# Patient Record
Sex: MALE | Race: WHITE | Employment: OTHER | URBAN - METROPOLITAN AREA
[De-identification: names, ages, dates, MRNs, and addresses within clinical notes are randomized per-mention and may not be internally consistent; named-entity substitution may affect disease eponyms.]

---

## 2017-01-10 RX ORDER — FUROSEMIDE 40 MG/1
TABLET ORAL
Qty: 30 TAB | Refills: 0 | Status: SHIPPED | OUTPATIENT
Start: 2017-01-10 | End: 2017-02-09 | Stop reason: SDUPTHER

## 2017-01-11 ENCOUNTER — HOSPITAL ENCOUNTER (OUTPATIENT)
Dept: LAB | Age: 82
Discharge: HOME | End: 2017-01-11
Payer: MEDICARE

## 2017-01-11 ENCOUNTER — APPOINTMENT (OUTPATIENT)
Dept: INTERNAL MEDICINE CLINIC | Age: 82
End: 2017-01-11

## 2017-01-11 ENCOUNTER — OFFICE VISIT (OUTPATIENT)
Dept: CARDIOLOGY CLINIC | Age: 82
End: 2017-01-11

## 2017-01-11 DIAGNOSIS — R00.0 SINUS TACHYCARDIA: ICD-10-CM

## 2017-01-11 DIAGNOSIS — I49.5 SSS (SICK SINUS SYNDROME) (HCC): ICD-10-CM

## 2017-01-11 DIAGNOSIS — I48.92 ATRIAL FLUTTER, UNSPECIFIED TYPE (HCC): ICD-10-CM

## 2017-01-11 DIAGNOSIS — Z95.0 PACEMAKER: Primary | ICD-10-CM

## 2017-01-11 PROCEDURE — 36415 COLL VENOUS BLD VENIPUNCTURE: CPT

## 2017-01-11 PROCEDURE — 85025 COMPLETE CBC W/AUTO DIFF WBC: CPT

## 2017-01-11 PROCEDURE — 83036 HEMOGLOBIN GLYCOSYLATED A1C: CPT

## 2017-01-11 PROCEDURE — 84550 ASSAY OF BLOOD/URIC ACID: CPT

## 2017-01-11 PROCEDURE — 84153 ASSAY OF PSA TOTAL: CPT

## 2017-01-11 PROCEDURE — 80053 COMPREHEN METABOLIC PANEL: CPT

## 2017-01-11 PROCEDURE — 80061 LIPID PANEL: CPT

## 2017-01-17 ENCOUNTER — OFFICE VISIT (OUTPATIENT)
Dept: INTERNAL MEDICINE CLINIC | Age: 82
End: 2017-01-17

## 2017-01-17 VITALS
HEIGHT: 73 IN | HEART RATE: 72 BPM | TEMPERATURE: 98.2 F | SYSTOLIC BLOOD PRESSURE: 100 MMHG | DIASTOLIC BLOOD PRESSURE: 60 MMHG | BODY MASS INDEX: 32.15 KG/M2 | RESPIRATION RATE: 16 BRPM | OXYGEN SATURATION: 96 % | WEIGHT: 242.6 LBS

## 2017-01-17 DIAGNOSIS — I25.10 CORONARY ARTERY DISEASE INVOLVING NATIVE CORONARY ARTERY OF NATIVE HEART WITHOUT ANGINA PECTORIS: ICD-10-CM

## 2017-01-17 DIAGNOSIS — M10.9 GOUT, UNSPECIFIED CAUSE, UNSPECIFIED CHRONICITY, UNSPECIFIED SITE: ICD-10-CM

## 2017-01-17 DIAGNOSIS — E78.00 HYPERCHOLESTEROLEMIA: ICD-10-CM

## 2017-01-17 DIAGNOSIS — M19.90 SENILE ARTHRITIS: Primary | ICD-10-CM

## 2017-01-17 DIAGNOSIS — I63.9 CEREBROVASCULAR ACCIDENT (CVA), UNSPECIFIED MECHANISM (HCC): ICD-10-CM

## 2017-01-17 DIAGNOSIS — C61 PROSTATE CANCER (HCC): ICD-10-CM

## 2017-01-17 DIAGNOSIS — M17.10 ARTHRITIS OF KNEE: Primary | ICD-10-CM

## 2017-01-17 DIAGNOSIS — J42 CHRONIC BRONCHITIS, UNSPECIFIED CHRONIC BRONCHITIS TYPE (HCC): ICD-10-CM

## 2017-01-17 DIAGNOSIS — R73.9 HYPERGLYCEMIA: ICD-10-CM

## 2017-01-17 RX ORDER — DICLOFENAC SODIUM 10 MG/G
GEL TOPICAL 4 TIMES DAILY
Qty: 1 EACH | Refills: 5 | Status: SHIPPED | OUTPATIENT
Start: 2017-01-17 | End: 2017-03-14 | Stop reason: CLARIF

## 2017-01-17 NOTE — MR AVS SNAPSHOT
Visit Information Date & Time Provider Department Dept. Phone Encounter #  
 1/17/2017  9:00 AM Lora Carvajal, [address] [place] [address],4[address] [phone] [id number] Your Appointments 3/7/2017  9:15 AM  
6 MONTH with Chang Albrecht MD  
[facility] Appt Note: 6 month per Dr. Chino Macedo, no cp  
 [address] [facility]  
[phone] [address] [facility]  
  
    
 4/20/2017  9:15 AM  
PACEMAKER with PACEMAKER, [facility] Appt Note: 6mo bsc dcpm  
 [address] [facility]  
[phone] [address] [facility]  
  
    
 4/20/2017  9:15 AM  
ANNUAL with Karen Craig MD  
[facility] Appt Note: AM APT REQUEST, ANNUAL WITH DEVICE CHECK  
 [address] [facility]  
[phone] [address] [facility] Upcoming Health Maintenance Date Due  
 GLAUCOMA SCREENING Q2Y 9/9/2016 MEDICARE YEARLY EXAM 7/15/2017 DTaP/Tdap/Td series (2 - Td) 7/19/2026 Allergies as of 1/17/2017  Review Complete On: 1/17/2017 By: Rose Marie Winchester Severity Noted Reaction Type Reactions Gadolinium-containing Contrast Media Medium 06/11/2012   Intolerance Other (comments) 1) Moderate to Severe. 2) Post Gadolinium issues as noted: - Diaphoretic, Near Syncope, Nausea and vomiting. Contrast Dye [Iodine]  11/20/2009    Other (comments) Decrease in BP Levaquin [Levofloxacin]  11/20/2009    Other (comments) Joint pain Medrol [Methylprednisolone]  06/18/2015    Vertigo Norvasc [Amlodipine]  03/04/2015    Other (comments) Numbness and tingling Nsaids (Non-steroidal Anti-inflammatory Drug)  03/10/2016    Other (comments) Cough up blood Percocet [Oxycodone-acetaminophen]  12/02/2014    Itching Ranexa [Ranolazine]  04/02/2015    Other (comments) Simvastatin  11/20/2009    Other (comments) Joint pain Zetia [Ezetimibe]  12/11/2009    Myalgia Current Immunizations  Reviewed on 4/14/2016 Name Date Influenza High Dose Vaccine PF 9/29/2016 Influenza Vaccine 10/1/2015, 10/17/2014, 10/21/2013 Influenza Vaccine Split 10/3/2012 Pneumococcal Conjugate (PCV-13) 9/29/2016 Pneumococcal Vaccine (Unspecified Type) 4/14/2015 Td 1/1/2008 Tdap 7/19/2016 Zoster Vaccine, Live 1/1/2012 Not reviewed this visit You Were Diagnosed With   
  
 Codes Comments Arthritis of knee    -  Primary ICD-10-CM: M19.90 ICD-9-CM: 716.96 Chronic bronchitis, unspecified chronic bronchitis type ([facility]     ICD-10-CM: V89 ICD-9-CM: 491.9 Hypercholesterolemia     ICD-10-CM: E78.00 ICD-9-CM: 272.0 Gout, unspecified cause, unspecified chronicity, unspecified site     ICD-10-CM: M10.9 ICD-9-CM: 274.9 Coronary artery disease involving native coronary artery of native heart without angina pectoris     ICD-10-CM: I25.10 ICD-9-CM: 414.01 Prostate cancer [facility])     ICD-10-CM: O35 ICD-9-CM: 769 Cerebrovascular accident (CVA), unspecified mechanism ([facility]     ICD-10-CM: I63.9 ICD-9-CM: 434.91 Vitals BP Pulse Temp Resp Height(growth percentile) Weight(growth percentile) 100/60 (BP 1 Location: Left arm, BP Patient Position: Sitting) 72 98.2 °F (36.8 °C) (Oral) 16 6' 1\" (1.854 m) 242 lb 9.6 oz (110 kg) SpO2 BMI Smoking Status 96% 32.01 kg/m2 Never Smoker Vitals History BMI and BSA Data Body Mass Index Body Surface Area 32.01 kg/m 2 2.38 m 2 Preferred Pharmacy Pharmacy Name Phone [facility] [address], [address] [place] [address] [phone] Your Updated Medication List  
  
   
 This list is accurate as of: 1/17/17  9:48 AM.  Always use your most recent med list.  
  
  
  
  
 albuterol 90 mcg/actuation inhaler Commonly known as:  VENTOLIN HFA Take 2 Puffs by inhalation every four (4) hours as needed for Wheezing. aspirin, buffered 81 mg Tab Take 81 mg by mouth daily. coenzyme q10 10 mg Cap Take 100 mg by mouth every evening. diclofenac 1 % Gel Commonly known as:  VOLTAREN Apply  to affected area four (4) times daily. furosemide 40 mg tablet Commonly known as:  LASIX TAKE ONE TABLET BY MOUTH ONCE DAILY HYDROcodone-acetaminophen 5-325 mg per tablet Commonly known as:  Edin Dolphin Take 1 Tab by mouth every six (6) hours as needed. Max Daily Amount: 4 Tabs. isosorbide mononitrate ER 60 mg CR tablet Commonly known as:  IMDUR  
TAKE ONE TABLET BY MOUTH ONCE DAILY  
  
 metoprolol tartrate 25 mg tablet Commonly known as:  LOPRESSOR Take 0.5 Tabs by mouth two (2) times a day. omeprazole 20 mg capsule Commonly known as:  PRILOSEC  
TAKE ONE CAPSULE BY MOUTH EVERY DAY  
  
 potassium chloride 20 mEq tablet Commonly known as:  K-DUR, KLOR-CON Take 1 Tab by mouth daily. rosuvastatin 10 mg tablet Commonly known as:  CRESTOR Take 1 Tab by mouth every Monday and Thursday. Prescriptions Sent to Pharmacy Refills  
 diclofenac (VOLTAREN) 1 % gel 5 Sig: Apply  to affected area four (4) times daily. Class: Normal  
 Pharmacy: [address] [place] [address] #: [phone] Route: Topical  
  
We Performed the Following PSA DIAGNOSTIC (PROSTATIC SPECIFIC AG) [id number] CPT(R)] To-Do List   
 01/17/2017 Imaging:  XR KNEE RT MAX 2 VWS Please provide this summary of care documentation to your next provider. Your primary care clinician is listed as [place]. If you have any questions after today's visit, please call [phone].

## 2017-01-17 NOTE — PROGRESS NOTES
1. Have you been to the ER, urgent care clinic since your last visit? Hospitalized since your last visit?no    2. Have you seen or consulted any other health care providers outside of the [facility] since your last visit? Include any pap smears or colon screening.  no

## 2017-01-17 NOTE — PROGRESS NOTES
Subjective:    Mr. Cathy Fulton is here for f/u. See A/P. Chief Complaint   Patient presents with    Hypertension    Follow-up     6 month f.u    Knee Pain     pt c.o pains in both knee's; pain today is 7/10    Results     pt want to discuss lab results       He has undergone valve replacement in March 2016 with Dr. Shobha Landon. It is recalled from early 2016 that he saw Dr. Trevor Connelly, for exertional dyspnea, and had cardiac cath. \"My valve is real bad. \"   He has a lot of pain in his R knee, medial aspect, that seems to be overshadowing the pain in L knee. Sees Dr. Bae, and has had corticosteroid injection. Surgery on hold due to heart issues. He continues to have wheezing at times. Uses albuterol prn. Doing better. He has prostate cancer, but doesn't want to treat this. He has had biopsy proven adenocarcinoma of prostate, by Dr. Yael Bishop with [facility]. As we noted before: At this time \"I aint doin' nothin'. If the PSA gets high, then I'll take the shots. \"  From our records, it appears he was referred to [facility] for consideration of radiation therapy. He refused this. Past Medical History: Hyperlipidemia, chronic bronchitis, allergic rhinitis, urolithiasis, hiatal hernia with GERD, ED, Asthma, prostate cancer 2012 (Dr. Yael Bishop). Normal stress echo in 2006; Echo 2010 showed LVH, EF 55-60%. Hemoptysis with negative workup in 2008saw Dr. Silvio Hernandez. EGD in 2009 Dr. Anjana Greenberg showing Hiatal hernia. R lid chronic ptosis noted in 2009 by Dr. Benny Soto, optometrist.  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 Had exertional chest pain in 2012, recurred in 2014--cardiac work up with Dr. Trevor Connelly. His ophthalmologist is Dr. Tristen Ann. Past Surgical History: Hernia in 86. Kidney stone 85. Appy (ruptured) in 79. Resection of melanoma. Colon polypectomy 2011 Dr. Anjana Greenberg.    A flutter ablation 2014  Fernando Menendez 2014 Dr. Contreras Booze repair 2014 Dr. Elkin Maldonado. Aortic valve replacement 2016. Allergies: IVP dye (BP drop). Medications: Prilosec, Albuterol prn, bacterial ointment. Fish oil. Viagra prn  Social History:  . Retired [employer]. Drinks a pint to 1 1/2 pints hard liquor daily, Tobacco, or drugs. Family History: F CVAs. Brother valve disease. Review of systems: Unremarkable except as noted above. Objective:    Visit Vitals    /60 (BP 1 Location: Left arm, BP Patient Position: Sitting)    Pulse 72    Temp 98.2 °F (36.8 °C) (Oral)    Resp 16    Ht 6' 1\" (1.854 m)    Wt 242 lb 9.6 oz (110 kg)    SpO2 96%    BMI 32.01 kg/m2   . General appearance: Pleasant, obese elderly male in NAD. HEENT: PERRLA. EOMI. He has a mild right facial droop. OP moist, pink. Neck: Supple with No LAD. Lungs: CTAB. No wheezes. No rales. Heart: RRR. Pacemaker incision healing well. Abdomen: S, NT, ND, BS +. Extremities: Warm. No C/C/E. Neuro: Sensation intact. Lab Results   Component Value Date/Time    Cholesterol, total 143 01/11/2017 08:11 AM    HDL Cholesterol 47 01/11/2017 08:11 AM    LDL, calculated 70 01/11/2017 08:11 AM    VLDL, calculated 26 01/11/2017 08:11 AM    Triglyceride 132 01/11/2017 08:11 AM    CHOL/HDL Ratio 3.6 09/17/2010 08:27 AM     Lab Results   Component Value Date/Time    Hemoglobin A1c 6.4 01/11/2017 08:11 AM         Assessment / Plan:   1. R knee pain: New complaint. Check XR. Continue tylenol. Try diclofenac gel. 2. Aortic stenosis: s/p AVR. Doing well. 3. CAD: As per Dr. Michele Salmeron  4. A flutter, SSS: now with pacemaker. As per Cardiology. 5. Asthma: Stable. Uses albuterol prn; Less than daily currently. 6. Prostate Cancer: Surveillance for now. Pt willing to do hormonal treatments if PSA rises to higher levels. 7. Problem drinking: He has been cutting back. 8. Hyperlipidemia:  Not at goal, but options limited.   Did not tolerate simvastatin or zetia. Now on fish oil. Will recheck lipids and CMP. Continue pulse dosing of crestor. 9. GERD:  Controlled on prn prilosec; Pt. may continue this. 10. Chronic bronchitis:  Stable. Was seeing Dr. Herbert Malik at one time. 11. ED: Not discussed. 12. All rhinitis: Stable. 13. Possible TIA/Diplopia/Amaurosis fugax: No recent episode. Work up as above. 14. Gout: no recent flares. 15. Borderline DM. Recheck next visit. 16. Hernia: s/p repair by Dr. Tiffany Figueroa. 17. Obesity: Watch diet. More exercise as able (limited now by postsurgical recovery). 18. Prediabetes: Watch labs. 19. Noncompliance. Follow up in 6 months.

## 2017-01-18 ENCOUNTER — TELEPHONE (OUTPATIENT)
Dept: INTERNAL MEDICINE CLINIC | Age: 82
End: 2017-01-18

## 2017-01-18 RX ORDER — ISOSORBIDE MONONITRATE 60 MG/1
TABLET, EXTENDED RELEASE ORAL
Qty: 30 TAB | Refills: 0 | Status: SHIPPED | OUTPATIENT
Start: 2017-01-18 | End: 2017-02-10 | Stop reason: SDUPTHER

## 2017-01-18 NOTE — TELEPHONE ENCOUNTER
Spoke with patient's spouse, Bhumi Keith, which was verified on HIPPA form. Discussed right knee xrays in which was negative for fracture, dislocation, and joint effusion. Only arthritis noted.

## 2017-01-20 ENCOUNTER — TELEPHONE (OUTPATIENT)
Dept: INTERNAL MEDICINE CLINIC | Age: 82
End: 2017-01-20

## 2017-01-28 DIAGNOSIS — E78.00 HYPERCHOLESTEROLEMIA: ICD-10-CM

## 2017-01-30 RX ORDER — ROSUVASTATIN CALCIUM 10 MG/1
TABLET, FILM COATED ORAL
Qty: 24 TAB | Refills: 0 | Status: SHIPPED | OUTPATIENT
Start: 2017-01-30 | End: 2017-02-07 | Stop reason: SDUPTHER

## 2017-02-01 ENCOUNTER — TELEPHONE (OUTPATIENT)
Dept: INTERNAL MEDICINE CLINIC | Age: 82
End: 2017-02-01

## 2017-02-01 RX ORDER — HYDROCODONE BITARTRATE AND ACETAMINOPHEN 5; 325 MG/1; MG/1
1 TABLET ORAL
Qty: 30 TAB | Refills: 0 | Status: SHIPPED | OUTPATIENT
Start: 2017-02-01 | End: 2017-03-14 | Stop reason: CLARIF

## 2017-02-01 NOTE — TELEPHONE ENCOUNTER
Requested Prescriptions     Pending Prescriptions Disp Refills    HYDROcodone-acetaminophen (NORCO) 5-325 mg per tablet 30 Tab 0     Sig: Take 1 Tab by mouth every six (6) hours as needed. Max Daily Amount: 4 Tabs.      Last OV-1/17/17  Next OV-n/s  Med refilled-11/14/16

## 2017-02-01 NOTE — TELEPHONE ENCOUNTER
Spoke to patient and let him know that his prescription is ready for . Let pt know that it is at the .

## 2017-02-01 NOTE — TELEPHONE ENCOUNTER
Pt called and states that he is needing a refill on this medication. Please call pt when hard copy is ready for p/u.

## 2017-02-07 DIAGNOSIS — E78.00 HYPERCHOLESTEROLEMIA: ICD-10-CM

## 2017-02-07 RX ORDER — ROSUVASTATIN CALCIUM 10 MG/1
TABLET, COATED ORAL
Qty: 24 TAB | Refills: 3 | Status: SHIPPED | OUTPATIENT
Start: 2017-02-07 | End: 2017-03-14

## 2017-02-07 RX ORDER — OMEPRAZOLE 20 MG/1
CAPSULE, DELAYED RELEASE ORAL
Qty: 90 CAP | Refills: 3 | Status: SHIPPED | OUTPATIENT
Start: 2017-02-07 | End: 2017-10-24 | Stop reason: SDUPTHER

## 2017-02-09 DIAGNOSIS — I25.10 CORONARY ARTERY DISEASE INVOLVING NATIVE CORONARY ARTERY OF NATIVE HEART WITHOUT ANGINA PECTORIS: Primary | ICD-10-CM

## 2017-02-09 RX ORDER — POTASSIUM CHLORIDE 20 MEQ/1
20 TABLET, EXTENDED RELEASE ORAL DAILY
Qty: 90 TAB | Refills: 3 | Status: SHIPPED | OUTPATIENT
Start: 2017-02-09 | End: 2017-02-10 | Stop reason: SDUPTHER

## 2017-02-09 RX ORDER — FUROSEMIDE 40 MG/1
TABLET ORAL
Qty: 90 TAB | Refills: 3 | Status: SHIPPED | OUTPATIENT
Start: 2017-02-09 | End: 2017-02-10 | Stop reason: SDUPTHER

## 2017-02-10 ENCOUNTER — TELEPHONE (OUTPATIENT)
Dept: CARDIOLOGY CLINIC | Age: 82
End: 2017-02-10

## 2017-02-10 DIAGNOSIS — I25.10 CORONARY ARTERY DISEASE INVOLVING NATIVE CORONARY ARTERY OF NATIVE HEART WITHOUT ANGINA PECTORIS: ICD-10-CM

## 2017-02-10 RX ORDER — METOPROLOL TARTRATE 25 MG/1
12.5 TABLET, FILM COATED ORAL 2 TIMES DAILY
Qty: 180 TAB | Refills: 1 | Status: SHIPPED | COMMUNITY
Start: 2017-02-10 | End: 2017-02-16 | Stop reason: SDUPTHER

## 2017-02-10 RX ORDER — ISOSORBIDE MONONITRATE 60 MG/1
TABLET, EXTENDED RELEASE ORAL
Qty: 90 TAB | Refills: 3 | Status: SHIPPED | COMMUNITY
Start: 2017-02-10 | End: 2017-08-21 | Stop reason: SDUPTHER

## 2017-02-10 RX ORDER — FUROSEMIDE 40 MG/1
TABLET ORAL
Qty: 90 TAB | Refills: 3 | Status: SHIPPED | COMMUNITY
Start: 2017-02-10 | End: 2017-08-21 | Stop reason: SDUPTHER

## 2017-02-10 RX ORDER — POTASSIUM CHLORIDE 20 MEQ/1
20 TABLET, EXTENDED RELEASE ORAL DAILY
Qty: 90 TAB | Refills: 3 | Status: SHIPPED | COMMUNITY
Start: 2017-02-10 | End: 2017-08-21 | Stop reason: SDUPTHER

## 2017-02-10 NOTE — TELEPHONE ENCOUNTER
Called pt,verified pt with two pt identifiers,asked pt if he wants his medications refilled thru [employer]. He said he did, I told him I would get those sent in for him. He verbalized that he understood everything.

## 2017-02-13 RX ORDER — ISOSORBIDE MONONITRATE 60 MG/1
TABLET, EXTENDED RELEASE ORAL
Qty: 30 TAB | Refills: 0 | Status: SHIPPED | OUTPATIENT
Start: 2017-02-13 | End: 2017-03-07 | Stop reason: SDUPTHER

## 2017-02-16 ENCOUNTER — TELEPHONE (OUTPATIENT)
Dept: CARDIOLOGY CLINIC | Age: 82
End: 2017-02-16

## 2017-02-16 RX ORDER — METOPROLOL TARTRATE 25 MG/1
TABLET, FILM COATED ORAL
Qty: 60 TAB | Refills: 0 | Status: SHIPPED | OUTPATIENT
Start: 2017-02-16 | End: 2017-08-21 | Stop reason: SDUPTHER

## 2017-02-16 RX ORDER — METOPROLOL TARTRATE 25 MG/1
12.5 TABLET, FILM COATED ORAL 2 TIMES DAILY
Qty: 30 TAB | Refills: 0 | Status: SHIPPED | OUTPATIENT
Start: 2017-02-16 | End: 2017-02-16 | Stop reason: SDUPTHER

## 2017-02-16 NOTE — TELEPHONE ENCOUNTER
Joana with [facility] [phone] needs refill on metoprolol 25 mg Please call in .thanks Lori Darby

## 2017-03-07 ENCOUNTER — OFFICE VISIT (OUTPATIENT)
Dept: CARDIOLOGY CLINIC | Age: 82
End: 2017-03-07

## 2017-03-07 ENCOUNTER — TELEPHONE (OUTPATIENT)
Dept: CARDIOLOGY CLINIC | Age: 82
End: 2017-03-07

## 2017-03-07 VITALS
HEIGHT: 73 IN | OXYGEN SATURATION: 94 % | DIASTOLIC BLOOD PRESSURE: 70 MMHG | WEIGHT: 241.56 LBS | SYSTOLIC BLOOD PRESSURE: 110 MMHG | HEART RATE: 70 BPM | BODY MASS INDEX: 32.01 KG/M2 | RESPIRATION RATE: 16 BRPM

## 2017-03-07 DIAGNOSIS — I25.10 CORONARY ARTERY DISEASE INVOLVING NATIVE CORONARY ARTERY OF NATIVE HEART WITHOUT ANGINA PECTORIS: ICD-10-CM

## 2017-03-07 DIAGNOSIS — I49.5 SSS (SICK SINUS SYNDROME) (HCC): ICD-10-CM

## 2017-03-07 DIAGNOSIS — I25.10 ASHD (ARTERIOSCLEROTIC HEART DISEASE): ICD-10-CM

## 2017-03-07 DIAGNOSIS — I35.0 AORTIC VALVE STENOSIS, UNSPECIFIED ETIOLOGY: ICD-10-CM

## 2017-03-07 DIAGNOSIS — Z01.810 PRE-OPERATIVE CARDIOVASCULAR EXAMINATION: ICD-10-CM

## 2017-03-07 DIAGNOSIS — E78.00 HYPERCHOLESTEROLEMIA: Primary | ICD-10-CM

## 2017-03-07 RX ORDER — TRAMADOL HYDROCHLORIDE 50 MG/1
TABLET ORAL
COMMUNITY
Start: 2017-02-13 | End: 2017-03-17

## 2017-03-07 NOTE — PROGRESS NOTES
NAME:  Selena Sanchez    :   1935   MRN:   [id number]   PCP:  Thomas Chaudhry MD           Subjective: The patient is a 81y.o. year old male  who presents for cardiac clearance for surgery. Surgical procedures include : an orthopedic procedure (right knee arthroscopy). Since the last visit, patient reports no change in exercise tolerance, chest pain, edema, medication intolerance, palpitations, shortness of breath, PND/orthopnea wheezing, sputum, syncope, dizziness or light headedness. Doing well. Past Medical History:   Diagnosis Date    Adverse effect of anesthesia     difficult with waking up     Allergic rhinitis     Arthritis     Asthma     Atrial flutter ([address] 2014    CAD (coronary artery disease)     Dr. Miguel Cherry    Calculus of kidney     Chest pain 2006    Normal stress echo    Chronic bronchitis     Chronic bronchitis ([facility] 10/3/2012    Chronic pain     lt knee    Erectile dysfunction     GERD (gastroesophageal reflux disease)     Gout 2013    Hemoptysis     Work up by Dr. Dacia Zamora; Negative    Hiatal hernia     Hypercholesterolemia     Hypertension     Melanoma ([facility]     back    Pacemaker     Dr. Megan Jiménez [facility]) 2012    Prostate cancer ([facility]     S/P ablation of atrial flutter 2014    Stroke [facility]) 2009     tia no residual problems    Unspecified sleep apnea     does not use cpap        ICD-10-CM ICD-9-CM    1. Hypercholesterolemia E78.00 272.0 AMB POC EKG ROUTINE W/ 12 LEADS, INTER & REP      Social History   Substance Use Topics    Smoking status: Never Smoker    Smokeless tobacco: Never Used    Alcohol use 1.2 oz/week     2 Standard drinks or equivalent per week      Comment: rare      Family History   Problem Relation Age of Onset    Stroke Father     Heart Disease Mother         Review of Systems  General: Pt denies excessive weight gain or loss.  Pt is able to conduct ADL's  HEENT: Denies blurred vision, headaches, epistaxis and difficulty swallowing. Respiratory: Denies shortness of breath, POLANCO, wheezing or stridor. Cardiovascular: Denies precordial pain, palpitations, edema or PND  Gastrointestinal: Denies poor appetite, indigestion, abdominal pain or blood in stool  . Objective:       Vitals:    03/07/17 0915 03/07/17 0927   BP: 112/70 110/70   Pulse: 70    Resp: 16    SpO2: 94%    Weight: 241 lb 9 oz (109.6 kg)    Height: 6' 1\" (1.854 m)     Body mass index is 31.87 kg/(m^2). General PE  Mental Status - Alert. General Appearance - Not in acute distress. Chest and Lung Exam   Inspection: Accessory muscles - No use of accessory muscles in breathing. Auscultation:   Breath sounds: - Normal.    Cardiovascular   Inspection: Jugular vein - Bilateral - Inspection Normal.  Palpation/Percussion:   Apical Impulse: - Normal.  Auscultation: Rhythm - Regular. Heart Sounds - S1 WNL and S2 WNL. No S3 or S4. Murmurs & Other Heart Sounds: Auscultation of the heart reveals - No Murmurs. Peripheral Vascular   Upper Extremity: Inspection - Bilateral - No Cyanotic nailbeds or Digital clubbing. Lower Extremity:   Palpation: Edema - Bilateral - tr R>L edema        Data Review:     EKG -  Electronic atrial pacemaker   -Left axis -anterior fascicular block.    -Old anteroseptal infarct.    -  Nonspecific T-abnormality.        Results for orders placed or performed during the hospital encounter of 03/16/16   EKG, 12 LEAD, INITIAL   Result Value Ref Range    Ventricular Rate 60 BPM    Atrial Rate 60 BPM    P-R Interval 208 ms    QRS Duration 172 ms    Q-T Interval 528 ms    QTC Calculation (Bezet) 528 ms    Calculated P Axis 71 degrees    Calculated R Axis -72 degrees    Calculated T Axis 37 degrees    Diagnosis       Normal sinus rhythm  Right bundle branch block  Left anterior fascicular block  ** Bifascicular block **  Left ventricular hypertrophy with QRS widening  When compared with ECG of 10-MAR-2016 14:56,  Sinus rhythm has replaced Electronic atrial pacemaker  Right bundle branch block is now present  Minimal criteria for Septal infarct are no longer present  Confirmed by Jose Sesay ([id number]) on 3/16/2016 1:57:21 PM     Results for orders placed or performed in visit on 12/04/14   CARDIAC HOLTER MONITOR, 24 HOURS    Narrative    ECG Monitor/24 hours, Complete    Reason for Holter Monitor   A-FLUTTER    Heartbeat    Slowest 48  Average 63  Fastest  97      Results:   Underlying Rhythm: Normal sinus rhythm      Atrial Arrhythmias: premature atrial contractions; frequent             AV Conduction: normal    Ventricular Arrhythmias: premature ventricular contractions; rare    ST Segment Analysis:normal     Symptom Correlation:  None reported    Comment:   Sinus rhythm with occasional atrial ectopy - morning bradycardia  of 48 bpm and highest heart rate of 97 bpm. Clinical correlation  advised. Nic Bill MD, [facility], [facility]                   Allergies reviewed  Allergies   Allergen Reactions    Gadolinium-Containing Contrast Media Other (comments)     1) Moderate to Severe. 2) Post Gadolinium issues as noted:   - Diaphoretic, Near Syncope, Nausea and vomiting.     Contrast Dye [Iodine] Other (comments)     Decrease in BP    Iodinated Contrast Media - Oral And Iv Dye Other (comments)    Levaquin [Levofloxacin] Other (comments)     Joint pain    Medrol [Methylprednisolone] Vertigo    Norvasc [Amlodipine] Other (comments)     Numbness and tingling    Nsaids (Non-Steroidal Anti-Inflammatory Drug) Other (comments)     Cough up blood      Percocet [Oxycodone-Acetaminophen] Itching    Ranexa [Ranolazine] Other (comments)    Simvastatin Other (comments)     Joint pain    Zetia [Ezetimibe] Myalgia       Medications reviewed  Current Outpatient Prescriptions   Medication Sig    traMADol (ULTRAM) 50 mg tablet     metoprolol tartrate (LOPRESSOR) 25 mg tablet TAKE ONE-HALF TABLET BY MOUTH TWICE DAILY    furosemide (LASIX) 40 mg tablet TAKE ONE TABLET BY MOUTH ONCE DAILY    potassium chloride (K-DUR, KLOR-CON) 20 mEq tablet Take 1 Tab by mouth daily.  isosorbide mononitrate ER (IMDUR) 60 mg CR tablet TAKE ONE TABLET BY MOUTH ONCE DAILY    omeprazole (PRILOSEC) 20 mg capsule TAKE ONE CAPSULE BY MOUTH EVERY DAY    rosuvastatin (CRESTOR) 10 mg tablet TAKE 1 TABLET BY MOUTH EVERY MONDAY AND THURSDAY    coenzyme q10 10 mg cap Take 100 mg by mouth every evening.  albuterol (VENTOLIN HFA) 90 mcg/actuation inhaler Take 2 Puffs by inhalation every four (4) hours as needed for Wheezing.  Aspirin, Buffered 81 mg tab Take 81 mg by mouth daily.  HYDROcodone-acetaminophen (NORCO) 5-325 mg per tablet Take 1 Tab by mouth every six (6) hours as needed. Max Daily Amount: 4 Tabs.  diclofenac (VOLTAREN) 1 % gel Apply  to affected area four (4) times daily. No current facility-administered medications for this visit. Assessment:       ICD-10-CM ICD-9-CM    1.  Hypercholesterolemia E78.00 272.0 AMB POC EKG ROUTINE W/ 12 LEADS, INTER & REP        Orders Placed This Encounter    AMB POC EKG ROUTINE W/ 12 LEADS, INTER & REP     Order Specific Question:   Reason for Exam:     Answer:   routine    traMADol (ULTRAM) 50 mg tablet       Patient Active Problem List   Diagnosis Code    Hypercholesterolemia E78.00    Stroke ([facility] I63.9    Asthma J45.909    Calculus of kidney N20.0    Erectile dysfunction N52.9    GERD (gastroesophageal reflux disease) K21.9    Pro[facility] cancer ([facility] C61    Chronic bronchitis ([facility]) J42    Allergic rhinitis J30.9    Gout M10.9    H/O TIA (transient ischemic attack) and stroke Z86.73    Chest pain R07.9    Sinus tachycardia R00.0    Atrial flutter ([facility]) I48.92    S/P ablation of atrial flutter Z98.890, Z86.79    Aortic stenosis I35.0    SOB (shortness of breath) R06.02    SSS (sick sinus syndrome) ([facility]) I49.5    MARYLOU (obstructive sleep apnea) G47.33    Pacemaker Z95.0    CAD (coronary artery disease) I25.10    ASHD (arteriosclerotic heart disease) I25.10    S/P AVR (aortic valve replacement) Z95.2    Irregular heartbeat I49.9           Plan:     Patient presents for cardiac clearance for surgery. Follow up in 6 mo.       1. S/p bioprosthetic aortic valve replacement. Doing very well. No significant CAD at time pre-op cardiac cath. Medical treatment.     2. Hyperlipidemia: on statin. Last FLP noted.     3. S/p a. Flutter ablation, sss, s/p PPM: f/u with EP.  12% V paced, no events per remote check 2.17.   4. MARYLOU: not using CPAP.    5. Clearance: stable cardiac wise - he is CLEARED for surgery. BRYAN Mancia       Pt seen and examined in details. See NP A&P for details. He will be at low CV risk during non cardiac surgery. No further cardiac work up is needed for further risk stratification or modification.      Stanislav Sharma MD

## 2017-03-07 NOTE — TELEPHONE ENCOUNTER
Faxed note stating that pt is cleared for low risk for non cardiac surgery per .  Faxed to [facility] at [phone]

## 2017-03-07 NOTE — MR AVS SNAPSHOT
Visit Information Date & Time Provider Department Dept. Phone Encounter #  
 3/7/2017  9:15 AM Elizabet Goldberg, 1024 [facility] [phone] Follow-up Instructions Return in about 6 months (around 9/7/2017). Your Appointments 4/20/2017  9:15 AM  
PACEMAKER with PACEMAKER, [facility] [address] [facility]) Appt Note: 6mo bsc dcpm  
 [address]  
[phone] [address]  
  
    
 4/20/2017  9:15 AM  
ANNUAL with Marisol Morse MD  
[facility] [address] [facility]) Appt Note: AM APT REQUEST, ANNUAL WITH DEVICE CHECK  
 [address]  
[phone] [address]  
  
    
  
 4/12/2017  8:00 AM  
REMOTE OFFICE VISIT with [facility]) Appt Note: NOT AN OFFICE VISIT - REMOTE BSC PM  
 [address]  
[phone] [address] Upcoming Health Maintenance Date Due  
 GLAUCOMA SCREENING Q2Y 9/9/2016 MEDICARE YEARLY EXAM 7/15/2017 DTaP/Tdap/Td series (2 - Td) 7/19/2026 Allergies as of 3/7/2017  Review Complete On: 3/7/2017 By: Elizabet Goldberg MD  
  
 Severity Noted Reaction Type Reactions Gadolinium-containing Contrast Media Medium 06/11/2012   Intolerance Other (comments) 1) Moderate to Severe. 2) Post Gadolinium issues as noted: - Diaphoretic, Near Syncope, Nausea and vomiting. Contrast Dye [Iodine]  11/20/2009    Other (comments) Decrease in BP Iodinated Contrast Media - Oral And Iv Dye  03/07/2017    Other (comments) Levaquin [Levofloxacin]  11/20/2009    Other (comments) Joint pain Medrol [Methylprednisolone]  06/18/2015    Vertigo Norvasc [Amlodipine]  03/04/2015    Other (comments) Numbness and tingling Nsaids (Non-steroidal Anti-inflammatory Drug)  03/10/2016    Other (comments) Cough up blood Percocet [Oxycodone-acetaminophen]  12/02/2014    Itching Ranexa [Ranolazine]  04/02/2015    Other (comments) Simvastatin  11/20/2009    Other (comments) Joint pain Zetia [Ezetimibe]  12/11/2009    Myalgia Current Immunizations  Reviewed on 4/14/2016 Name Date Influenza High Dose Vaccine PF 9/29/2016 Influenza Vaccine 10/1/2015, 10/17/2014, 10/21/2013 Influenza Vaccine Split 10/3/2012 Pneumococcal Conjugate (PCV-13) 9/29/2016 Pneumococcal Vaccine (Unspecified Type) 4/14/2015 Td 1/1/2008 Tdap 7/19/2016 Zoster Vaccine, Live 1/1/2012 Not reviewed this visit You Were Diagnosed With   
  
 Codes Comments Hypercholesterolemia    -  Primary ICD-10-CM: E78.00 ICD-9-CM: 272.0 Aortic valve stenosis, unspecified etiology     ICD-10-CM: I35.0 ICD-9-CM: 424.1 ASHD (arteriosclerotic heart disease)     ICD-10-CM: I25.10 ICD-9-CM: 414.00 Coronary artery disease involving native coronary artery of native heart without angina pectoris     ICD-10-CM: I25.10 ICD-9-CM: 414.01   
 SSS (sick sinus syndrome) (HCC)     ICD-10-CM: I49.5 ICD-9-CM: 427.81 Pre-operative cardiovascular examination     ICD-10-CM: Z01.810 ICD-9-CM: V72.81 Vitals BP Pulse Resp Height(growth percentile) Weight(growth percentile) SpO2  
 110/70 (BP 1 Location: Left arm, BP Patient Position: Sitting) 70 16 6' 1\" (1.854 m) 241 lb 9 oz (109.6 kg) 94% BMI Smoking Status 31.87 kg/m2 Never Smoker Vitals History BMI and BSA Data Body Mass Index Body Surface Area  
 31.87 kg/m 2 2.38 m 2 Preferred Pharmacy Pharmacy Name Phone [facility] [address], [address] [place] [address] [phone] Your Updated Medication List  
  
   
 This list is accurate as of: 3/7/17  9:48 AM.  Always use your most recent med list.  
  
  
  
  
 albuterol 90 mcg/actuation inhaler Commonly known as:  VENTOLIN HFA Take 2 Puffs by inhalation every four (4) hours as needed for Wheezing. aspirin, buffered 81 mg Tab Take 81 mg by mouth daily. coenzyme q10 10 mg Cap Take 100 mg by mouth every evening. diclofenac 1 % Gel Commonly known as:  VOLTAREN Apply  to affected area four (4) times daily. furosemide 40 mg tablet Commonly known as:  LASIX TAKE ONE TABLET BY MOUTH ONCE DAILY HYDROcodone-acetaminophen 5-325 mg per tablet Commonly known as:  Sangita Rodney Take 1 Tab by mouth every six (6) hours as needed. Max Daily Amount: 4 Tabs. isosorbide mononitrate ER 60 mg CR tablet Commonly known as:  IMDUR  
TAKE ONE TABLET BY MOUTH ONCE DAILY  
  
 metoprolol tartrate 25 mg tablet Commonly known as:  LOPRESSOR  
TAKE ONE-HALF TABLET BY MOUTH TWICE DAILY  
  
 omeprazole 20 mg capsule Commonly known as:  PRILOSEC  
TAKE ONE CAPSULE BY MOUTH EVERY DAY  
  
 potassium chloride 20 mEq tablet Commonly known as:  K-DUR, KLOR-CON Take 1 Tab by mouth daily. rosuvastatin 10 mg tablet Commonly known as:  CRESTOR  
TAKE 1 TABLET BY MOUTH EVERY MONDAY AND THURSDAY  
  
 traMADol 50 mg tablet Commonly known as:  ULTRAM  
  
  
  
  
We Performed the Following AMB POC EKG ROUTINE W/ 12 LEADS, INTER & REP [26365 CPT(R)] Follow-up Instructions Return in about 6 months (around 9/7/2017). Please provide this summary of care documentation to your next provider. Your primary care clinician is listed as [place]. If you have any questions after today's visit, please call [phone].

## 2017-03-14 ENCOUNTER — HOSPITAL ENCOUNTER (OUTPATIENT)
Dept: PREADMISSION TESTING | Age: 82
Discharge: HOME | End: 2017-03-14
Attending: ORTHOPAEDIC SURGERY
Payer: MEDICARE

## 2017-03-14 VITALS
BODY MASS INDEX: 31.68 KG/M2 | DIASTOLIC BLOOD PRESSURE: 71 MMHG | RESPIRATION RATE: 20 BRPM | SYSTOLIC BLOOD PRESSURE: 119 MMHG | OXYGEN SATURATION: 94 % | WEIGHT: 239 LBS | HEIGHT: 73 IN | TEMPERATURE: 97.6 F | HEART RATE: 69 BPM

## 2017-03-14 LAB
ABO + RH BLD: NORMAL
ALBUMIN SERPL BCP-MCNC: 3.6 G/DL (ref 3.5–5)
ALBUMIN/GLOB SERPL: 1 {RATIO} (ref 1.1–2.2)
ALP SERPL-CCNC: 77 U/L (ref 45–117)
ALT SERPL-CCNC: 23 U/L (ref 12–78)
ANION GAP BLD CALC-SCNC: 8 MMOL/L (ref 5–15)
APPEARANCE UR: CLEAR
AST SERPL W P-5'-P-CCNC: 19 U/L (ref 15–37)
BACTERIA URNS QL MICRO: NEGATIVE /HPF
BILIRUB SERPL-MCNC: 0.5 MG/DL (ref 0.2–1)
BILIRUB UR QL: NEGATIVE
BLOOD GROUP ANTIBODIES SERPL: NORMAL
BUN SERPL-MCNC: 22 MG/DL (ref 6–20)
BUN/CREAT SERPL: 18 (ref 12–20)
CALCIUM SERPL-MCNC: 8.6 MG/DL (ref 8.5–10.1)
CHLORIDE SERPL-SCNC: 104 MMOL/L (ref 97–108)
CO2 SERPL-SCNC: 27 MMOL/L (ref 21–32)
COLOR UR: ABNORMAL
CREAT SERPL-MCNC: 1.21 MG/DL (ref 0.7–1.3)
EPITH CASTS URNS QL MICRO: ABNORMAL /LPF
ERYTHROCYTE [DISTWIDTH] IN BLOOD BY AUTOMATED COUNT: 12.7 % (ref 11.5–14.5)
EST. AVERAGE GLUCOSE BLD GHB EST-MCNC: 140 MG/DL
GLOBULIN SER CALC-MCNC: 3.7 G/DL (ref 2–4)
GLUCOSE SERPL-MCNC: 100 MG/DL (ref 65–100)
GLUCOSE UR STRIP.AUTO-MCNC: NEGATIVE MG/DL
HBA1C MFR BLD: 6.5 % (ref 4.2–6.3)
HCT VFR BLD AUTO: 43.5 % (ref 36.6–50.3)
HGB BLD-MCNC: 14.5 G/DL (ref 12.1–17)
HGB UR QL STRIP: NEGATIVE
INR PPP: 1.1 (ref 0.9–1.1)
KETONES UR QL STRIP.AUTO: NEGATIVE MG/DL
LEUKOCYTE ESTERASE UR QL STRIP.AUTO: ABNORMAL
MCH RBC QN AUTO: 31.7 PG (ref 26–34)
MCHC RBC AUTO-ENTMCNC: 33.3 G/DL (ref 30–36.5)
MCV RBC AUTO: 95 FL (ref 80–99)
NITRITE UR QL STRIP.AUTO: NEGATIVE
PH UR STRIP: 5 [PH] (ref 5–8)
PLATELET # BLD AUTO: 235 K/UL (ref 150–400)
POTASSIUM SERPL-SCNC: 4 MMOL/L (ref 3.5–5.1)
PROT SERPL-MCNC: 7.3 G/DL (ref 6.4–8.2)
PROT UR STRIP-MCNC: NEGATIVE MG/DL
PROTHROMBIN TIME: 10.9 SEC (ref 9–11.1)
RBC # BLD AUTO: 4.58 M/UL (ref 4.1–5.7)
RBC #/AREA URNS HPF: ABNORMAL /HPF (ref 0–5)
SODIUM SERPL-SCNC: 139 MMOL/L (ref 136–145)
SP GR UR REFRACTOMETRY: 1.01 (ref 1–1.03)
SPECIMEN EXP DATE BLD: NORMAL
UA: UC IF INDICATED,UAUC: ABNORMAL
UROBILINOGEN UR QL STRIP.AUTO: 0.2 EU/DL (ref 0.2–1)
WBC # BLD AUTO: 8.1 K/UL (ref 4.1–11.1)
WBC URNS QL MICRO: ABNORMAL /HPF (ref 0–4)

## 2017-03-14 PROCEDURE — 97162 PT EVAL MOD COMPLEX 30 MIN: CPT

## 2017-03-14 PROCEDURE — 97161 PT EVAL LOW COMPLEX 20 MIN: CPT

## 2017-03-14 PROCEDURE — 80053 COMPREHEN METABOLIC PANEL: CPT | Performed by: ORTHOPAEDIC SURGERY

## 2017-03-14 PROCEDURE — 81001 URINALYSIS AUTO W/SCOPE: CPT | Performed by: ORTHOPAEDIC SURGERY

## 2017-03-14 PROCEDURE — 85610 PROTHROMBIN TIME: CPT | Performed by: ORTHOPAEDIC SURGERY

## 2017-03-14 PROCEDURE — G8980 MOBILITY D/C STATUS: HCPCS

## 2017-03-14 PROCEDURE — 36415 COLL VENOUS BLD VENIPUNCTURE: CPT | Performed by: ORTHOPAEDIC SURGERY

## 2017-03-14 PROCEDURE — 86900 BLOOD TYPING SEROLOGIC ABO: CPT | Performed by: ORTHOPAEDIC SURGERY

## 2017-03-14 PROCEDURE — 85027 COMPLETE CBC AUTOMATED: CPT | Performed by: ORTHOPAEDIC SURGERY

## 2017-03-14 PROCEDURE — G8978 MOBILITY CURRENT STATUS: HCPCS

## 2017-03-14 PROCEDURE — 97530 THERAPEUTIC ACTIVITIES: CPT

## 2017-03-14 PROCEDURE — G8979 MOBILITY GOAL STATUS: HCPCS

## 2017-03-14 PROCEDURE — 83036 HEMOGLOBIN GLYCOSYLATED A1C: CPT | Performed by: ORTHOPAEDIC SURGERY

## 2017-03-14 RX ORDER — PREGABALIN 75 MG/1
75 CAPSULE ORAL ONCE
Status: CANCELLED | OUTPATIENT
Start: 2017-03-28 | End: 2017-03-28

## 2017-03-14 RX ORDER — CEFAZOLIN SODIUM IN 0.9 % NACL 2 G/100 ML
2 PLASTIC BAG, INJECTION (ML) INTRAVENOUS ONCE
Status: CANCELLED | OUTPATIENT
Start: 2017-03-28 | End: 2017-03-28

## 2017-03-14 RX ORDER — SODIUM CHLORIDE, SODIUM LACTATE, POTASSIUM CHLORIDE, CALCIUM CHLORIDE 600; 310; 30; 20 MG/100ML; MG/100ML; MG/100ML; MG/100ML
25 INJECTION, SOLUTION INTRAVENOUS CONTINUOUS
Status: CANCELLED | OUTPATIENT
Start: 2017-03-28

## 2017-03-14 RX ORDER — ROSUVASTATIN CALCIUM 10 MG/1
10 TABLET, COATED ORAL
COMMUNITY
End: 2018-02-14 | Stop reason: SDUPTHER

## 2017-03-14 NOTE — PERIOP NOTES
Left message with Dr. Toney Barton office/Lucía concerning pre-op medications (acetaminophen,celebrex) flag with multiple allergy history.

## 2017-03-14 NOTE — PERIOP NOTES
[facility]  Preoperative Instructions        Surgery Date 03/28/17*          Time of Arrival 0600  Contact # [phone] home    1. On the day of your surgery, please report to the Surgical Services Registration Desk and sign in at your designated time. The Surgery Center is located to the right of the Emergency Room. 2. You must have someone with you to drive you home. You should not drive a car for 24 hours following surgery. Please make arrangements for a friend or family member to stay with you for the first 24 hours after your surgery. 3. Do not have anything to eat or drink (including water, gum, mints, coffee, juice) after midnight         . This may not apply to medications prescribed by your physician. Please note special instructions, if applicable. If you are currently taking Plavix, Coumadin, or other blood-thinning agents, contact your surgeon for instructions. 4. We recommend you do not drink any alcoholic beverages for 24 hours before and after your surgery. 5. Have a list of all current medications, including vitamins, herbal supplements and any other over the counter medications. Stop all Aspirin and non-steroidal anti-inflammatory drugs (I.e. Advil, Aleve), as directed by your surgeon's office. Stop all vitamins and herbal supplements seven days prior to your surgery. 6. Wear comfortable clothes. Wear glasses instead of contacts. Do not bring any money or jewelry. Please bring picture ID, insurance card, and any prearranged co-payment or hospital payment. Do not wear make-up, particularly mascara the morning of your surgery. Do not wear nail polish, particularly if you are having foot /hand surgery. Wear your hair loose or down, no ponytails, buns, danisha pins or clips. All body piercings must be removed.   Please shower with antibacterial soap for three consecutive days before and on the morning of surgery, but do not apply any lotions, powders or deodorants after the shower on the day of surgery. Please use a fresh towels after each shower. Please sleep in clean clothes and change bed linens the night before surgery. Please do not shave for 48 hours prior to surgery. Shaving of the face is acceptable. 7. You should understand that if you do not follow these instructions your surgery may be cancelled. If your physical condition changes (I.e. fever, cold or flu) please contact your surgeon as soon as possible. 8. It is important that you be on time. If a situation occurs where you may be late, please call [phone] (OR Holding Area). 9. If you have any questions and or problems, please call [phone] (Pre-admission Testing). 10. Your surgery time may be subject to change. You will receive a phone call the evening prior if your time changes. 11.  If having outpatient surgery, you must have someone to drive you here, stay with you during the duration of your stay, and to drive you home at time of discharge. Special Instructions:    MEDICATIONS TO TAKE THE MORNING OF SURGERY WITH A SIP OF WATER:tramadol as needed,omeprazole,  metoprolol, please bring inhaler to the hospital----use if needed    I understand a pre-operative phone call will be made to verify my surgery time. In the event that I am not available, I give permission for a message to be left on my answering service and/or with another person?   yes         ___________________      __________   _________    (Signature of Patient)             (Witness)                (Date and Time)

## 2017-03-14 NOTE — PROGRESS NOTES
[facility]  Physical Therapy Pre-surgery evaluation  [address] [facility], [address] [facility]    physical Therapy pre TKR surgery EVALUATION  Patient: Dayday Fisher Sr. (81 y.o. 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 Discussed anticipated disposition to home with possible discharge within a 1 to 2 day time frame post-surgery. Patient and  in agreement. GOALS: (Goals have been discussed and agreed upon with patient.)  DISCHARGE GOALS: Time Frame: 1 DAY  1. Patient will demonstrate increased strength, range of motion, and pain control via a home exercise program in order to minimize functional deficits in preparation for their upcoming surgery. This will be achieved by using education, demonstration and through the use of an informational handout including a home exercise program.  REHABILITATION POTENTIAL FOR STATED GOALS: excellent     RECOMMENDATIONS AND PLANNED INTERVENTIONS: (Benefits and precautions of physical therapy have been discussed with the patient.)  1. Home Exercise Program  TREATMENT PLAN EFFECTIVE DATES: 3/14/2017 TO 3/14/2017  FREQUENCY/DURATION: Patient to continue to perform home exercise program at least twice daily until his surgery. SUBJECTIVE:   Patient stated I climb ladders and everything.   Patient [x]   does  []   does not state signs/symptoms of shortness of breath/dyspnea on exertion/respiratory distress.     OBJECTIVE DATA SUMMARY:   HISTORY:    Past Medical History:   Diagnosis Date    Adverse effect of anesthesia     difficult with waking up     Allergic rhinitis     Arthritis     Asthma     Atrial flutter ([facility] [address] 9/12/2014    CAD (coronary artery disease)      Wilber    Calculus of kidney     Chest pain 2006    Normal stress echo    Chronic bronchitis     Chronic bronchitis ([facility] [address] 10/3/2012    Chronic pain     lt knee    Erectile dysfunction     GERD (gastroesophageal reflux disease)     Gout 1/24/2013    Hemoptysis 2008    Work up by Dr. Lucio Cassidy; Negative    Hiatal hernia     Hypercholesterolemia     Hypertension     Melanoma ([facility] [address]     back    Pacemaker     Dr. Nancy Saha    Prostate cancer [facility]) 6/4/2012    Prostate cancer ([facility]     S/P ablation of atrial flutter 9/12/2014    Stroke [facility]) 2009     tia no residual problems    Unspecified sleep apnea     does not use cpap     Past Surgical History:   Procedure Laterality Date    HX AFIB ABLATION  2014    Dr. Loida Alanis    ruptured appendix    HX HEART CATHETERIZATION      X2    [address]    HX HERNIA REPAIR  12/17/14    Recurrent left inguinal hernia; Dr. Mayiln Barrera      5 hernia repairs total    HX PACEMAKER      HX PACEMAKER PLACEMENT  2014    Dr. China Delvalle    Kidney stone extraction    ID COLSC FLX W/RMVL OF TUMOR POLYP LESION SNARE TQ  4/21/2011          Prior Level of Function/Home Situation: patient lives with his wife who ambulates with a SPC due to spinal stenosis. Patient occasionally uses a SPC for ambulation. He reports being fairly active, doing most of the household and yard work. He is retired although still drives and is active. He denies history of fals. He reports heart valve replacement and pacemaker placement one year ago, with some SOB then.    Personal factors and/or comorbidities impacting plan of care:       Home Situation  Home Environment: Private residence  # Steps to Enter: 2  Rails to Enter: Yes  Hand Rails : Right  One/Two Story Residence: One story  Living Alone: No  Support Systems: Spouse/Significant Other/Partner  Patient Expects to be Discharged to[de-identified] Private residence  Current DME Used/Available at Home: Cane, straight, Crutches, Grab bars, [facility]ower [facility]  Tub or Shower Type: Tub/Shower combination    EXAMINATION/PRESENTATION/DECISION MAKING:     ADLs (Current Functional Status): Bathing/Showering:   [x] Independent  [] Requires Assistance from Someone  [] Sponge Bath Only   Ambulation:  [x] Independent  [] Walk Indoors Only  [] Walk Outdoors  [x] Use Assistive [address] occasionally  [] Use Wheelchair Only     Dressing:  [x] [address] from Someone for:  [] Sock/Shoes  [] Pants  [] Everything   Household Activities:  [x] Routine house and yard work  [] Light Housework Only  [] None       Critical Behavior:                Strength:    Strength: Within functional limits                    Tone & Sensation:   Tone: Normal              Sensation: Intact               Range Of Motion:  AROM: Generally decreased, functional           PROM: Within functional limits           Coordination:  Coordination: Within functional limits    Functional Mobility:  Transfers:  Sit to Stand: Modified independent  Stand to Sit: Modified independent                       Balance:   Sitting: Intact, Without support  Standing: Intact, With support  Ambulation/Gait Training:  Distance (ft): 60 Feet (ft)  Assistive Device: Gait belt, Cane, straight  Ambulation - Level of Assistance: Modified independent     Gait Description (WDL): Exceptions to WDL  Gait Abnormalities: Antalgic        Base of Support: Widened     Speed/Chantell: Pace decreased (<100 feet/min)  Step Length: Right shortened, Left shortened                    Therapeutic Exercises:   The patient was educated in, has demonstrated, and has received written instructions to complete for their home exercise program per total knee replacement protocol.       Functional Measure:  Lower Extremity Functional Scale (LEFS):      Score 39/80     Percentage of impairment CH  0% CI  1-19% CJ  20-39% CK  40-59% CL  60-79% CM  80-99% CN  100%   LEFS score:  0-80 80 64-79 47-63 31-46 16-30 1-15 0     Cutt-offs: None established  TKA and GEORGE:   (Devang et al, 2000)  MDC = 9 points   MCID = 9 points     In compliance with CMSs Claims Based Outcome Reporting, the following G-code set was chosen for this patient based on their primary functional limitation being treated: The outcome measure chosen to determine the severity of the functional limitation was the LEFS with a score of 39/80 which was correlated with the impairment scale. ? Mobility - Walking and Moving Around:     - CURRENT STATUS: CK - 40%-59% impaired, limited or restricted    - GOAL STATUS: CK - 40%-59% impaired, limited or restricted    - D/C STATUS:  CK - 40%-59% impaired, limited or restricted       Pain:                      Activity Tolerance:   Good, O2 sats 93% on RA,  bpm after activity. COMMUNICATION/EDUCATION:   The patient was educated on:  [x]         Importance of post-operative mobility to achieve their desired outcomes and restore biological function  [x]         The key post-operative time frame to address ROM to prevent additional complications    The patients plan of care was discussed with:   [x]         The patient verbalized understanding of his plan in preparation for their upcoming surgery  [x]         The patient's  was present for this session  []         The patient reports that he/she does not have a  identified at this time  [x]         The  verbalized understanding of the education regarding the patient's upcoming surgery  [x]         Patient/family agree to work toward stated goals and plan of care. []         Patient understands intent and goals of therapy, but is neutral about his/her participation. []         Patient is unable to participate in goal setting and plan of care.     Thank you for this referral.  Agustín Rico, PT, DPT   Time Calculation: 23 mins

## 2017-03-15 LAB
BACTERIA SPEC CULT: NORMAL
BACTERIA SPEC CULT: NORMAL
SERVICE CMNT-IMP: NORMAL

## 2017-03-22 NOTE — PERIOP NOTES
Follow up call to confirm ordered medications for day of surgery and allergy hx. Any change in orders?

## 2017-04-12 ENCOUNTER — OFFICE VISIT (OUTPATIENT)
Dept: CARDIOLOGY CLINIC | Age: 82
End: 2017-04-12

## 2017-04-12 DIAGNOSIS — Z95.0 PACEMAKER: Primary | ICD-10-CM

## 2017-04-12 DIAGNOSIS — R00.0 SINUS TACHYCARDIA: ICD-10-CM

## 2017-04-12 DIAGNOSIS — I49.5 SSS (SICK SINUS SYNDROME) (HCC): ICD-10-CM

## 2017-04-20 ENCOUNTER — OFFICE VISIT (OUTPATIENT)
Dept: CARDIOLOGY CLINIC | Age: 82
End: 2017-04-20

## 2017-04-20 ENCOUNTER — CLINICAL SUPPORT (OUTPATIENT)
Dept: CARDIOLOGY CLINIC | Age: 82
End: 2017-04-20

## 2017-04-20 VITALS
WEIGHT: 240.9 LBS | HEIGHT: 73 IN | SYSTOLIC BLOOD PRESSURE: 116 MMHG | DIASTOLIC BLOOD PRESSURE: 70 MMHG | BODY MASS INDEX: 31.93 KG/M2 | RESPIRATION RATE: 18 BRPM | OXYGEN SATURATION: 95 % | HEART RATE: 72 BPM

## 2017-04-20 DIAGNOSIS — R06.02 SOB (SHORTNESS OF BREATH): ICD-10-CM

## 2017-04-20 DIAGNOSIS — Z95.0 PACEMAKER: Primary | ICD-10-CM

## 2017-04-20 DIAGNOSIS — I25.10 ASHD (ARTERIOSCLEROTIC HEART DISEASE): Primary | ICD-10-CM

## 2017-04-20 DIAGNOSIS — I48.92 ATRIAL FLUTTER, UNSPECIFIED TYPE (HCC): ICD-10-CM

## 2017-04-20 DIAGNOSIS — I49.5 SSS (SICK SINUS SYNDROME) (HCC): ICD-10-CM

## 2017-04-20 DIAGNOSIS — E78.00 HYPERCHOLESTEROLEMIA: ICD-10-CM

## 2017-04-20 RX ORDER — TRAMADOL HYDROCHLORIDE 50 MG/1
50 TABLET ORAL
COMMUNITY
Start: 2017-03-13 | End: 2021-02-01 | Stop reason: SDUPTHER

## 2017-04-20 NOTE — PROGRESS NOTES
Subjective:      Mayelin Manzano is a 81 y.o. male is here for annual visit and device check. He reports shortness of breath and chest pain after lying down at nighttime, he is seeing surgery regarding hernia repair. He notes LE edema toward the end of the day but has been cutting his lasix dose in half lately. The patient denies orthopnea, PND, palpitations, syncope, presyncope or fatigue. Patient Active Problem List    Diagnosis Date Noted    Irregular heartbeat 05/03/2016    S/P AVR (aortic valve replacement) 03/16/2016    ASHD (arteriosclerotic heart disease) 01/19/2016    CAD (coronary artery disease) 06/05/2015    Pacemaker 05/19/2015    MARYLOU (obstructive sleep apnea) 12/23/2014    SSS (sick sinus syndrome) ([address] 12/11/2014    Aortic stenosis 11/04/2014    SOB (shortness of breath) 11/04/2014    Atrial flutter ([address] 09/12/2014    S/P ablation of atrial flutter 09/12/2014    H/O TIA (transient ischemic attack) and stroke 09/11/2014    Chest pain 09/11/2014    Sinus tachycardia 09/11/2014    Gout 01/24/2013    Chronic bronchitis (HCC) 10/03/2012    Allergic rhinitis 10/03/2012    Prostate cancer ([address] 06/04/2012    Hypercholesterolemia     Stroke ([address]     Asthma     Calculus of kidney     Erectile dysfunction     GERD (gastroesophageal reflux disease)       Sanjiv Samaniego MD  Past Medical History:   Diagnosis Date    Adverse effect of anesthesia     difficult with waking up     Allergic rhinitis     Arthritis     Asthma     Atrial flutter ([address] 9/12/2014    CAD (coronary artery disease)     Dr. Dillan Steven Calculus of kidney     Chest pain 2006    Normal stress echo    Chronic bronchitis     Chronic bronchitis ([address] 10/3/2012    Chronic pain     knees    Erectile dysfunction     GERD (gastroesophageal reflux disease)     hiatal hernia    Gout 1/24/2013    Hemoptysis 2008    Work up by Dr. Rahul Schmitt;  Negative    Hiatal hernia     Hypercholesterolemia     Hypertension     Ill-defined condition     bronchitis    Melanoma ([facility]     back    Nausea & vomiting     Pacemaker     Dr. Sakina Wolff [facility]) 6/4/2012    S/P ablation of atrial flutter 9/12/2014    Sleep apnea     states never had test for apnea    Stroke [facility]) 2009     tia no residual problems      Past Surgical History:   Procedure Laterality Date    CARDIAC SURG PROCEDURE UNLIST      ablation    HX AFIB ABLATION  2014    Dr. Rebecca Ray    ruptured appendix    HX HEART CATHETERIZATION      [address]    HX HERNIA REPAIR  12/17/14    Recurrent left inguinal hernia; Dr. Cuellar Patella      5 hernia repairs total    HX PACEMAKER  12/2014    HX PACEMAKER PLACEMENT  2014    Dr. Ok Godoy    Kidney stone extraction    AR COLSC FLX W/RMVL OF TUMOR POLYP LESION SNARE TQ  4/21/2011          Allergies   Allergen Reactions    Gadolinium-Containing Contrast Media Other (comments)     1) Moderate to Severe. 2) Post Gadolinium issues as noted:   - Diaphoretic, Near Syncope, Nausea and vomiting.     Acetaminophen Other (comments)     Indigestion,\"burning sensation\"    Contrast Dye [Iodine] Other (comments)     Decrease in BP    Levaquin [Levofloxacin] Other (comments)     Joint pain    Medrol [Methylprednisolone] Vertigo    Norvasc [Amlodipine] Other (comments)     Numbness and tingling    Nsaids (Non-Steroidal Anti-Inflammatory Drug) Other (comments)     Cough up blood      Percocet [Oxycodone-Acetaminophen] Itching    Ranexa [Ranolazine] Other (comments)     Cannot remember    Simvastatin Other (comments)     Joint pain    Voltaren [Diclofenac Sodium] Other (comments)     unknown    Zetia [Ezetimibe] Myalgia      Family History   Problem Relation Age of Onset    Stroke Father     Heart Disease Mother     negative for cardiac disease  Social History     Social History    Marital status:      Spouse name: N/A    Number of children: N/A    Years of education: N/A     Social History Main Topics    Smoking status: Never Smoker    Smokeless tobacco: Never Used    Alcohol use 1.8 oz/week     1 Shots of liquor, 2 Standard drinks or equivalent per week      Comment: rare    Drug use: No    Sexual activity: Yes     Partners: Female     Other Topics Concern    None     Social History Narrative     Current Outpatient Prescriptions   Medication Sig    TRAMADOL HCL (TRAMADOL PO) Take 100 mg by mouth as needed.  rosuvastatin (CRESTOR) 10 mg tablet Take 10 mg by mouth every Monday and Thursday.  menthol 6 % gel by Apply Externally route as needed. \"Abdirashid\"--cool therapy pain gel    metoprolol tartrate (LOPRESSOR) 25 mg tablet TAKE ONE-HALF TABLET BY MOUTH TWICE DAILY    furosemide (LASIX) 40 mg tablet TAKE ONE TABLET BY MOUTH ONCE DAILY    potassium chloride (K-DUR, KLOR-CON) 20 mEq tablet Take 1 Tab by mouth daily.  isosorbide mononitrate ER (IMDUR) 60 mg CR tablet TAKE ONE TABLET BY MOUTH ONCE DAILY    omeprazole (PRILOSEC) 20 mg capsule TAKE ONE CAPSULE BY MOUTH EVERY DAY    coenzyme q10 10 mg cap Take 100 mg by mouth every evening.  albuterol (VENTOLIN HFA) 90 mcg/actuation inhaler Take 2 Puffs by inhalation every four (4) hours as needed for Wheezing.  Aspirin, Buffered 81 mg tab Take 81 mg by mouth daily.  traMADol (ULTRAM) 50 mg tablet      No current facility-administered medications for this visit. Vitals:    04/20/17 0917   BP: 116/70   Pulse: 72   Resp: 18   SpO2: 95%   Weight: 240 lb 14.4 oz (109.3 kg)   Height: 6' 1\" (1.854 m)       I have reviewed the nurses notes, vitals, problem list, allergy list, medical history, family, social history and medications. Review of Symptoms:    General: Pt denies excessive weight gain or loss.  Pt is able to conduct ADL's  HEENT: Denies blurred vision, headaches, epistaxis and difficulty swallowing. Respiratory: +shortness of breath, Denies wheezing or stridor. Cardiovascular: +LE edema, chest pain, Denies  palpitations or PND  Gastrointestinal: Denies poor appetite, indigestion, abdominal pain or blood in stool  Urinary: Denies dysuria, pyuria  Musculoskeletal: Denies pain or swelling from muscles or joints  Neurologic: Denies tremor, paresthesias, or sensory motor disturbance  Skin: Denies rash, itching or texture change. Psych: Denies depression      Physical Exam:      General: Well developed, in no acute distress. HEENT: Eyes - PERRL, no jvd  Heart:  Normal S1/S2 negative S3 or S4. Regular, no murmur, gallop or rub.   Respiratory: Clear bilaterally x 4, no wheezing or rales  Abdomen:   Soft, non-tender, bowel sounds are active.   Extremities:  No edema, normal cap refill, no cyanosis. Musculoskeletal: No clubbing  Neuro: A&Ox3, speech clear, gait stable. Skin: Skin color is normal. No rashes or lesions.  Non diaphoretic  Vascular: 2+ pulses symmetric in all extremities    Cardiographics    Ekg: sinus rhythm   [facility] PM: 89%AP, 12%RVP    Results for orders placed or performed during the hospital encounter of 03/16/16   EKG, 12 LEAD, INITIAL   Result Value Ref Range    Ventricular Rate 60 BPM    Atrial Rate 60 BPM    P-R Interval 208 ms    QRS Duration 172 ms    Q-T Interval 528 ms    QTC Calculation (Bezet) 528 ms    Calculated P Axis 71 degrees    Calculated R Axis -72 degrees    Calculated T Axis 37 degrees    Diagnosis       Normal sinus rhythm  Right bundle branch block  Left anterior fascicular block  ** Bifascicular block **  Left ventricular hypertrophy with QRS widening  When compared with ECG of 10-MAR-2016 14:56,  Sinus rhythm has replaced Electronic atrial pacemaker  Right bundle branch block is now present  Minimal criteria for Septal infarct are no longer present  Confirmed by Faye Holguin ([id number]) on 3/16/2016 1:57:21 PM     Results for orders placed or performed in visit on 12/04/14   CARDIAC HOLTER MONITOR, 24 HOURS    Narrative    ECG Monitor/24 hours, Complete    Reason for Holter Monitor   A-FLUTTER    Heartbeat    Slowest 48  Average 63  Fastest  97      Results:   Underlying Rhythm: Normal sinus rhythm      Atrial Arrhythmias: premature atrial contractions; frequent             AV Conduction: normal    Ventricular Arrhythmias: premature ventricular contractions; rare    ST Segment Analysis:normal     Symptom Correlation:  None reported    Comment:   Sinus rhythm with occasional atrial ectopy - morning bradycardia  of 48 bpm and highest heart rate of 97 bpm. Clinical correlation  advised. Erma Jaramillo MD, [facility]                    Lab Results   Component Value Date/Time    WBC 8.1 03/14/2017 11:02 AM    Hemoglobin (POC) 13.9 12/17/2014 12:06 PM    HGB 14.5 03/14/2017 11:02 AM    Hematocrit (POC) 41 12/17/2014 12:06 PM    HCT 43.5 03/14/2017 11:02 AM    PLATELET 589 14/91/4606 11:02 AM    MCV 95.0 03/14/2017 11:02 AM      Lab Results   Component Value Date/Time    Sodium 139 03/14/2017 11:02 AM    Potassium 4.0 03/14/2017 11:02 AM    Chloride 104 03/14/2017 11:02 AM    CO2 27 03/14/2017 11:02 AM    Anion gap 8 03/14/2017 11:02 AM    Glucose 100 03/14/2017 11:02 AM    BUN 22 03/14/2017 11:02 AM    Creatinine 1.21 03/14/2017 11:02 AM    BUN/Creatinine ratio 18 03/14/2017 11:02 AM    GFR est AA >60 03/14/2017 11:02 AM    GFR est non-AA 58 03/14/2017 11:02 AM    Calcium 8.6 03/14/2017 11:02 AM    Bilirubin, total 0.5 03/14/2017 11:02 AM    AST (SGOT) 19 03/14/2017 11:02 AM    Alk. phosphatase 77 03/14/2017 11:02 AM    Protein, total 7.3 03/14/2017 11:02 AM    Albumin 3.6 03/14/2017 11:02 AM    Globulin 3.7 03/14/2017 11:02 AM    A-G Ratio 1.0 03/14/2017 11:02 AM    ALT (SGPT) 23 03/14/2017 11:02 AM         Assessment:     Assessment:        ICD-10-CM ICD-9-CM    1.  ASHD (arteriosclerotic heart disease) I25.10 414.00 AMB POC EKG ROUTINE W/ 12 LEADS, INTER & REP     Orders Placed This Encounter    AMB POC EKG ROUTINE W/ 12 LEADS, INTER & REP     Order Specific Question:   Reason for Exam:     Answer:   routine    traMADol (ULTRAM) 50 mg tablet        Plan:   Mr. Marianna Dailey is here for follow up and annual device check. His device interrogation demonstrates normal functioning. EKG demonstrates nsr. He should return to taking correct dose of lasix. Suspect his symptoms are related to hernia, he will follow up with surgery. Continue medical therapy and follow up with Dr. Lali Ortega in one year. Continue medical management for hypercholesteremia. Thank you for allowing me to participate in [address] [place] [address].     Katie Bowden MD, [facility]

## 2017-04-21 NOTE — PROGRESS NOTES
See device report - BSC DPCM in office device check, on remote home monitoring, next check due in 6 months.

## 2017-06-30 ENCOUNTER — TELEPHONE (OUTPATIENT)
Dept: INTERNAL MEDICINE CLINIC | Age: 82
End: 2017-06-30

## 2017-06-30 DIAGNOSIS — J42 CHRONIC BRONCHITIS, UNSPECIFIED CHRONIC BRONCHITIS TYPE (HCC): Primary | ICD-10-CM

## 2017-07-03 NOTE — TELEPHONE ENCOUNTER
Pt states he needs see a pulmonologist and needs a referral as to who to see.  Copied and forwarded to Dr. Kashif Carrasquillo

## 2017-07-06 ENCOUNTER — OFFICE VISIT (OUTPATIENT)
Dept: INTERNAL MEDICINE CLINIC | Age: 82
End: 2017-07-06

## 2017-07-06 VITALS
SYSTOLIC BLOOD PRESSURE: 102 MMHG | HEART RATE: 74 BPM | RESPIRATION RATE: 16 BRPM | HEIGHT: 73 IN | BODY MASS INDEX: 32.18 KG/M2 | WEIGHT: 242.8 LBS | OXYGEN SATURATION: 95 % | DIASTOLIC BLOOD PRESSURE: 65 MMHG | TEMPERATURE: 97.5 F

## 2017-07-06 DIAGNOSIS — Z13.31 SCREENING FOR DEPRESSION: ICD-10-CM

## 2017-07-06 DIAGNOSIS — Z00.00 ROUTINE GENERAL MEDICAL EXAMINATION AT A HEALTH CARE FACILITY: Primary | ICD-10-CM

## 2017-07-06 DIAGNOSIS — J30.9 ALLERGIC RHINITIS, UNSPECIFIED ALLERGIC RHINITIS TRIGGER, UNSPECIFIED RHINITIS SEASONALITY: ICD-10-CM

## 2017-07-06 DIAGNOSIS — J20.9 ACUTE BRONCHITIS, UNSPECIFIED ORGANISM: ICD-10-CM

## 2017-07-06 DIAGNOSIS — K21.9 GASTROESOPHAGEAL REFLUX DISEASE WITHOUT ESOPHAGITIS: ICD-10-CM

## 2017-07-06 DIAGNOSIS — I63.9 CEREBROVASCULAR ACCIDENT (CVA), UNSPECIFIED MECHANISM (HCC): ICD-10-CM

## 2017-07-06 DIAGNOSIS — J42 CHRONIC BRONCHITIS, UNSPECIFIED CHRONIC BRONCHITIS TYPE (HCC): ICD-10-CM

## 2017-07-06 DIAGNOSIS — I25.10 CORONARY ARTERY DISEASE INVOLVING NATIVE CORONARY ARTERY OF NATIVE HEART WITHOUT ANGINA PECTORIS: ICD-10-CM

## 2017-07-06 DIAGNOSIS — E78.00 HYPERCHOLESTEROLEMIA: ICD-10-CM

## 2017-07-06 RX ORDER — METHYLPREDNISOLONE 4 MG/1
TABLET ORAL
Qty: 1 DOSE PACK | Refills: 0 | Status: SHIPPED | OUTPATIENT
Start: 2017-07-06 | End: 2017-08-08 | Stop reason: ALTCHOICE

## 2017-07-06 RX ORDER — ALBUTEROL SULFATE 90 UG/1
2 AEROSOL, METERED RESPIRATORY (INHALATION)
Qty: 1 INHALER | Refills: 11 | Status: SHIPPED | OUTPATIENT
Start: 2017-07-06 | End: 2017-10-11 | Stop reason: SDUPTHER

## 2017-07-06 NOTE — MR AVS SNAPSHOT
Visit Information Date & Time Provider Department Dept. Phone Encounter #  
 7/6/2017 10:00 AM Paolo Cazares, [address] [facility] [phone] [id number] Follow-up Instructions Return in about 6 months (around 1/6/2018) for HTN, etc.  
  
Your Appointments 9/19/2017  9:00 AM  
BLOOD PRESSURE with Mina Boles MD  
[facility] [address] [place] [address]) Appt Note: 6 month per Dr. Philomena Morrison, no cp  
 [address]  
[phone] [address]  
  
    
 10/19/2017  8:15 AM  
PACEMAKER with PACEMAKER, [facility] [address]) Appt Note: BSC DCPM 6mo check [address]  
[phone] [address]  
  
    
  
 7/12/2017  8:00 AM  
REMOTE OFFICE VISIT with [facility] [address] [facility]) Appt Note: NOT AN OFFICE VISIT - REMOTE BSC PM  
 [address]  
[phone] [address] Upcoming Health Maintenance Date Due INFLUENZA AGE 9 TO ADULT 8/1/2017 MEDICARE YEARLY EXAM 7/7/2018 GLAUCOMA SCREENING Q2Y 12/1/2018 DTaP/Tdap/Td series (2 - Td) 7/19/2026 Allergies as of 7/6/2017  Review Complete On: 7/6/2017 By: Paolo Cazares MD  
  
 Severity Noted Reaction Type Reactions Gadolinium-containing Contrast Media Medium 06/11/2012   Intolerance Other (comments) 1) Moderate to Severe. 2) Post Gadolinium issues as noted: - Diaphoretic, Near Syncope, Nausea and vomiting. Acetaminophen  03/14/2017    Other (comments) Indigestion,\"burning sensation\" Contrast Dye [Iodine]  11/20/2009    Other (comments) Decrease in BP Levaquin [Levofloxacin]  11/20/2009    Other (comments) Joint pain Medrol [Methylprednisolone]  06/18/2015    Vertigo Norvasc [Amlodipine]  03/04/2015    Other (comments) Numbness and tingling Nsaids (Non-steroidal Anti-inflammatory Drug)  03/10/2016    Other (comments) Cough up blood Percocet [Oxycodone-acetaminophen]  12/02/2014    Itching Ranexa [Ranolazine]  04/02/2015    Other (comments) Cannot remember Simvastatin  11/20/2009    Other (comments) Joint pain  
 Voltaren [Diclofenac Sodium]  03/14/2017    Other (comments)  
 unknown Zetia [Ezetimibe]  12/11/2009    Myalgia Current Immunizations  Reviewed on 4/14/2016 Name Date Influenza High Dose Vaccine PF 9/29/2016 Influenza Vaccine 10/1/2015, 10/17/2014, 10/21/2013 Influenza Vaccine Split 10/3/2012 Pneumococcal Conjugate (PCV-13) 9/29/2016 Pneumococcal Vaccine (Unspecified Type) 4/14/2015 Td 1/1/2008 Tdap 7/19/2016 Zoster Vaccine, Live 1/1/2012 Not reviewed this visit You Were Diagnosed With   
  
 Codes Comments Acute bronchitis, unspecified organism    -  Primary ICD-10-CM: J20.9 ICD-9-CM: 466.0 Routine general medical examination at a health care facility     ICD-10-CM: Z00.00 ICD-9-CM: V70.0 Screening for depression     ICD-10-CM: Z13.89 ICD-9-CM: V79.0 Chronic bronchitis, unspecified chronic bronchitis type ([facility]     ICD-10-CM: U23 ICD-9-CM: 491.9 Cerebrovascular accident (CVA), unspecified mechanism ([facility]     ICD-10-CM: I63.9 ICD-9-CM: 434.91 Hypercholesterolemia     ICD-10-CM: E78.00 ICD-9-CM: 272.0 Gastroesophageal reflux disease without esophagitis     ICD-10-CM: K21.9 ICD-9-CM: 530.81 Allergic rhinitis, unspecified allergic rhinitis trigger, unspecified rhinitis seasonality     ICD-10-CM: J30.9 ICD-9-CM: 477.9 Coronary artery disease involving native coronary artery of native heart without angina pectoris     ICD-10-CM: I25.10 ICD-9-CM: 414.01 Vitals BP Pulse Temp Resp Height(growth percentile) Weight(growth percentile) 102/65 (BP 1 Location: Left arm, BP Patient Position: Sitting) 74 97.5 °F (36.4 °C) (Oral) 16 6' 1\" (1.854 m) 242 lb 12.8 oz (110.1 kg) SpO2 BMI Smoking Status 95% 32.03 kg/m2 Never Smoker Vitals History BMI and BSA Data Body Mass Index Body Surface Area 32.03 kg/m 2 2.38 m 2 Preferred Pharmacy Pharmacy Name Phone [facility] [address], [address] [place] [address] Avenue [phone] Your Updated Medication List  
  
   
This list is accurate as of: 7/6/17 10:59 AM.  Always use your most recent med list.  
  
  
  
  
 albuterol 90 mcg/actuation inhaler Commonly known as:  VENTOLIN HFA Take 2 Puffs by inhalation every four (4) hours as needed for Wheezing. aspirin, buffered 81 mg Tab Take 81 mg by mouth daily. coenzyme q10 10 mg Cap Take 100 mg by mouth every evening. CRESTOR 10 mg tablet Generic drug:  rosuvastatin Take 10 mg by mouth every Monday and Thursday. furosemide 40 mg tablet Commonly known as:  LASIX TAKE ONE TABLET BY MOUTH ONCE DAILY  
  
 isosorbide mononitrate ER 60 mg CR tablet Commonly known as:  IMDUR  
TAKE ONE TABLET BY MOUTH ONCE DAILY  
  
 menthol 6 % Gel  
by Apply Externally route as needed. \"Abdirashid\"--cool therapy pain gel  
  
 methylPREDNISolone 4 mg tablet Commonly known as:  Aleks Blanchard Use as directed  
  
 metoprolol tartrate 25 mg tablet Commonly known as:  LOPRESSOR  
TAKE ONE-HALF TABLET BY MOUTH TWICE DAILY  
  
 omeprazole 20 mg capsule Commonly known as:  PRILOSEC  
TAKE ONE CAPSULE BY MOUTH EVERY DAY  
  
 potassium chloride 20 mEq tablet Commonly known as:  K-DUR, KLOR-CON Take 1 Tab by mouth daily. * TRAMADOL PO Take 100 mg by mouth as needed. * traMADol 50 mg tablet Commonly known as:  ULTRAM  
  
 * Notice:   This list has 2 medication(s) that are the same as other medications prescribed for you. Read the directions carefully, and ask your doctor or other care provider to review them with you. Prescriptions Sent to Pharmacy Refills  
 albuterol (VENTOLIN HFA) 90 mcg/actuation inhaler 11 Sig: Take 2 Puffs by inhalation every four (4) hours as needed for Wheezing. Class: Normal  
 Pharmacy: [address] [place], [address] [place] [address] Ph #: [phone] Route: Inhalation  
 methylPREDNISolone (MEDROL DOSEPACK) 4 mg tablet 0 Sig: Use as directed Class: Normal  
 Pharmacy: [address] [place], [address] [place] [address] Ph #: [phone] We Performed the Following Mavis  [[id number] [facility]] Follow-up Instructions Return in about 6 months (around 1/6/2018) for HTN, etc.  
  
  
Patient Instructions Medicare Part B Preventive Services Limitations Recommendation Scheduled Glaucoma Screening 
(Eye Exam)  Covered for patients with diagnosis of diabetes or family history of glaucoma; -Americans age 48 and older; -Americans age 72 and older Completed 12/1/2016 Recommended every 1-2 years Due [id number] Colorectal Cancer Screening 
 
-Fecal occult blood test every year OR 
-Flexible sigmoidoscopy every 5 yrs OR 
-Colonoscopy every 10 years OR 
-Barium Enema Age 54-65; After age 76 if history of abnormal results Completed Unknown date but states it was normal 
 
 
Recommended every 10 years  Complete Bone Mass Measurement (Dexascan) Age 61 and older Requires diagnosis related to osteoporosis or estrogen deficiency OR patient has history of long-term glucocorticoid treatment Every 5 years for normal scan, every 2 years for abnormal scan Completed Unknown Recommended every 5 years Due Per Dr Shiv Juarez Cardiovascular Screening Blood Tests  
(Cholesterol panel) Lipid panel every 5 years;  Annually if diagnosed with high cholesterol and/or diabetes  Completed 1/12/2017 Annually Due 2018 Diabetes Screening Tests -Basic Metabolic Panel (BMP) or [id number] (HgbA1C) BMP every 3 years for non-diabetic patients Hgb A1C every 6 months for  
pre-diabetic patients or HgbA1C <7 HgbA1C every 3 months if 7 or greater Completed 3/14/2017 Hgb A1C Recommended every 6 months Due 9/2017 Resources for Smoking Cessation    American Lung Association 
www.lung. org 
9-521-LIFTHZM 
 
www.smokefree. gov 
 
1-800-QUIT NOW Seasonal Influenza Vaccination Age 7 months and older Completed 2016 Recommended Annually Due Fall 2017 Pneumococcal Vaccination 
(PPSV 23) Age 72 and older; 
<65 if at high risk for getting Pneumonia Completed 4/14/2014 Recommended once Complete Prevnar Vaccination (PCV 13) Age 72 and older Completed 9/29/2016 Recommended once Complete Tetanus Vaccine -Only covered by Medicare Part D through the pharmacy -Requires a prescription from your primary care provider Completed 7/9/2016 Recommended every 10 years  Due 2026 Zoster Vaccine (Shingles) Age 61 and older, one time dose 
 
-Only covered by Medicare Part D through the pharmacy Completed 1/1/2012 Recommended once  Complete Learning About Cutting Calories How do calories affect your weight? Food gives your body energy. Energy from the food you eat is measured in calories. This energy keeps your heart beating, your brain active, and your muscles working. Your body needs a certain number of calories each day. After your body uses the calories it needs, it stores extra calories as fat. To lose weight safely, you have to eat fewer calories while eating in a healthy way. How many calories do you need each day? The more active you are, the more calories you need. When you are less active, you need fewer calories.  How many calories you need each day also depends on several things, including your age and whether you are male or female. Here are some general guidelines for adults: 
· Less active women and older adults need 1,600 to 2,000 calories each day. · Active women and less active men need 2,000 to 2,400 calories each day. · Active men need 2,400 to 3,000 calories each day. How can you cut calories and eat healthy meals? Whole grains, vegetables and fruits, and dried beans are good lower-calorie foods. They give you lots of nutrients and fiber. And they fill you up. Sweets, energy drinks, and soda pop are high in calories. They give you few nutrients and no fiber. Try to limit soda pop, fruit juice, and energy drinks. Drink water instead. Some fats can be part of a healthy diet. But cutting back on fats from highly processed foods like fast foods and many snack foods is a good way to lower the calories in your diet. Also, use smaller amounts of fats like butter, margarine, salad dressing, and mayonnaise. Add fresh garlic, lemon, or herbs to your meals to add flavor without adding fat. Meats and dairy products can be a big source of hidden fats. Try to choose lean or low-fat versions of these products. Fat-free cookies, candies, chips, and frozen treats can still be high in sugar and calories. Some fat-free foods have more calories than regular ones. Eat fat-free treats in moderation, as you would other foods. If your favorite foods are high in fat, salt, sugar, or calories, limit how often you eat them. Eat smaller servings, or look for healthy substitutes. Fill up on fruits, vegetables, and whole grains. Eating at home · Use meat as a side dish instead of as the main part of your meal. 
· Try main dishes that use whole wheat pasta, brown rice, dried beans, or vegetables. · Find ways to cook with little or no fat, such as broiling, steaming, or grilling. · Use cooking spray instead of oil.  If you use oil, use a monounsaturated oil, such as canola or olive oil. · Trim fat from meats before you cook them. · Drain off fat after you brown the meat or while you roast it. · Chill soups and stews after you cook them. Then skim the fat off the top after it hardens. Eating out · Order foods that are broiled or poached rather than fried or breaded. · Cut back on the amount of butter or margarine that you use on bread. · Order sauces, gravies, and salad dressings on the side, and use only a little. · When you order pasta, choose tomato sauce rather than cream sauce. · Ask for salsa with your baked potato instead of sour cream, butter, cheese, or chong. · Order meals in a small size instead of upgrading to a large. · Share an entree, or take part of your food home to eat as another meal. 
· Share appetizers and desserts. Where can you learn more? Go to http://francisco-nikki.info/. Enter 99 [id number] in the search box to learn more about \"Learning About Cutting Calories. \" Current as of: November 9, 2016 Content Version: 11.3 © 4597-0368 [employer], [employer]. Care instructions adapted under license by [employer] (which disclaims liability or warranty for this information). If you have questions about a medical condition or this instruction, always ask your healthcare professional. [address] any warranty or liability for your use of this information. Please provide this summary of care documentation to your next provider. Your primary care clinician is listed as [place]. If you have any questions after today's visit, please call [phone].

## 2017-07-06 NOTE — PROGRESS NOTES
1. Have you been to the ER, urgent care clinic since your last visit? Hospitalized since your last visit?no  2. Have you seen or consulted any other health care providers outside of the [address] [place] [address] since your last visit? Include any pap smears or colon screening.  no

## 2017-07-06 NOTE — PROGRESS NOTES
SUBJECTIVE  Mr. Chacho Shepherd. presents today acutely for \"I got the damn bronchitis. \"     Chief Complaint   Patient presents with    Annual Wellness Visit    Follow-up     pt here today for routine f.u    Cough     pt concerned of bronchitis; pt c.o coughing up white mucous, tighness in chest     Other     pt has koko w/pulmonary on 8/16/17; pt need a referral and discuss getting an xray       \"I can hear myself gurgling\". \"I like to damn die on Saturday; I had something in there and couldn't get it up in what seemed like forever. \"  +Tightness in chest. Has had some wheezing at times. Present for a week. No F/C. No N/V. Has a lot of coughing and mucus production--mostly clear. He has appointments coming up with Pulmonary. He has taken OTC syrup with acetaminophen, DM, and guaifenesin. Chronic bronchitis: Has seen Dr. Mario Alberto Rodriguez. He continues to have wheezing at times. Uses albuterol prn. Doing better. He had cataracts removed in December 2016. He has undergone valve replacement in March 2016 with Dr. Angela Youngblood. It is recalled from early 2016 that he saw Dr. Jorge Vu, for exertional dyspnea, and had cardiac cath. \"My valve is real bad. \"   Knee pain: \"Doing pretty good right now. \"   Sees Dr. Estrella Morgan, and has had corticosteroid injections. Surgery on hold due to heart issues. He has prostate cancer, but doesn't want to treat this. He has had biopsy proven adenocarcinoma of prostate, by Dr. Gin Cummins with [facility]. As we noted before: At this time \"I aint doin' nothin'. If the PSA gets high, then I'll take the shots. \"  From our records, it appears he was referred to [facility] for consideration of radiation therapy. He refused this. Past Medical History: Hyperlipidemia, chronic bronchitis, allergic rhinitis, urolithiasis, hiatal hernia with GERD, ED, Asthma, prostate cancer 2012 (Dr. Gin Cummins). Normal stress echo in 2006; Echo 2010 showed LVH, EF 55-60%.   Hemoptysis with negative workup in 2008saw  [facility] Beatriz. EGD in 2009 Dr. Ines Roldan showing Hiatal hernia. R lid chronic ptosis noted in 2009 by Dr. Ramos, optometrist.  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 Had exertional chest pain in 2012, recurred in 2014--cardiac work up with Dr. Iman Shultz. His ophthalmologist is Dr. Cameron Echevarria. Past Surgical History: Hernia in 86. Kidney stone 85. Appy (ruptured) in 79. Resection of melanoma. Colon polypectomy 2011 Dr. Ines Roldan. A flutter ablation 2014 Dr. Shelly Lowry 2014 Dr. Rosmery Riddle repair 2014 Dr. Eliu Mcknight. Aortic valve replacement 2016. Cataracts 2016. Allergies: IVP dye (BP drop). Medications:   Current Outpatient Prescriptions on File Prior to Visit   Medication Sig Dispense Refill    traMADol (ULTRAM) 50 mg tablet       TRAMADOL HCL (TRAMADOL PO) Take 100 mg by mouth as needed.  rosuvastatin (CRESTOR) 10 mg tablet Take 10 mg by mouth every Monday and Thursday.  menthol 6 % gel by Apply Externally route as needed. \"Abdirashid\"--cool therapy pain gel      metoprolol tartrate (LOPRESSOR) 25 mg tablet TAKE ONE-HALF TABLET BY MOUTH TWICE DAILY 60 Tab 0    furosemide (LASIX) 40 mg tablet TAKE ONE TABLET BY MOUTH ONCE DAILY 90 Tab 3    potassium chloride (K-DUR, KLOR-CON) 20 mEq tablet Take 1 Tab by mouth daily. 90 Tab 3    isosorbide mononitrate ER (IMDUR) 60 mg CR tablet TAKE ONE TABLET BY MOUTH ONCE DAILY 90 Tab 3    omeprazole (PRILOSEC) 20 mg capsule TAKE ONE CAPSULE BY MOUTH EVERY DAY 90 Cap 3    coenzyme q10 10 mg cap Take 100 mg by mouth every evening.  Aspirin, Buffered 81 mg tab Take 81 mg by mouth daily. No current facility-administered medications on file prior to visit. Social History:  . Retired holt. Drinks a pint to 1 1/2 pints hard liquor daily. No tobacco, or drugs. Family History: F CVAs. Brother valve disease.   Review of systems: Unremarkable except as noted above. Objective:    Visit Vitals    /65 (BP 1 Location: Left arm, BP Patient Position: Sitting)    Pulse 74    Temp 97.5 °F (36.4 °C) (Oral)    Resp 16    Ht 6' 1\" (1.854 m)    Wt 242 lb 12.8 oz (110.1 kg)    SpO2 95%    BMI 32.03 kg/m2   . General appearance: Pleasant, obese elderly male in NAD. HEENT: PERRLA. EOMI. He has a mild right facial droop. OP moist, pink. Neck: Supple with No LAD. Lungs: CTAB. No wheezes. No rales. Heart: RRR. Pacemaker incision healing well. Abdomen: S, NT, ND, BS +. Extremities: Warm. No C/C/E. Neuro: Sensation intact. Lab Results   Component Value Date/Time    Cholesterol, total 143 01/11/2017 08:11 AM    HDL Cholesterol 47 01/11/2017 08:11 AM    LDL, calculated 70 01/11/2017 08:11 AM    VLDL, calculated 26 01/11/2017 08:11 AM    Triglyceride 132 01/11/2017 08:11 AM    CHOL/HDL Ratio 3.6 09/17/2010 08:27 AM     Lab Results   Component Value Date/Time    Hemoglobin A1c 6.5 03/14/2017 11:02 AM         Assessment / Plan:   1. Cough / acute exacerbation of chronic bronchitis: Check XRay chest per pt/specialist request.  He has appt coming up with Dr. Harshil Wang, pulmonologist.  We have refilled his albuterol. Rx for medrol dosepak. If he develops fever or purulent sputum, or other signs of infectious etiology, then consider adding antibiotic. 2. R knee pain: New complaint. Check XR. Continue tylenol. Try diclofenac gel. 3. Aortic stenosis: s/p AVR. Doing well. 4. CAD: As per his cardiologist, Dr. Trinidad [place]  5. A flutter, SSS: now with pacemaker. As per cardiology. 6. Asthma: Stable. Uses albuterol prn; Less than daily currently. 7. Prostate Cancer: Surveillance for now. Pt willing to do hormonal treatments if PSA rises to higher levels. 8. Problem drinking: He has been cutting back. 9. Hyperlipidemia:  Not at goal, but options limited. Did not tolerate simvastatin or zetia. Now on fish oil. Will recheck lipids and CMP. Continue pulse dosing of crestor. 10. GERD:  Controlled on prn prilosec; Pt. may continue this. 11. ED: Not discussed. 12. All rhinitis: Stable. 13. Possible TIA/Diplopia/Amaurosis fugax: No recent episode. Work up as above. 14. Gout: no recent flares. 15. Borderline DM. Recheck labs. 16. Obesity: Watch diet. More exercise as able (limited now by postsurgical recovery). 17. Noncompliance. Follow up in 6 months.

## 2017-07-06 NOTE — PATIENT INSTRUCTIONS
Medicare Part B Preventive Services Limitations Recommendation Scheduled   Glaucoma Screening  (Eye Exam)  Covered for patients with diagnosis of diabetes or family history of glaucoma; -Americans age 48 and older; -Americans age 72 and older Completed 12/1/2016    Recommended every 1-2 years Due [id number]     Colorectal Cancer Screening    -Fecal occult blood test every year OR  -Flexible sigmoidoscopy every 5 yrs OR  -Colonoscopy every 10 years OR  -Barium Enema Age 54-65; After age 76 if history of abnormal results Completed Unknown date but states it was normal      Recommended every 10 years  Complete   Bone Mass Measurement (Dexascan)     Age 61 and older    Requires diagnosis related to osteoporosis or estrogen deficiency OR patient has history of long-term glucocorticoid treatment     Every 5 years for normal scan, every 2 years for abnormal scan Completed Unknown      Recommended every 5 years Due Per Dr Ck Allen Blood Tests   (Cholesterol panel)               Lipid panel every 5 years; Annually if diagnosed with high cholesterol and/or diabetes  Completed 1/12/2017      Annually Due 2018   Diabetes Screening Tests    -Basic Metabolic Panel (BMP) or [id number] (HgbA1C)   BMP every 3 years for non-diabetic patients    Hgb A1C every 6 months for   pre-diabetic patients or HgbA1C <7    HgbA1C every 3 months if 7 or greater Completed 3/14/2017    Hgb A1C  Recommended every 6 months Due 9/2017   Resources for Smoking Cessation    American Lung Association  www.lung. org  6-677-BUMGDTM    www.smokefree. gov    1-800-QUIT NOW   Seasonal Influenza Vaccination Age 7 months and older Completed 2016    Recommended Annually Due Fall 2017   Pneumococcal Vaccination  (PPSV 21) Age 72 and older;  <65 if at high risk for getting Pneumonia Completed 4/14/2014    Recommended once Complete   Prevnar Vaccination  (PCV 13) Age 72 and older Completed 9/29/2016    Recommended once Complete   Tetanus Vaccine -Only covered by Medicare Part D through the pharmacy    -Requires a prescription from your primary care provider   Completed 7/9/2016    Recommended every 10 years  Due 2026       Zoster Vaccine (Shingles) Age 61 and older, one time dose    -Only covered by Medicare Part D through the pharmacy     Completed 1/1/2012        Recommended once  Complete            Learning About Cutting Calories  How do calories affect your weight? Food gives your body energy. Energy from the food you eat is measured in calories. This energy keeps your heart beating, your brain active, and your muscles working. Your body needs a certain number of calories each day. After your body uses the calories it needs, it stores extra calories as fat. To lose weight safely, you have to eat fewer calories while eating in a healthy way. How many calories do you need each day? The more active you are, the more calories you need. When you are less active, you need fewer calories. How many calories you need each day also depends on several things, including your age and whether you are male or female. Here are some general guidelines for adults:  · Less active women and older adults need 1,600 to 2,000 calories each day. · Active women and less active men need 2,000 to 2,400 calories each day. · Active men need 2,400 to 3,000 calories each day. How can you cut calories and eat healthy meals? Whole grains, vegetables and fruits, and dried beans are good lower-calorie foods. They give you lots of nutrients and fiber. And they fill you up. Sweets, energy drinks, and soda pop are high in calories. They give you few nutrients and no fiber. Try to limit soda pop, fruit juice, and energy drinks. Drink water instead. Some fats can be part of a healthy diet. But cutting back on fats from highly processed foods like fast foods and many snack foods is a good way to lower the calories in your diet.  Also, use smaller amounts of fats like butter, margarine, salad dressing, and mayonnaise. Add fresh garlic, lemon, or herbs to your meals to add flavor without adding fat. Meats and dairy products can be a big source of hidden fats. Try to choose lean or low-fat versions of these products. Fat-free cookies, candies, chips, and frozen treats can still be high in sugar and calories. Some fat-free foods have more calories than regular ones. Eat fat-free treats in moderation, as you would other foods. If your favorite foods are high in fat, salt, sugar, or calories, limit how often you eat them. Eat smaller servings, or look for healthy substitutes. Fill up on fruits, vegetables, and whole grains. Eating at home  · Use meat as a side dish instead of as the main part of your meal.  · Try main dishes that use whole wheat pasta, brown rice, dried beans, or vegetables. · Find ways to cook with little or no fat, such as broiling, steaming, or grilling. · Use cooking spray instead of oil. If you use oil, use a monounsaturated oil, such as canola or olive oil. · Trim fat from meats before you cook them. · Drain off fat after you brown the meat or while you roast it. · Chill soups and stews after you cook them. Then skim the fat off the top after it hardens. Eating out  · Order foods that are broiled or poached rather than fried or breaded. · Cut back on the amount of butter or margarine that you use on bread. · Order sauces, gravies, and salad dressings on the side, and use only a little. · When you order pasta, choose tomato sauce rather than cream sauce. · Ask for salsa with your baked potato instead of sour cream, butter, cheese, or chong. · Order meals in a small size instead of upgrading to a large. · Share an entree, or take part of your food home to eat as another meal.  · Share appetizers and desserts. Where can you learn more? Go to http://aaron.info/.   Enter T154 in the search box to learn more about \"Learning About Cutting Calories. \"  Current as of: November 9, 2016  Content Version: 11.3  © 1639-6322 [employer]. Care instructions adapted under license by [employer] (which disclaims liability or warranty for this information). If you have questions about a medical condition or this instruction, always ask your healthcare professional. [place] [address] any warranty or liability for your use of this information.

## 2017-07-06 NOTE — PROGRESS NOTES
This is a Subsequent Medicare Annual Wellness Visit providing Personalized Prevention Plan Services (PPPS) (Performed 12 months after initial AWV and PPPS )    I have reviewed the patient's medical history in detail and updated the computerized patient record. History     Past Medical History:   Diagnosis Date    Adverse effect of anesthesia     difficult with waking up     Allergic rhinitis     Arthritis     Asthma     Atrial flutter ([facility] 2014    CAD (coronary artery disease)     Dr. Bianca Garcia    Calculus of kidney     Chest pain 2006    Normal stress echo    Chronic bronchitis     Chronic bronchitis ([address] 10/3/2012    Chronic pain     knees    Erectile dysfunction     GERD (gastroesophageal reflux disease)     hiatal hernia    Gout 2013    Hemoptysis 2008    Work up by Dr. William Lacy;  Negative    Hiatal hernia     Hypercholesterolemia     Hypertension     Ill-defined condition     bronchitis    Melanoma ([facility]     back    Nausea & vomiting     Pacemaker     Dr. Ge[facility]) 2012    S/P ablation of atrial flutter 2014    Sleep apnea     states never had test for apnea    Stroke [facility]) 2009     tia no residual problems      Past Surgical History:   Procedure Laterality Date    CARDIAC SURG PROCEDURE UNLIST      ablation    HX AFIB ABLATION      Dr. Rashawn Serrato    ruptured appendix    HX HEART CATHETERIZATION      [address]    HX HERNIA REPAIR  14    Recurrent left inguinal hernia; Dr. Lopez Parrot      5 hernia repairs total    HX PACEMAKER  2014    HX PACEMAKER PLACEMENT      Dr. Rodrigez     Kidney stone extraction    GA COLSC FLX W/RMVL OF TUMOR POLYP LESION SNARE TQ  2011          Current Outpatient Prescriptions   Medication Sig Dispense Refill    traMADol (ULTRAM) 50 mg tablet       TRAMADOL HCL (TRAMADOL PO) Take 100 mg by mouth as needed.  rosuvastatin (CRESTOR) 10 mg tablet Take 10 mg by mouth every Monday and Thursday.  menthol 6 % gel by Apply Externally route as needed. \"Abdirashid\"--cool therapy pain gel      metoprolol tartrate (LOPRESSOR) 25 mg tablet TAKE ONE-HALF TABLET BY MOUTH TWICE DAILY 60 Tab 0    furosemide (LASIX) 40 mg tablet TAKE ONE TABLET BY MOUTH ONCE DAILY 90 Tab 3    potassium chloride (K-DUR, KLOR-CON) 20 mEq tablet Take 1 Tab by mouth daily. 90 Tab 3    isosorbide mononitrate ER (IMDUR) 60 mg CR tablet TAKE ONE TABLET BY MOUTH ONCE DAILY 90 Tab 3    omeprazole (PRILOSEC) 20 mg capsule TAKE ONE CAPSULE BY MOUTH EVERY DAY 90 Cap 3    coenzyme q10 10 mg cap Take 100 mg by mouth every evening.  albuterol (VENTOLIN HFA) 90 mcg/actuation inhaler Take 2 Puffs by inhalation every four (4) hours as needed for Wheezing. 1 Inhaler 11    Aspirin, Buffered 81 mg tab Take 81 mg by mouth daily. Allergies   Allergen Reactions    Gadolinium-Containing Contrast Media Other (comments)     1) Moderate to Severe. 2) Post Gadolinium issues as noted:   - Diaphoretic, Near Syncope, Nausea and vomiting.     Acetaminophen Other (comments)     Indigestion,\"burning sensation\"    Contrast Dye [Iodine] Other (comments)     Decrease in BP    Levaquin [Levofloxacin] Other (comments)     Joint pain    Medrol [Methylprednisolone] Vertigo    Norvasc [Amlodipine] Other (comments)     Numbness and tingling    Nsaids (Non-Steroidal Anti-Inflammatory Drug) Other (comments)     Cough up blood      Percocet [Oxycodone-Acetaminophen] Itching    Ranexa [Ranolazine] Other (comments)     Cannot remember    Simvastatin Other (comments)     Joint pain    Voltaren [Diclofenac Sodium] Other (comments)     unknown    Zetia [Ezetimibe] Myalgia     Family History   Problem Relation Age of Onset    Stroke Father     Heart Disease Mother      Social History   Substance Use Topics    Smoking status: Never Smoker    Smokeless tobacco: Never Used    Alcohol use 1.8 oz/week     1 Shots of liquor, 2 Standard drinks or equivalent per week      Comment: rare     Patient Active Problem List   Diagnosis Code    Hypercholesterolemia E78.00    Stroke ([facility] I63.9    Asthma J45.909    Calculus of kidney N20.0    Erectile dysfunction N52.9    GERD (gastroesophageal reflux disease) K21.9    Prostate cancer ([facility] C61    Chronic bronchitis ([facility] J42    Allergic rhinitis J30.9    Gout M10.9    H/O TIA (transient ischemic attack) and stroke Z86.73    Chest pain R07.9    Sinus tachycardia R00.0    Atrial flutter ([facility]) I48.92    S/P ablation of atrial flutter Z98.890, Z86.79    Aortic stenosis I35.0    SOB (shortness of breath) R06.02    SSS (sick sinus syndrome) ([facility]) I49.5    MARYLOU (obstructive sleep apnea) G47.33    Pacemaker Z95.0    CAD (coronary artery disease) I25.10    ASHD (arteriosclerotic heart disease) I25.10    S/P AVR (aortic valve replacement) Z95.2    Irregular heartbeat I49.9       Depression Risk Factor Screening:     PHQ over the last two weeks 7/6/2017   Little interest or pleasure in doing things Not at all   Feeling down, depressed or hopeless Not at all   Total Score PHQ 2 0     Alcohol Risk Factor Screening:   Deferred      Functional Ability and Level of Safety:     Hearing Loss   none    Activities of Daily Living   Self-care. Requires assistance with: no ADLs    Fall Risk     Fall Risk Assessment, last 12 mths 7/6/2017   Able to walk? Yes   Fall in past 12 months? No     Abuse Screen   Patient is not abused    Review of Systems   Not required  Annual Medicare Wellness Visit    Physical Examination     Evaluation of Cognitive Function:  Mood/affect:  happy, Not feeling well, here for sick visit  Appearance: age appropriate  Family member/caregiver input: None present    No exam performed today, Annual Medicare Wellness Visit.     Patient Care Team:  Lul Harris MD as PCP - Yohan Hogan MD (Orthopedic Surgery)  Shantell Quintanilla MD (Cardiology)  Chel Martins MD as Physician (Cardiology)  Lul Kendrick RN as Nurse Navigator  Dory Kwok MD as Surgeon (General Surgery)  Marcos Marino NP as Nurse Practitioner (Nurse Practitioner)  Dr. Vidhi Cárdenas (Dental General Practice)    Advice/Referrals/Counseling   Education and counseling provided:  Are appropriate based on today's review and evaluation  Colorectal cancer screening tests  Bone mass measurement (DEXA)  Screening for glaucoma      Assessment/Plan       ICD-10-CM ICD-9-CM    1. Acute bronchitis, unspecified organism J20.9 466.0    2. Routine general medical examination at a health care facility Z00.00 V70.0    3. Screening for depression Z13.89 V79.0 DEPRESSION SCREEN ANNUAL   4. Chronic bronchitis, unspecified chronic bronchitis type ([facility] J42 491.9    .          1. AMD on file. 2. Discussed preventative screenings and vaccines. Patient states he sees his opthalmologist often and has frequent exams for cataracts. States he had a colonoscopy but does not remember when, states it was normal.  No Dexascan to his knowledge, order per Dr Jose Gonzales discretion. Immunization up to date and patient plans to get a flu shot this fall. AVS and preventative screenings table printed, reviewed, and handed to patient. Patient verbalized understanding to all information.

## 2017-07-12 ENCOUNTER — OFFICE VISIT (OUTPATIENT)
Dept: CARDIOLOGY CLINIC | Age: 82
End: 2017-07-12

## 2017-07-12 DIAGNOSIS — Z95.0 PACEMAKER: Primary | ICD-10-CM

## 2017-07-12 DIAGNOSIS — I49.5 SSS (SICK SINUS SYNDROME) (HCC): ICD-10-CM

## 2017-07-12 DIAGNOSIS — I48.92 ATRIAL FLUTTER, UNSPECIFIED TYPE (HCC): ICD-10-CM

## 2017-07-13 ENCOUNTER — TELEPHONE (OUTPATIENT)
Dept: INTERNAL MEDICINE CLINIC | Age: 82
End: 2017-07-13

## 2017-07-13 NOTE — TELEPHONE ENCOUNTER
Patient states he needs a call back in reference to getting an x-ray for his pulmonary doctor he was referred to see. Patient states order received is not what is needed. Please call to discuss.  Thank you

## 2017-07-13 NOTE — TELEPHONE ENCOUNTER
Pt informed his order was in the system he just needed to go to main building [facility] and register. Pt verbalized understanding.

## 2017-07-17 ENCOUNTER — HOSPITAL ENCOUNTER (OUTPATIENT)
Dept: LAB | Age: 82
Discharge: HOME | End: 2017-07-17
Payer: MEDICARE

## 2017-07-17 ENCOUNTER — LAB ONLY (OUTPATIENT)
Dept: INTERNAL MEDICINE CLINIC | Age: 82
End: 2017-07-17

## 2017-07-17 DIAGNOSIS — I25.10 CORONARY ARTERY DISEASE INVOLVING NATIVE CORONARY ARTERY OF NATIVE HEART WITHOUT ANGINA PECTORIS: ICD-10-CM

## 2017-07-17 DIAGNOSIS — M10.9 GOUT, UNSPECIFIED CAUSE, UNSPECIFIED CHRONICITY, UNSPECIFIED SITE: ICD-10-CM

## 2017-07-17 DIAGNOSIS — C61 PROSTATE CANCER (HCC): ICD-10-CM

## 2017-07-17 DIAGNOSIS — J42 UNSPECIFIED CHRONIC BRONCHITIS (HCC): Primary | ICD-10-CM

## 2017-07-17 DIAGNOSIS — R73.9 HYPERGLYCEMIA: ICD-10-CM

## 2017-07-17 PROCEDURE — 36415 COLL VENOUS BLD VENIPUNCTURE: CPT

## 2017-07-17 PROCEDURE — 80053 COMPREHEN METABOLIC PANEL: CPT

## 2017-07-17 PROCEDURE — 84153 ASSAY OF PSA TOTAL: CPT

## 2017-07-17 PROCEDURE — 84550 ASSAY OF BLOOD/URIC ACID: CPT

## 2017-07-17 PROCEDURE — 83036 HEMOGLOBIN GLYCOSYLATED A1C: CPT

## 2017-07-17 PROCEDURE — 85025 COMPLETE CBC W/AUTO DIFF WBC: CPT

## 2017-07-17 PROCEDURE — 80061 LIPID PANEL: CPT

## 2017-07-18 ENCOUNTER — TELEPHONE (OUTPATIENT)
Dept: INTERNAL MEDICINE CLINIC | Age: 82
End: 2017-07-18

## 2017-07-18 LAB
ALBUMIN SERPL-MCNC: 4.2 G/DL (ref 3.5–4.7)
ALBUMIN/GLOB SERPL: 1.8 {RATIO} (ref 1.2–2.2)
ALP SERPL-CCNC: 72 IU/L (ref 39–117)
ALT SERPL-CCNC: 18 IU/L (ref 0–44)
AST SERPL-CCNC: 17 IU/L (ref 0–40)
BASOPHILS # BLD AUTO: 0.1 X10E3/UL (ref 0–0.2)
BASOPHILS NFR BLD AUTO: 1 %
BILIRUB SERPL-MCNC: 0.6 MG/DL (ref 0–1.2)
BUN SERPL-MCNC: 17 MG/DL (ref 8–27)
BUN/CREAT SERPL: 18 (ref 10–24)
CALCIUM SERPL-MCNC: 9.3 MG/DL (ref 8.6–10.2)
CHLORIDE SERPL-SCNC: 98 MMOL/L (ref 96–106)
CHOLEST SERPL-MCNC: 170 MG/DL (ref 100–199)
CO2 SERPL-SCNC: 27 MMOL/L (ref 18–29)
CREAT SERPL-MCNC: 0.96 MG/DL (ref 0.76–1.27)
EOSINOPHIL # BLD AUTO: 0.8 X10E3/UL (ref 0–0.4)
EOSINOPHIL NFR BLD AUTO: 11 %
ERYTHROCYTE [DISTWIDTH] IN BLOOD BY AUTOMATED COUNT: 14.1 % (ref 12.3–15.4)
EST. AVERAGE GLUCOSE BLD GHB EST-MCNC: 131 MG/DL
GLOBULIN SER CALC-MCNC: 2.4 G/DL (ref 1.5–4.5)
GLUCOSE SERPL-MCNC: 113 MG/DL (ref 65–99)
HBA1C MFR BLD: 6.2 % (ref 4.8–5.6)
HCT VFR BLD AUTO: 43.9 % (ref 37.5–51)
HDLC SERPL-MCNC: 54 MG/DL
HGB BLD-MCNC: 14.7 G/DL (ref 12.6–17.7)
IMM GRANULOCYTES # BLD: 0 X10E3/UL (ref 0–0.1)
IMM GRANULOCYTES NFR BLD: 0 %
LDLC SERPL CALC-MCNC: 96 MG/DL (ref 0–99)
LYMPHOCYTES # BLD AUTO: 1.9 X10E3/UL (ref 0.7–3.1)
LYMPHOCYTES NFR BLD AUTO: 28 %
MCH RBC QN AUTO: 31.4 PG (ref 26.6–33)
MCHC RBC AUTO-ENTMCNC: 33.5 G/DL (ref 31.5–35.7)
MCV RBC AUTO: 94 FL (ref 79–97)
MONOCYTES # BLD AUTO: 0.8 X10E3/UL (ref 0.1–0.9)
MONOCYTES NFR BLD AUTO: 11 %
NEUTROPHILS # BLD AUTO: 3.4 X10E3/UL (ref 1.4–7)
NEUTROPHILS NFR BLD AUTO: 49 %
PLATELET # BLD AUTO: 227 X10E3/UL (ref 150–379)
POTASSIUM SERPL-SCNC: 4.6 MMOL/L (ref 3.5–5.2)
PROT SERPL-MCNC: 6.6 G/DL (ref 6–8.5)
PSA SERPL-MCNC: 4.8 NG/ML (ref 0–4)
RBC # BLD AUTO: 4.68 X10E6/UL (ref 4.14–5.8)
SODIUM SERPL-SCNC: 141 MMOL/L (ref 134–144)
TRIGL SERPL-MCNC: 100 MG/DL (ref 0–149)
URATE SERPL-MCNC: 7.5 MG/DL (ref 3.7–8.6)
VLDLC SERPL CALC-MCNC: 20 MG/DL (ref 5–40)
WBC # BLD AUTO: 6.8 X10E3/UL (ref 3.4–10.8)

## 2017-07-18 NOTE — TELEPHONE ENCOUNTER
----- Message from Jozef Linares MD sent at 7/17/2017  4:07 PM EDT -----  Please call the patient and let the patient know that his test result(s) is/are normal.  Thanks. Wild Walker.

## 2017-07-19 ENCOUNTER — TELEPHONE (OUTPATIENT)
Dept: INTERNAL MEDICINE CLINIC | Age: 82
End: 2017-07-19

## 2017-07-20 ENCOUNTER — TELEPHONE (OUTPATIENT)
Dept: INTERNAL MEDICINE CLINIC | Age: 82
End: 2017-07-20

## 2017-07-20 NOTE — TELEPHONE ENCOUNTER
----- Message from Richard Ruiz MD sent at 7/17/2017  4:07 PM EDT -----  Please call the patient and let the patient know that his test result(s) is/are normal.  Thanks. Earle Garcia.

## 2017-07-21 NOTE — TELEPHONE ENCOUNTER
A letter is on its way to him. However, you may inform him of results. Mildly hyperglycemic / pre-diabetic. PSA is slightly high at 4.8.    BJF

## 2017-07-26 NOTE — TELEPHONE ENCOUNTER
Identified patient 2 identifiers verified. Patient made aware of lab results and informed that a lab letter has been mailed. Patient encouraged to call back if any further questions about labs after he receives them in the mail.

## 2017-08-01 ENCOUNTER — TELEPHONE (OUTPATIENT)
Dept: CARDIOLOGY CLINIC | Age: 82
End: 2017-08-01

## 2017-08-01 NOTE — TELEPHONE ENCOUNTER
Pt have swelling in both legs. Please call. Pt has been sched a vascular appt for 8-8-17 @ 4pm.      Please call.    [    Thanks

## 2017-08-01 NOTE — TELEPHONE ENCOUNTER
Left message to call me back. Pt returned my call,verified pt with two pt identifiers, pt stated that he has been swelling in legs and stomach for 2-3 days. He says his chest has funny feeling, like tightness. No sob, heart fluttering. He is not doing daily wts. His BP today is 126/63 HR 74. He takes his Lasix 40 mg tab 1 tab in am and 1/2 tab in pm. His next appt with you is on 8/8/17. Please advise,thank you. Message  Received: [phone]MD Mara LPN       Caller: Unspecified (Yesterday,  9:38 AM)                     Have him increase lasix dose in the evening to 1 tab. Daily wt. F/u with me in cardiac clinic. Don't schedule him in vascular clinic for leg swelling. Called pt,verified pt with two pt identifiers, told pt that  wants him to increase his lasix dose to 1 tab in the am and 1 tab in the pm. Advised to take daily wts as discussed with pt yesterday. Advised that we will need to reschedule his appt on the 8th as it was scheduled in the wrong clinic time. Advised that our  will call and set that appt up with him. He verbalized that he understood everything and had no further questions.        Message  Received: Today       Armani Arredondo LPN       Caller: Unspecified Dom Gray,  9:38 AM)                     Done, Tuesday, August 08, 2017 10:15 AM       Noted.

## 2017-08-08 ENCOUNTER — OFFICE VISIT (OUTPATIENT)
Dept: CARDIOLOGY CLINIC | Age: 82
End: 2017-08-08

## 2017-08-08 VITALS
HEART RATE: 76 BPM | HEIGHT: 73 IN | BODY MASS INDEX: 31.5 KG/M2 | RESPIRATION RATE: 16 BRPM | OXYGEN SATURATION: 98 % | WEIGHT: 237.7 LBS | SYSTOLIC BLOOD PRESSURE: 110 MMHG | DIASTOLIC BLOOD PRESSURE: 68 MMHG

## 2017-08-08 DIAGNOSIS — E78.00 HYPERCHOLESTEROLEMIA: ICD-10-CM

## 2017-08-08 DIAGNOSIS — I48.92 ATRIAL FLUTTER, UNSPECIFIED TYPE (HCC): ICD-10-CM

## 2017-08-08 DIAGNOSIS — I49.5 SSS (SICK SINUS SYNDROME) (HCC): ICD-10-CM

## 2017-08-08 DIAGNOSIS — Z95.2 S/P AVR (AORTIC VALVE REPLACEMENT): Primary | ICD-10-CM

## 2017-08-08 DIAGNOSIS — Z98.890 S/P ABLATION OF ATRIAL FLUTTER: ICD-10-CM

## 2017-08-08 DIAGNOSIS — R06.02 SOB (SHORTNESS OF BREATH): ICD-10-CM

## 2017-08-08 DIAGNOSIS — I25.10 CORONARY ARTERY DISEASE INVOLVING NATIVE CORONARY ARTERY OF NATIVE HEART WITHOUT ANGINA PECTORIS: ICD-10-CM

## 2017-08-08 DIAGNOSIS — Z86.79 S/P ABLATION OF ATRIAL FLUTTER: ICD-10-CM

## 2017-08-08 NOTE — PROGRESS NOTES
Chief Complaint   Patient presents with    Leg Swelling     bilaterial leg/ankle swelling and SOB at times while resting

## 2017-08-08 NOTE — MR AVS SNAPSHOT
Visit Information Date & Time Provider Department Dept. Phone Encounter #  
 8/8/2017 10:15 AM Bam Knott, [address] [facility] [phone] [id number] Follow-up Instructions Return in about 6 months (around 2/8/2018). Routing History Follow-up and Disposition History Your Appointments 9/19/2017  9:00 AM  
BLOOD PRESSURE with Bam Knott MD  
[facility] [address] [place] [address]) Appt Note: 6 month per Dr. Yuri Jules, no cp  
 [address] [facility]  
[phone] [id number] [facility]  
  
    
 10/19/2017  8:15 AM  
PACEMAKER with PACEMAKER, [facility] [address]) Appt Note: BSC DCPM 6mo check [id number] [facility]  
[phone] [address] [facility]  
  
    
 1/4/2018  8:45 AM  
ROUTINE CARE with Norm Black MD  
[facility] [address] [place] [address]) Appt Note: 6 month follow up  
 [facility] [address] [facility]  
[phone]  
  
   
 [facility] [address] [facility] [address] [facility] [address] [address]  
  
    
  
 10/11/2017  8:00 AM  
REMOTE OFFICE VISIT with [facility] [address] [facility]) Appt Note: NOT AN OFFICE VISIT - REMOTE BSC PM  
 [id number] [facility]  
[phone] [address] [facility] Upcoming Health Maintenance Date Due INFLUENZA AGE 9 TO ADULT 8/1/2017 MEDICARE YEARLY EXAM 7/7/2018 GLAUCOMA SCREENING Q2Y 12/1/2018 DTaP/Tdap/Td series (2 - Td) 7/19/2026 Allergies as of 8/8/2017  Review Complete On: 8/8/2017 By: Bam Knott MD  
  
 Severity Noted Reaction Type Reactions Gadolinium-containing Contrast Media Medium 06/11/2012   Intolerance Other (comments) 1) Moderate to Severe. 2) Post Gadolinium issues as noted: - Diaphoretic, Near Syncope, Nausea and vomiting. Acetaminophen  03/14/2017    Other (comments) Indigestion,\"burning sensation\" Contrast Dye [Iodine]  11/20/2009    Other (comments) Decrease in BP Levaquin [Levofloxacin]  11/20/2009    Other (comments) Joint pain Medrol [Methylprednisolone]  06/18/2015    Vertigo Norvasc [Amlodipine]  03/04/2015    Other (comments) Numbness and tingling Nsaids (Non-steroidal Anti-inflammatory Drug)  03/10/2016    Other (comments) Cough up blood Percocet [Oxycodone-acetaminophen]  12/02/2014    Itching Ranexa [Ranolazine]  04/02/2015    Other (comments) Cannot remember Simvastatin  11/20/2009    Other (comments) Joint pain  
 Voltaren [Diclofenac Sodium]  03/14/2017    Other (comments)  
 unknown Zetia [Ezetimibe]  12/11/2009    Myalgia Current Immunizations  Reviewed on 4/14/2016 Name Date Influenza High Dose Vaccine PF 9/29/2016 Influenza Vaccine 10/1/2015, 10/17/2014, 10/21/2013 Influenza Vaccine Split 10/3/2012 Pneumococcal Conjugate (PCV-13) 9/29/2016 Pneumococcal Vaccine (Unspecified Type) 4/14/2015 Td 1/1/2008 Tdap 7/19/2016 Zoster Vaccine, Live 1/1/2012 Not reviewed this visit You Were Diagnosed With   
  
 Codes Comments S/P AVR (aortic valve replacement)    -  Primary ICD-10-CM: P31.6 ICD-9-CM: V43.3 Atrial flutter, unspecified type ([facility] [address]     ICD-10-CM: I48.92 
ICD-9-CM: 427.32 S/P ablation of atrial flutter     ICD-10-CM: Z98.890, Z86.79 
ICD-9-CM: V45.89 Coronary artery disease involving native coronary artery of native heart without angina pectoris     ICD-10-CM: I25.10 ICD-9-CM: 414.01   
 SSS (sick sinus syndrome) (HCC)     ICD-10-CM: I49.5 ICD-9-CM: 427.81 Hypercholesterolemia     ICD-10-CM: E78.00 ICD-9-CM: 272.0   
 SOB (shortness of breath)     ICD-10-CM: R06.02 
ICD-9-CM: 786.05 Vitals BP Pulse Resp Height(growth percentile) Weight(growth percentile) SpO2  
 110/68 (BP 1 Location: Right arm, BP Patient Position: Sitting) 76 16 6' 1\" (1.854 m) 237 lb 11.2 oz (107.8 kg) 98% BMI Smoking Status 31.36 kg/m2 Never Smoker Vitals History BMI and BSA Data Body Mass Index Body Surface Area  
 31.36 kg/m 2 2.36 m 2 Preferred Pharmacy Pharmacy Name Phone [facility] [address], [address] [place] [address] Avenue [phone] Your Updated Medication List  
  
   
This list is accurate as of: 8/8/17 11:06 AM.  Always use your most recent med list.  
  
  
  
  
 albuterol 90 mcg/actuation inhaler Commonly known as:  VENTOLIN HFA Take 2 Puffs by inhalation every four (4) hours as needed for Wheezing. aspirin, buffered 81 mg Tab Take 81 mg by mouth daily. coenzyme q10 10 mg Cap Take 100 mg by mouth every evening. CRESTOR 10 mg tablet Generic drug:  rosuvastatin Take 10 mg by mouth every Monday and Thursday. furosemide 40 mg tablet Commonly known as:  LASIX TAKE ONE TABLET BY MOUTH ONCE DAILY  
  
 isosorbide mononitrate ER 60 mg CR tablet Commonly known as:  IMDUR  
TAKE ONE TABLET BY MOUTH ONCE DAILY  
  
 menthol 6 % Gel  
by Apply Externally route as needed. \"Abdirashid\"--cool therapy pain gel  
  
 metoprolol tartrate 25 mg tablet Commonly known as:  LOPRESSOR  
TAKE ONE-HALF TABLET BY MOUTH TWICE DAILY  
  
 omeprazole 20 mg capsule Commonly known as:  PRILOSEC  
TAKE ONE CAPSULE BY MOUTH EVERY DAY  
  
 potassium chloride 20 mEq tablet Commonly known as:  K-DUR, KLOR-CON Take 1 Tab by mouth daily. * TRAMADOL PO Take 100 mg by mouth as needed. * traMADol 50 mg tablet Commonly known as:  ULTRAM  
  
 * Notice: This list has 2 medication(s) that are the same as other medications prescribed for you.  Read the directions carefully, and ask your doctor or other care provider to review them with you. We Performed the Following AMB POC EKG ROUTINE W/ 12 LEADS, INTER & REP [91227 CPT(R)] METABOLIC PANEL, BASIC [30667 CPT(R)] Follow-up Instructions Return in about 6 months (around 2/8/2018). To-Do List   
 08/08/2017 ECHO:  2D ECHO COMPLETE ADULT (TTE) W OR WO CONTR Please provide this summary of care documentation to your next provider. Your primary care clinician is listed as [place]. If you have any questions after today's visit, please call [phone].

## 2017-08-08 NOTE — PROGRESS NOTES
8/8/2017 10:54 AM      Subjective:     Samira Veras Sr. is here for f/u visit. He call fes weeks ago c/o POLANCO and leg swelling. Dose of lasix was increased and since then feeling better. Wt. Is down. He denies chest pain, chest pressure/discomfort, dyspnea, palpitations, irregular heart beats, near-syncope, syncope, fatigue, orthopnea, paroxysmal nocturnal dyspnea, exertional chest pressure/discomfort, claudication, lower extremity edema. Visit Vitals    /68 (BP 1 Location: Right arm, BP Patient Position: Sitting)    Pulse 76    Resp 16    Ht 6' 1\" (1.854 m)    Wt 237 lb 11.2 oz (107.8 kg)    SpO2 98%    BMI 31.36 kg/m2     Current Outpatient Prescriptions   Medication Sig    albuterol (VENTOLIN HFA) 90 mcg/actuation inhaler Take 2 Puffs by inhalation every four (4) hours as needed for Wheezing.  traMADol (ULTRAM) 50 mg tablet     rosuvastatin (CRESTOR) 10 mg tablet Take 10 mg by mouth every Monday and Thursday.  menthol 6 % gel by Apply Externally route as needed. \"Abdirashid\"--cool therapy pain gel    metoprolol tartrate (LOPRESSOR) 25 mg tablet TAKE ONE-HALF TABLET BY MOUTH TWICE DAILY    furosemide (LASIX) 40 mg tablet TAKE ONE TABLET BY MOUTH ONCE DAILY    potassium chloride (K-DUR, KLOR-CON) 20 mEq tablet Take 1 Tab by mouth daily.  isosorbide mononitrate ER (IMDUR) 60 mg CR tablet TAKE ONE TABLET BY MOUTH ONCE DAILY    omeprazole (PRILOSEC) 20 mg capsule TAKE ONE CAPSULE BY MOUTH EVERY DAY    coenzyme q10 10 mg cap Take 100 mg by mouth every evening.  Aspirin, Buffered 81 mg tab Take 81 mg by mouth daily.  TRAMADOL HCL (TRAMADOL PO) Take 100 mg by mouth as needed. No current facility-administered medications for this visit.           Objective:      Visit Vitals    /68 (BP 1 Location: Right arm, BP Patient Position: Sitting)    Pulse 76    Resp 16    Ht 6' 1\" (1.854 m)    Wt 237 lb 11.2 oz (107.8 kg)    SpO2 98%    BMI 31.36 kg/m2 Data Review:     EKG: Normal sinus rhythm, 1st degree AV block, LAFB, LVH with repolarization changes    Reviewed and/or ordered active problem list, medication list tests    Past Medical History:   Diagnosis Date    Adverse effect of anesthesia     difficult with waking up     Allergic rhinitis     Arthritis     Asthma     Atrial flutter ([facility] [address] 9/12/2014    CAD (coronary artery disease)     Dr. Iman Shultz    Calculus of kidney     Chest pain 2006    Normal stress echo    Chronic bronchitis     Chronic bronchitis ([facility] [address] 10/3/2012    Chronic pain     knees    Erectile dysfunction     GERD (gastroesophageal reflux disease)     hiatal hernia    Gout 1/24/2013    Hemoptysis 2008    Work up by Dr. Shawnee Henderson; Negative    Hiatal hernia     Hypercholesterolemia     Hypertension     Ill-defined condition     bronchitis    Melanoma ([facility] [address]     back    Nausea & vomiting     Pacemaker     Dr. Carmen Dumont [facility]) 6/4/2012    S/P ablation of atrial flutter 9/12/2014    Sleep apnea     states never had test for apnea    Stroke [facility]) 2009     tia no residual problems      Past Surgical History:   Procedure Laterality Date    CARDIAC SURG PROCEDURE UNLIST      ablation    HX AFIB ABLATION  2014    Dr. Jose Desir    ruptured appendix    HX HEART CATHETERIZATION      [address]    HX HERNIA REPAIR  12/17/14    Recurrent left inguinal hernia; Dr. Yanez Courts      5 hernia repairs total    HX PACEMAKER  12/2014    HX PACEMAKER PLACEMENT  2014    Dr. Oro American    Kidney stone extraction    [place] COLSC FLX W/RMVL OF TUMOR POLYP LESION SNARE TQ  4/21/2011          Allergies   Allergen Reactions    Gadolinium-Containing Contrast Media Other (comments)     1) Moderate to Severe. 2) Post Gadolinium issues as noted:   - Diaphoretic, Near Syncope, Nausea and vomiting.     Acetaminophen Other (comments)     Indigestion,\"burning sensation\"    Contrast Dye [Iodine] Other (comments)     Decrease in BP    Levaquin [Levofloxacin] Other (comments)     Joint pain    Medrol [Methylprednisolone] Vertigo    Norvasc [Amlodipine] Other (comments)     Numbness and tingling    Nsaids (Non-Steroidal Anti-Inflammatory Drug) Other (comments)     Cough up blood      Percocet [Oxycodone-Acetaminophen] Itching    Ranexa [Ranolazine] Other (comments)     Cannot remember    Simvastatin Other (comments)     Joint pain    Voltaren [Diclofenac Sodium] Other (comments)     unknown    Zetia [Ezetimibe] Myalgia      Family History   Problem Relation Age of Onset    Stroke Father     Heart Disease Mother       Social History     Social History    Marital status:      Spouse name: N/A    Number of children: N/A    Years of education: N/A     Occupational History    Not on file. Social History Main Topics    Smoking status: Never Smoker    Smokeless tobacco: Never Used    Alcohol use 1.8 oz/week     1 Shots of liquor, 2 Standard drinks or equivalent per week      Comment: rare    Drug use: No    Sexual activity: Yes     Partners: Female     Other Topics Concern    Not on file     Social History Narrative         Review of Systems     General: Not Present- Anorexia, Chills, Dietary Changes, Fatigue, Fever, Medication Changes, Night Sweats, Weight Gain > 10lbs. and Weight Loss > 10lbs. .  Skin: Not Present- Bruising and Excessive Sweating. HEENT: Not Present- Headache, Visual Loss and Vertigo. Respiratory: Not Present- Cough, Decreased Exercise Tolerance, Difficulty Breathing, Snoring and Wheezing.   Cardiovascular: Not Present- Abnormal Blood Pressure, Chest Pain, Claudications, Difficulty Breathing On Exertion, Edema, Fainting / Blacking Out, Irregular Heart Beat, Night Cramps, Orthopnea, Palpitations, Paroxysmal Nocturnal Dyspnea, Rapid Heart Rate, Shortness of Breath and Swelling of Extremities. Gastrointestinal: Not Present- Black, Tarry Stool, Bloody Stool, Diarrhea, Hematemesis, Rectal Bleeding and Vomiting. Musculoskeletal: Not Present- Muscle Pain and Muscle Weakness. Neurological: Not Present- Dizziness. Psychiatric: Not Present- Depression. Endocrine: Not Present- Cold Intolerance, Heat Intolerance and Thyroid Problems. Hematology: Not Present- Abnormal Bleeding, Anemia, Blood Clots and Easy Bruising.       Physical Exam   The physical exam findings are as follows:       General   Mental Status - Alert. General Appearance - Not in acute distress. Chest and Lung Exam   Inspection: Accessory muscles - No use of accessory muscles in breathing. Auscultation:   Breath sounds: - Normal.      Cardiovascular   Inspection: Jugular vein - Bilateral - Inspection Normal.  Palpation/Percussion:   Apical Impulse: - Normal.  Auscultation: Rhythm - Regular. Heart Sounds - S1 WNL and S2 WNL. No S3 or S4. Murmurs & Other Heart Sounds: Auscultation of the heart reveals - No Murmurs. Carotid arteries - No Carotid bruit. Peripheral Vascular   Upper Extremity: Inspection - Bilateral - No Cyanotic nailbeds or Digital clubbing. Lower Extremity:   Palpation: Edema - Bilateral - No edema. Assessment:       ICD-10-CM ICD-9-CM    1. S/P AVR (aortic valve replacement) Z45.2 K18.8 METABOLIC PANEL, BASIC      2D ECHO COMPLETE ADULT (TTE) W OR WO CONTR   2. Atrial flutter, unspecified type (HCC) I48.92 427.32 AMB POC EKG ROUTINE W/ 12 LEADS, INTER & REP      METABOLIC PANEL, BASIC      2D ECHO COMPLETE ADULT (TTE) W OR WO CONTR   3. S/P ablation of atrial flutter B65.527 M59.83 METABOLIC PANEL, BASIC    K84.73  2D ECHO COMPLETE ADULT (TTE) W OR WO CONTR   4. Coronary artery disease involving native coronary artery of native heart without angina pectoris M87.74 491.37 METABOLIC PANEL, BASIC      2D ECHO COMPLETE ADULT (TTE) W OR WO CONTR   5.  SSS (sick sinus syndrome) ([facility] I49.5 427.81 METABOLIC PANEL, BASIC      2D ECHO COMPLETE ADULT (TTE) W OR WO CONTR   6. Hypercholesterolemia Q93.19 871.1 METABOLIC PANEL, BASIC      2D ECHO COMPLETE ADULT (TTE) W OR WO CONTR   7. SOB (shortness of breath) O08.66 895.74 METABOLIC PANEL, BASIC      2D ECHO COMPLETE ADULT (TTE) W OR WO CONTR       Plan:     1. S/p bioprosthetic aortic valve replacement. Doing very well. No significant CAD at time pre-op cardiac cath. Check Echo.     2. Hyperlipidemia: on statin. Last FLP noted.     3. S/p a. Flutter ablation, sss, s/p PPM: f/u with EP.    4. MARYLOU: not using CPAP.    5. Leg swelling resolved with lasix. Continue bid dose. Check f/u BMP.

## 2017-08-14 ENCOUNTER — HOSPITAL ENCOUNTER (OUTPATIENT)
Dept: LAB | Age: 82
Discharge: HOME | End: 2017-08-14
Payer: MEDICARE

## 2017-08-14 PROCEDURE — 80048 BASIC METABOLIC PNL TOTAL CA: CPT

## 2017-08-14 PROCEDURE — 36415 COLL VENOUS BLD VENIPUNCTURE: CPT

## 2017-08-15 LAB
BUN SERPL-MCNC: 19 MG/DL (ref 8–27)
BUN/CREAT SERPL: 17 (ref 10–24)
CALCIUM SERPL-MCNC: 9.6 MG/DL (ref 8.6–10.2)
CHLORIDE SERPL-SCNC: 97 MMOL/L (ref 96–106)
CO2 SERPL-SCNC: 25 MMOL/L (ref 18–29)
CREAT SERPL-MCNC: 1.15 MG/DL (ref 0.76–1.27)
GLUCOSE SERPL-MCNC: 120 MG/DL (ref 65–99)
INTERPRETATION: NORMAL
POTASSIUM SERPL-SCNC: 4.2 MMOL/L (ref 3.5–5.2)
SODIUM SERPL-SCNC: 141 MMOL/L (ref 134–144)

## 2017-08-16 ENCOUNTER — TELEPHONE (OUTPATIENT)
Dept: CARDIOLOGY CLINIC | Age: 82
End: 2017-08-16

## 2017-08-16 NOTE — TELEPHONE ENCOUNTER
----- Message from Luis Neal MD sent at 8/15/2017 10:02 AM EDT -----  Inform him labs are ok      Called pt,verified pt with two pt identifiers, told pt that his labs are okay. He verbalized that he understood everything.

## 2017-08-21 ENCOUNTER — TELEPHONE (OUTPATIENT)
Dept: CARDIOLOGY CLINIC | Age: 82
End: 2017-08-21

## 2017-08-21 DIAGNOSIS — I25.10 CORONARY ARTERY DISEASE INVOLVING NATIVE CORONARY ARTERY OF NATIVE HEART WITHOUT ANGINA PECTORIS: ICD-10-CM

## 2017-08-21 RX ORDER — FUROSEMIDE 40 MG/1
40 TABLET ORAL 2 TIMES DAILY
Qty: 180 TAB | Refills: 3 | Status: SHIPPED | COMMUNITY
Start: 2017-08-21 | End: 2018-07-02 | Stop reason: SDUPTHER

## 2017-08-21 RX ORDER — ISOSORBIDE MONONITRATE 60 MG/1
TABLET, EXTENDED RELEASE ORAL
Qty: 90 TAB | Refills: 3 | Status: SHIPPED | COMMUNITY
Start: 2017-08-21 | End: 2018-10-27 | Stop reason: SDUPTHER

## 2017-08-21 RX ORDER — METOPROLOL TARTRATE 25 MG/1
TABLET, FILM COATED ORAL
Qty: 90 TAB | Refills: 3 | Status: SHIPPED | COMMUNITY
Start: 2017-08-21 | End: 2018-10-27 | Stop reason: SDUPTHER

## 2017-08-21 RX ORDER — POTASSIUM CHLORIDE 20 MEQ/1
20 TABLET, EXTENDED RELEASE ORAL 2 TIMES DAILY
Qty: 180 TAB | Refills: 3 | Status: SHIPPED | OUTPATIENT
Start: 2017-08-21 | End: 2018-10-27 | Stop reason: SDUPTHER

## 2017-08-21 NOTE — TELEPHONE ENCOUNTER
Pt need rx refill for metoprolol, furosemide, potassium chloride, isosorbide mononitrate:  please send all rx to [facility] mail order.       Thanks

## 2017-08-21 NOTE — TELEPHONE ENCOUNTER
Pt called in and wanted to verify his medications and needs a refill on his lasix and potassium. Advised he is to take the lasix 40 mg tab twice daily and the potassium 20 meq was verbal with  to take twice daily. Advised I would send in new Rx scripts to his mail order pharmacy. He verbalized that he understood everything. Sent new Rx scripts to NP for approval and to be sent to pharmacy.

## 2017-08-29 ENCOUNTER — CLINICAL SUPPORT (OUTPATIENT)
Dept: CARDIOLOGY CLINIC | Age: 82
End: 2017-08-29

## 2017-08-29 DIAGNOSIS — I48.92 ATRIAL FLUTTER, UNSPECIFIED TYPE (HCC): ICD-10-CM

## 2017-08-29 DIAGNOSIS — E78.00 HYPERCHOLESTEROLEMIA: ICD-10-CM

## 2017-08-29 DIAGNOSIS — Z86.79 S/P ABLATION OF ATRIAL FLUTTER: ICD-10-CM

## 2017-08-29 DIAGNOSIS — Z95.2 S/P AVR (AORTIC VALVE REPLACEMENT): ICD-10-CM

## 2017-08-29 DIAGNOSIS — I25.10 CORONARY ARTERY DISEASE INVOLVING NATIVE CORONARY ARTERY OF NATIVE HEART WITHOUT ANGINA PECTORIS: ICD-10-CM

## 2017-08-29 DIAGNOSIS — Z98.890 S/P ABLATION OF ATRIAL FLUTTER: ICD-10-CM

## 2017-08-29 DIAGNOSIS — R06.02 SOB (SHORTNESS OF BREATH): ICD-10-CM

## 2017-08-29 DIAGNOSIS — I49.5 SSS (SICK SINUS SYNDROME) (HCC): ICD-10-CM

## 2017-08-30 ENCOUNTER — TELEPHONE (OUTPATIENT)
Dept: CARDIOLOGY CLINIC | Age: 82
End: 2017-08-30

## 2017-08-30 NOTE — TELEPHONE ENCOUNTER
----- Message from Helga Walsh MD sent at 8/30/2017 10:04 AM EDT -----  Inform him Echo is ok      Called pt and left message to call me back. Returned pt's call,verified pt with two pt identifiers, told pt that his echo is okay. He verbalized that he understood everything.

## 2017-10-11 ENCOUNTER — OFFICE VISIT (OUTPATIENT)
Dept: CARDIOLOGY CLINIC | Age: 82
End: 2017-10-11

## 2017-10-11 DIAGNOSIS — I49.5 SSS (SICK SINUS SYNDROME) (HCC): ICD-10-CM

## 2017-10-11 DIAGNOSIS — Z95.0 PACEMAKER: Primary | ICD-10-CM

## 2017-10-11 DIAGNOSIS — I49.9 IRREGULAR HEARTBEAT: ICD-10-CM

## 2017-10-11 RX ORDER — ALBUTEROL SULFATE 90 UG/1
2 AEROSOL, METERED RESPIRATORY (INHALATION)
Qty: 3 INHALER | Refills: 3 | Status: SHIPPED | OUTPATIENT
Start: 2017-10-11 | End: 2019-05-15 | Stop reason: SDUPTHER

## 2017-10-19 ENCOUNTER — CLINICAL SUPPORT (OUTPATIENT)
Dept: CARDIOLOGY CLINIC | Age: 82
End: 2017-10-19

## 2017-10-19 DIAGNOSIS — I48.92 ATRIAL FLUTTER, UNSPECIFIED TYPE (HCC): ICD-10-CM

## 2017-10-19 DIAGNOSIS — I49.5 SSS (SICK SINUS SYNDROME) (HCC): ICD-10-CM

## 2017-10-19 DIAGNOSIS — Z95.0 PACEMAKER: Primary | ICD-10-CM

## 2017-10-24 RX ORDER — OMEPRAZOLE 20 MG/1
CAPSULE, DELAYED RELEASE ORAL
Qty: 90 CAP | Refills: 3 | Status: SHIPPED | OUTPATIENT
Start: 2017-10-24 | End: 2018-10-27 | Stop reason: SDUPTHER

## 2018-01-04 ENCOUNTER — OFFICE VISIT (OUTPATIENT)
Dept: INTERNAL MEDICINE CLINIC | Age: 83
End: 2018-01-04

## 2018-01-04 VITALS
DIASTOLIC BLOOD PRESSURE: 60 MMHG | BODY MASS INDEX: 31.94 KG/M2 | TEMPERATURE: 97.7 F | HEART RATE: 73 BPM | SYSTOLIC BLOOD PRESSURE: 99 MMHG | OXYGEN SATURATION: 94 % | RESPIRATION RATE: 20 BRPM | WEIGHT: 241 LBS | HEIGHT: 73 IN

## 2018-01-04 DIAGNOSIS — J01.10 ACUTE NON-RECURRENT FRONTAL SINUSITIS: Primary | ICD-10-CM

## 2018-01-04 RX ORDER — AMOXICILLIN AND CLAVULANATE POTASSIUM 875; 125 MG/1; MG/1
1 TABLET, FILM COATED ORAL EVERY 12 HOURS
Qty: 20 TAB | Refills: 0 | Status: SHIPPED | OUTPATIENT
Start: 2018-01-04 | End: 2018-01-14

## 2018-01-04 RX ORDER — CODEINE PHOSPHATE AND GUAIFENESIN 10; 100 MG/5ML; MG/5ML
5 SOLUTION ORAL
Qty: 180 ML | Refills: 0 | Status: SHIPPED | OUTPATIENT
Start: 2018-01-04 | End: 2018-04-19

## 2018-01-04 NOTE — PROGRESS NOTES
Neelima Ty is a 81 y.o. male who presents for evaluation of few days worth of sinus congestion cough, poor sleep. No f/c. No night sweats. ROS:  Constitutional: negative for fevers, chills, anorexia and weight loss  Eyes:   negative for visual disturbance and irritation  ENT:   negative for tinnitus,sore throat,ear pain,hoarseness. ++nasal congestion  Respiratory:  negative for hemoptysis, dyspnea,wheezing.  ++cough  CV:   negative for chest pain, palpitations, lower extremity edema  GI:   negative for nausea, vomiting, diarrhea, abdominal pain,melena  Genitourinary: negative for frequency, dysuria and hematuria  Musculoskel: negative for myalgias, arthralgias, back pain, muscle weakness, joint pain  Neurological:  negative for headaches, dizziness, focal weakness, numbness  Psychiatric:     Negative for depression or anxiety      Past Medical History:   Diagnosis Date    Adverse effect of anesthesia     difficult with waking up     Allergic rhinitis     Arthritis     Asthma     Atrial flutter ([facility] [address] 9/12/2014    CAD (coronary artery disease)     Dr. Sarai Lopez    Calculus of kidney     Chest pain 2006    Normal stress echo    Chronic bronchitis     Chronic bronchitis ([address] 10/3/2012    Chronic pain     knees    Erectile dysfunction     GERD (gastroesophageal reflux disease)     hiatal hernia    Gout 1/24/2013    Hemoptysis 2008    Work up by Dr. Julianna Justice;  Negative    Hiatal hernia     Hypercholesterolemia     Hypertension     Ill-defined condition     bronchitis    Melanoma ([address]     back    Nausea & vomiting     Pacemaker     Dr. Socorro Liu [facility]) 6/4/2012    S/P ablation of atrial flutter 9/12/2014    Sleep apnea     states never had test for apnea    Stroke [facility]) 2009     tia no residual problems       Past Surgical History:   Procedure Laterality Date    CARDIAC SURG PROCEDURE UNLIST      ablation    HX AFIB ABLATION  2014    Dr. Asia Mars VALVE REPLACEMENT      HX APPENDECTOMY  1979    ruptured appendix    HX HEART CATHETERIZATION      HX HERNIA REPAIR  1986    HX HERNIA REPAIR  12/17/14    Recurrent left inguinal hernia; Dr. Babak Salguero      5 hernia repairs total    HX PACEMAKER  12/2014    HX PACEMAKER PLACEMENT  2014    Dr. Zain Zaragoza    Kidney stone extraction    [place] COLSC FLX W/RMVL OF TUMOR POLYP LESION SNARE TQ  4/21/2011            Family History   Problem Relation Age of Onset    Stroke Father     Heart Disease Mother        Social History     Social History    Marital status:      Spouse name: N/A    Number of children: N/A    Years of education: N/A     Occupational History    Not on file. Social History Main Topics    Smoking status: Never Smoker    Smokeless tobacco: Never Used    Alcohol use 1.8 oz/week     1 Shots of liquor, 2 Standard drinks or equivalent per week      Comment: rare    Drug use: No    Sexual activity: Yes     Partners: Female     Other Topics Concern    Not on file     Social History Narrative            Visit Vitals    BP 99/60 (BP 1 Location: Right arm, BP Patient Position: Sitting)    Pulse 73    Temp 97.7 °F (36.5 °C) (Oral)    Resp 20    Ht 6' 0.5\" (1.842 m)    Wt 241 lb (109.3 kg)    SpO2 94%    BMI 32.24 kg/m2       Physical Examination:   General - Well appearing male  HEENT - PERRL, TM no erythema/opacification, normal nasal turbinates, no oropharyngeal erythema or exudate, MMM  Neck - supple, no bruits, no thyroidomegaly, no lymphadenopathy  Pulm - clear to auscultation bilaterally--good air flow, no extra sounds  Cardio - RRR, normal S1 S2, no murmur  Abd - soft, nontender, no masses, no HSM  Extrem - no edema, +2 distal pulses  Neuro-  No focal deficits, CN intact     Assessment/Plan:    1. Acute sinusitis--rx for augmentin and mitra a/c. Would also add flonase or nasocort  2. Cough from pnd--mitra ac.   Don't think needs any prednisone. rtc prn, usually sees dr longo.         Lorena Persons III, DO

## 2018-01-04 NOTE — PROGRESS NOTES
Reviewed record in preparation for visit and have obtained necessary documentation. Identified pt with two pt identifiers(name and ). Chief Complaint   Patient presents with    Cold Symptoms    Nasal Congestion       There were no vitals filed for this visit. Coordination of Care Questionnaire:  :     1) Have you been to an emergency room, urgent care clinic since your last visit? no   Hospitalized since your last visit? no             2) Have you seen or consulted any other health care providers outside of [address] [place] [address] since your last visit? no  (Include any pap smears or colon screenings in this section.)    3) In the event something were to happen to you and you were unable to speak on your behalf, do you have an Advance Directive/ Living Will in place stating your wishes? NO    Do you have an Advance Directive on file? yes    4) Are you interested in receiving information on Advance Directives? NO    Patient is accompanied by N/A I have received verbal consent from [address] [place] [address] to discuss any/all medical information while they are present in the room.

## 2018-01-04 NOTE — PATIENT INSTRUCTIONS
Sinusitis: Care Instructions  Your Care Instructions    Sinusitis is an infection of the lining of the sinus cavities in your head. Sinusitis often follows a cold. It causes pain and pressure in your head and face. In most cases, sinusitis gets better on its own in 1 to 2 weeks. But some mild symptoms may last for several weeks. Sometimes antibiotics are needed. Follow-up care is a key part of your treatment and safety. Be sure to make and go to all appointments, and call your doctor if you are having problems. It's also a good idea to know your test results and keep a list of the medicines you take. How can you care for yourself at home? · Take an over-the-counter pain medicine, such as acetaminophen (Tylenol), ibuprofen (Advil, Motrin), or naproxen (Aleve). Read and follow all instructions on the label. · If the doctor prescribed antibiotics, take them as directed. Do not stop taking them just because you feel better. You need to take the full course of antibiotics. · Be careful when taking over-the-counter cold or flu medicines and Tylenol at the same time. Many of these medicines have acetaminophen, which is Tylenol. Read the labels to make sure that you are not taking more than the recommended dose. Too much acetaminophen (Tylenol) can be harmful. · Breathe warm, moist air from a steamy shower, a hot bath, or a sink filled with hot water. Avoid cold, dry air. Using a humidifier in your home may help. Follow the directions for cleaning the machine. · Use saline (saltwater) nasal washes to help keep your nasal passages open and wash out mucus and bacteria. You can buy saline nose drops at a grocery store or drugstore. Or you can make your own at home by adding 1 teaspoon of salt and 1 teaspoon of baking soda to 2 cups of distilled water. If you make your own, fill a bulb syringe with the solution, insert the tip into your nostril, and squeeze gently. Preethi Nicole your nose.   · Put a hot, wet towel or a warm gel pack on your face 3 or 4 times a day for 5 to 10 minutes each time. · Try a decongestant nasal spray like oxymetazoline (Afrin). Do not use it for more than 3 days in a row. Using it for more than 3 days can make your congestion worse. When should you call for help? Call your doctor now or seek immediate medical care if:  ? · You have new or worse swelling or redness in your face or around your eyes. ? · You have a new or higher fever. ? Watch closely for changes in your health, and be sure to contact your doctor if:  ? · You have new or worse facial pain. ? · The mucus from your nose becomes thicker (like pus) or has new blood in it. ? · You are not getting better as expected. Where can you learn more? Go to http://francisco-nikki.info/. Enter I161 in the search box to learn more about \"Sinusitis: Care Instructions. \"  Current as of: May 12, 2017  Content Version: 11.4  © 9199-0061 [employer]. Care instructions adapted under license by [employer] (which disclaims liability or warranty for this information). If you have questions about a medical condition or this instruction, always ask your healthcare professional. [place] [address] any warranty or liability for your use of this information. Consider using flonase or nasocort to help with sinus congestion.

## 2018-01-04 NOTE — MR AVS SNAPSHOT
Visit Information Date & Time Provider Department Dept. Phone Encounter #  
 1/4/2018  9:00 AM Ness Rosales, [address]  [id number] Follow-up Instructions Return if symptoms worsen or fail to improve. Your Appointments 2/13/2018  9:15 AM  
6 MONTH with Cira Hendrix MD  
[facility] [address] [place] [address]) Appt Note: Lurena Finger [address]  
[phone] [address] [facility] [address] [address]  
  
    
 4/19/2018  8:45 AM  
PACEMAKER with PACEMAKER, [facility] [address] [place] [address]) Appt Note: [address] [facility] [address]  
[phone] [address] [facility] [address]  
  
    
 4/19/2018  9:00 AM  
ANNUAL with Richi Stover MD  
[facility] [address]) Appt Note: BSC DCPM 6mo check, annual with Dr. Tim Daly [address]  
[phone] [address] [facility] [address] [address]  
  
    
  
 1/10/2018  8:00 AM  
REMOTE OFFICE VISIT with [facility] [address] [place] [address]) Appt Note: NOT AN OFFICE VISIT - REMOTE BSC PM  
 [id number] [facility] [address]  
[phone] [address] [facility] [address] Upcoming Health Maintenance Date Due  
 MEDICARE YEARLY EXAM 7/7/2018 GLAUCOMA SCREENING Q2Y 12/1/2018 DTaP/Tdap/Td series (2 - Td) 7/19/2026 Allergies as of 1/4/2018  Review Complete On: 1/4/2018 By: Katie Daniel III, DO Severity Noted Reaction Type Reactions Gadolinium-containing Contrast Media Medium 06/11/2012   Intolerance Other (comments) 1) Moderate to Severe. 2) Post Gadolinium issues as noted: - Diaphoretic, Near Syncope, Nausea and vomiting. Acetaminophen  03/14/2017    Other (comments) Indigestion,\"burning sensation\" Contrast Dye [Iodine]  11/20/2009    Other (comments) Decrease in BP Levaquin [Levofloxacin]  11/20/2009    Other (comments) Joint pain Medrol [Methylprednisolone]  06/18/2015    Vertigo Norvasc [Amlodipine]  03/04/2015    Other (comments) Numbness and tingling Nsaids (Non-steroidal Anti-inflammatory Drug)  03/10/2016    Other (comments) Cough up blood Percocet [Oxycodone-acetaminophen]  12/02/2014    Itching Ranexa [Ranolazine]  04/02/2015    Other (comments) Cannot remember Simvastatin  11/20/2009    Other (comments) Joint pain  
 Voltaren [Diclofenac Sodium]  03/14/2017    Other (comments)  
 unknown Zetia [Ezetimibe]  12/11/2009    Myalgia Current Immunizations  Reviewed on 4/14/2016 Name Date Influenza High Dose Vaccine PF 9/29/2016 Influenza Vaccine 10/12/2017, 10/1/2015, 10/17/2014, 10/21/2013 Influenza Vaccine Split 10/3/2012 Pneumococcal Conjugate (PCV-13) 9/29/2016 Pneumococcal Vaccine (Unspecified Type) 4/14/2015 Td 1/1/2008 Tdap 7/19/2016 Zoster Vaccine, Live 1/1/2012 Not reviewed this visit You Were Diagnosed With   
  
 Codes Comments Acute non-recurrent frontal sinusitis    -  Primary ICD-10-CM: J01.10 ICD-9-CM: 605.8 Vitals BP Pulse Temp Resp Height(growth percentile) Weight(growth percentile) 99/60 (BP 1 Location: Right arm, BP Patient Position: Sitting) 73 97.7 °F (36.5 °C) (Oral) 20 6' 0.5\" (1.842 m) 241 lb (109.3 kg) SpO2 BMI Smoking Status 94% 32.24 kg/m2 Never Smoker BMI and BSA Data Body Mass Index Body Surface Area  
 32.24 kg/m 2 2.36 m 2 Preferred Pharmacy Pharmacy Name Phone [address] [place] [address], [address] [place] [address] Domenica Forte Said [phone] Your Updated Medication List  
  
   
This list is accurate as of: 1/4/18  9:01 AM.  Always use your most recent med list.  
  
  
  
  
 albuterol 90 mcg/actuation inhaler Commonly known as:  VENTOLIN HFA Take 2 Puffs by inhalation every four (4) hours as needed for Wheezing. amoxicillin-clavulanate 875-125 mg per tablet Commonly known as:  AUGMENTIN Take 1 Tab by mouth every twelve (12) hours for 10 days. aspirin, buffered 81 mg Tab Take 81 mg by mouth daily. coenzyme q10 10 mg Cap Take 100 mg by mouth every evening. CRESTOR 10 mg tablet Generic drug:  rosuvastatin Take 10 mg by mouth every Monday and Thursday. furosemide 40 mg tablet Commonly known as:  LASIX Take 1 Tab by mouth two (2) times a day. guaiFENesin-codeine 100-10 mg/5 mL solution Commonly known as:  ROBITUSSIN AC Take 5 mL by mouth nightly as needed for Cough. Max Daily Amount: 5 mL. isosorbide mononitrate ER 60 mg CR tablet Commonly known as:  IMDUR  
TAKE ONE TABLET BY MOUTH ONCE DAILY  
  
 menthol 6 % Gel  
by Apply Externally route as needed. \"Abdirashid\"--cool therapy pain gel  
  
 metoprolol tartrate 25 mg tablet Commonly known as:  LOPRESSOR  
TAKE ONE-HALF TABLET BY MOUTH TWICE DAILY  
  
 omeprazole 20 mg capsule Commonly known as:  PRILOSEC  
TAKE 1 CAPSULE EVERY DAY  
  
 potassium chloride 20 mEq tablet Commonly known as:  K-DUR, KLOR-CON Take 1 Tab by mouth two (2) times a day. * TRAMADOL PO Take 100 mg by mouth as needed. * traMADol 50 mg tablet Commonly known as:  ULTRAM  
  
 * Notice: This list has 2 medication(s) that are the same as other medications prescribed for you. Read the directions carefully, and ask your doctor or other care provider to review them with you. Prescriptions Printed Refills  
 guaiFENesin-codeine (ROBITUSSIN AC) 100-10 mg/5 mL solution 0 Sig: Take 5 mL by mouth nightly as needed for Cough. Max Daily Amount: 5 mL. Class: Print Route: Oral  
  
Prescriptions Sent to Pharmacy Refills amoxicillin-clavulanate (AUGMENTIN) 875-125 mg per tablet 0 Sig: Take 1 Tab by mouth every twelve (12) hours for 10 days. Class: Normal  
 Pharmacy: Dexter Garay at [address] #: [phone] Route: Oral  
  
Follow-up Instructions Return if symptoms worsen or fail to improve. Patient Instructions Sinusitis: Care Instructions Your Care Instructions Sinusitis is an infection of the lining of the sinus cavities in your head. Sinusitis often follows a cold. It causes pain and pressure in your head and face. In most cases, sinusitis gets better on its own in 1 to 2 weeks. But some mild symptoms may last for several weeks. Sometimes antibiotics are needed. Follow-up care is a key part of your treatment and safety. Be sure to make and go to all appointments, and call your doctor if you are having problems. It's also a good idea to know your test results and keep a list of the medicines you take. How can you care for yourself at home? · Take an over-the-counter pain medicine, such as acetaminophen (Tylenol), ibuprofen (Advil, Motrin), or naproxen (Aleve). Read and follow all instructions on the label. · If the doctor prescribed antibiotics, take them as directed. Do not stop taking them just because you feel better. You need to take the full course of antibiotics. · Be careful when taking over-the-counter cold or flu medicines and Tylenol at the same time. Many of these medicines have acetaminophen, which is Tylenol. Read the labels to make sure that you are not taking more than the recommended dose. Too much acetaminophen (Tylenol) can be harmful. · Breathe warm, moist air from a steamy shower, a hot bath, or a sink filled with hot water. Avoid cold, dry air. Using a humidifier in your home may help. Follow the directions for cleaning the machine.  
· Use saline (saltwater) nasal washes to help keep your nasal passages open and wash out mucus and bacteria. You can buy saline nose drops at a grocery store or drugstore. Or you can make your own at home by adding 1 teaspoon of salt and 1 teaspoon of baking soda to 2 cups of distilled water. If you make your own, fill a bulb syringe with the solution, insert the tip into your nostril, and squeeze gently. Glenice Mimes your nose. · Put a hot, wet towel or a warm gel pack on your face 3 or 4 times a day for 5 to 10 minutes each time. · Try a decongestant nasal spray like oxymetazoline (Afrin). Do not use it for more than 3 days in a row. Using it for more than 3 days can make your congestion worse. When should you call for help? Call your doctor now or seek immediate medical care if: 
? · You have new or worse swelling or redness in your face or around your eyes. ? · You have a new or higher fever. ? Watch closely for changes in your health, and be sure to contact your doctor if: 
? · You have new or worse facial pain. ? · The mucus from your nose becomes thicker (like pus) or has new blood in it. ? · You are not getting better as expected. Where can you learn more? Go to http://francisco-nikki.info/. Enter N143 in the search box to learn more about \"Sinusitis: Care Instructions. \" Current as of: May 12, 2017 Content Version: 11.4 © 3754-7268 [employer]. Care instructions adapted under license by [employer] (which disclaims liability or warranty for this information). If you have questions about a medical condition or this instruction, always ask your healthcare professional. [place] [address] any warranty or liability for your use of this information. Consider using flonase or nasocort to help with sinus congestion. Introducing [facility]! Dear Haider Galan: 
Thank you for requesting a "[employer] account.   Our records indicate that you have previously registered for a "[employer] account but its currently inactive. Please call our [email or web address] support line at [phone]. Additional Information If you have questions, please visit the Frequently Asked Questions section of the [email or web address] website at [email or web address] Remember, [email or web address] is NOT to be used for urgent needs. For medical emergencies, dial 911. Now available from your iPhone and Android! Please provide this summary of care documentation to your next provider. Your primary care clinician is listed as [place]. If you have any questions after today's visit, please call [phone].

## 2018-01-10 ENCOUNTER — OFFICE VISIT (OUTPATIENT)
Dept: CARDIOLOGY CLINIC | Age: 83
End: 2018-01-10

## 2018-01-10 DIAGNOSIS — R00.0 SINUS TACHYCARDIA: ICD-10-CM

## 2018-01-10 DIAGNOSIS — Z95.0 PACEMAKER: Primary | ICD-10-CM

## 2018-01-10 DIAGNOSIS — I49.5 SSS (SICK SINUS SYNDROME) (HCC): ICD-10-CM

## 2018-02-13 ENCOUNTER — OFFICE VISIT (OUTPATIENT)
Dept: CARDIOLOGY CLINIC | Age: 83
End: 2018-02-13

## 2018-02-13 VITALS
WEIGHT: 244.8 LBS | DIASTOLIC BLOOD PRESSURE: 74 MMHG | RESPIRATION RATE: 16 BRPM | OXYGEN SATURATION: 94 % | BODY MASS INDEX: 33.16 KG/M2 | HEIGHT: 72 IN | HEART RATE: 87 BPM | SYSTOLIC BLOOD PRESSURE: 110 MMHG

## 2018-02-13 DIAGNOSIS — Z95.2 S/P AVR (AORTIC VALVE REPLACEMENT): ICD-10-CM

## 2018-02-13 DIAGNOSIS — Z86.79 S/P ABLATION OF ATRIAL FLUTTER: ICD-10-CM

## 2018-02-13 DIAGNOSIS — I48.92 ATRIAL FLUTTER, UNSPECIFIED TYPE (HCC): ICD-10-CM

## 2018-02-13 DIAGNOSIS — Z98.890 S/P ABLATION OF ATRIAL FLUTTER: ICD-10-CM

## 2018-02-13 DIAGNOSIS — I25.10 CORONARY ARTERY DISEASE INVOLVING NATIVE CORONARY ARTERY OF NATIVE HEART WITHOUT ANGINA PECTORIS: Primary | ICD-10-CM

## 2018-02-13 DIAGNOSIS — E78.00 HYPERCHOLESTEROLEMIA: ICD-10-CM

## 2018-02-13 NOTE — PROGRESS NOTES
2/13/2018 10:09 AM      Subjective:     Lobito Ariza Peaks Sr. is here for f/u visit. He denies chest pain, chest pressure/discomfort, dyspnea, palpitations, irregular heart beats, near-syncope, syncope, fatigue, orthopnea, paroxysmal nocturnal dyspnea, exertional chest pressure/discomfort, claudication, lower extremity edema, tachypnea. Visit Vitals    /74 (BP 1 Location: Left arm, BP Patient Position: Sitting)    Pulse 87    Resp 16    Ht 6' (1.829 m)    Wt 244 lb 12.8 oz (111 kg)    SpO2 94%    BMI 33.2 kg/m2     Current Outpatient Prescriptions   Medication Sig    omeprazole (PRILOSEC) 20 mg capsule TAKE 1 CAPSULE EVERY DAY    albuterol (VENTOLIN HFA) 90 mcg/actuation inhaler Take 2 Puffs by inhalation every four (4) hours as needed for Wheezing.  potassium chloride (K-DUR, KLOR-CON) 20 mEq tablet Take 1 Tab by mouth two (2) times a day.  furosemide (LASIX) 40 mg tablet Take 1 Tab by mouth two (2) times a day.  metoprolol tartrate (LOPRESSOR) 25 mg tablet TAKE ONE-HALF TABLET BY MOUTH TWICE DAILY    isosorbide mononitrate ER (IMDUR) 60 mg CR tablet TAKE ONE TABLET BY MOUTH ONCE DAILY    TRAMADOL HCL (TRAMADOL PO) Take 100 mg by mouth as needed.  rosuvastatin (CRESTOR) 10 mg tablet Take 10 mg by mouth every Monday and Thursday.  menthol 6 % gel by Apply Externally route as needed. \"Abdirashid\"--cool therapy pain gel    coenzyme q10 10 mg cap Take 100 mg by mouth every evening.  Aspirin, Buffered 81 mg tab Take 81 mg by mouth daily.  guaiFENesin-codeine (ROBITUSSIN AC) 100-10 mg/5 mL solution Take 5 mL by mouth nightly as needed for Cough. Max Daily Amount: 5 mL.  traMADol (ULTRAM) 50 mg tablet      No current facility-administered medications for this visit.           Objective:      Visit Vitals    /74 (BP 1 Location: Left arm, BP Patient Position: Sitting)    Pulse 87    Resp 16    Ht 6' (1.829 m)    Wt 244 lb 12.8 oz (111 kg)    SpO2 94%  BMI 33.2 kg/m2       Data Review:     EKG: Normal sinus rhythm, 1st degree AV block, lateral T wave inversion, unchnaged    Reviewed and/or ordered active problem list, medication list tests    Past Medical History:   Diagnosis Date    Adverse effect of anesthesia     difficult with waking up     Allergic rhinitis     Arthritis     Asthma     Atrial flutter ([facility] 9/12/2014    CAD (coronary artery disease)     Dr. Areli Raya    Calculus of kidney     Chest pain 2006    Normal stress echo    Chronic bronchitis     Chronic bronchitis ([facility] 10/3/2012    Chronic pain     knees    Erectile dysfunction     GERD (gastroesophageal reflux disease)     hiatal hernia    Gout 1/24/2013    Hemoptysis 2008    Work up by Dr. Karina Burrell; Negative    Hiatal hernia     Hypercholesterolemia     Hypertension     Ill-defined condition     bronchitis    Melanoma ([facility] [address] [facility]     back    Nausea & vomiting     Pacemaker     Dr. Hill [facility]) 6/4/2012    S/P ablation of atrial flutter 9/12/2014    Sleep apnea     states never had test for apnea    Stroke [facility]) 2009     tia no residual problems      Past Surgical History:   Procedure Laterality Date    CARDIAC SURG PROCEDURE UNLIST      ablation    HX AFIB ABLATION  2014    Dr. Mary Sharma    ruptured appendix    HX HEART CATHETERIZATION      [address]    HX HERNIA REPAIR  12/17/14    Recurrent left inguinal hernia; Dr. Webber Draft      5 hernia repairs total    HX PACEMAKER  12/2014    HX PACEMAKER PLACEMENT  2014    Dr. Yuly Chung    Kidney stone extraction    AL COLSC FLX W/RMVL OF TUMOR POLYP LESION SNARE TQ  4/21/2011          Allergies   Allergen Reactions    Gadolinium-Containing Contrast Media Other (comments)     1) Moderate to Severe. 2) Post Gadolinium issues as noted:   - Diaphoretic, Near Syncope, Nausea and vomiting.     Acetaminophen Other (comments)     Indigestion,\"burning sensation\"    Contrast Dye [Iodine] Other (comments)     Decrease in BP    Levaquin [Levofloxacin] Other (comments)     Joint pain    Medrol [Methylprednisolone] Vertigo    Norvasc [Amlodipine] Other (comments)     Numbness and tingling    Nsaids (Non-Steroidal Anti-Inflammatory Drug) Other (comments)     Cough up blood      Percocet [Oxycodone-Acetaminophen] Itching    Ranexa [Ranolazine] Other (comments)     Cannot remember    Simvastatin Other (comments)     Joint pain    Voltaren [Diclofenac Sodium] Other (comments)     unknown    Zetia [Ezetimibe] Myalgia      Family History   Problem Relation Age of Onset    Stroke Father     Heart Disease Mother       Social History     Social History    Marital status:      Spouse name: N/A    Number of children: N/A    Years of education: N/A     Occupational History    Not on file. Social History Main Topics    Smoking status: Never Smoker    Smokeless tobacco: Never Used    Alcohol use 1.8 oz/week     1 Shots of liquor, 2 Standard drinks or equivalent per week      Comment: rare    Drug use: No    Sexual activity: Yes     Partners: Female     Other Topics Concern    Not on file     Social History Narrative         Review of Systems     General: Not Present- Anorexia, Chills, Dietary Changes, Fatigue, Fever, Medication Changes, Night Sweats, Weight Gain > 10lbs. and Weight Loss > 10lbs. .  Skin: Not Present- Bruising and Excessive Sweating. HEENT: Not Present- Headache, Visual Loss and Vertigo. Respiratory: Not Present- Cough, Decreased Exercise Tolerance, Difficulty Breathing, Snoring and Wheezing.   Cardiovascular: Not Present- Abnormal Blood Pressure, Chest Pain, Claudications, Difficulty Breathing On Exertion, Edema, Fainting / Blacking Out, Irregular Heart Beat, Night Cramps, Orthopnea, Palpitations, Paroxysmal Nocturnal Dyspnea, Rapid Heart Rate, Shortness of Breath and Swelling of Extremities. Gastrointestinal: Not Present- Black, Tarry Stool, Bloody Stool, Diarrhea, Hematemesis, Rectal Bleeding and Vomiting. Musculoskeletal: Not Present- Muscle Pain and Muscle Weakness. Neurological: Not Present- Dizziness. Psychiatric: Not Present- Depression. Endocrine: Not Present- Cold Intolerance, Heat Intolerance and Thyroid Problems. Hematology: Not Present- Abnormal Bleeding, Anemia, Blood Clots and Easy Bruising.       Physical Exam   The physical exam findings are as follows:       General   Mental Status - Alert. General Appearance - Not in acute distress. Chest and Lung Exam   Inspection: Accessory muscles - No use of accessory muscles in breathing. Auscultation:   Breath sounds: - Normal.      Cardiovascular   Inspection: Jugular vein - Bilateral - Inspection Normal.  Palpation/Percussion:   Apical Impulse: - Normal.  Auscultation: Rhythm - Regular. Heart Sounds - S1 WNL and S2 WNL. No S3 or S4. Murmurs & Other Heart Sounds: Auscultation of the heart reveals - No Murmurs. Carotid arteries - No Carotid bruit. Peripheral Vascular   Upper Extremity: Inspection - Bilateral - No Cyanotic nailbeds or Digital clubbing. Lower Extremity:   Palpation: Edema - Bilateral - No edema. Assessment:       ICD-10-CM ICD-9-CM    1. Coronary artery disease involving native coronary artery of native heart without angina pectoris I25.10 414.01    2. Atrial flutter, unspecified type ([facility]) I48.92 427.32 AMB POC EKG ROUTINE W/ 12 LEADS, INTER & REP   3. Hypercholesterolemia E78.00 272.0    4. S/P AVR (aortic valve replacement) Z95.2 V43.3    5. S/P ablation of atrial flutter Z98.890 V45.89     Z86.79         Plan:     1. S/p bioprosthetic aortic valve replacement. Doing very well. No significant CAD at time pre-op cardiac cath. 2. Hyperlipidemia: on statin. Last FLP noted.     3. S/p a. Flutter ablation, sss, s/p PPM: f/u with EP.    4.  MARYLOU: not using CPAP.    5. Leg swelling resolved with lasix. Continue lasix. Breathing also improved since has been on lasix.

## 2018-02-15 RX ORDER — ROSUVASTATIN CALCIUM 10 MG/1
TABLET, COATED ORAL
Qty: 24 TAB | Refills: 3 | Status: SHIPPED | OUTPATIENT
Start: 2018-02-15 | End: 2019-01-29 | Stop reason: SDUPTHER

## 2018-03-11 ENCOUNTER — OFFICE VISIT (OUTPATIENT)
Dept: URGENT CARE | Age: 83
End: 2018-03-11

## 2018-03-11 VITALS
HEART RATE: 76 BPM | OXYGEN SATURATION: 98 % | RESPIRATION RATE: 18 BRPM | TEMPERATURE: 98.2 F | BODY MASS INDEX: 32.01 KG/M2 | WEIGHT: 236 LBS | SYSTOLIC BLOOD PRESSURE: 105 MMHG | DIASTOLIC BLOOD PRESSURE: 58 MMHG

## 2018-03-11 DIAGNOSIS — J40 BRONCHITIS: Primary | ICD-10-CM

## 2018-03-11 RX ORDER — PREDNISONE 20 MG/1
20 TABLET ORAL
Qty: 5 TAB | Refills: 0 | Status: SHIPPED | OUTPATIENT
Start: 2018-03-11 | End: 2018-03-16

## 2018-03-11 RX ORDER — AZITHROMYCIN 250 MG/1
TABLET, FILM COATED ORAL
Qty: 6 TAB | Refills: 0 | Status: SHIPPED | OUTPATIENT
Start: 2018-03-11 | End: 2018-03-16

## 2018-03-11 NOTE — PATIENT INSTRUCTIONS
Bronchitis: Care Instructions  Your Care Instructions    Bronchitis is inflammation of the bronchial tubes, which carry air to the lungs. The tubes swell and produce mucus, or phlegm. The mucus and inflamed bronchial tubes make you cough. You may have trouble breathing. Most cases of bronchitis are caused by viruses like those that cause colds. Antibiotics usually do not help and they may be harmful. Bronchitis usually develops rapidly and lasts about 2 to 3 weeks in otherwise healthy people. Follow-up care is a key part of your treatment and safety. Be sure to make and go to all appointments, and call your doctor if you are having problems. It's also a good idea to know your test results and keep a list of the medicines you take. How can you care for yourself at home? · Take all medicines exactly as prescribed. Call your doctor if you think you are having a problem with your medicine. · Get some extra rest.  · Take an over-the-counter pain medicine, such as acetaminophen (Tylenol), ibuprofen (Advil, Motrin), or naproxen (Aleve) to reduce fever and relieve body aches. Read and follow all instructions on the label. · Do not take two or more pain medicines at the same time unless the doctor told you to. Many pain medicines have acetaminophen, which is Tylenol. Too much acetaminophen (Tylenol) can be harmful. · Take an over-the-counter cough medicine that contains dextromethorphan to help quiet a dry, hacking cough so that you can sleep. Avoid cough medicines that have more than one active ingredient. Read and follow all instructions on the label. · Breathe moist air from a humidifier, hot shower, or sink filled with hot water. The heat and moisture will thin mucus so you can cough it out. · Do not smoke. Smoking can make bronchitis worse. If you need help quitting, talk to your doctor about stop-smoking programs and medicines. These can increase your chances of quitting for good.   When should you call for help? Call 911 anytime you think you may need emergency care. For example, call if:  ? · You have severe trouble breathing. ?Call your doctor now or seek immediate medical care if:  ? · You have new or worse trouble breathing. ? · You cough up dark brown or bloody mucus (sputum). ? · You have a new or higher fever. ? · You have a new rash. ? Watch closely for changes in your health, and be sure to contact your doctor if:  ? · You cough more deeply or more often, especially if you notice more mucus or a change in the color of your mucus. ? · You are not getting better as expected. Where can you learn more? Go to http://francisco-nikki.info/. Enter H333 in the search box to learn more about \"Bronchitis: Care Instructions. \"  Current as of: May 12, 2017  Content Version: 11.4  © 6745-0800 [employer]. Care instructions adapted under license by [employer] (which disclaims liability or warranty for this information). If you have questions about a medical condition or this instruction, always ask your healthcare professional. [address] any warranty or liability for your use of this information.

## 2018-03-11 NOTE — MR AVS SNAPSHOT
[address] [facility] [address] 
[phone] Patient: Deborah Bridges 
MRN: [id number] [id number] Visit Information Date & Time Provider Department Dept. Phone Encounter #  
 3/11/2018  1:15 PM [address] [facility] [phone] [id number] Follow-up Instructions Return if symptoms worsen or fail to improve. Your Appointments 4/19/2018  8:45 AM  
PACEMAKER with PACEMAKER, [facility] [address] [place] [address]) Appt Note: [address] [facility] [address]  
[phone] [address] [facility] [address]  
  
    
 4/19/2018  9:00 AM  
ANNUAL with Ann Marie MD  
[facility] [address] [place] [address]) Appt Note: BSC DCPM 6mo check, annual with Dr. Manda Johnson [address]  
[phone] [address] [facility] [address] [address]  
  
    
 8/7/2018  9:00 AM  
ESTABLISHED PATIENT with Martie Cheadle, MD  
[facility] [address] [place] [address]) Appt Note: 6mo f/up  $0cp ekr [id number] [facility] [address]  
[phone] [id number] [facility] [address]  
  
    
  
 4/11/2018  8:00 AM  
REMOTE OFFICE VISIT with [facility] [address] [place] [address]) Appt Note: NOT AN OFFICE VISIT - REMOTE BSC PM  
 [id number] [facility] [address]  
[phone] [id number] [facility] [address] Upcoming Health Maintenance Date Due Bone Densitometry (Dexa) Screening 2/21/2001 MEDICARE YEARLY EXAM 7/7/2018 GLAUCOMA SCREENING Q2Y 12/1/2018 DTaP/Tdap/Td series (2 - Td) 7/19/2026 Allergies as of 3/11/2018  Review Complete On: 3/11/2018 By: Dasha Reis RN Severity Noted Reaction Type Reactions Gadolinium-containing Contrast Media Medium 06/11/2012   Intolerance Other (comments) 1) Moderate to Severe. 2) Post Gadolinium issues as noted: - Diaphoretic, Near Syncope, Nausea and vomiting. Acetaminophen  03/14/2017    Other (comments) Indigestion,\"burning sensation\" Contrast Dye [Iodine]  11/20/2009    Other (comments) Decrease in BP Levaquin [Levofloxacin]  11/20/2009    Other (comments) Joint pain Medrol [Methylprednisolone]  06/18/2015    Vertigo Norvasc [Amlodipine]  03/04/2015    Other (comments) Numbness and tingling Nsaids (Non-steroidal Anti-inflammatory Drug)  03/10/2016    Other (comments) Cough up blood Percocet [Oxycodone-acetaminophen]  12/02/2014    Itching Ranexa [Ranolazine]  04/02/2015    Other (comments) Cannot remember Simvastatin  11/20/2009    Other (comments) Joint pain  
 Voltaren [Diclofenac Sodium]  03/14/2017    Other (comments)  
 unknown Zetia [Ezetimibe]  12/11/2009    Myalgia Current Immunizations  Reviewed on 4/14/2016 Name Date Influenza High Dose Vaccine PF 9/29/2016 Influenza Vaccine 10/12/2017, 10/1/2015, 10/17/2014, 10/21/2013 Influenza Vaccine Split 10/3/2012 Pneumococcal Conjugate (PCV-13) 9/29/2016 Pneumococcal Vaccine (Unspecified Type) 4/14/2015 Td 1/1/2008 Tdap 7/19/2016 Zoster Vaccine, Live 1/1/2012 Not reviewed this visit You Were Diagnosed With   
  
 Codes Comments Bronchitis    -  Primary ICD-10-CM: B91 ICD-9-CM: 275 Vitals BP Pulse Temp Resp Weight(growth percentile) SpO2  
 105/58 76 98.2 °F (36.8 °C) 18 236 lb (107 kg) 98% BMI Smoking Status 32.01 kg/m2 Never Smoker BMI and BSA Data Body Mass Index Body Surface Area 32.01 kg/m 2 2.33 m 2 Preferred Pharmacy Pharmacy Name Phone [facility] [address] [place] [address] Avenue [phone] Your Updated Medication List  
  
   
 This list is accurate as of 3/11/18  2:10 PM.  Always use your most recent med list.  
  
  
  
  
 albuterol 90 mcg/actuation inhaler Commonly known as:  VENTOLIN HFA Take 2 Puffs by inhalation every four (4) hours as needed for Wheezing. aspirin, buffered 81 mg Tab Take 81 mg by mouth daily. azithromycin 250 mg tablet Commonly known as:  Chucky Simmonds Take 2 tablets today, then take 1 tablet daily  
  
 coenzyme q10 10 mg Cap Take 100 mg by mouth every evening. furosemide 40 mg tablet Commonly known as:  LASIX Take 1 Tab by mouth two (2) times a day. guaiFENesin-codeine 100-10 mg/5 mL solution Commonly known as:  ROBITUSSIN AC Take 5 mL by mouth nightly as needed for Cough. Max Daily Amount: 5 mL. isosorbide mononitrate ER 60 mg CR tablet Commonly known as:  IMDUR  
TAKE ONE TABLET BY MOUTH ONCE DAILY  
  
 menthol 6 % Gel  
by Apply Externally route as needed. \"Abdirashid\"--cool therapy pain gel  
  
 metoprolol tartrate 25 mg tablet Commonly known as:  LOPRESSOR  
TAKE ONE-HALF TABLET BY MOUTH TWICE DAILY  
  
 omeprazole 20 mg capsule Commonly known as:  PRILOSEC  
TAKE 1 CAPSULE EVERY DAY  
  
 potassium chloride 20 mEq tablet Commonly known as:  K-DUR, KLOR-CON Take 1 Tab by mouth two (2) times a day. predniSONE 20 mg tablet Commonly known as:  Veena Savers Take 1 Tab by mouth daily (with breakfast) for 5 days. rosuvastatin 10 mg tablet Commonly known as:  CRESTOR  
TAKE 1 TABLET BY MOUTH EVERY MONDAY AND THURSDAY * TRAMADOL PO Take 100 mg by mouth as needed. * traMADol 50 mg tablet Commonly known as:  ULTRAM  
  
 * Notice: This list has 2 medication(s) that are the same as other medications prescribed for you. Read the directions carefully, and ask your doctor or other care provider to review them with you. Prescriptions Sent to Pharmacy  Refills  
 predniSONE (DELTASONE) 20 mg tablet 0  
 Sig: Take 1 Tab by mouth daily (with breakfast) for 5 days. Class: Normal  
 Pharmacy: [address], [address] [place] [address] Ph #: [phone] Route: Oral  
 azithromycin (ZITHROMAX) 250 mg tablet 0 Sig: Take 2 tablets today, then take 1 tablet daily Class: Normal  
 Pharmacy: [address], [address] [facility] Ph #: [phone] Follow-up Instructions Return if symptoms worsen or fail to improve. Patient Instructions Bronchitis: Care Instructions Your Care Instructions Bronchitis is inflammation of the bronchial tubes, which carry air to the lungs. The tubes swell and produce mucus, or phlegm. The mucus and inflamed bronchial tubes make you cough. You may have trouble breathing. Most cases of bronchitis are caused by viruses like those that cause colds. Antibiotics usually do not help and they may be harmful. Bronchitis usually develops rapidly and lasts about 2 to 3 weeks in otherwise healthy people. Follow-up care is a key part of your treatment and safety. Be sure to make and go to all appointments, and call your doctor if you are having problems. It's also a good idea to know your test results and keep a list of the medicines you take. How can you care for yourself at home? · Take all medicines exactly as prescribed. Call your doctor if you think you are having a problem with your medicine. · Get some extra rest. 
· Take an over-the-counter pain medicine, such as acetaminophen (Tylenol), ibuprofen (Advil, Motrin), or naproxen (Aleve) to reduce fever and relieve body aches. Read and follow all instructions on the label. · Do not take two or more pain medicines at the same time unless the doctor told you to. Many pain medicines have acetaminophen, which is Tylenol. Too much acetaminophen (Tylenol) can be harmful.  
· Take an over-the-counter cough medicine that contains dextromethorphan to help quiet a dry, hacking cough so that you can sleep. Avoid cough medicines that have more than one active ingredient. Read and follow all instructions on the label. · Breathe moist air from a humidifier, hot shower, or sink filled with hot water. The heat and moisture will thin mucus so you can cough it out. · Do not smoke. Smoking can make bronchitis worse. If you need help quitting, talk to your doctor about stop-smoking programs and medicines. These can increase your chances of quitting for good. When should you call for help? Call 911 anytime you think you may need emergency care. For example, call if: 
? · You have severe trouble breathing. ?Call your doctor now or seek immediate medical care if: 
? · You have new or worse trouble breathing. ? · You cough up dark brown or bloody mucus (sputum). ? · You have a new or higher fever. ? · You have a new rash. ? Watch closely for changes in your health, and be sure to contact your doctor if: 
? · You cough more deeply or more often, especially if you notice more mucus or a change in the color of your mucus. ? · You are not getting better as expected. Where can you learn more? Go to http://francisco-nikki.info/. Enter H333 in the search box to learn more about \"Bronchitis: Care Instructions. \" Current as of: May 12, 2017 Content Version: 11.4 © 0662-6972 [employer]. Care instructions adapted under license by [employer] (which disclaims liability or warranty for this information). If you have questions about a medical condition or this instruction, always ask your healthcare professional. [address] any warranty or liability for your use of this information. Introducing hospitals & HEALTH SERVICES! Dear Lilia Ireland: 
Thank you for requesting a [email or web address] account.   Our records indicate that you have previously registered for a [email or web address] account but its currently inactive. Please call our [employer] support line at [phone]. Additional Information If you have questions, please visit the Frequently Asked Questions section of the [employer] website at [email or web address] Remember, [employer] is NOT to be used for urgent needs. For medical emergencies, dial 911. Now available from your iPhone and Android! Please provide this summary of care documentation to your next provider. Your primary care clinician is listed as [place]. If you have any questions after today's visit, please call [phone].

## 2018-03-11 NOTE — PROGRESS NOTES
Patient is a 81 y.o. male presenting with cold symptoms. The history is provided by the patient. History limited by: nothing. Cold Symptoms   The history is provided by the patient. This is a new problem. The current episode started 2 days ago. The problem occurs constantly. The problem has not changed since onset. The cough is productive of sputum (white sputum). There has been no fever. Associated symptoms include wheezing. Pertinent negatives include no chest pain, no chills, no sweats, no nausea and no vomiting. He has tried cough syrup for the symptoms. The treatment provided no relief. He is not a smoker. His past medical history is significant for bronchitis and asthma. The patient presents to the clinic today with \"rattling\" in his chest since yesterday morning. Endorses cough productive of white sputum. Denies blood in sputum. Denies fevers or chills, denies sick contacts. States his chest is burning, and that this is similar to previous episodes of bronchitis that he has had in the past. States that this is NOT pain similar to previous cardiac chest pain. Denies fevers or chills. Denies smoking. States that he has been taking mucinex cough syrup with mild relief. Past Medical History:   Diagnosis Date    Adverse effect of anesthesia     difficult with waking up     Allergic rhinitis     Arthritis     Asthma     Atrial flutter ([address] 9/12/2014    CAD (coronary artery disease)     Dr. Fatimah Small    Calculus of kidney     Chest pain 2006    Normal stress echo    Chronic bronchitis     Chronic bronchitis ([address] 10/3/2012    Chronic pain     knees    Erectile dysfunction     GERD (gastroesophageal reflux disease)     hiatal hernia    Gout 1/24/2013    Hemoptysis 2008    Work up by Dr. Cheyanne Al;  Negative    Hiatal hernia     Hypercholesterolemia     Hypertension     Ill-defined condition     bronchitis    Melanoma (HCC)     back    Nausea & vomiting     Pacemaker     Dr. Jorgito Genao Prostate cancer ([facility] 6/4/2012    S/P ablation of atrial flutter 9/12/2014    Sleep apnea     states never had test for apnea    Stroke [facility]) 2009     tia no residual problems        Past Surgical History:   Procedure Laterality Date    CARDIAC SURG PROCEDURE UNLIST      ablation    HX AFIB ABLATION  2014    Dr. Gypsy Lyman    ruptured appendix    HX HEART CATHETERIZATION      [address]    HX HERNIA REPAIR  12/17/14    Recurrent left inguinal hernia; Dr. Holly Haynes      5 hernia repairs total    HX PACEMAKER  12/2014    HX PACEMAKER PLACEMENT  2014    Dr. Rodo Tovar    Kidney stone extraction    FL COLSC FLX W/RMVL OF TUMOR POLYP LESION SNARE TQ  4/21/2011              Family History   Problem Relation Age of Onset    Stroke Father     Heart Disease Mother         Social History     Social History    Marital status:      Spouse name: N/A    Number of children: N/A    Years of education: N/A     Occupational History    Not on file. Social History Main Topics    Smoking status: Never Smoker    Smokeless tobacco: Never Used    Alcohol use 1.8 oz/week     1 Shots of liquor, 2 Standard drinks or equivalent per week      Comment: rare    Drug use: No    Sexual activity: Yes     Partners: Female     Other Topics Concern    Not on file     Social History Narrative                ALLERGIES: Gadolinium-containing contrast media; Acetaminophen; Contrast dye [iodine]; Levaquin [levofloxacin]; Medrol [methylprednisolone]; Norvasc [amlodipine]; Nsaids (non-steroidal anti-inflammatory drug); Percocet [oxycodone-acetaminophen]; Ranexa [ranolazine]; Simvastatin; Voltaren [diclofenac sodium]; and Zetia [ezetimibe]    Review of Systems   Constitutional: Negative for chills. Respiratory: Positive for wheezing. Cardiovascular: Negative for chest pain.    Gastrointestinal: Negative for nausea and vomiting. Neurological: Negative for dizziness. Vitals:    03/11/18 1349   BP: 105/58   Pulse: 76   Resp: 18   Temp: 98.2 °F (36.8 °C)   SpO2: 98%   Weight: 236 lb (107 kg)       Physical Exam   Constitutional: He is oriented to person, place, and time. He appears well-developed and well-nourished. No distress. HENT:   Head: Normocephalic and atraumatic. Eyes: EOM are normal.   Neck: Normal range of motion. Cardiovascular: Normal rate and regular rhythm. Pulmonary/Chest: Effort normal and breath sounds normal. No respiratory distress. He has no wheezes. He has no rales. He exhibits no tenderness. Abdominal: Soft. He exhibits no distension. Musculoskeletal: Normal range of motion. Neurological: He is alert and oriented to person, place, and time. Skin: Skin is warm and dry. He is not diaphoretic. Psychiatric: He has a normal mood and affect. MDM   Visit Vitals    /58    Pulse 76    Temp 98.2 °F (36.8 °C)    Resp 18    Wt 236 lb (107 kg)    SpO2 98%    BMI 32.01 kg/m2     PLAN  Patient with multiple risk factors presenting to the clinic with burning chest pain and productive cough since yesterday morning. He appears well today and his exam is benign. Vitals are stable. States that the chest pain is DISSIMILAR to previous cardiac pain that he has had in the past, states it feels more like his previous episodes of bronchitis. - Offered to do chest xray today, patient refused. Patient resistant to any additional workup and requesting antibiotics.   - Rx azithromycin (has some anti-inflammatory effect) and prednisone burst. Continue OTC cough suppressants and use of albuterol inhaler PRN  - Advised patient to go to the ED with any worsening of symptoms or failure to improve and he verbalized understanding  - Follow up with PCP    Procedures

## 2018-03-13 ENCOUNTER — OFFICE VISIT (OUTPATIENT)
Dept: INTERNAL MEDICINE CLINIC | Age: 83
End: 2018-03-13

## 2018-03-13 VITALS
HEART RATE: 73 BPM | DIASTOLIC BLOOD PRESSURE: 55 MMHG | OXYGEN SATURATION: 94 % | SYSTOLIC BLOOD PRESSURE: 105 MMHG | BODY MASS INDEX: 32.37 KG/M2 | WEIGHT: 239 LBS | HEIGHT: 72 IN | TEMPERATURE: 98 F

## 2018-03-13 DIAGNOSIS — J40 BRONCHITIS: Primary | ICD-10-CM

## 2018-03-13 DIAGNOSIS — J40 CATARRHAL BRONCHITIS: Primary | ICD-10-CM

## 2018-03-13 RX ORDER — AMOXICILLIN AND CLAVULANATE POTASSIUM 875; 125 MG/1; MG/1
1 TABLET, FILM COATED ORAL 2 TIMES DAILY
Qty: 20 TAB | Refills: 0 | Status: SHIPPED | OUTPATIENT
Start: 2018-03-13 | End: 2018-04-03 | Stop reason: ALTCHOICE

## 2018-03-13 NOTE — PROGRESS NOTES
Chief Complaint   Patient presents with    Cough     chest congestion, non productive; x 4 days    Medication Reaction     azithromycin; choking feeling throat area; unable to breath    Abdominal Pain     sore from coughing

## 2018-03-13 NOTE — PROGRESS NOTES
HISTORY OF PRESENT ILLNESS  Golden Hoffman is a 81 y.o. male. HPI   Acute care visit  Went to [facility] 3-11-18 for URI sxs--rx for zpak was given--choking sensation after this med  Stopped prednisone as well   No fevers  Burning in chest 3d for hours for 1-2  Now just some soreness in chest from coughing  Hx AVR, paroxymal a flutter  Treated with augmentin 2 mos ago for sinus infection  Patient Active Problem List    Diagnosis Date Noted    Irregular heartbeat 05/03/2016    S/P AVR (aortic valve replacement) 03/16/2016    ASHD (arteriosclerotic heart disease) 01/19/2016    CAD (coronary artery disease) 06/05/2015    Pacemaker 05/19/2015    MARYLOU (obstructive sleep apnea) 12/23/2014    SSS (sick sinus syndrome) ([address] 12/11/2014    Aortic stenosis 11/04/2014    SOB (shortness of breath) 11/04/2014    Atrial flutter ([facility] 09/12/2014    S/P ablation of atrial flutter 09/12/2014    H/O TIA (transient ischemic attack) and stroke 09/11/2014    Chest pain 09/11/2014    Sinus tachycardia 09/11/2014    Gout 01/24/2013    Chronic bronchitis ([address] 10/03/2012    Allergic rhinitis 10/03/2012    Prostate cancer ([facility] 06/04/2012    Hypercholesterolemia     Stroke ([facility]     Asthma     Calculus of kidney     Erectile dysfunction     GERD (gastroesophageal reflux disease)      Current Outpatient Prescriptions   Medication Sig Dispense Refill    predniSONE (DELTASONE) 20 mg tablet Take 1 Tab by mouth daily (with breakfast) for 5 days. 5 Tab 0    azithromycin (ZITHROMAX) 250 mg tablet Take 2 tablets today, then take 1 tablet daily 6 Tab 0    rosuvastatin (CRESTOR) 10 mg tablet TAKE 1 TABLET BY MOUTH EVERY MONDAY AND THURSDAY 24 Tab 3    guaiFENesin-codeine (ROBITUSSIN AC) 100-10 mg/5 mL solution Take 5 mL by mouth nightly as needed for Cough. Max Daily Amount: 5 mL.  180 mL 0    omeprazole (PRILOSEC) 20 mg capsule TAKE 1 CAPSULE EVERY DAY 90 Cap 3    albuterol (VENTOLIN HFA) 90 mcg/actuation inhaler Take 2 Puffs by inhalation every four (4) hours as needed for Wheezing. 3 Inhaler 3    potassium chloride (K-DUR, KLOR-CON) 20 mEq tablet Take 1 Tab by mouth two (2) times a day. 180 Tab 3    furosemide (LASIX) 40 mg tablet Take 1 Tab by mouth two (2) times a day. 180 Tab 3    metoprolol tartrate (LOPRESSOR) 25 mg tablet TAKE ONE-HALF TABLET BY MOUTH TWICE DAILY 90 Tab 3    isosorbide mononitrate ER (IMDUR) 60 mg CR tablet TAKE ONE TABLET BY MOUTH ONCE DAILY 90 Tab 3    traMADol (ULTRAM) 50 mg tablet       TRAMADOL HCL (TRAMADOL PO) Take 100 mg by mouth as needed.  menthol 6 % gel by Apply Externally route as needed. \"Abdirashid\"--cool therapy pain gel      coenzyme q10 10 mg cap Take 100 mg by mouth every evening.  Aspirin, Buffered 81 mg tab Take 81 mg by mouth daily. Allergies   Allergen Reactions    Azithromycin Anaphylaxis    Gadolinium-Containing Contrast Media Other (comments)     1) Moderate to Severe. 2) Post Gadolinium issues as noted:   - Diaphoretic, Near Syncope, Nausea and vomiting.     Acetaminophen Other (comments)     Indigestion,\"burning sensation\"    Contrast Dye [Iodine] Other (comments)     Decrease in BP    Levaquin [Levofloxacin] Other (comments)     Joint pain    Medrol [Methylprednisolone] Vertigo    Norvasc [Amlodipine] Other (comments)     Numbness and tingling    Nsaids (Non-Steroidal Anti-Inflammatory Drug) Other (comments)     Cough up blood      Percocet [Oxycodone-Acetaminophen] Itching    Ranexa [Ranolazine] Other (comments)     Cannot remember    Simvastatin Other (comments)     Joint pain    Voltaren [Diclofenac Sodium] Other (comments)     unknown    Zetia [Ezetimibe] Myalgia     Social History   Substance Use Topics    Smoking status: Never Smoker    Smokeless tobacco: Never Used    Alcohol use 1.8 oz/week     1 Shots of liquor, 2 Standard drinks or equivalent per week      Comment: rare      Lab Results  Component Value Date/Time   GFR est non-AA 59 (L) 08/14/2017 08:35 AM   GFRNA, POC >60 12/17/2014 12:06 PM   GFR est AA 69 08/14/2017 08:35 AM   GFRAA, POC >60 12/17/2014 12:06 PM   Creatinine 1.15 08/14/2017 08:35 AM   Creatinine (POC) 1.1 12/17/2014 12:06 PM   BUN 19 08/14/2017 08:35 AM   BUN (POC) 21 (H) 12/17/2014 12:06 PM   Sodium 141 08/14/2017 08:35 AM   Sodium (POC) 140 12/17/2014 12:06 PM   Potassium 4.2 08/14/2017 08:35 AM   Potassium (POC) 4.2 12/17/2014 12:06 PM   Chloride 97 08/14/2017 08:35 AM   Chloride (POC) 104 12/17/2014 12:06 PM   CO2 25 08/14/2017 08:35 AM   Magnesium 2.5 (H) 03/18/2016 05:28 AM        ROS    Physical Exam   Constitutional: He appears well-developed and well-nourished. No distress. Appears stated age   HENT:   Head: Normocephalic. Mouth/Throat: Oropharynx is clear and moist.   Cardiovascular: Normal rate, regular rhythm and normal heart sounds. Exam reveals no gallop and no friction rub. No murmur heard. Pulmonary/Chest: Effort normal. No respiratory distress. He has no wheezes. He has rales. He exhibits no tenderness. RLL rales   Abdominal: Soft. Musculoskeletal: He exhibits no edema. Neurological: He is alert. Psychiatric: He has a normal mood and affect. Nursing note and vitals reviewed. ASSESSMENT and PLAN  Diagnoses and all orders for this visit:    1. Bronchitis  -     XR CHEST PA LAT; Future   augmentin x 7-10 d   mucinex   Albuterol MDI prn   To call or return if not improved. Other orders  -     amoxicillin-clavulanate (AUGMENTIN) 875-125 mg per tablet; Take 1 Tab by mouth two (2) times a day. Follow-up Disposition:  Return if symptoms worsen or fail to improve.

## 2018-03-13 NOTE — MR AVS SNAPSHOT
[address] [facility] 
[phone] Patient: Emeterio Angela 
MRN:  [id number] Visit Information Date & Time Provider Department Dept. Phone Encounter #  
 3/13/2018  9:00 AM Indra Villarreal [address] [place] [address],[address] [phone] [id number] Follow-up Instructions Return if symptoms worsen or fail to improve. Your Appointments 4/19/2018  8:45 AM  
PACEMAKER with PACEMAKER, [facility]) Appt Note: [facility]  
[phone] [address] [facility]  
  
    
 4/19/2018  9:00 AM  
ANNUAL with Darryl Sorensen MD  
[facility]) Appt Note: BSC DCPM 6mo check, annual with Dr. Kuldip Daniel [facility]  
[phone] [address] [address]  
  
    
 8/7/2018  9:00 AM  
ESTABLISHED PATIENT with Aleida Dc MD  
[facility]) Appt Note: 6mo f/up  $0cp ekr [address] [facility]  
[phone] [address] [facility]  
  
    
  
 4/11/2018  8:00 AM  
REMOTE OFFICE VISIT with [facility]) Appt Note: NOT AN OFFICE VISIT - REMOTE BSC PM  
 [address] [facility]  
[phone] [address] [facility] Upcoming Health Maintenance Date Due Bone Densitometry (Dexa) Screening 2/21/2001 MEDICARE YEARLY EXAM 7/7/2018 GLAUCOMA SCREENING Q2Y 12/1/2018 DTaP/Tdap/Td series (2 - Td) 7/19/2026 Allergies as of 3/13/2018  Review Complete On: 3/13/2018 By: Yash Ayala LPN Severity Noted Reaction Type Reactions Azithromycin High 03/13/2018    Anaphylaxis Gadolinium-containing Contrast Media Medium 06/11/2012   Intolerance Other (comments) 1) Moderate to Severe. 2) Post Gadolinium issues as noted: - Diaphoretic, Near Syncope, Nausea and vomiting. Acetaminophen  03/14/2017    Other (comments) Indigestion,\"burning sensation\" Contrast Dye [Iodine]  11/20/2009    Other (comments) Decrease in BP Levaquin [Levofloxacin]  11/20/2009    Other (comments) Joint pain Medrol [Methylprednisolone]  06/18/2015    Vertigo Norvasc [Amlodipine]  03/04/2015    Other (comments) Numbness and tingling Nsaids (Non-steroidal Anti-inflammatory Drug)  03/10/2016    Other (comments) Cough up blood Percocet [Oxycodone-acetaminophen]  12/02/2014    Itching Ranexa [Ranolazine]  04/02/2015    Other (comments) Cannot remember Simvastatin  11/20/2009    Other (comments) Joint pain  
 Voltaren [Diclofenac Sodium]  03/14/2017    Other (comments)  
 unknown Zetia [Ezetimibe]  12/11/2009    Myalgia Current Immunizations  Reviewed on 4/14/2016 Name Date Influenza High Dose Vaccine PF 9/29/2016 Influenza Vaccine 10/12/2017, 10/1/2015, 10/17/2014, 10/21/2013 Influenza Vaccine Split 10/3/2012 Pneumococcal Conjugate (PCV-13) 9/29/2016 Pneumococcal Vaccine (Unspecified Type) 4/14/2015 Td 1/1/2008 Tdap 7/19/2016 Zoster Vaccine, Live 1/1/2012 Not reviewed this visit You Were Diagnosed With   
  
 Codes Comments Bronchitis    -  Primary ICD-10-CM: B63 ICD-9-CM: 234 Vitals BP Pulse Temp Height(growth percentile) Weight(growth percentile) SpO2  
 105/55 (BP 1 Location: Left arm, BP Patient Position: Sitting) 73 98 °F (36.7 °C) (Oral) 6' (1.829 m) 239 lb (108.4 kg) 94% BMI Smoking Status 32.41 kg/m2 Never Smoker BMI and BSA Data Body Mass Index Body Surface Area  
 32.41 kg/m 2 2.35 m 2 Preferred Pharmacy Pharmacy Name Phone [facility] [address], [address] [place] [address] Avenue [phone] Your Updated Medication List  
  
   
This list is accurate as of 3/13/18  9:25 AM.  Always use your most recent med list.  
  
  
  
  
 albuterol 90 mcg/actuation inhaler Commonly known as:  VENTOLIN HFA Take 2 Puffs by inhalation every four (4) hours as needed for Wheezing. amoxicillin-clavulanate 875-125 mg per tablet Commonly known as:  AUGMENTIN Take 1 Tab by mouth two (2) times a day. aspirin, buffered 81 mg Tab Take 81 mg by mouth daily. azithromycin 250 mg tablet Commonly known as:  Laymond Grumet Take 2 tablets today, then take 1 tablet daily  
  
 coenzyme q10 10 mg Cap Take 100 mg by mouth every evening. furosemide 40 mg tablet Commonly known as:  LASIX Take 1 Tab by mouth two (2) times a day. guaiFENesin-codeine 100-10 mg/5 mL solution Commonly known as:  ROBITUSSIN AC Take 5 mL by mouth nightly as needed for Cough. Max Daily Amount: 5 mL. isosorbide mononitrate ER 60 mg CR tablet Commonly known as:  IMDUR  
TAKE ONE TABLET BY MOUTH ONCE DAILY  
  
 menthol 6 % Gel  
by Apply Externally route as needed. \"Abdirashid\"--cool therapy pain gel  
  
 metoprolol tartrate 25 mg tablet Commonly known as:  LOPRESSOR  
TAKE ONE-HALF TABLET BY MOUTH TWICE DAILY  
  
 omeprazole 20 mg capsule Commonly known as:  PRILOSEC  
TAKE 1 CAPSULE EVERY DAY  
  
 potassium chloride 20 mEq tablet Commonly known as:  K-DUR, KLOR-CON Take 1 Tab by mouth two (2) times a day. predniSONE 20 mg tablet Commonly known as:  Marge Better Take 1 Tab by mouth daily (with breakfast) for 5 days. rosuvastatin 10 mg tablet Commonly known as:  CRESTOR  
TAKE 1 TABLET BY MOUTH EVERY MONDAY AND THURSDAY  
  
 traMADol 50 mg tablet Commonly known as:  ULTRAM  
  
  
  
  
Prescriptions Sent to Pharmacy Refills amoxicillin-clavulanate (AUGMENTIN) 875-125 mg per tablet 0 Sig: Take 1 Tab by mouth two (2) times a day. Class: Normal  
 Pharmacy: [facility] [address] HANNAH CHERY [address], [address] [place] [address] #: [phone] Route: Oral  
  
Follow-up Instructions Return if symptoms worsen or fail to improve. To-Do List   
 03/13/2018 Imaging:  XR CHEST PA LAT Introducing \[facility]\"" & HEALTH SERVICES! Dear Haider Galan: 
Thank you for requesting a [employer] account. Our records indicate that you have previously registered for a [employer] account but its currently inactive. Please call our [employer] support line at [phone]. Additional Information If you have questions, please visit the Frequently Asked Questions section of the [employer] website at [email or web address]/. Remember, [employer] is NOT to be used for urgent needs. For medical emergencies, dial 911. Now available from your iPhone and Android! Please provide this summary of care documentation to your next provider. Your primary care clinician is listed as [place]. If you have any questions after today's visit, please call [phone].

## 2018-03-23 ENCOUNTER — OFFICE VISIT (OUTPATIENT)
Dept: INTERNAL MEDICINE CLINIC | Age: 83
End: 2018-03-23

## 2018-03-23 VITALS
RESPIRATION RATE: 16 BRPM | WEIGHT: 243 LBS | HEART RATE: 70 BPM | TEMPERATURE: 97.9 F | HEIGHT: 72 IN | DIASTOLIC BLOOD PRESSURE: 66 MMHG | SYSTOLIC BLOOD PRESSURE: 105 MMHG | OXYGEN SATURATION: 93 % | BODY MASS INDEX: 32.91 KG/M2

## 2018-03-23 DIAGNOSIS — J40 BRONCHITIS: Primary | ICD-10-CM

## 2018-03-23 RX ORDER — FLUTICASONE PROPIONATE AND SALMETEROL 250; 50 UG/1; UG/1
1 POWDER RESPIRATORY (INHALATION) 2 TIMES DAILY
Qty: 1 INHALER | Refills: 0 | Status: SHIPPED | COMMUNITY
Start: 2018-03-23 | End: 2018-03-26 | Stop reason: SDUPTHER

## 2018-03-23 RX ORDER — PREDNISONE 20 MG/1
TABLET ORAL
Qty: 12 TAB | Refills: 0 | Status: SHIPPED | OUTPATIENT
Start: 2018-03-23 | End: 2018-04-19 | Stop reason: ALTCHOICE

## 2018-03-23 NOTE — MR AVS SNAPSHOT
[address] [facility] 
[phone] Patient: Yokasta Ontiveros 
MRN:  [id number] Visit Information Date & Time Provider Department Dept. Phone Encounter #  
 3/23/2018  2:30 PM Carolyn Minor, [address] [place] [address],[address] [phone] [id number] Follow-up Instructions Return if symptoms worsen or fail to improve. Your Appointments 4/19/2018  8:45 AM  
PACEMAKER with PACEMAKER, [facility]) Appt Note: [address] [facility]  
[phone] [address] [facility]  
  
    
 4/19/2018  9:00 AM  
ANNUAL with Rafael Mcghee MD  
[place] [facility]) Appt Note: BSC DCPM 6mo check, annual with Dr. Annie Jay [facility]  
[phone] [address] [facility] [address] [address]  
  
    
 8/7/2018  9:00 AM  
ESTABLISHED PATIENT with Radha Mei MD  
[facility] [facility]) Appt Note: 6mo f/up  $0cp ekr [address] [facility]  
[phone] [address] [facility]  
  
    
  
 4/11/2018  8:00 AM  
REMOTE OFFICE VISIT with [facility]) Appt Note: NOT AN OFFICE VISIT - REMOTE BSC PM  
 [id number] [facility]  
[phone] [address] [facility] Upcoming Health Maintenance Date Due  
 MEDICARE YEARLY EXAM 7/7/2018 GLAUCOMA SCREENING Q2Y 12/1/2018 DTaP/Tdap/Td series (2 - Td) 7/19/2026 Allergies as of 3/23/2018  Review Complete On: 3/23/2018 By: Dimple Mercedes LPN Severity Noted Reaction Type Reactions Azithromycin High 03/13/2018    Anaphylaxis Gadolinium-containing Contrast Media Medium 06/11/2012   Intolerance Other (comments) 1) Moderate to Severe. 2) Post Gadolinium issues as noted: - Diaphoretic, Near Syncope, Nausea and vomiting. Acetaminophen  03/14/2017    Other (comments) Indigestion,\"burning sensation\" Contrast Dye [Iodine]  11/20/2009    Other (comments) Decrease in BP Levaquin [Levofloxacin]  11/20/2009    Other (comments) Joint pain Medrol [Methylprednisolone]  06/18/2015    Vertigo Norvasc [Amlodipine]  03/04/2015    Other (comments) Numbness and tingling Nsaids (Non-steroidal Anti-inflammatory Drug)  03/10/2016    Other (comments) Cough up blood Percocet [Oxycodone-acetaminophen]  12/02/2014    Itching Ranexa [Ranolazine]  04/02/2015    Other (comments) Cannot remember Simvastatin  11/20/2009    Other (comments) Joint pain  
 Voltaren [Diclofenac Sodium]  03/14/2017    Other (comments)  
 unknown Zetia [Ezetimibe]  12/11/2009    Myalgia Current Immunizations  Reviewed on 4/14/2016 Name Date Influenza High Dose Vaccine PF 9/29/2016 Influenza Vaccine 10/12/2017, 10/1/2015, 10/17/2014, 10/21/2013 Influenza Vaccine Split 10/3/2012 Pneumococcal Conjugate (PCV-13) 9/29/2016 Pneumococcal Vaccine (Unspecified Type) 4/14/2015 Td 1/1/2008 Tdap 7/19/2016 Zoster Vaccine, Live 1/1/2012 Not reviewed this visit You Were Diagnosed With   
  
 Codes Comments Bronchitis    -  Primary ICD-10-CM: X57 ICD-9-CM: 773 Vitals BP Pulse Temp Resp Height(growth percentile) Weight(growth percentile) 105/66 (BP 1 Location: Left arm, BP Patient Position: Sitting) 70 97.9 °F (36.6 °C) (Oral) 16 6' (1.829 m) 243 lb (110.2 kg) SpO2 BMI Smoking Status 93% 32.96 kg/m2 Never Smoker Vitals History BMI and BSA Data Body Mass Index Body Surface Area  
 32.96 kg/m 2 2.37 m 2 Preferred Pharmacy Pharmacy Name Phone [facility] [address] [place] [address], [address] [place] [address] Avenue [phone] Your Updated Medication List  
  
   
This list is accurate as of 3/23/18  2:41 PM.  Always use your most recent med list.  
  
  
  
  
 albuterol 90 mcg/actuation inhaler Commonly known as:  VENTOLIN HFA Take 2 Puffs by inhalation every four (4) hours as needed for Wheezing. amoxicillin-clavulanate 875-125 mg per tablet Commonly known as:  AUGMENTIN Take 1 Tab by mouth two (2) times a day. aspirin, buffered 81 mg Tab Take 81 mg by mouth daily. coenzyme q10 10 mg Cap Take 100 mg by mouth every evening. furosemide 40 mg tablet Commonly known as:  LASIX Take 1 Tab by mouth two (2) times a day. guaiFENesin-codeine 100-10 mg/5 mL solution Commonly known as:  ROBITUSSIN AC Take 5 mL by mouth nightly as needed for Cough. Max Daily Amount: 5 mL. isosorbide mononitrate ER 60 mg CR tablet Commonly known as:  IMDUR  
TAKE ONE TABLET BY MOUTH ONCE DAILY  
  
 menthol 6 % Gel  
by Apply Externally route as needed. \"Abdirashid\"--cool therapy pain gel  
  
 metoprolol tartrate 25 mg tablet Commonly known as:  LOPRESSOR  
TAKE ONE-HALF TABLET BY MOUTH TWICE DAILY MUCINEX SINUS-MAX D-N (DIPHEN) 25 mg-10 mg- 650 mg/20mL(nt) Lisq Generic drug:  [id number] Take  by mouth. omeprazole 20 mg capsule Commonly known as:  PRILOSEC  
TAKE 1 CAPSULE EVERY DAY  
  
 potassium chloride 20 mEq tablet Commonly known as:  K-DUR, KLOR-CON Take 1 Tab by mouth two (2) times a day. predniSONE 20 mg tablet Commonly known as:  Carron Cozier 60mg po daily for 2days, 40mg po daily for 2 days, 20mg po daily for 2days then stop  
  
 rosuvastatin 10 mg tablet Commonly known as:  CRESTOR  
TAKE 1 TABLET BY MOUTH EVERY MONDAY AND THURSDAY  
  
 traMADol 50 mg tablet Commonly known as:  ULTRAM  
  
  
  
  
Prescriptions Sent to Pharmacy Refills predniSONE (DELTASONE) 20 mg tablet 0 Simg po daily for 2days, 40mg po daily for 2 days, 20mg po daily for 2days then stop Class: Normal  
 Pharmacy: [facility] [address] HANNAH CAMARENA [address] [place], [address] [facility] #: [phone] Follow-up Instructions Return if symptoms worsen or fail to improve. Patient Instructions   
advair 2 times per day Can use your albuterol as needed Complete prednisone taper Introducing [facility] SERVICES! Dear Jovanna Villalobos: 
Thank you for requesting a [employer] account. Our records indicate that you have previously registered for a [employer] account but its currently inactive. Please call our [employer] support line at [phone]. Additional Information If you have questions, please visit the Frequently Asked Questions section of the [employer] website at [email or web address]/. Remember, [employer] is NOT to be used for urgent needs. For medical emergencies, dial 911. Now available from your iPhone and Android! Please provide this summary of care documentation to your next provider. Your primary care clinician is listed as [place]. If you have any questions after today's visit, please call [phone].

## 2018-03-23 NOTE — PROGRESS NOTES
HISTORY OF PRESENT ILLNESS  Uzma Hutchinson is a 81 y.o. male. HPI   Pt normally follows with Dr. Helga Lopez (PCP). Pt is here for acute care. Pt presents with his wife. Pt ambulates with a cane.     Pt c/o nonproductive cough and wheezing x 2 weeks - improved  Pt denies having F/C  Pt has been taking Mucinex Night Time Cold and Flu (guaifenesin, tylenol and sudafed) for his sx  Pt has been using his albuterol 4-5x daily  Pt went to the  on 3/11/18  Pt was provided with a zpak and prednisone  Pt took these meds separately, but felt as though he was choking on both of these meds  Pt stated that it felt as though his throat was closing and could not catch his breath  Pt had to go outside and breath fresh air in order to improve his sx  However, pt states that he has had no problem taking these meds in the past    Pt saw Dr. Jaya Sullivan (PCP) on 3/13/18  Reviewed CXR 3/18: no PNA  Pt was provided with augmentin and has completed this course  Pt thinks that he has 'industrial bronchitis'  Ordered prednisone taper  Ordered advair inhaler    Patient Active Problem List    Diagnosis Date Noted    Irregular heartbeat 05/03/2016    S/P AVR (aortic valve replacement) 03/16/2016    ASHD (arteriosclerotic heart disease) 01/19/2016    CAD (coronary artery disease) 06/05/2015    Pacemaker 05/19/2015    MARYLOU (obstructive sleep apnea) 12/23/2014    SSS (sick sinus syndrome) ([address] 12/11/2014    Aortic stenosis 11/04/2014    SOB (shortness of breath) 11/04/2014    Atrial flutter ([address] 09/12/2014    S/P ablation of atrial flutter 09/12/2014    H/O TIA (transient ischemic attack) and stroke 09/11/2014    Chest pain 09/11/2014    Sinus tachycardia 09/11/2014    Gout 01/24/2013    Chronic bronchitis ([address] 10/03/2012    Allergic rhinitis 10/03/2012    Prostate cancer ([address] 06/04/2012    Hypercholesterolemia     Stroke ([address]     Asthma     Calculus of kidney     Erectile dysfunction     GERD (gastroesophageal reflux disease) Current Outpatient Prescriptions   Medication Sig Dispense Refill    abietijpuzyi-FA-ztzmvyvnjhl-GG (MUCINEX SINUS-MAX D-N, DIPHEN,) 25 mg-10 mg- 650 mg/20mL(nt) lisq Take  by mouth.  rosuvastatin (CRESTOR) 10 mg tablet TAKE 1 TABLET BY MOUTH EVERY MONDAY AND THURSDAY 24 Tab 3    omeprazole (PRILOSEC) 20 mg capsule TAKE 1 CAPSULE EVERY DAY 90 Cap 3    albuterol (VENTOLIN HFA) 90 mcg/actuation inhaler Take 2 Puffs by inhalation every four (4) hours as needed for Wheezing. 3 Inhaler 3    potassium chloride (K-DUR, KLOR-CON) 20 mEq tablet Take 1 Tab by mouth two (2) times a day. 180 Tab 3    furosemide (LASIX) 40 mg tablet Take 1 Tab by mouth two (2) times a day. 180 Tab 3    metoprolol tartrate (LOPRESSOR) 25 mg tablet TAKE ONE-HALF TABLET BY MOUTH TWICE DAILY 90 Tab 3    isosorbide mononitrate ER (IMDUR) 60 mg CR tablet TAKE ONE TABLET BY MOUTH ONCE DAILY 90 Tab 3    traMADol (ULTRAM) 50 mg tablet       menthol 6 % gel by Apply Externally route as needed. \"Abdirashid\"--cool therapy pain gel      coenzyme q10 10 mg cap Take 100 mg by mouth every evening.  Aspirin, Buffered 81 mg tab Take 81 mg by mouth daily.  amoxicillin-clavulanate (AUGMENTIN) 875-125 mg per tablet Take 1 Tab by mouth two (2) times a day. 20 Tab 0    guaiFENesin-codeine (ROBITUSSIN AC) 100-10 mg/5 mL solution Take 5 mL by mouth nightly as needed for Cough. Max Daily Amount: 5 mL.  180 mL 0     Past Surgical History:   Procedure Laterality Date    CARDIAC SURG PROCEDURE UNLIST      ablation    HX AFIB ABLATION  2014    Dr. Santi Hong    ruptured appendix    HX HEART CATHETERIZATION      HX HERNIA REPAIR  1986    HX HERNIA REPAIR  12/17/14    Recurrent left inguinal hernia; Dr. Socorro Fontanez      5 hernia repairs total    HX PACEMAKER  12/2014    HX PACEMAKER PLACEMENT  2014    Dr. Shahnaz Mcdaniel FLX W/RMVL OF TUMOR POLYP LESION SNARE TQ  4/21/2011           Lab Results  Component Value Date/Time   WBC 6.8 07/17/2017 08:15 AM   HGB 14.7 07/17/2017 08:15 AM   Hemoglobin (POC) 13.9 12/17/2014 12:06 PM   HCT 43.9 07/17/2017 08:15 AM   Hematocrit (POC) 41 12/17/2014 12:06 PM   PLATELET 838 37/08/4392 08:15 AM   MCV 94 07/17/2017 08:15 AM     Lab Results  Component Value Date/Time   Cholesterol, total 170 07/17/2017 08:15 AM   HDL Cholesterol 54 07/17/2017 08:15 AM   LDL, calculated 96 07/17/2017 08:15 AM   Triglyceride 100 07/17/2017 08:15 AM   CHOL/HDL Ratio 3.6 09/17/2010 08:27 AM     Lab Results  Component Value Date/Time   GFR est non-AA 59 (L) 08/14/2017 08:35 AM   GFRNA, POC >60 12/17/2014 12:06 PM   GFR est AA 69 08/14/2017 08:35 AM   GFRAA, POC >60 12/17/2014 12:06 PM   Creatinine 1.15 08/14/2017 08:35 AM   Creatinine (POC) 1.1 12/17/2014 12:06 PM   BUN 19 08/14/2017 08:35 AM   BUN (POC) 21 (H) 12/17/2014 12:06 PM   Sodium 141 08/14/2017 08:35 AM   Sodium (POC) 140 12/17/2014 12:06 PM   Potassium 4.2 08/14/2017 08:35 AM   Potassium (POC) 4.2 12/17/2014 12:06 PM   Chloride 97 08/14/2017 08:35 AM   Chloride (POC) 104 12/17/2014 12:06 PM   CO2 25 08/14/2017 08:35 AM   Magnesium 2.5 (H) 03/18/2016 05:28 AM        Review of Systems   Constitutional: Negative for chills and fever. Respiratory: Positive for wheezing. Negative for cough, sputum production and shortness of breath. Cardiovascular: Negative for chest pain. Physical Exam   Constitutional: He is oriented to person, place, and time. He appears well-developed and well-nourished. No distress. HENT:   Head: Normocephalic and atraumatic. Eyes: Conjunctivae and EOM are normal. Right eye exhibits no discharge. Left eye exhibits no discharge. Neck: Normal range of motion. Neck supple. No thyromegaly present. Cardiovascular: Normal rate, regular rhythm and normal heart sounds. Exam reveals no gallop and no friction rub.     No murmur heard.  Pulmonary/Chest: Effort normal. No respiratory distress. He has wheezes (Expiratory). He has no rales. He exhibits no tenderness. Musculoskeletal: Normal range of motion. He exhibits no edema, tenderness or deformity. Lymphadenopathy:     He has no cervical adenopathy. Neurological: He is alert and oriented to person, place, and time. He has normal reflexes. He exhibits normal muscle tone. Coordination normal.   Skin: Skin is warm and dry. No rash noted. He is not diaphoretic. No erythema. No pallor. Psychiatric: He has a normal mood and affect. His behavior is normal.       ASSESSMENT and PLAN    ICD-10-CM ICD-9-CM    1. Bronchitis    Pt has completed abx, no additional abx needed, will give him advair inhaler to help with his wheezing and a prednisone taper    Of note he is quite a difficult historian, his rxn to zpak and prednisone do not appear to be throat closing but rather some difficulty swallowing, nothing anaphylactic, he has tolerated both of these meds in the past, OK to give prednisone at this time   [id number]         Scribed by [address] [facility] [address], as dictated by Dr. Dilia Beasley. Current diagnosis and concerns discussed with pt at length. Pt understands risks and benefits or current treatment plan and medications, and accepts the treatment and medication with any possible risks. Pt asks appropriate questions, which were answered. Pt was instructed to call with any concerns or problems. This note will not be viewable in [address]

## 2018-03-26 ENCOUNTER — OFFICE VISIT (OUTPATIENT)
Dept: INTERNAL MEDICINE CLINIC | Age: 83
End: 2018-03-26

## 2018-03-26 VITALS
OXYGEN SATURATION: 94 % | HEART RATE: 70 BPM | BODY MASS INDEX: 32.78 KG/M2 | TEMPERATURE: 97.7 F | SYSTOLIC BLOOD PRESSURE: 132 MMHG | DIASTOLIC BLOOD PRESSURE: 73 MMHG | RESPIRATION RATE: 16 BRPM | HEIGHT: 72 IN | WEIGHT: 242 LBS

## 2018-03-26 DIAGNOSIS — J98.01 BRONCHOSPASM: Primary | ICD-10-CM

## 2018-03-26 RX ORDER — PREDNISONE 20 MG/1
TABLET ORAL
Qty: 10 TAB | Refills: 0 | Status: SHIPPED | OUTPATIENT
Start: 2018-03-26 | End: 2018-04-19 | Stop reason: ALTCHOICE

## 2018-03-26 RX ORDER — FLUTICASONE PROPIONATE AND SALMETEROL 250; 50 UG/1; UG/1
1 POWDER RESPIRATORY (INHALATION) 2 TIMES DAILY
Qty: 1 INHALER | Refills: 0 | Status: SHIPPED | COMMUNITY
Start: 2018-03-26 | End: 2018-04-19

## 2018-03-26 NOTE — PROGRESS NOTES
Reviewed record in preparation for visit and have obtained necessary documentation. Identified pt with two pt identifiers(name and ). Chief Complaint   Patient presents with    Cough     x 2 week       There are no preventive care reminders to display for this patient. Mr. Sushant Rodriguez has a reminder for a \"due or due soon\" health maintenance. I have asked that he discuss this further with his primary care provider for follow-up on this health maintenance. Coordination of Care Questionnaire:  :     1) Have you been to an emergency room, urgent care clinic since your last visit? yes   Hospitalized since your last visit? no             2) Have you seen or consulted any other health care providers outside of [facility] since your last visit? no  (Include any pap smears or colon screenings in this section.)    3) In the event something were to happen to you and you were unable to speak on your behalf, do you have an Advance Directive/ Living Will in place stating your wishes? YES    Do you have an Advance Directive on file? yes    4) Are you interested in receiving information on Advance Directives? NO    Patient is accompanied by self I have received verbal consent from Sly Santoro Sr to discuss any/all medical information while they are present in the room.

## 2018-03-26 NOTE — PROGRESS NOTES
Jesús Velázquez is a 81 y.o. male patient of Dr. Carmelo Oh who complains of persistent bronchitis. Seen at urgent care on 3/11. Onset of symptoms 3/8. Took zpak for 2 days, then was seen by Dr. Luis Ferrara 3/13 and changed to Augmentin, took one week. CXR on 3/13. Saw Dr. Julio Mathew, 3/23. Cough is productive of white mucous. No fever. Feeling chest congestion and wheezing. Given advair BID. Took Prednisone 60mg taper. Using albuterol 5 times a day. Past Medical History:   Diagnosis Date    Adverse effect of anesthesia     difficult with waking up     Allergic rhinitis     Arthritis     Asthma     Atrial flutter ([address] 9/12/2014    CAD (coronary artery disease)     Dr. Enmanuel Barnett    Calculus of kidney     Chest pain 2006    Normal stress echo    Chronic bronchitis     Chronic bronchitis ([address] 10/3/2012    Chronic pain     knees    Erectile dysfunction     GERD (gastroesophageal reflux disease)     hiatal hernia    Gout 1/24/2013    Hemoptysis 2008    Work up by Dr. Lázaro Molina; Negative    Hiatal hernia     Hypercholesterolemia     Hypertension     Ill-defined condition     bronchitis    Melanoma ([address]     back    Nausea & vomiting     Pacemaker     Dr. Judy Julio [facility]) 6/4/2012    S/P ablation of atrial flutter 9/12/2014    Sleep apnea     states never had test for apnea    Stroke [facility]) 2009     tia no residual problems           Review of Systems  Pertinent items are noted in HPI. Objective:     Visit Vitals    /73 (BP 1 Location: Left arm, BP Patient Position: Sitting)    Pulse 70    Temp 97.7 °F (36.5 °C) (Oral)    Resp 16    Ht 6' (1.829 m)    Wt 242 lb (109.8 kg)    SpO2 94%    BMI 32.82 kg/m2     Gen: Mildly ill appearing male  HEENT:   PERRL,normal conjunctiva.  External ear and canals normal, TMs no opacification or erythema,  Swollen nasal turbinates, no sinus pain on palpation,  OP no erythema, no exudates, MMM  Neck:   No masses or LAD  Resp:  Diffuse wheezing, no rhonchi, no rales. CV:  RRR, normal S1S2, no murmur. Assessment/Plan:       ICD-10-CM ICD-9-CM    1. Bronchospasm J98.01 519.11 fluticasone-salmeterol (ADVAIR) 250-50 mcg/dose diskus inhaler   CONTINUE VENTOLIN AS NEEDED. CONTINUE ADVAIR 250/50 TWICE A DAY. PREDNISONE 2 OF THE 20MG (40MG DOSE) FOR THE NEXT 3 DAYS, THEN REDUCE TO ONE TABLET (20MG) ONCE A DAY FOR 4 DAYS. THEN STOP. Follow-up Disposition:  Return if symptoms worsen or fail to improve.     Herbert Hathaway MD

## 2018-03-26 NOTE — MR AVS SNAPSHOT
[address] [facility] 
[phone] Patient: Tracee Diaz 
MRN:  [id number] Visit Information Date & Time Provider Department Dept. Phone Encounter #  
 3/26/2018  2:00 PM Juana Starks, [address] [place] [address],[address] [phone] [id number] Follow-up Instructions Return if symptoms worsen or fail to improve. Your Appointments 4/19/2018  8:45 AM  
PACEMAKER with PACEMAKER, [facility] [facility]) Appt Note: [address] [facility]  
[phone] [address]  
  
    
 4/19/2018  9:00 AM  
ANNUAL with Swetha Nelson MD  
[facility]) Appt Note: BSC DCPM 6mo check, annual with Dr. Eli Aguilar [facility]  
[phone] [address] [address]  
  
    
 8/7/2018  9:00 AM  
ESTABLISHED PATIENT with Neri Nelson MD  
[facility]) Appt Note: 6mo f/up  $0cp ekr [address]  
[phone] [address]  
  
    
  
 4/11/2018  8:00 AM  
REMOTE OFFICE VISIT with [facility] Appt Note: NOT AN OFFICE VISIT - REMOTE BSC PM  
 [address]  
[phone] [address] Upcoming Health Maintenance Date Due  
 MEDICARE YEARLY EXAM 7/7/2018 GLAUCOMA SCREENING Q2Y 12/1/2018 DTaP/Tdap/Td series (2 - Td) 7/19/2026 Allergies as of 3/26/2018  Review Complete On: 3/26/2018 By: Juana Starks MD  
  
 Severity Noted Reaction Type Reactions Azithromycin High 03/13/2018    Anaphylaxis Gadolinium-containing Contrast Media Medium 06/11/2012   Intolerance Other (comments) 1) Moderate to Severe. 2) Post Gadolinium issues as noted: - Diaphoretic, Near Syncope, Nausea and vomiting. Acetaminophen  03/14/2017    Other (comments) Indigestion,\"burning sensation\" Contrast Dye [Iodine]  11/20/2009    Other (comments) Decrease in BP Levaquin [Levofloxacin]  11/20/2009    Other (comments) Joint pain Medrol [Methylprednisolone]  06/18/2015    Vertigo Norvasc [Amlodipine]  03/04/2015    Other (comments) Numbness and tingling Nsaids (Non-steroidal Anti-inflammatory Drug)  03/10/2016    Other (comments) Cough up blood Percocet [Oxycodone-acetaminophen]  12/02/2014    Itching Ranexa [Ranolazine]  04/02/2015    Other (comments) Cannot remember Simvastatin  11/20/2009    Other (comments) Joint pain  
 Voltaren [Diclofenac Sodium]  03/14/2017    Other (comments)  
 unknown Zetia [Ezetimibe]  12/11/2009    Myalgia Current Immunizations  Reviewed on 4/14/2016 Name Date Influenza High Dose Vaccine PF 9/29/2016 Influenza Vaccine 10/12/2017, 10/1/2015, 10/17/2014, 10/21/2013 Influenza Vaccine Split 10/3/2012 Pneumococcal Conjugate (PCV-13) 9/29/2016 Pneumococcal Vaccine (Unspecified Type) 4/14/2015 Td 1/1/2008 Tdap 7/19/2016 Zoster Vaccine, Live 1/1/2012 Not reviewed this visit You Were Diagnosed With   
  
 Codes Comments Bronchospasm    -  Primary ICD-10-CM: J98.01 
ICD-9-CM: 519.11 Vitals BP Pulse Temp Resp Height(growth percentile) Weight(growth percentile) 132/73 (BP 1 Location: Left arm, BP Patient Position: Sitting) 70 97.7 °F (36.5 °C) (Oral) 16 6' (1.829 m) 242 lb (109.8 kg) SpO2 BMI Smoking Status 94% 32.82 kg/m2 Never Smoker BMI and BSA Data Body Mass Index Body Surface Area  
 32.82 kg/m 2 2.36 m 2 Preferred Pharmacy Pharmacy Name Phone [facility] [address] [place] [address], [address] [place] [address] Avenue [phone] Your Updated Medication List  
  
   
This list is accurate as of 3/26/18  2:44 PM.  Always use your most recent med list.  
  
  
  
  
 albuterol 90 mcg/actuation inhaler Commonly known as:  VENTOLIN HFA Take 2 Puffs by inhalation every four (4) hours as needed for Wheezing. amoxicillin-clavulanate 875-125 mg per tablet Commonly known as:  AUGMENTIN Take 1 Tab by mouth two (2) times a day. aspirin, buffered 81 mg Tab Take 81 mg by mouth daily. coenzyme q10 10 mg Cap Take 100 mg by mouth every evening. fluticasone-salmeterol 250-50 mcg/dose diskus inhaler Commonly known as:  ADVAIR Take 1 Puff by inhalation two (2) times a day. furosemide 40 mg tablet Commonly known as:  LASIX Take 1 Tab by mouth two (2) times a day. guaiFENesin-codeine 100-10 mg/5 mL solution Commonly known as:  ROBITUSSIN AC Take 5 mL by mouth nightly as needed for Cough. Max Daily Amount: 5 mL. isosorbide mononitrate ER 60 mg CR tablet Commonly known as:  IMDUR  
TAKE ONE TABLET BY MOUTH ONCE DAILY  
  
 menthol 6 % Gel  
by Apply Externally route as needed. \"Abdirashid\"--cool therapy pain gel  
  
 metoprolol tartrate 25 mg tablet Commonly known as:  LOPRESSOR  
TAKE ONE-HALF TABLET BY MOUTH TWICE DAILY MUCINEX SINUS-MAX D-N (DIPHEN) 25 mg-10 mg- 650 mg/20mL(nt) Lisq Generic drug:  skwcrxzqyyit-TB-qhsdzirfaro-GG Take  by mouth. omeprazole 20 mg capsule Commonly known as:  PRILOSEC  
TAKE 1 CAPSULE EVERY DAY  
  
 potassium chloride 20 mEq tablet Commonly known as:  K-DUR, KLOR-CON Take 1 Tab by mouth two (2) times a day. * predniSONE 20 mg tablet Commonly known as:  [place] Rinks 60mg po daily for 2days, 40mg po daily for 2 days, 20mg po daily for 2days then stop * predniSONE 20 mg tablet Commonly known as:  [place] Rinks  
 Take 2 tablets (40mg) by mouth for 3 days, reduce to 1 tablet (20mg) for 4 days. rosuvastatin 10 mg tablet Commonly known as:  CRESTOR  
TAKE 1 TABLET BY MOUTH EVERY MONDAY AND THURSDAY  
  
 traMADol 50 mg tablet Commonly known as:  ULTRAM  
  
 * Notice: This list has 2 medication(s) that are the same as other medications prescribed for you. Read the directions carefully, and ask your doctor or other care provider to review them with you. Prescriptions Sent to Pharmacy Refills  
 predniSONE (DELTASONE) 20 mg tablet 0 Sig: Take 2 tablets (40mg) by mouth for 3 days, reduce to 1 tablet (20mg) for 4 days. Class: Normal  
 Pharmacy: [address] [facility] [address], [address] [place] [address] #: [phone] Follow-up Instructions Return if symptoms worsen or fail to improve. Patient Instructions FOR THE VENTOLIN INHALER (BLUE) ONE INHALATION WITH DEEP BREATH AND THEN SECOND INHALATION. CONTINUE ADVAIR 250/50 TWICE A DAY. PREDNISONE 2 OF THE 20MG (40MG DOSE) FOR THE NEXT 3 DAYS, THEN REDUCE TO ONE TABLET (20MG) ONCE A DAY FOR 4 DAYS. THEN STOP. Introducing [facility]! Dear Asad Cue: 
Thank you for requesting a [employer] account. Our records indicate that you have previously registered for a [employer] account but its currently inactive. Please call our [employer] support line at [phone]. Additional Information If you have questions, please visit the Frequently Asked Questions section of the [employer] website at [email or web address] Remember, [employer] is NOT to be used for urgent needs. For medical emergencies, dial 911. Now available from your iPhone and Android! Please provide this summary of care documentation to your next provider. Your primary care clinician is listed as [place]. If you have any questions after today's visit, please call [phone].

## 2018-03-26 NOTE — PATIENT INSTRUCTIONS
FOR THE VENTOLIN INHALER (BLUE) ONE INHALATION WITH DEEP BREATH AND THEN SECOND INHALATION. CONTINUE ADVAIR 250/50 TWICE A DAY. PREDNISONE 2 OF THE 20MG (40MG DOSE) FOR THE NEXT 3 DAYS, THEN REDUCE TO ONE TABLET (20MG) ONCE A DAY FOR 4 DAYS. THEN STOP.

## 2018-04-02 ENCOUNTER — TELEPHONE (OUTPATIENT)
Dept: INTERNAL MEDICINE CLINIC | Age: 83
End: 2018-04-02

## 2018-04-02 NOTE — TELEPHONE ENCOUNTER
Identified patient 2 identifiers verified.  Patient was scheduled with Dr. Otoniel Grant 4/3/18 at 3pm

## 2018-04-02 NOTE — TELEPHONE ENCOUNTER
Patient was given a Rx for Advair. He had to quit taking it because it was causing him to get sores in his mouth. He would like a different Rx. No appts available. Patient is all out of Prednisone as well. Best contact number is [phone].        Message received & copied from [facility]

## 2018-04-03 ENCOUNTER — OFFICE VISIT (OUTPATIENT)
Dept: INTERNAL MEDICINE CLINIC | Age: 83
End: 2018-04-03

## 2018-04-03 VITALS
HEIGHT: 72 IN | HEART RATE: 71 BPM | BODY MASS INDEX: 32.23 KG/M2 | DIASTOLIC BLOOD PRESSURE: 68 MMHG | OXYGEN SATURATION: 93 % | TEMPERATURE: 98 F | WEIGHT: 238 LBS | SYSTOLIC BLOOD PRESSURE: 121 MMHG

## 2018-04-03 DIAGNOSIS — B37.0 THRUSH: Primary | ICD-10-CM

## 2018-04-03 RX ORDER — NYSTATIN 100000 [USP'U]/ML
1 SUSPENSION ORAL 4 TIMES DAILY
Qty: 100 ML | Refills: 0 | Status: SHIPPED | OUTPATIENT
Start: 2018-04-03 | End: 2018-04-19

## 2018-04-03 NOTE — PROGRESS NOTES
HISTORY OF PRESENT ILLNESS  Whit Dunham is a 81 y.o. male. HPI   Pt c/o blisters on lips and sore tongue since using advair last month for 1 week. He stopped the advair last week and notes the blisters  Are improving now  Still has cough with clear phlgem but improving  Has had 2 course of abx, prednisone and advair last month    Patient Active Problem List    Diagnosis Date Noted    Irregular heartbeat 05/03/2016    S/P AVR (aortic valve replacement) 03/16/2016    ASHD (arteriosclerotic heart disease) 01/19/2016    CAD (coronary artery disease) 06/05/2015    Pacemaker 05/19/2015    MARYLOU (obstructive sleep apnea) 12/23/2014    SSS (sick sinus syndrome) ([facility] [address] [facility] 12/11/2014    Aortic stenosis 11/04/2014    SOB (shortness of breath) 11/04/2014    Atrial flutter ([facility] [address] 09/12/2014    S/P ablation of atrial flutter 09/12/2014    H/O TIA (transient ischemic attack) and stroke 09/11/2014    Chest pain 09/11/2014    Sinus tachycardia 09/11/2014    Gout 01/24/2013    Chronic bronchitis ([facility] [address] 10/03/2012    Allergic rhinitis 10/03/2012    Prostate cancer ([facility] [address] 06/04/2012    Hypercholesterolemia     Stroke ([facility] [address] [facility]     Asthma     Calculus of kidney     Erectile dysfunction     GERD (gastroesophageal reflux disease)      Current Outpatient Prescriptions   Medication Sig Dispense Refill    nystatin (MYCOSTATIN) 100,000 unit/mL suspension Take 5 mL by mouth four (4) times daily. swish and spit 100 mL 0    ndudttjwnsct-BK-pzypoytpled-GG (MUCINEX SINUS-MAX D-N, DIPHEN,) 25 mg-10 mg- 650 mg/20mL(nt) lisq Take  by mouth.  rosuvastatin (CRESTOR) 10 mg tablet TAKE 1 TABLET BY MOUTH EVERY MONDAY AND THURSDAY 24 Tab 3    omeprazole (PRILOSEC) 20 mg capsule TAKE 1 CAPSULE EVERY DAY 90 Cap 3    albuterol (VENTOLIN HFA) 90 mcg/actuation inhaler Take 2 Puffs by inhalation every four (4) hours as needed for Wheezing.  3 Inhaler 3    potassium chloride (K-DUR, KLOR-CON) 20 mEq tablet Take 1 Tab by mouth two (2) times a day. 180 Tab 3    furosemide (LASIX) 40 mg tablet Take 1 Tab by mouth two (2) times a day. 180 Tab 3    metoprolol tartrate (LOPRESSOR) 25 mg tablet TAKE ONE-HALF TABLET BY MOUTH TWICE DAILY 90 Tab 3    isosorbide mononitrate ER (IMDUR) 60 mg CR tablet TAKE ONE TABLET BY MOUTH ONCE DAILY 90 Tab 3    coenzyme q10 10 mg cap Take 100 mg by mouth every evening.  Aspirin, Buffered 81 mg tab Take 81 mg by mouth daily.  predniSONE (DELTASONE) 20 mg tablet Take 2 tablets (40mg) by mouth for 3 days, reduce to 1 tablet (20mg) for 4 days. 10 Tab 0    fluticasone-salmeterol (ADVAIR) 250-50 mcg/dose diskus inhaler Take 1 Puff by inhalation two (2) times a day. 1 Inhaler 0    predniSONE (DELTASONE) 20 mg tablet 60mg po daily for 2days, 40mg po daily for 2 days, 20mg po daily for 2days then stop 12 Tab 0    guaiFENesin-codeine (ROBITUSSIN AC) 100-10 mg/5 mL solution Take 5 mL by mouth nightly as needed for Cough. Max Daily Amount: 5 mL. 180 mL 0    traMADol (ULTRAM) 50 mg tablet       menthol 6 % gel by Apply Externally route as needed. \"Abdirashid\"--cool therapy pain gel       Allergies   Allergen Reactions    Azithromycin Anaphylaxis    Gadolinium-Containing Contrast Media Other (comments)     1) Moderate to Severe. 2) Post Gadolinium issues as noted:   - Diaphoretic, Near Syncope, Nausea and vomiting.     Acetaminophen Other (comments)     Indigestion,\"burning sensation\"    Contrast Dye [Iodine] Other (comments)     Decrease in BP    Levaquin [Levofloxacin] Other (comments)     Joint pain    Medrol [Methylprednisolone] Vertigo    Norvasc [Amlodipine] Other (comments)     Numbness and tingling    Nsaids (Non-Steroidal Anti-Inflammatory Drug) Other (comments)     Cough up blood      Percocet [Oxycodone-Acetaminophen] Itching    Ranexa [Ranolazine] Other (comments)     Cannot remember    Simvastatin Other (comments)     Joint pain    Voltaren [Diclofenac Sodium] Other (comments)     unknown    Zetia [Ezetimibe] Myalgia           ROS    Physical Exam   HENT:   Healing blister right lateral lower lip  ? Thrush mild to posterior tongue with white layer   Cardiovascular: Intact distal pulses. Pulmonary/Chest: No respiratory distress. He has no wheezes. He has no rales. He exhibits no tenderness. ASSESSMENT and PLAN  Diagnoses and all orders for this visit:    1. Thrush   Nystatin swish and spit qid x 5d   May appy otc ointment or chapstick to Right lower lip lesion  Other orders  -     nystatin (MYCOSTATIN) 100,000 unit/mL suspension; Take 5 mL by mouth four (4) times daily. swish and spit      Follow-up Disposition:  Return if symptoms worsen or fail to improve.

## 2018-04-11 ENCOUNTER — OFFICE VISIT (OUTPATIENT)
Dept: CARDIOLOGY CLINIC | Age: 83
End: 2018-04-11

## 2018-04-11 DIAGNOSIS — Z95.0 PACEMAKER: Primary | ICD-10-CM

## 2018-04-11 DIAGNOSIS — R00.0 SINUS TACHYCARDIA: ICD-10-CM

## 2018-04-11 DIAGNOSIS — I48.92 ATRIAL FLUTTER, UNSPECIFIED TYPE (HCC): ICD-10-CM

## 2018-04-11 DIAGNOSIS — I49.5 SSS (SICK SINUS SYNDROME) (HCC): ICD-10-CM

## 2018-04-19 ENCOUNTER — OFFICE VISIT (OUTPATIENT)
Dept: CARDIOLOGY CLINIC | Age: 83
End: 2018-04-19

## 2018-04-19 VITALS
OXYGEN SATURATION: 95 % | DIASTOLIC BLOOD PRESSURE: 70 MMHG | HEART RATE: 69 BPM | WEIGHT: 241.2 LBS | SYSTOLIC BLOOD PRESSURE: 128 MMHG | RESPIRATION RATE: 16 BRPM | HEIGHT: 72 IN | BODY MASS INDEX: 32.67 KG/M2

## 2018-04-19 DIAGNOSIS — I48.92 ATRIAL FLUTTER, UNSPECIFIED TYPE (HCC): ICD-10-CM

## 2018-04-19 DIAGNOSIS — I49.5 SSS (SICK SINUS SYNDROME) (HCC): ICD-10-CM

## 2018-04-19 DIAGNOSIS — I25.10 CORONARY ARTERY DISEASE INVOLVING NATIVE CORONARY ARTERY OF NATIVE HEART WITHOUT ANGINA PECTORIS: ICD-10-CM

## 2018-04-19 DIAGNOSIS — G47.33 OSA (OBSTRUCTIVE SLEEP APNEA): ICD-10-CM

## 2018-04-19 DIAGNOSIS — I48.92 ATRIAL FLUTTER, UNSPECIFIED TYPE (HCC): Primary | ICD-10-CM

## 2018-04-19 DIAGNOSIS — I10 ESSENTIAL HYPERTENSION: ICD-10-CM

## 2018-04-19 DIAGNOSIS — Z95.0 PACEMAKER: Primary | ICD-10-CM

## 2018-04-19 DIAGNOSIS — Z95.0 PACEMAKER: ICD-10-CM

## 2018-04-19 NOTE — PROGRESS NOTES
Subjective:      Chacho Shepherd is a 81 y.o. male is here for annual device follow up. He has shortness of breath with  exertion. The patient denies chest pain, orthopnea, PND, LE edema, palpitations, syncope, presyncope or fatigue. Patient Active Problem List    Diagnosis Date Noted    Irregular heartbeat 05/03/2016    S/P AVR (aortic valve replacement) 03/16/2016    ASHD (arteriosclerotic heart disease) 01/19/2016    CAD (coronary artery disease) 06/05/2015    Pacemaker 05/19/2015    MARYLOU (obstructive sleep apnea) 12/23/2014    SSS (sick sinus syndrome) ([address] 12/11/2014    Aortic stenosis 11/04/2014    SOB (shortness of breath) 11/04/2014    Atrial flutter ([address] 09/12/2014    S/P ablation of atrial flutter 09/12/2014    H/O TIA (transient ischemic attack) and stroke 09/11/2014    Chest pain 09/11/2014    Sinus tachycardia 09/11/2014    Gout 01/24/2013    Chronic bronchitis (HCC) 10/03/2012    Allergic rhinitis 10/03/2012    Prostate cancer ([address] 06/04/2012    Hypercholesterolemia     Stroke ([address]     Asthma     Calculus of kidney     Erectile dysfunction     GERD (gastroesophageal reflux disease)       Adilene Salazar MD  Past Medical History:   Diagnosis Date    Adverse effect of anesthesia     difficult with waking up     Allergic rhinitis     Arthritis     Asthma     Atrial flutter ([address] 9/12/2014    CAD (coronary artery disease)     Dr. Nazia Leigh Calculus of kidney     Chest pain 2006    Normal stress echo    Chronic bronchitis     Chronic bronchitis ([address] 10/3/2012    Chronic pain     knees    Erectile dysfunction     GERD (gastroesophageal reflux disease)     hiatal hernia    Gout 1/24/2013    Hemoptysis 2008    Work up by Dr. Luiz Larson;  Negative    Hiatal hernia     Hypercholesterolemia     Hypertension     Ill-defined condition     bronchitis    Melanoma (HCC)     back    Nausea & vomiting     Pacemaker     Dr. Mika Estrella [facility]) 6/4/2012    S/P ablation of atrial flutter 9/12/2014    Sleep apnea     states never had test for apnea    Stroke [facility]) 2009     tia no residual problems      Past Surgical History:   Procedure Laterality Date    CARDIAC SURG PROCEDURE UNLIST      ablation    HX AFIB ABLATION  2014    Dr. Jacqueline Candelaria    ruptured appendix    HX HEART CATHETERIZATION      [address]    HX HERNIA REPAIR  12/17/14    Recurrent left inguinal hernia; Dr. Deion Ann      5 hernia repairs total    HX PACEMAKER  12/2014    HX PACEMAKER PLACEMENT  2014    Dr. Carlos Fox    Kidney stone extraction    [place] COLSC FLX W/RMVL OF TUMOR POLYP LESION SNARE TQ  4/21/2011          Allergies   Allergen Reactions    Azithromycin Anaphylaxis    Gadolinium-Containing Contrast Media Other (comments)     1) Moderate to Severe. 2) Post Gadolinium issues as noted:   - Diaphoretic, Near Syncope, Nausea and vomiting.     Acetaminophen Other (comments)     Indigestion,\"burning sensation\"    Contrast Dye [Iodine] Other (comments)     Decrease in BP    Levaquin [Levofloxacin] Other (comments)     Joint pain    Medrol [Methylprednisolone] Vertigo    Norvasc [Amlodipine] Other (comments)     Numbness and tingling    Nsaids (Non-Steroidal Anti-Inflammatory Drug) Other (comments)     Cough up blood      Percocet [Oxycodone-Acetaminophen] Itching    Ranexa [Ranolazine] Other (comments)     Cannot remember    Simvastatin Other (comments)     Joint pain    Voltaren [Diclofenac Sodium] Other (comments)     unknown    Zetia [Ezetimibe] Myalgia      Family History   Problem Relation Age of Onset    Stroke Father     Heart Disease Mother     negative for cardiac disease  Social History     Social History    Marital status:      Spouse name: N/A    Number of children: N/A    Years of education: N/A     Social History Main Topics    Smoking status: Never Smoker    Smokeless tobacco: Never Used    Alcohol use 1.8 oz/week     1 Shots of liquor, 2 Standard drinks or equivalent per week      Comment: rare    Drug use: No    Sexual activity: Not Currently     Partners: Female     Other Topics Concern    None     Social History Narrative     Current Outpatient Prescriptions   Medication Sig    fjhblsdohspt-RB-mbevlidxgac-GG (MUCINEX SINUS-MAX D-N, DIPHEN,) 25 mg-10 mg- 650 mg/20mL(nt) lisq Take  by mouth.  rosuvastatin (CRESTOR) 10 mg tablet TAKE 1 TABLET BY MOUTH EVERY MONDAY AND THURSDAY    omeprazole (PRILOSEC) 20 mg capsule TAKE 1 CAPSULE EVERY DAY    albuterol (VENTOLIN HFA) 90 mcg/actuation inhaler Take 2 Puffs by inhalation every four (4) hours as needed for Wheezing.  potassium chloride (K-DUR, KLOR-CON) 20 mEq tablet Take 1 Tab by mouth two (2) times a day.  furosemide (LASIX) 40 mg tablet Take 1 Tab by mouth two (2) times a day.  metoprolol tartrate (LOPRESSOR) 25 mg tablet TAKE ONE-HALF TABLET BY MOUTH TWICE DAILY    isosorbide mononitrate ER (IMDUR) 60 mg CR tablet TAKE ONE TABLET BY MOUTH ONCE DAILY    traMADol (ULTRAM) 50 mg tablet     menthol 6 % gel by Apply Externally route as needed. \"Abdirashid\"--cool therapy pain gel    coenzyme q10 10 mg cap Take 100 mg by mouth every evening.  Aspirin, Buffered 81 mg tab Take 81 mg by mouth daily. No current facility-administered medications for this visit. Vitals:    04/19/18 0904   BP: 128/70   Pulse: 69   Resp: 16   SpO2: 95%   Weight: 241 lb 3.2 oz (109.4 kg)   Height: 6' (1.829 m)       I have reviewed the nurses notes, vitals, problem list, allergy list, medical history, family, social history and medications. Review of Symptoms:    General: Pt denies excessive weight gain or loss. Pt is able to conduct ADL's  HEENT: Denies blurred vision, headaches, epistaxis and difficulty swallowing. Respiratory: +sob w exertion. Denies wheezing or stridor.   Cardiovascular: Denies precordial pain, palpitations, edema or PND  Gastrointestinal: Denies poor appetite, indigestion, abdominal pain or blood in stool  Urinary: Denies dysuria, pyuria  Musculoskeletal: Denies pain or swelling from muscles or joints  Neurologic: Denies tremor, paresthesias, or sensory motor disturbance  Skin: Denies rash, itching or texture change. Psych: Denies depression      Physical Exam:      General: Well developed, in no acute distress. HEENT: Eyes - PERRL, no jvd  Heart:  Normal S1/S2 negative S3 or S4. Regular, no murmur, gallop or rub.   Respiratory: Clear bilaterally x 4, no wheezing or rales  Abdomen:   Soft, non-tender, bowel sounds are active.   Extremities:  No edema, normal cap refill, no cyanosis. Musculoskeletal: No clubbing  Neuro: A&Ox3, speech clear, gait stable. Skin: Skin color is normal. No rashes or lesions.  Non diaphoretic  Vascular: 2+ pulses symmetric in all extremities    Cardiographics    Ekg: atrial pacing  BSC PM: 93% AP, 14% RVP    Results for orders placed or performed during the hospital encounter of 03/16/16   EKG, 12 LEAD, INITIAL   Result Value Ref Range    Ventricular Rate 60 BPM    Atrial Rate 60 BPM    P-R Interval 208 ms    QRS Duration 172 ms    Q-T Interval 528 ms    QTC Calculation (Bezet) 528 ms    Calculated P Axis 71 degrees    Calculated R Axis -72 degrees    Calculated T Axis 37 degrees    Diagnosis       Normal sinus rhythm  Right bundle branch block  Left anterior fascicular block  ** Bifascicular block **  Left ventricular hypertrophy with QRS widening  When compared with ECG of 10-MAR-2016 14:56,  Sinus rhythm has replaced Electronic atrial pacemaker  Right bundle branch block is now present  Minimal criteria for Septal infarct are no longer present  Confirmed by [facility] ([id number]) on 3/16/2016 1:57:21 PM     Results for orders placed or performed in visit on 12/04/14   CARDIAC HOLTER MONITOR, 24 HOURS    Narrative    ECG Monitor/24 hours, Complete    Reason for Holter Monitor   A-FLUTTER    Heartbeat    Slowest 48  Average 63  Fastest  97      Results:   Underlying Rhythm: Normal sinus rhythm      Atrial Arrhythmias: premature atrial contractions; frequent             AV Conduction: normal    Ventricular Arrhythmias: premature ventricular contractions; rare    ST Segment Analysis:normal     Symptom Correlation:  None reported    Comment:   Sinus rhythm with occasional atrial ectopy - morning bradycardia  of 48 bpm and highest heart rate of 97 bpm. Clinical correlation  advised. Anne Marie Vega MD, [facility]                    Lab Results   Component Value Date/Time    WBC 6.8 07/17/2017 08:15 AM    Hemoglobin (POC) 13.9 12/17/2014 12:06 PM    HGB 14.7 07/17/2017 08:15 AM    Hematocrit (POC) 41 12/17/2014 12:06 PM    HCT 43.9 07/17/2017 08:15 AM    PLATELET 313 87/26/7814 08:15 AM    MCV 94 07/17/2017 08:15 AM      Lab Results   Component Value Date/Time    Sodium 141 08/14/2017 08:35 AM    Potassium 4.2 08/14/2017 08:35 AM    Chloride 97 08/14/2017 08:35 AM    CO2 25 08/14/2017 08:35 AM    Anion gap 8 03/14/2017 11:02 AM    Glucose 120 (H) 08/14/2017 08:35 AM    BUN 19 08/14/2017 08:35 AM    Creatinine 1.15 08/14/2017 08:35 AM    BUN/Creatinine ratio 17 08/14/2017 08:35 AM    GFR est AA 69 08/14/2017 08:35 AM    GFR est non-AA 59 (L) 08/14/2017 08:35 AM    Calcium 9.6 08/14/2017 08:35 AM    Bilirubin, total 0.6 07/17/2017 08:15 AM    AST (SGOT) 17 07/17/2017 08:15 AM    Alk. phosphatase 72 07/17/2017 08:15 AM    Protein, total 6.6 07/17/2017 08:15 AM    Albumin 4.2 07/17/2017 08:15 AM    Globulin 3.7 03/14/2017 11:02 AM    A-G Ratio 1.8 07/17/2017 08:15 AM    ALT (SGPT) 18 07/17/2017 08:15 AM         Assessment:     Assessment:        ICD-10-CM ICD-9-CM    1. Atrial flutter, unspecified type ([facility] I48.92 427.32 AMB POC EKG ROUTINE W/ 12 LEADS, INTER & REP   2. SSS (sick sinus syndrome) (HCC) I49.5 427.81    3. Pacemaker Z95.0 V45.01    4.  MAYRLOU (obstructive sleep apnea) G47.33 327.23    5. Coronary artery disease involving native coronary artery of native heart without angina pectoris I25.10 414.01    6. Essential hypertension I10 401.9      Orders Placed This Encounter    AMB POC EKG ROUTINE W/ 12 LEADS, INTER & REP     Order Specific Question:   Reason for Exam:     Answer:   routine        Plan:   Mr. Jimbo Garcia is here for annual device follow up. He has some shortness of breath with exertion, denies other cardiac complaints. EKG shows atrial pacing and device interrogation demonstrates normal functioning ( 93% AP for sick sinus, 14% RVP for transient heart block). Continue current medical therapy for htn and cad and per device clinic. Follow up with Dr. Caroline Garvey in one year. Continue medical management for sss, htn, CAD, MARYLOU. Thank you for allowing me to participate in [address] [place] [address].     MD Hussain Nina MD, Alanna Bloch

## 2018-04-19 NOTE — MR AVS SNAPSHOT
Tab Breen Barry [address] [place] [address] 
[phone] Patient: Pauly Vale 
MRN:  [id number] Visit Information Date & Time Provider Department Dept. Phone Encounter #  
 4/19/2018  9:00 AM Marty Sam, [address] [facility] [phone] [id number] Your Appointments 7/18/2018  8:00 AM  
PROCEDURE with [facility] Appt Note: NOT AN OFFICE VISIT - REMOTE BSC PM  
 [id number] [facility]  
[phone] [address] [facility] [address] [address]  
  
    
 8/7/2018  9:00 AM  
ESTABLISHED PATIENT with hCi March MD  
[facility]) Appt Note: 6mo f/up  $0cp ekr [id number] [facility]  
[phone] [address] [facility]  
  
    
 10/25/2018  8:15 AM  
PROCEDURE with PACEMAKER, [facility]) Appt Note: 6mo bsc dcpm  
 [id number] [facility]  
[phone] [address] [facility]  
  
    
 4/25/2019  9:15 AM  
PROCEDURE with PACEMAKER, [facility]) Appt Note: annual f/u with device check [id number] [facility]  
[phone] 4/25/2019  9:15 AM  
ESTABLISHED PATIENT with Daily Conway MD  
[facility] Appt Note: annual f/u with device check [id number] [facility]  
[phone] [id number] [facility] Upcoming Health Maintenance Date Due  
 MEDICARE YEARLY EXAM 7/7/2018 GLAUCOMA SCREENING Q2Y 12/1/2018 DTaP/Tdap/Td series (2 - Td) 7/19/2026 Allergies as of 4/19/2018  Review Complete On: 4/19/2018 By: Key Carter Hermilo Cisneros, AUGUST Severity Noted Reaction Type Reactions Azithromycin High 03/13/2018    Anaphylaxis Gadolinium-containing Contrast Media Medium 06/11/2012   Intolerance Other (comments) 1) Moderate to Severe. 2) Post Gadolinium issues as noted: - Diaphoretic, Near Syncope, Nausea and vomiting. Acetaminophen  03/14/2017    Other (comments) Indigestion,\"burning sensation\" Contrast Dye [Iodine]  11/20/2009    Other (comments) Decrease in BP Levaquin [Levofloxacin]  11/20/2009    Other (comments) Joint pain Medrol [Methylprednisolone]  06/18/2015    Vertigo Norvasc [Amlodipine]  03/04/2015    Other (comments) Numbness and tingling Nsaids (Non-steroidal Anti-inflammatory Drug)  03/10/2016    Other (comments) Cough up blood Percocet [Oxycodone-acetaminophen]  12/02/2014    Itching Ranexa [Ranolazine]  04/02/2015    Other (comments) Cannot remember Simvastatin  11/20/2009    Other (comments) Joint pain  
 Voltaren [Diclofenac Sodium]  03/14/2017    Other (comments)  
 unknown Zetia [Ezetimibe]  12/11/2009    Myalgia Current Immunizations  Reviewed on 4/14/2016 Name Date Influenza High Dose Vaccine PF 9/29/2016 Influenza Vaccine 10/12/2017, 10/1/2015, 10/17/2014, 10/21/2013 Influenza Vaccine Split 10/3/2012 Pneumococcal Conjugate (PCV-13) 9/29/2016 Pneumococcal Vaccine (Unspecified Type) 4/14/2015 Td 1/1/2008 Tdap 7/19/2016 Zoster Vaccine, Live 1/1/2012 Not reviewed this visit You Were Diagnosed With   
  
 Codes Comments Atrial flutter, unspecified type ([facility]    -  Primary ICD-10-CM: I48.92 
ICD-9-CM: 427.32   
 SSS (sick sinus syndrome) ([facility]     ICD-10-CM: I49.5 ICD-9-CM: 427.81 Pacemaker     ICD-10-CM: Z95.0 ICD-9-CM: V45.01   
 MARYLOU (obstructive sleep apnea)     ICD-10-CM: G47.33 
ICD-9-CM: 327.23  Coronary artery disease involving native coronary artery of native heart without angina pectoris     ICD-10-CM: I25.10 ICD-9-CM: 414.01 Vitals BP Pulse Resp Height(growth percentile) Weight(growth percentile) SpO2  
 128/70 (BP 1 Location: Right arm, BP Patient Position: Sitting) 69 16 6' (1.829 m) 241 lb 3.2 oz (109.4 kg) 95% BMI Smoking Status 32.71 kg/m2 Never Smoker Vitals History BMI and BSA Data Body Mass Index Body Surface Area 32.71 kg/m 2 2.36 m 2 Preferred Pharmacy Pharmacy Name Phone [facility] [place] [address], [address] [phone] Your Updated Medication List  
  
   
This list is accurate as of 4/19/18  9:46 AM.  Always use your most recent med list.  
  
  
  
  
 albuterol 90 mcg/actuation inhaler Commonly known as:  VENTOLIN HFA Take 2 Puffs by inhalation every four (4) hours as needed for Wheezing. aspirin, buffered 81 mg Tab Take 81 mg by mouth daily. coenzyme q10 10 mg Cap Take 100 mg by mouth every evening. furosemide 40 mg tablet Commonly known as:  LASIX Take 1 Tab by mouth two (2) times a day. isosorbide mononitrate ER 60 mg CR tablet Commonly known as:  IMDUR  
TAKE ONE TABLET BY MOUTH ONCE DAILY  
  
 menthol 6 % Gel  
by Apply Externally route as needed. \"Abdirashid\"--cool therapy pain gel  
  
 metoprolol tartrate 25 mg tablet Commonly known as:  LOPRESSOR  
TAKE ONE-HALF TABLET BY MOUTH TWICE DAILY MUCINEX SINUS-MAX D-N (DIPHEN) 25 mg-10 mg- 650 mg/20mL(nt) Lisq Generic drug:  hjmskjeqsrgb-XA-hetnbjvafdf-GG Take  by mouth. omeprazole 20 mg capsule Commonly known as:  PRILOSEC  
TAKE 1 CAPSULE EVERY DAY  
  
 potassium chloride 20 mEq tablet Commonly known as:  K-DUR, KLOR-CON Take 1 Tab by mouth two (2) times a day. rosuvastatin 10 mg tablet Commonly known as:  CRESTOR  
TAKE 1 TABLET BY MOUTH EVERY MONDAY AND THURSDAY  
  
 traMADol 50 mg tablet Commonly known as:  Terrence Barba  
  
  
  
  
 We Performed the Following AMB POC EKG ROUTINE W/ 12 LEADS, INTER & REP [39659 CPT(R)] Introducing [facility]! [employer] introduces [employer] patient portal. Now you can access parts of your medical record, email your doctor's office, and request medication refills online. 1. In your internet browser, go to [email or web address] 2. Click on the First Time User? Click Here link in the Sign In box. You will see the New Member Sign Up page. 3. Enter your [employer] Access Code exactly as it appears below. You will not need to use this code after youve completed the sign-up process. If you do not sign up before the expiration date, you must request a new code. · [employer] Access Code: [email or web address] Expires: 6/24/2018  2:51 PM 
 
4. Enter the last four digits of your Social Security Number (xxxx) and Date of Birth (mm/dd/yyyy) as indicated and click Submit. You will be taken to the next sign-up page. 5. Create a [employer] ID. This will be your [employer] login ID and cannot be changed, so think of one that is secure and easy to remember. 6. Create a [employer] password. You can change your password at any time. 7. Enter your Password Reset Question and Answer. This can be used at a later time if you forget your password. 8. Enter your e-mail address. You will receive e-mail notification when new information is available in [address] 9. Click Sign Up. You can now view and download portions of your medical record. 10. Click the Download Summary menu link to download a portable copy of your medical information. If you have questions, please visit the Frequently Asked Questions section of the [employer] website. Remember, [employer] is NOT to be used for urgent needs. For medical emergencies, dial 911. Now available from your iPhone and Android! Please provide this summary of care documentation to your next provider. Your primary care clinician is listed as [place]. If you have any questions after today's visit, please call [phone].

## 2018-04-19 NOTE — PROGRESS NOTES
Chief Complaint   Patient presents with    Irregular Heart Beat     annual follow up, c/o SOB with activity     1. Have you been to the ER, urgent care clinic since your last visit? Hospitalized since your last visit? Yes When: 3/9/2018 urgent care due to bronchitis    2. Have you seen or consulted any other health care providers outside of the [address] [place] [address] since your last visit? Include any pap smears or colon screening.  No

## 2018-05-05 ENCOUNTER — OFFICE VISIT (OUTPATIENT)
Dept: URGENT CARE | Age: 83
End: 2018-05-05

## 2018-05-05 VITALS
DIASTOLIC BLOOD PRESSURE: 52 MMHG | HEART RATE: 92 BPM | TEMPERATURE: 99.3 F | SYSTOLIC BLOOD PRESSURE: 101 MMHG | RESPIRATION RATE: 18 BRPM | BODY MASS INDEX: 32.74 KG/M2 | WEIGHT: 247 LBS | HEIGHT: 73 IN | OXYGEN SATURATION: 94 %

## 2018-05-05 DIAGNOSIS — J20.9 ACUTE BRONCHITIS, UNSPECIFIED ORGANISM: Primary | ICD-10-CM

## 2018-05-05 RX ORDER — PREDNISONE 10 MG/1
TABLET ORAL
Qty: 21 TAB | Refills: 0 | Status: SHIPPED | OUTPATIENT
Start: 2018-05-05 | End: 2018-05-22

## 2018-05-05 RX ORDER — DOXYCYCLINE 100 MG/1
100 CAPSULE ORAL 2 TIMES DAILY
Qty: 20 CAP | Refills: 0 | Status: SHIPPED | OUTPATIENT
Start: 2018-05-05 | End: 2018-05-22

## 2018-05-05 RX ORDER — CODEINE PHOSPHATE AND GUAIFENESIN 10; 100 MG/5ML; MG/5ML
5 SOLUTION ORAL
Qty: 120 ML | Refills: 0 | Status: SHIPPED | OUTPATIENT
Start: 2018-05-05 | End: 2019-10-15

## 2018-05-05 RX ORDER — BENZONATATE 200 MG/1
200 CAPSULE ORAL
Qty: 21 CAP | Refills: 0 | Status: SHIPPED | OUTPATIENT
Start: 2018-05-05 | End: 2018-05-12

## 2018-05-05 RX ORDER — IPRATROPIUM BROMIDE AND ALBUTEROL SULFATE 2.5; .5 MG/3ML; MG/3ML
3 SOLUTION RESPIRATORY (INHALATION)
Status: COMPLETED | OUTPATIENT
Start: 2018-05-05 | End: 2018-05-05

## 2018-05-05 RX ADMIN — IPRATROPIUM BROMIDE AND ALBUTEROL SULFATE 3 ML: 2.5; .5 SOLUTION RESPIRATORY (INHALATION) at 08:55

## 2018-05-05 NOTE — PROGRESS NOTES
Patient is a 81 y.o. male presenting with cough. Cough   The history is provided by the patient. This is a new problem. The current episode started more than 2 days ago. The problem occurs every few minutes. The problem has been rapidly worsening. The cough is productive of brown sputum. There has been no fever. Associated symptoms include rhinorrhea, shortness of breath and wheezing. Pertinent negatives include no chest pain, no chills and no sore throat. He has tried nebulizers for the symptoms. He is a smoker (ex). His past medical history is significant for COPD and asthma. Past Medical History:   Diagnosis Date    Adverse effect of anesthesia     difficult with waking up     Allergic rhinitis     Arthritis     Asthma     Atrial flutter ([address] 9/12/2014    CAD (coronary artery disease)     Dr. Charla Florentino    Calculus of kidney     Chest pain 2006    Normal stress echo    Chronic bronchitis     Chronic bronchitis ([address] 10/3/2012    Chronic pain     knees    Erectile dysfunction     GERD (gastroesophageal reflux disease)     hiatal hernia    Gout 1/24/2013    Hemoptysis 2008    Work up by Dr. Tressa Stahl;  Negative    Hiatal hernia     Hypercholesterolemia     Hypertension     Ill-defined condition     bronchitis    Melanoma ([address]     back    Nausea & vomiting     Pacemaker     Dr. Dana Balbuena [facility]) 6/4/2012    S/P ablation of atrial flutter 9/12/2014    Sleep apnea     states never had test for apnea    Stroke [facility]) 2009     tia no residual problems        Past Surgical History:   Procedure Laterality Date    CARDIAC SURG PROCEDURE UNLIST      ablation    HX AFIB ABLATION  2014    Dr. Fartun Cleis    ruptured appendix    HX HEART CATHETERIZATION      [facility]    HX HERNIA REPAIR  12/17/14    Recurrent left inguinal hernia; Dr. Fatima Asa      5 hernia repairs total    HX PACEMAKER 12/2014    HX PACEMAKER PLACEMENT  2014    Dr. Oro American    Kidney stone extraction    [place] COLSC FLX W/RMVL OF TUMOR POLYP LESION SNARE TQ  4/21/2011              Family History   Problem Relation Age of Onset    Stroke Father     Heart Disease Mother         Social History     Social History    Marital status:      Spouse name: N/A    Number of children: N/A    Years of education: N/A     Occupational History    Not on file. Social History Main Topics    Smoking status: Never Smoker    Smokeless tobacco: Never Used    Alcohol use 1.8 oz/week     1 Shots of liquor, 2 Standard drinks or equivalent per week      Comment: rare    Drug use: No    Sexual activity: Not Currently     Partners: Female     Other Topics Concern    Not on file     Social History Narrative                ALLERGIES: Azithromycin; Gadolinium-containing contrast media; Acetaminophen; Contrast dye [iodine]; Levaquin [levofloxacin]; Medrol [methylprednisolone]; Norvasc [amlodipine]; Nsaids (non-steroidal anti-inflammatory drug); Percocet [oxycodone-acetaminophen]; Ranexa [ranolazine]; Simvastatin; Voltaren [diclofenac sodium]; and Zetia [ezetimibe]    Review of Systems   Constitutional: Negative for chills. HENT: Positive for rhinorrhea. Negative for sore throat. Respiratory: Positive for cough, shortness of breath and wheezing. Cardiovascular: Negative for chest pain. All other systems reviewed and are negative. Vitals:    05/05/18 0837   BP: 101/52   Pulse: 92   Resp: 18   Temp: 99.3 °F (37.4 °C)   SpO2: 94%   Weight: 247 lb (112 kg)   Height: 6' 1\" (1.854 m)       Physical Exam   Constitutional: No distress. HENT:   Right Ear: Tympanic membrane and ear canal normal.   Left Ear: Tympanic membrane and ear canal normal.   Nose: Nose normal.   Mouth/Throat: No oropharyngeal exudate, posterior oropharyngeal edema or posterior oropharyngeal erythema.    Eyes: Conjunctivae are normal. Right eye exhibits no discharge. Left eye exhibits no discharge. Neck: Neck supple. Pulmonary/Chest: Effort normal. No respiratory distress. He has decreased breath sounds. He has wheezes. He has rhonchi in the right lower field. He has no rales. Lymphadenopathy:     He has no cervical adenopathy. Skin: No rash noted. Nursing note and vitals reviewed. MDM    Procedures        ICD-10-CM ICD-9-CM    1. Acute bronchitis, unspecified organism J20.9 466.0 XR CHEST PA LAT      XR CHEST PA LAT      guaiFENesin-codeine (ROBITUSSIN AC) 100-10 mg/5 mL solution    CXR- hyperimflation and new left basilar atelectasis   Use Mucinex DM twice a day  Medications Ordered Today   Medications    albuterol-ipratropium (DUO-NEB) 2.5 MG-0.5 MG/3 ML     Order Specific Question:   MODE OF DELIVERY     Answer:   Nebulizer    benzonatate (TESSALON) 200 mg capsule     Sig: Take 1 Cap by mouth three (3) times daily as needed for Cough for up to 7 days. Dispense:  21 Cap     Refill:  0    doxycycline (MONODOX) 100 mg capsule     Sig: Take 1 Cap by mouth two (2) times a day. Dispense:  20 Cap     Refill:  0    predniSONE (STERAPRED DS) 10 mg dose pack     Sig: As directed     Dispense:  21 Tab     Refill:  0    guaiFENesin-codeine (ROBITUSSIN AC) 100-10 mg/5 mL solution     Sig: Take 5 mL by mouth three (3) times daily as needed for Cough. Max Daily Amount: 15 mL. Dispense:  120 mL     Refill:  0     Results for orders placed or performed in visit on 05/05/18   XR CHEST PA LAT    Narrative    EXAM:  XR CHEST PA LAT    INDICATION:   cough- wheezing    COMPARISON: 3/13/2018. FINDINGS: PA and lateral radiographs of the chest demonstrate hyperinflated  lungs. The cardiac and mediastinal contours and pulmonary vascularity are  stable. A dual-lead pacemaker is in place. There is a new linear opacity at the  left base. Impression    IMPRESSION: Hyperinflated lungs. New left basilar subsegmental atelectasis. The patients condition was discussed with the patient and they understand. The patient is to follow up with primary care doctor. If signs and symptoms become worse the pt is to go to the ER. The patient is to take medications as prescribed.

## 2018-05-05 NOTE — PATIENT INSTRUCTIONS
Bronchitis: Care Instructions  Your Care Instructions    Bronchitis is inflammation of the bronchial tubes, which carry air to the lungs. The tubes swell and produce mucus, or phlegm. The mucus and inflamed bronchial tubes make you cough. You may have trouble breathing. Most cases of bronchitis are caused by viruses like those that cause colds. Antibiotics usually do not help and they may be harmful. Bronchitis usually develops rapidly and lasts about 2 to 3 weeks in otherwise healthy people. Follow-up care is a key part of your treatment and safety. Be sure to make and go to all appointments, and call your doctor if you are having problems. It's also a good idea to know your test results and keep a list of the medicines you take. How can you care for yourself at home? · Take all medicines exactly as prescribed. Call your doctor if you think you are having a problem with your medicine. · Get some extra rest.  · Take an over-the-counter pain medicine, such as acetaminophen (Tylenol), ibuprofen (Advil, Motrin), or naproxen (Aleve) to reduce fever and relieve body aches. Read and follow all instructions on the label. · Do not take two or more pain medicines at the same time unless the doctor told you to. Many pain medicines have acetaminophen, which is Tylenol. Too much acetaminophen (Tylenol) can be harmful. · Take an over-the-counter cough medicine that contains dextromethorphan to help quiet a dry, hacking cough so that you can sleep. Avoid cough medicines that have more than one active ingredient. Read and follow all instructions on the label. · Breathe moist air from a humidifier, hot shower, or sink filled with hot water. The heat and moisture will thin mucus so you can cough it out. · Do not smoke. Smoking can make bronchitis worse. If you need help quitting, talk to your doctor about stop-smoking programs and medicines. These can increase your chances of quitting for good.   When should you call for help? Call 911 anytime you think you may need emergency care. For example, call if:  ? · You have severe trouble breathing. ?Call your doctor now or seek immediate medical care if:  ? · You have new or worse trouble breathing. ? · You cough up dark brown or bloody mucus (sputum). ? · You have a new or higher fever. ? · You have a new rash. ? Watch closely for changes in your health, and be sure to contact your doctor if:  ? · You cough more deeply or more often, especially if you notice more mucus or a change in the color of your mucus. ? · You are not getting better as expected. Where can you learn more? Go to http://francisco-nikki.info/. Enter H333 in the search box to learn more about \"Bronchitis: Care Instructions. \"  Current as of: May 12, 2017  Content Version: 11.4  © 2510-0004 [employer]. Care instructions adapted under license by [employer] (which disclaims liability or warranty for this information). If you have questions about a medical condition or this instruction, always ask your healthcare professional. [address] any warranty or liability for your use of this information.

## 2018-05-05 NOTE — MR AVS SNAPSHOT
[address] [place] [address] 
[phone] Patient: Jong Gabriel 
MRN: [id number] OYQ:[id number] Visit Information Date & Time Provider Department Dept. Phone Encounter #  
 5/5/2018  8:00 AM [address] [facility] [phone] [id number] Follow-up Instructions Return if symptoms worsen or fail to improve, for Follow up with PCP. Your Appointments 7/18/2018  8:00 AM  
PROCEDURE with [facility] [address] [place] [address]) Appt Note: NOT AN OFFICE VISIT - REMOTE BSC PM  
 [address]  
[phone] [address] [address]  
  
    
 8/7/2018  9:00 AM  
ESTABLISHED PATIENT with Pamela Miranda MD  
[facility] [address] [place] [address]) Appt Note: 6mo f/up  $0cp ekr [address]  
[phone] [address]  
  
    
 10/25/2018  8:15 AM  
PROCEDURE with PACEMAKER, [facility] [address]) Appt Note: 6mo bsc dcpm  
 [address]  
[phone] [address]  
  
    
 4/25/2019  9:15 AM  
PROCEDURE with PACEMAKER, [facility] [address]) Appt Note: annual f/u with device check [address]  
[phone] 4/25/2019  9:15 AM  
ESTABLISHED PATIENT with Kristin Rivera MD  
[facility] [address] [place] [address]) Appt Note: annual f/u with device check [address]  
[phone] [address] Upcoming Health Maintenance Date Due  
 MEDICARE YEARLY EXAM 7/7/2018 Influenza Age 5 to Adult 8/1/2018 GLAUCOMA SCREENING Q2Y 12/1/2018 DTaP/Tdap/Td series (2 - Td) 7/19/2026 Allergies as of 5/5/2018  Review Complete On: 5/5/2018 By: Ondina Julio RN Severity Noted Reaction Type Reactions Azithromycin High 03/13/2018    Anaphylaxis Gadolinium-containing Contrast Media Medium 06/11/2012   Intolerance Other (comments) 1) Moderate to Severe. 2) Post Gadolinium issues as noted: - Diaphoretic, Near Syncope, Nausea and vomiting. Acetaminophen  03/14/2017    Other (comments) Indigestion,\"burning sensation\" Contrast Dye [Iodine]  11/20/2009    Other (comments) Decrease in BP Levaquin [Levofloxacin]  11/20/2009    Other (comments) Joint pain Medrol [Methylprednisolone]  06/18/2015    Vertigo Norvasc [Amlodipine]  03/04/2015    Other (comments) Numbness and tingling Nsaids (Non-steroidal Anti-inflammatory Drug)  03/10/2016    Other (comments) Cough up blood Percocet [Oxycodone-acetaminophen]  12/02/2014    Itching Ranexa [Ranolazine]  04/02/2015    Other (comments) Cannot remember Simvastatin  11/20/2009    Other (comments) Joint pain  
 Voltaren [Diclofenac Sodium]  03/14/2017    Other (comments)  
 unknown Zetia [Ezetimibe]  12/11/2009    Myalgia Current Immunizations  Reviewed on 4/14/2016 Name Date Influenza High Dose Vaccine PF 9/29/2016 Influenza Vaccine 10/12/2017, 10/1/2015, 10/17/2014, 10/21/2013 Influenza Vaccine Split 10/3/2012 Pneumococcal Conjugate (PCV-13) 9/29/2016 Pneumococcal Vaccine (Unspecified Type) 4/14/2015 Td 1/1/2008 Tdap 7/19/2016 Zoster Vaccine, Live 1/1/2012 Not reviewed this visit You Were Diagnosed With   
  
 Codes Comments Acute bronchitis, unspecified organism    -  Primary ICD-10-CM: J20.9 ICD-9-CM: 466.0 CXR- hyperimflation and new left basilar atelectasis Vitals BP Pulse Temp Resp Height(growth percentile) Weight(growth percentile) 101/52 92 99.3 °F (37.4 °C) 18 6' 1\" (1.854 m) 247 lb (112 kg) SpO2 BMI Smoking Status 94% 32.59 kg/m2 Never Smoker BMI and BSA Data Body Mass Index Body Surface Area 32.59 kg/m 2 2.4 m 2 Preferred Pharmacy Pharmacy Name Phone [facility] [address], [address] [place] [address] Avenue [phone] Your Updated Medication List  
  
   
This list is accurate as of 5/5/18  9:14 AM.  Always use your most recent med list.  
  
  
  
  
 albuterol 90 mcg/actuation inhaler Commonly known as:  VENTOLIN HFA Take 2 Puffs by inhalation every four (4) hours as needed for Wheezing. aspirin, buffered 81 mg Tab Take 81 mg by mouth daily. benzonatate 200 mg capsule Commonly known as:  TESSALON Take 1 Cap by mouth three (3) times daily as needed for Cough for up to 7 days. coenzyme q10 10 mg Cap Take 100 mg by mouth every evening. doxycycline 100 mg capsule Commonly known as:  Lynda Rivera Take 1 Cap by mouth two (2) times a day. furosemide 40 mg tablet Commonly known as:  LASIX Take 1 Tab by mouth two (2) times a day. guaiFENesin-codeine 100-10 mg/5 mL solution Commonly known as:  ROBITUSSIN AC Take 5 mL by mouth three (3) times daily as needed for Cough. Max Daily Amount: 15 mL. isosorbide mononitrate ER 60 mg CR tablet Commonly known as:  IMDUR  
TAKE ONE TABLET BY MOUTH ONCE DAILY  
  
 menthol 6 % Gel  
by Apply Externally route as needed. \"Abdirashid\"--cool therapy pain gel  
  
 metoprolol tartrate 25 mg tablet Commonly known as:  LOPRESSOR  
TAKE ONE-HALF TABLET BY MOUTH TWICE DAILY MUCINEX SINUS-MAX D-N (DIPHEN) 25 mg-10 mg- 650 mg/20mL(nt) Lisq Generic drug:  [id number]-GG Take  by mouth. omeprazole 20 mg capsule Commonly known as:  PRILOSEC  
TAKE 1 CAPSULE EVERY DAY  
  
 potassium chloride 20 mEq tablet Commonly known as:  K-DUR, KLOR-CON  
 Take 1 Tab by mouth two (2) times a day. predniSONE 10 mg dose pack Commonly known as:  STERAPRED DS As directed  
  
 rosuvastatin 10 mg tablet Commonly known as:  CRESTOR  
TAKE 1 TABLET BY MOUTH EVERY MONDAY AND THURSDAY  
  
 traMADol 50 mg tablet Commonly known as:  ULTRAM  
  
  
  
  
Prescriptions Printed Refills  
 guaiFENesin-codeine (ROBITUSSIN AC) 100-10 mg/5 mL solution 0 Sig: Take 5 mL by mouth three (3) times daily as needed for Cough. Max Daily Amount: 15 mL. Class: Print Route: Oral  
  
Prescriptions Sent to Pharmacy Refills  
 benzonatate (TESSALON) 200 mg capsule 0 Sig: Take 1 Cap by mouth three (3) times daily as needed for Cough for up to 7 days. Class: Normal  
 Pharmacy: [facility] DR HANNAH CHERY [address] [place] [address] Ph #: [phone] Route: Oral  
 doxycycline (MONODOX) 100 mg capsule 0 Sig: Take 1 Cap by mouth two (2) times a day. Class: Normal  
 Pharmacy: [facility] DR HANNAH CHERY [address] [place] [address] Ph #: [phone] Route: Oral  
 predniSONE (STERAPRED DS) 10 mg dose pack 0 Sig: As directed Class: Normal  
 Pharmacy: [facility] DR HANNAH CHERY [address] [place] [address] Ph #: [phone] Follow-up Instructions Return if symptoms worsen or fail to improve, for Follow up with PCP. To-Do List   
 05/05/2018 Imaging:  XR CHEST PA LAT   
  
 05/05/2018 Imaging:  XR CHEST PA LAT Patient Instructions Bronchitis: Care Instructions Your Care Instructions Bronchitis is inflammation of the bronchial tubes, which carry air to the lungs. The tubes swell and produce mucus, or phlegm. The mucus and inflamed bronchial tubes make you cough. You may have trouble breathing. Most cases of bronchitis are caused by viruses like those that cause colds. Antibiotics usually do not help and they may be harmful. Bronchitis usually develops rapidly and lasts about 2 to 3 weeks in otherwise healthy people. Follow-up care is a key part of your treatment and safety. Be sure to make and go to all appointments, and call your doctor if you are having problems. It's also a good idea to know your test results and keep a list of the medicines you take. How can you care for yourself at home? · Take all medicines exactly as prescribed. Call your doctor if you think you are having a problem with your medicine. · Get some extra rest. 
· Take an over-the-counter pain medicine, such as acetaminophen (Tylenol), ibuprofen (Advil, Motrin), or naproxen (Aleve) to reduce fever and relieve body aches. Read and follow all instructions on the label. · Do not take two or more pain medicines at the same time unless the doctor told you to. Many pain medicines have acetaminophen, which is Tylenol. Too much acetaminophen (Tylenol) can be harmful. · Take an over-the-counter cough medicine that contains dextromethorphan to help quiet a dry, hacking cough so that you can sleep. Avoid cough medicines that have more than one active ingredient. Read and follow all instructions on the label. · Breathe moist air from a humidifier, hot shower, or sink filled with hot water. The heat and moisture will thin mucus so you can cough it out. · Do not smoke. Smoking can make bronchitis worse. If you need help quitting, talk to your doctor about stop-smoking programs and medicines. These can increase your chances of quitting for good. When should you call for help? Call 911 anytime you think you may need emergency care. For example, call if: 
? · You have severe trouble breathing. ?Call your doctor now or seek immediate medical care if: 
? · You have new or worse trouble breathing. ? · You cough up dark brown or bloody mucus (sputum). ? · You have a new or higher fever. ? · You have a new rash. ?Watch closely for changes in your health, and be sure to contact your doctor if: 
? · You cough more deeply or more often, especially if you notice more mucus or a change in the color of your mucus. ? · You are not getting better as expected. Where can you learn more? Go to http://francisco-nikki.info/. Enter H333 in the search box to learn more about \"Bronchitis: Care Instructions. \" Current as of: May 12, 2017 Content Version: 11.4 © 2291-2584 Harry [employer] David. Care instructions adapted under license by [employer] (which disclaims liability or warranty for this information). If you have questions about a medical condition or this instruction, always ask your healthcare professional. [address] any warranty or liability for your use of this information. Introducing [facility]! Caitlin Silva introduces [employer] patient portal. Now you can access parts of your medical record, email your doctor's office, and request medication refills online. 1. In your internet browser, go to [email or web address] 2. Click on the First Time User? Click Here link in the Sign In box. You will see the New Member Sign Up page. 3. Enter your [employer] Access Code exactly as it appears below. You will not need to use this code after youve completed the sign-up process. If you do not sign up before the expiration date, you must request a new code. · [employer] Access Code: [email or web address] Expires: 6/24/2018  2:51 PM 
 
4. Enter the last four digits of your Social Security Number (xxxx) and Date of Birth (mm/dd/yyyy) as indicated and click Submit. You will be taken to the next sign-up page. 5. Create a [employer] ID. This will be your [employer] login ID and cannot be changed, so think of one that is secure and easy to remember. 6. Create a Kee Squaret password. You can change your password at any time. 7. Enter your Password Reset Question and Answer. This can be used at a later time if you forget your password. 8. Enter your e-mail address. You will receive e-mail notification when new information is available in [address] 9. Click Sign Up. You can now view and download portions of your medical record. 10. Click the Download Summary menu link to download a portable copy of your medical information. If you have questions, please visit the Frequently Asked Questions section of the [employer] website. Remember, [employer] is NOT to be used for urgent needs. For medical emergencies, dial 911. Now available from your iPhone and Android! Please provide this summary of care documentation to your next provider. Your primary care clinician is listed as [place]. If you have any questions after today's visit, please call [phone].

## 2018-05-22 ENCOUNTER — OFFICE VISIT (OUTPATIENT)
Dept: INTERNAL MEDICINE CLINIC | Age: 83
End: 2018-05-22

## 2018-05-22 ENCOUNTER — HOSPITAL ENCOUNTER (OUTPATIENT)
Dept: LAB | Age: 83
Discharge: HOME | End: 2018-05-22
Payer: MEDICARE

## 2018-05-22 VITALS
OXYGEN SATURATION: 95 % | RESPIRATION RATE: 16 BRPM | HEART RATE: 74 BPM | BODY MASS INDEX: 32.78 KG/M2 | TEMPERATURE: 97.7 F | WEIGHT: 242 LBS | DIASTOLIC BLOOD PRESSURE: 59 MMHG | SYSTOLIC BLOOD PRESSURE: 95 MMHG | HEIGHT: 72 IN

## 2018-05-22 DIAGNOSIS — R73.9 BORDERLINE HYPERGLYCEMIA: ICD-10-CM

## 2018-05-22 DIAGNOSIS — E78.00 HYPERCHOLESTEROLEMIA: ICD-10-CM

## 2018-05-22 DIAGNOSIS — C61 PROSTATE CANCER (HCC): ICD-10-CM

## 2018-05-22 DIAGNOSIS — M10.9 GOUT, UNSPECIFIED CAUSE, UNSPECIFIED CHRONICITY, UNSPECIFIED SITE: ICD-10-CM

## 2018-05-22 DIAGNOSIS — I63.9 CEREBROVASCULAR ACCIDENT (CVA), UNSPECIFIED MECHANISM (HCC): ICD-10-CM

## 2018-05-22 DIAGNOSIS — I25.10 CORONARY ARTERY DISEASE INVOLVING NATIVE CORONARY ARTERY OF NATIVE HEART WITHOUT ANGINA PECTORIS: Primary | ICD-10-CM

## 2018-05-22 DIAGNOSIS — K21.9 GASTROESOPHAGEAL REFLUX DISEASE WITHOUT ESOPHAGITIS: ICD-10-CM

## 2018-05-22 PROCEDURE — 80061 LIPID PANEL: CPT

## 2018-05-22 PROCEDURE — 82043 UR ALBUMIN QUANTITATIVE: CPT

## 2018-05-22 PROCEDURE — 83036 HEMOGLOBIN GLYCOSYLATED A1C: CPT

## 2018-05-22 PROCEDURE — 84154 ASSAY OF PSA FREE: CPT

## 2018-05-22 PROCEDURE — 84153 ASSAY OF PSA TOTAL: CPT

## 2018-05-22 PROCEDURE — 80053 COMPREHEN METABOLIC PANEL: CPT

## 2018-05-22 PROCEDURE — 85025 COMPLETE CBC W/AUTO DIFF WBC: CPT

## 2018-05-22 PROCEDURE — 36415 COLL VENOUS BLD VENIPUNCTURE: CPT

## 2018-05-22 PROCEDURE — 84550 ASSAY OF BLOOD/URIC ACID: CPT

## 2018-05-22 NOTE — MR AVS SNAPSHOT
[address] [facility] 
[phone] Patient: Janelle Perez 
MRN:  [id number] Visit Information Date & Time Provider Department Dept. Phone Encounter #  
 5/22/2018  9:30 AM Jacquichema BrandonIndra [address] [place] [address],[address] [phone] [id number] Follow-up Instructions Return in about 6 months (around 11/22/2018) for lipids, etc.  
  
Your Appointments 7/18/2018  8:00 AM  
[facility] Appt Note: NOT AN OFFICE VISIT - REMOTE BSC PM  
 [address] [facility]  
[phone] [address] [facility] [address] [address]  
  
    
 8/7/2018  9:00 AM  
ESTABLISHED PATIENT with Nathaly Stewart MD  
[facility]) Appt Note: 6mo f/up  $0cp ekr [address] [facility]  
[phone] [address] [facility]  
  
    
 10/25/2018  8:15 AM  
PROCEDURE with PACEMAKER, [facility] Appt Note: 6mo bsc dcpm  
 [address] [facility]  
[phone] [address] [facility]  
  
    
 4/25/2019  9:15 AM  
PROCEDURE with PACEMAKER, [facility] Appt Note: annual f/u with device check [address] [facility]  
[phone] 4/25/2019  9:15 AM  
ESTABLISHED PATIENT with Tod Vasquez MD  
[facility] Appt Note: annual f/u with device check [address] [facility]  
[phone] [address] [facility] Upcoming Health Maintenance Date Due  
 MEDICARE YEARLY EXAM 7/7/2018 Influenza Age 5 to Adult 8/1/2018 GLAUCOMA SCREENING Q2Y 12/1/2018 DTaP/Tdap/Td series (2 - Td) 7/19/2026 Allergies as of 5/22/2018  Review Complete On: 5/22/2018 By: Silverio Hernandez MD  
  
 Severity Noted Reaction Type Reactions Azithromycin High 03/13/2018    Anaphylaxis Gadolinium-containing Contrast Media Medium 06/11/2012   Intolerance Other (comments) 1) Moderate to Severe. 2) Post Gadolinium issues as noted: - Diaphoretic, Near Syncope, Nausea and vomiting. Acetaminophen  03/14/2017    Other (comments) Indigestion,\"burning sensation\" Contrast Dye [Iodine]  11/20/2009    Other (comments) Decrease in BP Levaquin [Levofloxacin]  11/20/2009    Other (comments) Joint pain Medrol [Methylprednisolone]  06/18/2015    Vertigo Norvasc [Amlodipine]  03/04/2015    Other (comments) Numbness and tingling Nsaids (Non-steroidal Anti-inflammatory Drug)  03/10/2016    Other (comments) Cough up blood Percocet [Oxycodone-acetaminophen]  12/02/2014    Itching Ranexa [Ranolazine]  04/02/2015    Other (comments) Cannot remember Simvastatin  11/20/2009    Other (comments) Joint pain  
 Voltaren [Diclofenac Sodium]  03/14/2017    Other (comments)  
 unknown Zetia [Ezetimibe]  12/11/2009    Myalgia Current Immunizations  Reviewed on 4/14/2016 Name Date Influenza High Dose Vaccine PF 9/29/2016 Influenza Vaccine 10/12/2017, 10/1/2015, 10/17/2014, 10/21/2013 Influenza Vaccine Split 10/3/2012 Pneumococcal Conjugate (PCV-13) 9/29/2016 Pneumococcal Vaccine (Unspecified Type) 4/14/2015 Td 1/1/2008 Tdap 7/19/2016 Zoster Vaccine, Live 1/1/2012 Not reviewed this visit You Were Diagnosed With   
  
 Codes Comments Coronary artery disease involving native coronary artery of native heart without angina pectoris    -  Primary ICD-10-CM: I25.10 ICD-9-CM: 414.01 Gastroesophageal reflux disease without esophagitis     ICD-10-CM: K21.9 ICD-9-CM: 530.81   
 Gout, unspecified cause, unspecified chronicity, unspecified site     ICD-10-CM: M10.9 ICD-9-CM: 274.9 Hypercholesterolemia     ICD-10-CM: E78.00 ICD-9-CM: 272.0 Cerebrovascular accident (CVA), unspecified mechanism ([facility] [address]     ICD-10-CM: I63.9 ICD-9-CM: 434.91 Borderline hyperglycemia     ICD-10-CM: R73.9 ICD-9-CM: 790.29 Prostate cancer [facility])     ICD-10-CM: N08 ICD-9-CM: 226 Vitals BP Pulse Temp Resp Height(growth percentile) Weight(growth percentile) 95/59 (BP 1 Location: Left arm, BP Patient Position: Sitting) 74 97.7 °F (36.5 °C) (Oral) 16 6' (1.829 m) 242 lb (109.8 kg) SpO2 BMI Smoking Status 95% 32.82 kg/m2 Never Smoker Vitals History BMI and BSA Data Body Mass Index Body Surface Area  
 32.82 kg/m 2 2.36 m 2 Preferred Pharmacy Pharmacy Name Phone [facility] [address] [place] [address] Dr Carrillo, [address] [phone] Your Updated Medication List  
  
   
This list is accurate as of 5/22/18  9:47 AM.  Always use your most recent med list.  
  
  
  
  
 albuterol 90 mcg/actuation inhaler Commonly known as:  VENTOLIN HFA Take 2 Puffs by inhalation every four (4) hours as needed for Wheezing. aspirin, buffered 81 mg Tab Take 81 mg by mouth daily. coenzyme q10 10 mg Cap Take 100 mg by mouth every evening. furosemide 40 mg tablet Commonly known as:  LASIX Take 1 Tab by mouth two (2) times a day. guaiFENesin-codeine 100-10 mg/5 mL solution Commonly known as:  ROBITUSSIN AC Take 5 mL by mouth three (3) times daily as needed for Cough. Max Daily Amount: 15 mL. isosorbide mononitrate ER 60 mg CR tablet Commonly known as:  IMDUR  
TAKE ONE TABLET BY MOUTH ONCE DAILY  
  
 menthol 6 % Gel  
by Apply Externally route as needed. \"Abdirashid\"--cool therapy pain gel  
  
 metoprolol tartrate 25 mg tablet Commonly known as:  LOPRESSOR  
TAKE ONE-HALF TABLET BY MOUTH TWICE DAILY MUCINEX SINUS-MAX D-N (DIPHEN) 25 mg-10 mg- 650 mg/20mL(nt) Lisq Generic drug:  mekomeilqqre-MY-yhgsyrdhucu-GG Take  by mouth. omeprazole 20 mg capsule Commonly known as:  PRILOSEC  
TAKE 1 CAPSULE EVERY DAY  
  
 potassium chloride 20 mEq tablet Commonly known as:  K-DUR, KLOR-CON Take 1 Tab by mouth two (2) times a day. rosuvastatin 10 mg tablet Commonly known as:  CRESTOR  
TAKE 1 TABLET BY MOUTH EVERY MONDAY AND THURSDAY  
  
 traMADol 50 mg tablet Commonly known as:  ULTRAM  
  
  
  
  
We Performed the Following CBC WITH AUTOMATED DIFF [92550 CPT(R)] HEMOGLOBIN A1C WITH EAG [89823 CPT(R)] LIPID PANEL [43946 CPT(R)] METABOLIC PANEL, COMPREHENSIVE [45521 CPT(R)] MICROALBUMIN, UR, RAND [id number] CPT(R)] PSA W/ REFLX FREE PSA [99042 CPT(R)] URIC ACID [id number] CPT(R)] Follow-up Instructions Return in about 6 months (around 11/22/2018) for lipids, etc.  
  
  
Introducing [facility]! [employer] introduces ClarityRay patient portal. Now you can access parts of your medical record, email your doctor's office, and request medication refills online. 1. In your internet browser, go to [email or web address] 2. Click on the First Time User? Click Here link in the Sign In box. You will see the New Member Sign Up page. 3. Enter your ClarityRay Access Code exactly as it appears below. You will not need to use this code after youve completed the sign-up process. If you do not sign up before the expiration date, you must request a new code. · ClarityRay Access Code: [email or web address] Expires: 6/24/2018  2:51 PM 
 
4. Enter the last four digits of your Social Security Number (xxxx) and Date of Birth (mm/dd/yyyy) as indicated and click Submit. You will be taken to the next sign-up page. 5. Create a ClarityRay ID. This will be your ClarityRay login ID and cannot be changed, so think of one that is secure and easy to remember. 6. Create a [employer] password. You can change your password at any time. 7. Enter your Password Reset Question and Answer. This can be used at a later time if you forget your password. 8. Enter your e-mail address. You will receive e-mail notification when new information is available in [address] 9. Click Sign Up. You can now view and download portions of your medical record. 10. Click the Download Summary menu link to download a portable copy of your medical information. If you have questions, please visit the Frequently Asked Questions section of the [employer] website. Remember, [employer] is NOT to be used for urgent needs. For medical emergencies, dial 911. Now available from your iPhone and Android! Please provide this summary of care documentation to your next provider. Your primary care clinician is listed as [place]. If you have any questions after today's visit, please call [phone].

## 2018-05-22 NOTE — PROGRESS NOTES
SUBJECTIVE  Mr. Julian Joseph presents today acutely for \"I got the damn bronchitis. \"     Chief Complaint   Patient presents with    New Order     pt here today to discuss getting some lab work; pt states that he has not been feeling well and that he just finished taking predinson for bronchitis     Cough     pt was seen at [facility] for bronchitis        He has just gotten over a bout of acute bronchitis. Has been to urgent care, and has been through antbiotics and two courses of prednisone. \"I'm finally feeling better\" the last few days. Chronic bronchitis: [facility] He continues to have wheezing at times. Uses albuterol prn. Doing better. He had cataracts removed in December 2016. He has undergone valve replacement in March 2016 with Dr. Moe Cummins. It is recalled from early 2016 that he saw Dr. Rosalva Bennett, for exertional dyspnea, and had cardiac cath. \"My valve is real bad. \"   Knee pain: \"Doing pretty good right now. \"   Sees Dr. Bae, and has had corticosteroid injections. Surgery on hold due to heart issues. He has prostate cancer, but doesn't want to treat this. He has had biopsy proven adenocarcinoma of prostate, by Dr. Nuno Stafford with [facility]. As we noted before: At this time \"I aint doin' nothin'. If the PSA gets high, then I'll take the shots. \"  From our records, it appears he was referred to [facility] for consideration of radiation therapy. He refused this. Past Medical History: Hyperlipidemia, chronic bronchitis, allergic rhinitis, urolithiasis, hiatal hernia with GERD, ED, Asthma, prostate cancer 2012 (Dr. Nuno Stafford). Normal stress echo in 2006; Echo 2010 showed LVH, EF 55-60%. Hemoptysis with negative workup in 2008--saw Dr. Jonh Harris. EGD in 2009 Dr. Arabella Badillo showing Hiatal hernia.   R lid chronic ptosis noted in 2009 by Dr. Tara Jacobo, optometrist.  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 Had exertional chest pain in 2012, recurred in 2014--cardiac work up with Dr. Ismael Reza. His ophthalmologist is Dr. Vijaya Rondon. Past Surgical History: Hernia in 86. Kidney stone 85. Appy (ruptured) in 79. Resection of melanoma. Colon polypectomy 2011 Dr. Dean Men. A flutter ablation 2014 Dr. Sakina Duarte 2014 Dr. Norberto Hernandez repair 2014 Dr. Reno Broussard. Aortic valve replacement 2016. Cataracts 2016. Allergies: IVP dye (BP drop). Medications:   Current Outpatient Prescriptions on File Prior to Visit   Medication Sig Dispense Refill    guaiFENesin-codeine (ROBITUSSIN AC) 100-10 mg/5 mL solution Take 5 mL by mouth three (3) times daily as needed for Cough. Max Daily Amount: 15 mL. 120 mL 0    tufvitonmnmj-NT-pjpbtamaljb-GG (MUCINEX SINUS-MAX D-N, DIPHEN,) 25 mg-10 mg- 650 mg/20mL(nt) lisq Take  by mouth.  rosuvastatin (CRESTOR) 10 mg tablet TAKE 1 TABLET BY MOUTH EVERY MONDAY AND THURSDAY 24 Tab 3    omeprazole (PRILOSEC) 20 mg capsule TAKE 1 CAPSULE EVERY DAY 90 Cap 3    albuterol (VENTOLIN HFA) 90 mcg/actuation inhaler Take 2 Puffs by inhalation every four (4) hours as needed for Wheezing. 3 Inhaler 3    potassium chloride (K-DUR, KLOR-CON) 20 mEq tablet Take 1 Tab by mouth two (2) times a day. 180 Tab 3    furosemide (LASIX) 40 mg tablet Take 1 Tab by mouth two (2) times a day. 180 Tab 3    metoprolol tartrate (LOPRESSOR) 25 mg tablet TAKE ONE-HALF TABLET BY MOUTH TWICE DAILY 90 Tab 3    isosorbide mononitrate ER (IMDUR) 60 mg CR tablet TAKE ONE TABLET BY MOUTH ONCE DAILY 90 Tab 3    traMADol (ULTRAM) 50 mg tablet       menthol 6 % gel by Apply Externally route as needed. \"Abdirashid\"--cool therapy pain gel      coenzyme q10 10 mg cap Take 100 mg by mouth every evening.  Aspirin, Buffered 81 mg tab Take 81 mg by mouth daily.  doxycycline (MONODOX) 100 mg capsule Take 1 Cap by mouth two (2) times a day.  20 Cap 0    predniSONE (STERAPRED DS) 10 mg dose pack As directed 21 Tab 0     No current facility-administered medications on file prior to visit. Social History:  . Retired holt. Drinks a pint to 1 1/2 pints hard liquor daily. No tobacco, or drugs. Family History: F CVAs. Brother valve disease. Review of systems: Unremarkable except as noted above. Objective:    Visit Vitals    BP 95/59 (BP 1 Location: Left arm, BP Patient Position: Sitting)    Pulse 74    Temp 97.7 °F (36.5 °C) (Oral)    Resp 16    Ht 6' (1.829 m)    Wt 242 lb (109.8 kg)    SpO2 95%    BMI 32.82 kg/m2   . General appearance: Pleasant, obese elderly male in NAD. HEENT: PERRLA. EOMI. He has a mild right facial droop. OP moist, pink. Neck: Supple with No LAD. Lungs: CTAB. No wheezes. No rales. Heart: RRR. Pacemaker incision healing well. Abdomen: S, NT, ND, BS +. Extremities: Warm. No C/C/E. Neuro: Sensation intact. Lab Results   Component Value Date/Time    Cholesterol, total 170 07/17/2017 08:15 AM    HDL Cholesterol 54 07/17/2017 08:15 AM    LDL, calculated 96 07/17/2017 08:15 AM    VLDL, calculated 20 07/17/2017 08:15 AM    Triglyceride 100 07/17/2017 08:15 AM    CHOL/HDL Ratio 3.6 09/17/2010 08:27 AM     Lab Results   Component Value Date/Time    Hemoglobin A1c 6.2 (H) 07/17/2017 08:15 AM         Assessment / Plan:   1. CAD: No CP. As per his cardiologist, Dr. Mary Jane Winkler  2. A flutter, SSS: now with pacemaker. As per cardiology. 3. Aortic stenosis: s/p AVR. Doing well. 4. Asthma: Stable. Uses albuterol prn; Less than daily currently. 5. Prostate Cancer: Surveillance for now. Pt willing to do hormonal treatments if PSA rises to higher levels. 6. Problem drinking: He has been cutting back. 7. Hyperlipidemia:  Not at goal, but options limited. Did not tolerate simvastatin or zetia. Now on fish oil. Will recheck lipids and CMP. Continue pulse dosing of crestor.   8. GERD:  Controlled on prn prilosec; Pt. may continue this.  9. ED: Not discussed. 10. All rhinitis: Stable. 11. Possible TIA/Diplopia/Amaurosis fugax: No recent episode. Work up as above. 12. Gout: no recent flares. 13. Borderline DM. Recheck labs. 14. Obesity: Watch diet. More exercise as able (limited now by postsurgical recovery). 15. Noncompliance. Follow up in 6 months.

## 2018-05-22 NOTE — PROGRESS NOTES
1. Have you been to the ER, urgent care clinic since your last visit? Hospitalized since your last visit?no    2. Have you seen or consulted any other health care providers outside of the [address] [place] [address] since your last visit? Include any pap smears or colon screening.  no

## 2018-05-23 LAB
ALBUMIN SERPL-MCNC: 4 G/DL (ref 3.5–4.7)
ALBUMIN/GLOB SERPL: 1.5 {RATIO} (ref 1.2–2.2)
ALP SERPL-CCNC: 70 IU/L (ref 39–117)
ALT SERPL-CCNC: 18 IU/L (ref 0–44)
AST SERPL-CCNC: 20 IU/L (ref 0–40)
BASOPHILS # BLD AUTO: 0.1 X10E3/UL (ref 0–0.2)
BASOPHILS NFR BLD AUTO: 1 %
BILIRUB SERPL-MCNC: 0.6 MG/DL (ref 0–1.2)
BUN SERPL-MCNC: 16 MG/DL (ref 8–27)
BUN/CREAT SERPL: 15 (ref 10–24)
CALCIUM SERPL-MCNC: 9.1 MG/DL (ref 8.6–10.2)
CHLORIDE SERPL-SCNC: 99 MMOL/L (ref 96–106)
CHOLEST SERPL-MCNC: 171 MG/DL (ref 100–199)
CO2 SERPL-SCNC: 27 MMOL/L (ref 18–29)
CREAT SERPL-MCNC: 1.04 MG/DL (ref 0.76–1.27)
EOSINOPHIL # BLD AUTO: 0.4 X10E3/UL (ref 0–0.4)
EOSINOPHIL NFR BLD AUTO: 5 %
ERYTHROCYTE [DISTWIDTH] IN BLOOD BY AUTOMATED COUNT: 13.6 % (ref 12.3–15.4)
EST. AVERAGE GLUCOSE BLD GHB EST-MCNC: 151 MG/DL
GFR SERPLBLD CREATININE-BSD FMLA CKD-EPI: 67 ML/MIN/1.73
GFR SERPLBLD CREATININE-BSD FMLA CKD-EPI: 77 ML/MIN/1.73
GLOBULIN SER CALC-MCNC: 2.6 G/DL (ref 1.5–4.5)
GLUCOSE SERPL-MCNC: 105 MG/DL (ref 65–99)
HBA1C MFR BLD: 6.9 % (ref 4.8–5.6)
HCT VFR BLD AUTO: 42.3 % (ref 37.5–51)
HDLC SERPL-MCNC: 47 MG/DL
HGB BLD-MCNC: 13.7 G/DL (ref 13–17.7)
IMM GRANULOCYTES # BLD: 0 X10E3/UL (ref 0–0.1)
IMM GRANULOCYTES NFR BLD: 0 %
LDLC SERPL CALC-MCNC: 101 MG/DL (ref 0–99)
LYMPHOCYTES # BLD AUTO: 1.8 X10E3/UL (ref 0.7–3.1)
LYMPHOCYTES NFR BLD AUTO: 25 %
MCH RBC QN AUTO: 30.6 PG (ref 26.6–33)
MCHC RBC AUTO-ENTMCNC: 32.4 G/DL (ref 31.5–35.7)
MCV RBC AUTO: 94 FL (ref 79–97)
MICROALBUMIN UR-MCNC: <3 UG/ML
MONOCYTES # BLD AUTO: 0.8 X10E3/UL (ref 0.1–0.9)
MONOCYTES NFR BLD AUTO: 11 %
NEUTROPHILS # BLD AUTO: 4.2 X10E3/UL (ref 1.4–7)
NEUTROPHILS NFR BLD AUTO: 58 %
PLATELET # BLD AUTO: 233 X10E3/UL (ref 150–379)
POTASSIUM SERPL-SCNC: 4.1 MMOL/L (ref 3.5–5.2)
PROT SERPL-MCNC: 6.6 G/DL (ref 6–8.5)
PSA FREE MFR SERPL: 13.1 %
PSA FREE SERPL-MCNC: 0.94 NG/ML
PSA SERPL-MCNC: 7.2 NG/ML (ref 0–4)
RBC # BLD AUTO: 4.48 X10E6/UL (ref 4.14–5.8)
REFLEX CRITERIA: ABNORMAL
SODIUM SERPL-SCNC: 140 MMOL/L (ref 134–144)
TRIGL SERPL-MCNC: 116 MG/DL (ref 0–149)
URATE SERPL-MCNC: 8.1 MG/DL (ref 3.7–8.6)
VLDLC SERPL CALC-MCNC: 23 MG/DL (ref 5–40)
WBC # BLD AUTO: 7.2 X10E3/UL (ref 3.4–10.8)

## 2018-05-29 ENCOUNTER — TELEPHONE (OUTPATIENT)
Dept: INTERNAL MEDICINE CLINIC | Age: 83
End: 2018-05-29

## 2018-06-15 NOTE — PROGRESS NOTES
Chief Complaint   Patient presents with    Heart Problem     Yearly appt. C/O CP & SOB rest or exertion. Patient placed on Cpap

## 2018-06-25 ENCOUNTER — TELEPHONE (OUTPATIENT)
Dept: CARDIOLOGY CLINIC | Age: 83
End: 2018-06-25

## 2018-06-25 NOTE — TELEPHONE ENCOUNTER
Patient is having basal cell carcinoma, nodular type removed on Tuesday, July 3 and patient is asking does this require him to be on an antibiotic before this procedure. It is required for dental procedures. Please call to advise, or forward to cardiac nurse if not for Dr. Geovani Beckford to advise. Thanks!

## 2018-06-27 NOTE — TELEPHONE ENCOUNTER
You  Rachel Del Toro MD 22 hours ago (2:04 PM)                   Please advise,Patient is having basal cell carcinoma, nodular type removed on Tuesday, July 3 and patient is asking does this require him to be on an antibiotic before this procedure.  It is required for dental procedures. MD Camryn Bunn LPN        Caller: Unspecified (2 days ago,  2:05 PM)                     Not from my standpoint. Spoke with patient  Verified patient with 2 patient identifiers    Informed per Dr Melodi Landau antibiotic not needed from his standpoint. Patient verbalized understanding.

## 2018-07-02 DIAGNOSIS — I25.10 CORONARY ARTERY DISEASE INVOLVING NATIVE CORONARY ARTERY OF NATIVE HEART WITHOUT ANGINA PECTORIS: ICD-10-CM

## 2018-07-03 RX ORDER — FUROSEMIDE 40 MG/1
TABLET ORAL
Qty: 180 TAB | Refills: 3 | Status: SHIPPED | OUTPATIENT
Start: 2018-07-03 | End: 2019-05-15 | Stop reason: SDUPTHER

## 2018-07-11 ENCOUNTER — CLINICAL SUPPORT (OUTPATIENT)
Dept: CARDIOLOGY CLINIC | Age: 83
End: 2018-07-11

## 2018-07-11 DIAGNOSIS — I48.92 ATRIAL FLUTTER, UNSPECIFIED TYPE (HCC): ICD-10-CM

## 2018-07-11 DIAGNOSIS — R00.0 SINUS TACHYCARDIA: ICD-10-CM

## 2018-07-11 DIAGNOSIS — Z95.0 PACEMAKER: Primary | ICD-10-CM

## 2018-07-11 DIAGNOSIS — I49.5 SSS (SICK SINUS SYNDROME) (HCC): ICD-10-CM

## 2018-08-07 ENCOUNTER — OFFICE VISIT (OUTPATIENT)
Dept: CARDIOLOGY CLINIC | Age: 83
End: 2018-08-07

## 2018-08-07 VITALS
SYSTOLIC BLOOD PRESSURE: 112 MMHG | DIASTOLIC BLOOD PRESSURE: 68 MMHG | BODY MASS INDEX: 32.22 KG/M2 | OXYGEN SATURATION: 95 % | WEIGHT: 237.9 LBS | RESPIRATION RATE: 17 BRPM | HEIGHT: 72 IN | HEART RATE: 76 BPM

## 2018-08-07 DIAGNOSIS — Z86.79 S/P ABLATION OF ATRIAL FLUTTER: ICD-10-CM

## 2018-08-07 DIAGNOSIS — Z95.2 S/P AVR (AORTIC VALVE REPLACEMENT): ICD-10-CM

## 2018-08-07 DIAGNOSIS — I49.5 SSS (SICK SINUS SYNDROME) (HCC): Primary | ICD-10-CM

## 2018-08-07 DIAGNOSIS — Z98.890 S/P ABLATION OF ATRIAL FLUTTER: ICD-10-CM

## 2018-08-07 DIAGNOSIS — I35.0 AORTIC VALVE STENOSIS, ETIOLOGY OF CARDIAC VALVE DISEASE UNSPECIFIED: ICD-10-CM

## 2018-08-07 DIAGNOSIS — E78.00 HYPERCHOLESTEROLEMIA: ICD-10-CM

## 2018-08-07 NOTE — PROGRESS NOTES
NAME:  Diana White    :   1935   MRN:   [id number]   PCP:  Lora Carvajal MD           Subjective: The patient is a 81y.o. year old male  who returns for a routine follow-up. Since the last visit, patient reports no change in exercise tolerance, chest pain, edema, medication intolerance,  shortness of breath, PND/orthopnea wheezing, sputum, syncope, dizziness or light headedness. Doing well. Takes lasix which manages his edema. Palpitations only if he lays on his right side -this is not new. Past Medical History:   Diagnosis Date    Adverse effect of anesthesia     difficult with waking up     Allergic rhinitis     Arthritis     Asthma     Atrial flutter ([address] 2014    CAD (coronary artery disease)     Dr. Harlan Sofia    Calculus of kidney     Chest pain 2006    Normal stress echo    Chronic bronchitis     Chronic bronchitis ([address] 10/3/2012    Chronic pain     knees    Erectile dysfunction     GERD (gastroesophageal reflux disease)     hiatal hernia    Gout 2013    Hemoptysis     Work up by Dr. Juan Diego Daigle; Negative    Hiatal hernia     Hypercholesterolemia     Hypertension     Ill-defined condition     bronchitis    Melanoma ([address]     back    Nausea & vomiting     Pacemaker     Dr. Oscar Atkinson [facility]) 2012    S/P ablation of atrial flutter 2014    Sleep apnea     states never had test for apnea    Stroke [facility]) 2009     tia no residual problems       Social History   Substance Use Topics    Smoking status: Never Smoker    Smokeless tobacco: Never Used    Alcohol use 1.8 oz/week     1 Shots of liquor, 2 Standard drinks or equivalent per week      Comment: rare      Family History   Problem Relation Age of Onset    Stroke Father     Heart Disease Mother         Review of Systems  Constitutional: Negative for fever, chills, and diaphoresis.    Respiratory: Negative for cough, hemoptysis, sputum production, shortness of breath and wheezing. Cardiovascular: Negative for chest pain, palpitations, orthopnea, claudication, leg swelling and PND. Gastrointestinal: Negative for heartburn, nausea, vomiting, blood in stool and melena. Genitourinary: Negative for dysuria and flank pain. Musculoskeletal: Negative for joint pain and back pain. Skin: Negative for rash. Neurological: Negative for focal weakness, seizures, loss of consciousness, weakness and headaches. Endo/Heme/Allergies: Does not bruise/bleed easily. Psychiatric/Behavioral: Negative for memory loss. The patient does not have insomnia. Objective:       Vitals:    08/07/18 0858 08/07/18 0910   BP: 108/70 112/68   Pulse: 76    Resp: 17    SpO2: 95%    Weight: 237 lb 14.4 oz (107.9 kg)    Height: 6' (1.829 m)     Body mass index is 32.27 kg/(m^2). General PE    Gen: NAD     Mental Status - Alert. General Appearance - Not in acute distress. Neck - no JVD     Chest and Lung Exam     Inspection: Accessory muscles - No use of accessory muscles in breathing. Auscultation:   Breath sounds: - Normal.     Cardiovascular   Inspection: Jugular vein - Bilateral - Inspection Normal.   Palpation/Percussion:   Apical Impulse: - Normal.   Auscultation: Rhythm - Regular. Heart Sounds - S1 WNL and S2 WNL. No S3 or S4. Murmurs & Other Heart Sounds: Auscultation of the heart reveals - No Murmurs. Peripheral Vascular   Upper Extremity: Inspection - Bilateral - No Cyanotic nailbeds or Digital clubbing. Lower Extremity:   Palpation: Edema - Bilateral - No edema. Abdomen: Soft, non-tender, bowel sounds are active. Neuro: A&O times 3, CN and motor grossly WNL      Data Review:     EKG -  Sinus  Rhythm   -Left axis -anterior fascicular block.    -Poor R-wave progression -nonspecific -consider old anterior infarct.  unchanged from previous    LABS-   Lab Results   Component Value Date/Time    Cholesterol, total 171 05/22/2018 09:53 AM    HDL Cholesterol 47 05/22/2018 09:53 AM    LDL, calculated 101 (H) 05/22/2018 09:53 AM    VLDL, calculated 23 05/22/2018 09:53 AM    Triglyceride 116 05/22/2018 09:53 AM    CHOL/HDL Ratio 3.6 09/17/2010 08:27 AM     Lab Results   Component Value Date/Time    Sodium 140 05/22/2018 09:53 AM    Potassium 4.1 05/22/2018 09:53 AM    Chloride 99 05/22/2018 09:53 AM    CO2 27 05/22/2018 09:53 AM    Anion gap 8 03/14/2017 11:02 AM    Glucose 105 (H) 05/22/2018 09:53 AM    BUN 16 05/22/2018 09:53 AM    Creatinine 1.04 05/22/2018 09:53 AM    BUN/Creatinine ratio 15 05/22/2018 09:53 AM    GFR est AA 77 05/22/2018 09:53 AM    GFR est non-AA 67 05/22/2018 09:53 AM    Calcium 9.1 05/22/2018 09:53 AM    Bilirubin, total 0.6 05/22/2018 09:53 AM    AST (SGOT) 20 05/22/2018 09:53 AM    Alk. phosphatase 70 05/22/2018 09:53 AM    Protein, total 6.6 05/22/2018 09:53 AM    Albumin 4.0 05/22/2018 09:53 AM    Globulin 3.7 03/14/2017 11:02 AM    A-G Ratio 1.5 05/22/2018 09:53 AM    ALT (SGPT) 18 05/22/2018 09:53 AM       Allergies reviewed  Allergies   Allergen Reactions    Azithromycin Anaphylaxis    Gadolinium-Containing Contrast Media Other (comments)     1) Moderate to Severe. 2) Post Gadolinium issues as noted:   - Diaphoretic, Near Syncope, Nausea and vomiting.     Acetaminophen Other (comments)     Indigestion,\"burning sensation\"    Contrast Dye [Iodine] Other (comments)     Decrease in BP    Levaquin [Levofloxacin] Other (comments)     Joint pain    Medrol [Methylprednisolone] Vertigo    Norvasc [Amlodipine] Other (comments)     Numbness and tingling    Nsaids (Non-Steroidal Anti-Inflammatory Drug) Other (comments)     Cough up blood      Percocet [Oxycodone-Acetaminophen] Itching    Ranexa [Ranolazine] Other (comments)     Cannot remember    Simvastatin Other (comments)     Joint pain    Voltaren [Diclofenac Sodium] Other (comments)     unknown    Zetia [Ezetimibe] Myalgia       Medications reviewed  Current Outpatient Prescriptions   Medication Sig  furosemide (LASIX) 40 mg tablet TAKE 1 TABLET TWICE DAILY    rosuvastatin (CRESTOR) 10 mg tablet TAKE 1 TABLET BY MOUTH EVERY MONDAY AND THURSDAY    omeprazole (PRILOSEC) 20 mg capsule TAKE 1 CAPSULE EVERY DAY    albuterol (VENTOLIN HFA) 90 mcg/actuation inhaler Take 2 Puffs by inhalation every four (4) hours as needed for Wheezing.  potassium chloride (K-DUR, KLOR-CON) 20 mEq tablet Take 1 Tab by mouth two (2) times a day.  metoprolol tartrate (LOPRESSOR) 25 mg tablet TAKE ONE-HALF TABLET BY MOUTH TWICE DAILY    isosorbide mononitrate ER (IMDUR) 60 mg CR tablet TAKE ONE TABLET BY MOUTH ONCE DAILY    traMADol (ULTRAM) 50 mg tablet     coenzyme q10 10 mg cap Take 100 mg by mouth every evening.  Aspirin, Buffered 81 mg tab Take 81 mg by mouth daily.  guaiFENesin-codeine (ROBITUSSIN AC) 100-10 mg/5 mL solution Take 5 mL by mouth three (3) times daily as needed for Cough. Max Daily Amount: 15 mL.  swyjzxuguttw-ZZ-xkumrfdwcwh-GG (MUCINEX SINUS-MAX D-N, DIPHEN,) 25 mg-10 mg- 650 mg/20mL(nt) lisq Take  by mouth.  menthol 6 % gel by Apply Externally route as needed. \"Abdirashid\"--cool therapy pain gel     No current facility-administered medications for this visit. Assessment:       ICD-10-CM ICD-9-CM    1. SSS (sick sinus syndrome) ([facility]) I49.5 427.81 AMB POC EKG ROUTINE W/ 12 LEADS, INTER & REP      2D ECHO COMPLETE ADULT (TTE) W OR WO CONTR   2. Aortic valve stenosis, etiology of cardiac valve disease unspecified I35.0 424.1 2D ECHO COMPLETE ADULT (TTE) W OR WO CONTR   3. Hypercholesterolemia E78.00 272.0 2D ECHO COMPLETE ADULT (TTE) W OR WO CONTR   4. S/P AVR (aortic valve replacement) Z95.2 V43.3 2D ECHO COMPLETE ADULT (TTE) W OR WO CONTR   5.  S/P ablation of atrial flutter Z98.890 V45.89 2D ECHO COMPLETE ADULT (TTE) W OR WO CONTR    Z86.79          Orders Placed This Encounter    AMB POC EKG ROUTINE W/ 12 LEADS, INTER & REP     Order Specific Question:   Reason for Exam:     Answer: routine    2D ECHO COMPLETE ADULT (TTE) W OR WO CONTR     Order Specific Question:   Reason for Exam:     Answer:   s/p aortic valve, edema     Order Specific Question:   Contrast Enhancement (Bubble Study, Definity, Optison) may be used if criteria listed in established evidence-based protocol has been identified. Answer:   Yes       Patient Active Problem List   Diagnosis Code    Hypercholesterolemia E78.00    Stroke ([address] I63.9    Asthma J45.909    Calculus of kidney N20.0    Erectile dysfunction N52.9    GERD (gastroesophageal reflux disease) K21.9    Prostate cancer ([address] C61    Chronic bronchitis ([facility] J42    Allergic rhinitis J30.9    Gout M10.9    H/O TIA (transient ischemic attack) and stroke Z86.73    Chest pain R07.9    Sinus tachycardia R00.0    Atrial flutter ([facility]) I48.92    S/P ablation of atrial flutter Z98.890, Z86.79    Aortic stenosis I35.0    SOB (shortness of breath) R06.02    SSS (sick sinus syndrome) ([facility]) I49.5    MARYLOU (obstructive sleep apnea) G47.33    Pacemaker Z95.0    CAD (coronary artery disease) I25.10    ASHD (arteriosclerotic heart disease) I25.10    S/P AVR (aortic valve replacement) Z95.2    Irregular heartbeat I49.9       Plan:     Patient presents for follow up, feeling well and stable from cardiac standpoint. 1. S/p bioprosthetic aortic valve replacement. Doing very well. No significant CAD at time pre-op cardiac cath (1/16). Due for repeat echo. 2. Hyperlipidemia: on statin. Last FLP noted.     3. S/p a. Flutter ablation, sss, s/p PPM: f/u with EP. Last seen earlier this year. 4. MARYLOU: not using CPAP.    5. Leg swelling resolved with lasix. Continue lasix. Breathing also improved since has been on lasix. Heather Guadarrama, ANP      Pt seen and examined in details. Agree with NP A&P. Labs reviewed. D/w pt.      Kaley Rodriguez MD

## 2018-08-07 NOTE — MR AVS SNAPSHOT
[address] [facility] 
[phone] Patient: Nic Morejon 
MRN:  [id number] Visit Information Date & Time Provider Department Dept. Phone Encounter #  
 8/7/2018  9:00 AM Margie Maza, [address] [facility] [phone] Follow-up Instructions Return in about 6 months (around 2/7/2019). Routing History Follow-up and Disposition History Your Appointments 10/25/2018  8:15 AM  
PROCEDURE with PACEMAKER, [facility]) Appt Note: 6mo bsc dcpm  
 [address] [facility]  
[phone] [address] [facility]  
  
    
 11/27/2018  8:45 AM  
ROUTINE CARE with Cj Nicole MD  
[facility]) Appt Note: 6 mon f/u  
 [facility] [address] [facility]  
[phone]  
  
   
 [facility] [address] [facility] [address] [address]  
  
    
 1/30/2019  8:00 AM  
PROCEDURE with [facility]) Appt Note: NOT AN OFFICE VISIT - REMOTE BSC PM  
 [id number] [facility]  
[phone] [address] [facility]  
  
    
 2/18/2019  9:15 AM  
ESTABLISHED PATIENT with Margie Maza MD  
[facility]) Appt Note: Dr. Miriam Thompson [address] [place] [address]  
[phone] [address] [facility]  
  
    
 4/25/2019  9:15 AM  
PROCEDURE with PACEMAKER, [facility]) Appt Note: annual f/u with device check [address] [facility]  
[phone]  4/25/2019  9:15 AM  
ESTABLISHED PATIENT with Idalmis Taylor MD  
 [facility]) Appt Note: annual f/u with device check [address] [address]  
[phone] [address] [address] Upcoming Health Maintenance Date Due  
 MEDICARE YEARLY EXAM 7/7/2018 Influenza Age 5 to Adult 8/1/2018 GLAUCOMA SCREENING Q2Y 12/1/2018 DTaP/Tdap/Td series (2 - Td) 7/19/2026 Allergies as of 8/7/2018  Review Complete On: 8/7/2018 By: Lakia Vu MD  
  
 Severity Noted Reaction Type Reactions Azithromycin High 03/13/2018    Anaphylaxis Gadolinium-containing Contrast Media Medium 06/11/2012   Intolerance Other (comments) 1) Moderate to Severe. 2) Post Gadolinium issues as noted: - Diaphoretic, Near Syncope, Nausea and vomiting. Acetaminophen  03/14/2017    Other (comments) Indigestion,\"burning sensation\" Contrast Dye [Iodine]  11/20/2009    Other (comments) Decrease in BP Levaquin [Levofloxacin]  11/20/2009    Other (comments) Joint pain Medrol [Methylprednisolone]  06/18/2015    Vertigo Norvasc [Amlodipine]  03/04/2015    Other (comments) Numbness and tingling Nsaids (Non-steroidal Anti-inflammatory Drug)  03/10/2016    Other (comments) Cough up blood Percocet [Oxycodone-acetaminophen]  12/02/2014    Itching Ranexa [Ranolazine]  04/02/2015    Other (comments) Cannot remember Simvastatin  11/20/2009    Other (comments) Joint pain  
 Voltaren [Diclofenac Sodium]  03/14/2017    Other (comments)  
 unknown Zetia [Ezetimibe]  12/11/2009    Myalgia Current Immunizations  Reviewed on 4/14/2016 Name Date Influenza High Dose Vaccine PF 9/29/2016 Influenza Vaccine 10/12/2017, 10/1/2015, 10/17/2014, 10/21/2013 Influenza Vaccine Split 10/3/2012 Pneumococcal Conjugate (PCV-13) 9/29/2016 Pneumococcal Vaccine (Unspecified Type) 4/14/2015 Td 1/1/2008 Tdap 7/19/2016 Zoster Vaccine, Live 1/1/2012 Not reviewed this visit You Were Diagnosed With   
  
 Codes Comments SSS (sick sinus syndrome) ([facility])    -  Primary ICD-10-CM: I49.5 ICD-9-CM: 427.81 Aortic valve stenosis, etiology of cardiac valve disease unspecified     ICD-10-CM: I35.0 ICD-9-CM: 424.1 Hypercholesterolemia     ICD-10-CM: E78.00 ICD-9-CM: 272.0 S/P AVR (aortic valve replacement)     ICD-10-CM: V44.0 ICD-9-CM: V43.3 S/P ablation of atrial flutter     ICD-10-CM: Z98.890, Z86.79 
ICD-9-CM: V45.89 Vitals BP Pulse Resp Height(growth percentile) Weight(growth percentile) SpO2  
 112/68 (BP 1 Location: Right arm, BP Patient Position: Sitting) 76 17 6' (1.829 m) 237 lb 14.4 oz (107.9 kg) 95% BMI Smoking Status 32.27 kg/m2 Never Smoker Vitals History BMI and BSA Data Body Mass Index Body Surface Area  
 32.27 kg/m 2 2.34 m 2 Preferred Pharmacy Pharmacy Name Phone [address] [phone] Your Updated Medication List  
  
   
This list is accurate as of 8/7/18  9:37 AM.  Always use your most recent med list.  
  
  
  
  
 albuterol 90 mcg/actuation inhaler Commonly known as:  VENTOLIN HFA Take 2 Puffs by inhalation every four (4) hours as needed for Wheezing. aspirin, buffered 81 mg Tab Take 81 mg by mouth daily. coenzyme q10 10 mg Cap Take 100 mg by mouth every evening. furosemide 40 mg tablet Commonly known as:  LASIX TAKE 1 TABLET TWICE DAILY  
  
 guaiFENesin-codeine 100-10 mg/5 mL solution Commonly known as:  ROBITUSSIN AC Take 5 mL by mouth three (3) times daily as needed for Cough. Max Daily Amount: 15 mL. isosorbide mononitrate ER 60 mg CR tablet Commonly known as:  IMDUR  
TAKE ONE TABLET BY MOUTH ONCE DAILY  
  
 menthol 6 % Gel  
by Apply Externally route as needed. \"Abdirashid\"--cool therapy pain gel metoprolol tartrate 25 mg tablet Commonly known as:  LOPRESSOR  
TAKE ONE-HALF TABLET BY MOUTH TWICE DAILY MUCINEX SINUS-MAX D-N (DIPHEN) 25 mg-10 mg- 650 mg/20mL(nt) Lisq Generic drug:  gubczsugoued-RH-uvjalaejuwx-GG Take  by mouth. omeprazole 20 mg capsule Commonly known as:  PRILOSEC  
TAKE 1 CAPSULE EVERY DAY  
  
 potassium chloride 20 mEq tablet Commonly known as:  K-DUR, KLOR-CON Take 1 Tab by mouth two (2) times a day. rosuvastatin 10 mg tablet Commonly known as:  CRESTOR  
TAKE 1 TABLET BY MOUTH EVERY MONDAY AND THURSDAY  
  
 traMADol 50 mg tablet Commonly known as:  ULTRAM  
  
  
  
  
We Performed the Following 2D ECHO COMPLETE ADULT (TTE) W OR WO CONTR [25383 CPT(R)] AMB POC EKG ROUTINE W/ 12 LEADS, INTER & REP [31666 CPT(R)] Follow-up Instructions Return in about 6 months (around 2/7/2019). Introducing [facility]! Ritu Granado introduces [facility] patient portal. Now you can access parts of your medical record, email your doctor's office, and request medication refills online. 1. In your internet browser, go to [email or web address] 2. Click on the First Time User? Click Here link in the Sign In box. You will see the New Member Sign Up page. 3. Enter your [facility] Access Code exactly as it appears below. You will not need to use this code after youve completed the sign-up process. If you do not sign up before the expiration date, you must request a new code. · [facility] Access Code: [email or web address] Expires: 10/9/2018 12:53 PM 
 
4. Enter the last four digits of your Social Security Number (xxxx) and Date of Birth (mm/dd/yyyy) as indicated and click Submit. You will be taken to the next sign-up page. 5. Create a [facility] ID. This will be your [facility] login ID and cannot be changed, so think of one that is secure and easy to remember. 6. Create a [employer] [facility] password. You can change your password at any time. 7. Enter your Password Reset Question and Answer. This can be used at a later time if you forget your password. 8. Enter your e-mail address. You will receive e-mail notification when new information is available in [address] 9. Click Sign Up. You can now view and download portions of your medical record. 10. Click the Download Summary menu link to download a portable copy of your medical information. If you have questions, please visit the Frequently Asked Questions section of the [facility] website. Remember, [facility] is NOT to be used for urgent needs. For medical emergencies, dial 911. Now available from your iPhone and Android! Please provide this summary of care documentation to your next provider. Your primary care clinician is listed as [place]. If you have any questions after today's visit, please call [phone].

## 2018-08-07 NOTE — PROGRESS NOTES
Chief Complaint   Patient presents with    Irregular Heart Beat     6 month foolow up    1. Have you been to the ER, urgent care clinic since your last visit? Hospitalized since your last visit? No    2. Have you seen or consulted any other health care providers outside of the [address] [place] [address] since your last visit? Include any pap smears or colon screening.  No

## 2018-08-13 NOTE — TELEPHONE ENCOUNTER
Pt medication has been printed. Attempted to call pt. Called pt and LV for pt to call office back. vital signs

## 2018-10-17 ENCOUNTER — CLINICAL SUPPORT (OUTPATIENT)
Dept: INTERNAL MEDICINE CLINIC | Age: 83
End: 2018-10-17

## 2018-10-17 ENCOUNTER — TELEPHONE (OUTPATIENT)
Dept: INTERNAL MEDICINE CLINIC | Age: 83
End: 2018-10-17

## 2018-10-17 DIAGNOSIS — M10.9 GOUT, UNSPECIFIED CAUSE, UNSPECIFIED CHRONICITY, UNSPECIFIED SITE: Primary | ICD-10-CM

## 2018-10-17 DIAGNOSIS — R73.9 HYPERGLYCEMIA: ICD-10-CM

## 2018-10-17 DIAGNOSIS — C61 PROSTATE CANCER (HCC): ICD-10-CM

## 2018-10-17 DIAGNOSIS — Z23 ENCOUNTER FOR IMMUNIZATION: Primary | ICD-10-CM

## 2018-10-17 DIAGNOSIS — E78.00 HYPERCHOLESTEROLEMIA: ICD-10-CM

## 2018-10-17 DIAGNOSIS — I25.10 CORONARY ARTERY DISEASE INVOLVING NATIVE CORONARY ARTERY OF NATIVE HEART WITHOUT ANGINA PECTORIS: ICD-10-CM

## 2018-10-17 NOTE — PATIENT INSTRUCTIONS
Vaccine Information Statement    Influenza (Flu) Vaccine (Inactivated or Recombinant): What you need to know    Many Vaccine Information Statements are available in Thai and other languages. See www.immunize.org/vis  Hojas de Información Sobre Vacunas están disponibles en Español y en muchos otros idiomas. Visite www.immunize.org/vis    1. Why get vaccinated? Influenza (flu) is a contagious disease that spreads around the [place] every year, usually between October and May. Flu is caused by influenza viruses, and is spread mainly by coughing, sneezing, and close contact. Anyone can get flu. Flu strikes suddenly and can last several days. Symptoms vary by age, but can include:   fever/chills   sore throat   muscle aches   fatigue   cough   headache    runny or stuffy nose    Flu can also lead to pneumonia and blood infections, and cause diarrhea and seizures in children. If you have a medical condition, such as heart or lung disease, flu can make it worse. Flu is more dangerous for some people. Infants and young children, people 72years of age and older, pregnant women, and people with certain health conditions or a weakened immune system are at greatest risk. Each year thousands of people in the [facility] die from flu, and many more are hospitalized. Flu vaccine can:   keep you from getting flu,   make flu less severe if you do get it, and   keep you from spreading flu to your family and other people. 2. Inactivated and recombinant flu vaccines    A dose of flu vaccine is recommended every flu season. Children 6 months through 6years of age may need two doses during the same flu season. Everyone else needs only one dose each flu season.        Some inactivated flu vaccines contain a very small amount of a mercury-based preservative called thimerosal. Studies have not shown thimerosal in vaccines to be harmful, but flu vaccines that do not contain thimerosal are available. There is no live flu virus in flu shots. They cannot cause the flu. There are many flu viruses, and they are always changing. Each year a new flu vaccine is made to protect against three or four viruses that are likely to cause disease in the upcoming flu season. But even when the vaccine doesnt exactly match these viruses, it may still provide some protection    Flu vaccine cannot prevent:   flu that is caused by a virus not covered by the vaccine, or   illnesses that look like flu but are not. It takes about 2 weeks for protection to develop after vaccination, and protection lasts through the flu season. 3. Some people should not get this vaccine    Tell the person who is giving you the vaccine:     If you have any severe, life-threatening allergies. If you ever had a life-threatening allergic reaction after a dose of flu vaccine, or have a severe allergy to any part of this vaccine, you may be advised not to get vaccinated. Most, but not all, types of flu vaccine contain a small amount of egg protein.  If you ever had Guillain-Barré Syndrome (also called GBS). Some people with a history of GBS should not get this vaccine. This should be discussed with your doctor.  If you are not feeling well. It is usually okay to get flu vaccine when you have a mild illness, but you might be asked to come back when you feel better. 4. Risks of a vaccine reaction    With any medicine, including vaccines, there is a chance of reactions. These are usually mild and go away on their own, but serious reactions are also possible. Most people who get a flu shot do not have any problems with it.      Minor problems following a flu shot include:    soreness, redness, or swelling where the shot was given     hoarseness   sore, red or itchy eyes   cough   fever   aches   headache   itching   fatigue  If these problems occur, they usually begin soon after the shot and last 1 or 2 days. More serious problems following a flu shot can include the following:     There may be a small increased risk of Guillain-Barré Syndrome (GBS) after inactivated flu vaccine. This risk has been estimated at 1 or 2 additional cases per million people vaccinated. This is much lower than the risk of severe complications from flu, which can be prevented by flu vaccine.  Young children who get the flu shot along with pneumococcal vaccine (PCV13) and/or DTaP vaccine at the same time might be slightly more likely to have a seizure caused by fever. Ask your doctor for more information. Tell your doctor if a child who is getting flu vaccine has ever had a seizure. Problems that could happen after any injected vaccine:      People sometimes faint after a medical procedure, including vaccination. Sitting or lying down for about 15 minutes can help prevent fainting, and injuries caused by a fall. Tell your doctor if you feel dizzy, or have vision changes or ringing in the ears.  Some people get severe pain in the shoulder and have difficulty moving the arm where a shot was given. This happens very rarely.  Any medication can cause a severe allergic reaction. Such reactions from a vaccine are very rare, estimated at about 1 in a million doses, and would happen within a few minutes to a few hours after the vaccination. As with any medicine, there is a very remote chance of a vaccine causing a serious injury or death. The safety of vaccines is always being monitored. For more information, visit: www.cdc.gov/vaccinesafety/    5. What if there is a serious reaction? What should I look for?  Look for anything that concerns you, such as signs of a severe allergic reaction, very high fever, or unusual behavior.     Signs of a severe allergic reaction can include hives, swelling of the face and throat, difficulty breathing, a fast heartbeat, dizziness, and weakness - usually within a few minutes to a few hours after the vaccination. What should I do?  If you think it is a severe allergic reaction or other emergency that cant wait, call 9-1-1 and get the person to the nearest hospital. Otherwise, call your doctor.  Reactions should be reported to the Vaccine Adverse Event Reporting System (VAERS). Your doctor should file this report, or you can do it yourself through  the VAERS web site at www.vaers. [facility].gov, or by calling 4-422.678.3604. VAERS does not give medical advice. 6. The National Vaccine Injury Compensation Program    The [facility] Vaccine Injury Compensation Program (VICP) is a federal program that was created to compensate people who may have been injured by certain vaccines. Persons who believe they may have been injured by a vaccine can learn about the program and about filing a claim by calling 4-229.795.5112 or visiting the [employer] website at www.[facility].gov/vaccinecompensation. There is a time limit to file a claim for compensation. 7. How can I learn more?  Ask your healthcare provider. He or she can give you the vaccine package insert or suggest other sources of information.  Call your local or state health department.  Contact the Centers for Disease Control and Prevention (CDC):  - Call 7-525.390.7763 (1-800-CDC-INFO) or  - Visit CDCs website at www.cdc.gov/flu    Vaccine Information Statement   Inactivated Influenza Vaccine   8/7/2015  [address] 932AA-44    Department of Health and Human Services  Centers for Disease Control and Prevention    Office Use Only

## 2018-10-17 NOTE — PROGRESS NOTES
After obtaining consent, and per verbal order from Dr. Rosalva Kuo, patient received influenza vaccine given by Liz Payne LPN in left deltoid. Influenza Vaccine 0.5 mL IM now. Patient was observed for 10 minutes post injection. Patient tolerated injection well.

## 2018-10-28 RX ORDER — OMEPRAZOLE 20 MG/1
CAPSULE, DELAYED RELEASE ORAL
Qty: 90 CAP | Refills: 3 | Status: SHIPPED | OUTPATIENT
Start: 2018-10-28 | End: 2019-10-18 | Stop reason: SDUPTHER

## 2018-10-29 ENCOUNTER — HOSPITAL ENCOUNTER (OUTPATIENT)
Dept: LAB | Age: 83
Discharge: HOME | End: 2018-10-29
Payer: MEDICARE

## 2018-10-29 ENCOUNTER — APPOINTMENT (OUTPATIENT)
Dept: INTERNAL MEDICINE CLINIC | Age: 83
End: 2018-10-29

## 2018-10-29 PROCEDURE — 80061 LIPID PANEL: CPT

## 2018-10-29 PROCEDURE — 80053 COMPREHEN METABOLIC PANEL: CPT

## 2018-10-29 PROCEDURE — 84153 ASSAY OF PSA TOTAL: CPT

## 2018-10-29 PROCEDURE — 85025 COMPLETE CBC W/AUTO DIFF WBC: CPT

## 2018-10-29 PROCEDURE — 36415 COLL VENOUS BLD VENIPUNCTURE: CPT

## 2018-10-29 PROCEDURE — 83036 HEMOGLOBIN GLYCOSYLATED A1C: CPT

## 2018-10-30 LAB
ALBUMIN SERPL-MCNC: 4.3 G/DL (ref 3.5–4.7)
ALBUMIN/GLOB SERPL: 1.7 {RATIO} (ref 1.2–2.2)
ALP SERPL-CCNC: 82 IU/L (ref 39–117)
ALT SERPL-CCNC: 17 IU/L (ref 0–44)
AST SERPL-CCNC: 20 IU/L (ref 0–40)
BASOPHILS # BLD AUTO: 0.1 X10E3/UL (ref 0–0.2)
BASOPHILS NFR BLD AUTO: 1 %
BILIRUB SERPL-MCNC: 0.5 MG/DL (ref 0–1.2)
BUN SERPL-MCNC: 19 MG/DL (ref 8–27)
BUN/CREAT SERPL: 16 (ref 10–24)
CALCIUM SERPL-MCNC: 9.6 MG/DL (ref 8.6–10.2)
CHLORIDE SERPL-SCNC: 101 MMOL/L (ref 96–106)
CHOLEST SERPL-MCNC: 173 MG/DL (ref 100–199)
CO2 SERPL-SCNC: 27 MMOL/L (ref 20–29)
CREAT SERPL-MCNC: 1.17 MG/DL (ref 0.76–1.27)
EOSINOPHIL # BLD AUTO: 0.4 X10E3/UL (ref 0–0.4)
EOSINOPHIL NFR BLD AUTO: 6 %
ERYTHROCYTE [DISTWIDTH] IN BLOOD BY AUTOMATED COUNT: 14 % (ref 12.3–15.4)
EST. AVERAGE GLUCOSE BLD GHB EST-MCNC: 134 MG/DL
GLOBULIN SER CALC-MCNC: 2.6 G/DL (ref 1.5–4.5)
GLUCOSE SERPL-MCNC: 108 MG/DL (ref 65–99)
HBA1C MFR BLD: 6.3 % (ref 4.8–5.6)
HCT VFR BLD AUTO: 44.6 % (ref 37.5–51)
HDLC SERPL-MCNC: 57 MG/DL
HGB BLD-MCNC: 15.2 G/DL (ref 13–17.7)
IMM GRANULOCYTES # BLD: 0 X10E3/UL (ref 0–0.1)
IMM GRANULOCYTES NFR BLD: 0 %
LDLC SERPL CALC-MCNC: 95 MG/DL (ref 0–99)
LYMPHOCYTES # BLD AUTO: 2.1 X10E3/UL (ref 0.7–3.1)
LYMPHOCYTES NFR BLD AUTO: 33 %
MCH RBC QN AUTO: 31.5 PG (ref 26.6–33)
MCHC RBC AUTO-ENTMCNC: 34.1 G/DL (ref 31.5–35.7)
MCV RBC AUTO: 93 FL (ref 79–97)
MONOCYTES # BLD AUTO: 0.6 X10E3/UL (ref 0.1–0.9)
MONOCYTES NFR BLD AUTO: 9 %
NEUTROPHILS # BLD AUTO: 3.3 X10E3/UL (ref 1.4–7)
NEUTROPHILS NFR BLD AUTO: 51 %
PLATELET # BLD AUTO: 212 X10E3/UL (ref 150–379)
POTASSIUM SERPL-SCNC: 4.1 MMOL/L (ref 3.5–5.2)
PROT SERPL-MCNC: 6.9 G/DL (ref 6–8.5)
PSA SERPL-MCNC: 5.4 NG/ML (ref 0–4)
RBC # BLD AUTO: 4.82 X10E6/UL (ref 4.14–5.8)
SODIUM SERPL-SCNC: 145 MMOL/L (ref 134–144)
TRIGL SERPL-MCNC: 106 MG/DL (ref 0–149)
VLDLC SERPL CALC-MCNC: 21 MG/DL (ref 5–40)
WBC # BLD AUTO: 6.4 X10E3/UL (ref 3.4–10.8)

## 2018-10-31 ENCOUNTER — CLINICAL SUPPORT (OUTPATIENT)
Dept: CARDIOLOGY CLINIC | Age: 83
End: 2018-10-31

## 2018-10-31 DIAGNOSIS — I49.5 SSS (SICK SINUS SYNDROME) (HCC): ICD-10-CM

## 2018-10-31 DIAGNOSIS — I48.92 ATRIAL FLUTTER, UNSPECIFIED TYPE (HCC): ICD-10-CM

## 2018-10-31 DIAGNOSIS — Z95.0 PACEMAKER: Primary | ICD-10-CM

## 2018-11-07 ENCOUNTER — APPOINTMENT (OUTPATIENT)
Dept: GENERAL RADIOLOGY | Age: 83
End: 2018-11-07
Attending: PHYSICIAN ASSISTANT
Payer: MEDICARE

## 2018-11-07 ENCOUNTER — HOSPITAL ENCOUNTER (EMERGENCY)
Age: 83
Discharge: HOME | End: 2018-11-07
Attending: EMERGENCY MEDICINE
Payer: MEDICARE

## 2018-11-07 ENCOUNTER — APPOINTMENT (OUTPATIENT)
Dept: CT IMAGING | Age: 83
End: 2018-11-07
Attending: PHYSICIAN ASSISTANT
Payer: MEDICARE

## 2018-11-07 VITALS
DIASTOLIC BLOOD PRESSURE: 88 MMHG | OXYGEN SATURATION: 97 % | BODY MASS INDEX: 31.59 KG/M2 | HEART RATE: 73 BPM | WEIGHT: 233.25 LBS | TEMPERATURE: 97.5 F | SYSTOLIC BLOOD PRESSURE: 142 MMHG | HEIGHT: 72 IN | RESPIRATION RATE: 16 BRPM

## 2018-11-07 DIAGNOSIS — S02.2XXB OPEN FRACTURE OF NASAL BONE, INITIAL ENCOUNTER: Primary | ICD-10-CM

## 2018-11-07 DIAGNOSIS — S61.411A LACERATION OF RIGHT HAND WITHOUT FOREIGN BODY, INITIAL ENCOUNTER: ICD-10-CM
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
## 2018-11-07 PROCEDURE — 74011250637 HC RX REV CODE- 250/637: Performed by: PHYSICIAN ASSISTANT

## 2018-11-07 PROCEDURE — 90471 IMMUNIZATION ADMIN: CPT

## 2018-11-07 PROCEDURE — 74011000250 HC RX REV CODE- 250: Performed by: PHYSICIAN ASSISTANT

## 2018-11-07 PROCEDURE — 73130 X-RAY EXAM OF HAND: CPT

## 2018-11-07 PROCEDURE — 75810000293 HC SIMP/SUPERF WND  RPR

## 2018-11-07 PROCEDURE — 90715 TDAP VACCINE 7 YRS/> IM: CPT | Performed by: PHYSICIAN ASSISTANT

## 2018-11-07 PROCEDURE — 70486 CT MAXILLOFACIAL W/O DYE: CPT

## 2018-11-07 PROCEDURE — 99283 EMERGENCY DEPT VISIT LOW MDM: CPT

## 2018-11-07 PROCEDURE — 77030018836 HC SOL IRR NACL ICUM -A

## 2018-11-07 PROCEDURE — 77030031132 HC SUT NYL COVD -A

## 2018-11-07 PROCEDURE — 74011250636 HC RX REV CODE- 250/636: Performed by: PHYSICIAN ASSISTANT

## 2018-11-07 RX ORDER — LIDOCAINE HYDROCHLORIDE AND EPINEPHRINE 20; 10 MG/ML; UG/ML
10 INJECTION, SOLUTION INFILTRATION; PERINEURAL
Status: COMPLETED | OUTPATIENT
Start: 2018-11-07 | End: 2018-11-07

## 2018-11-07 RX ORDER — AMOXICILLIN AND CLAVULANATE POTASSIUM 875; 125 MG/1; MG/1
1 TABLET, FILM COATED ORAL
Status: COMPLETED | OUTPATIENT
Start: 2018-11-07 | End: 2018-11-07

## 2018-11-07 RX ORDER — ONDANSETRON 4 MG/1
4 TABLET, ORALLY DISINTEGRATING ORAL
Qty: 10 TAB | Refills: 0 | Status: SHIPPED | OUTPATIENT
Start: 2018-11-07 | End: 2019-02-18

## 2018-11-07 RX ORDER — HYDROCODONE BITARTRATE AND ACETAMINOPHEN 5; 325 MG/1; MG/1
1 TABLET ORAL
Qty: 20 TAB | Refills: 0 | Status: SHIPPED | OUTPATIENT
Start: 2018-11-07 | End: 2019-10-15

## 2018-11-07 RX ORDER — AMOXICILLIN AND CLAVULANATE POTASSIUM 875; 125 MG/1; MG/1
1 TABLET, FILM COATED ORAL 2 TIMES DAILY
Qty: 14 TAB | Refills: 0 | Status: SHIPPED | OUTPATIENT
Start: 2018-11-07 | End: 2018-11-14

## 2018-11-07 RX ADMIN — TETANUS TOXOID, REDUCED DIPHTHERIA TOXOID AND ACELLULAR PERTUSSIS VACCINE, ADSORBED 0.5 ML: 5; 2.5; 8; 8; 2.5 SUSPENSION INTRAMUSCULAR at 13:35

## 2018-11-07 RX ADMIN — AMOXICILLIN AND CLAVULANATE POTASSIUM 1 TABLET: 875; 125 TABLET, FILM COATED ORAL at 13:27

## 2018-11-07 RX ADMIN — LIDOCAINE HYDROCHLORIDE,EPINEPHRINE BITARTRATE 200 MG: 20; .01 INJECTION, SOLUTION INFILTRATION; PERINEURAL at 13:37

## 2018-11-07 NOTE — ED PROVIDER NOTES
EMERGENCY DEPARTMENT HISTORY AND PHYSICAL EXAM 
 
 
Date: 11/7/2018 Patient Name: Dagmar Villalobos Sr 
 
History of Presenting Illness Chief Complaint Patient presents with  Fall Ambulatory into the ED with c/o laceration to Rt hand and pain/swelling to nose s/p glf-tripped on a curb and fell foreward onto his face. Denies LOC.  Hand Pain  Facial Pain History Provided By: Patient HPI: J Luis Cedeno, 81 y.o. male presents ambulatory  to the ED with cc of 1 hours of 4 out of 10 constant aching nasal and right hand pain that are worse with palpation and started suddenly when he tripped over a curb and fell. No LOC. No neck pain. No headache or dizziness. Chief Complaint: fall Duration: 1 Hours Timing:  Constant Location: right hand and bridge of nose Quality: Aching Severity: 4 out of 10 Modifying Factors: worse with palpation Associated Symptoms: denies any other associated signs or symptoms There are no other complaints, changes, or physical findings at this time. PCP: Do Watson MD 
 
Current Outpatient Medications Medication Sig Dispense Refill  amoxicillin-clavulanate (AUGMENTIN) 875-125 mg per tablet Take 1 Tab by mouth two (2) times a day for 7 days. 14 Tab 0  
 HYDROcodone-acetaminophen (NORCO) 5-325 mg per tablet Take 1 Tab by mouth every eight (8) hours as needed for Pain. Max Daily Amount: 3 Tabs. 20 Tab 0  
 ondansetron (ZOFRAN ODT) 4 mg disintegrating tablet Take 1 Tab by mouth every eight (8) hours as needed for Nausea. 10 Tab 0  
 isosorbide mononitrate ER (IMDUR) 60 mg CR tablet TAKE 1 TABLET EVERY DAY 90 Tab 3  
 metoprolol tartrate (LOPRESSOR) 25 mg tablet TAKE 1/2 TABLET TWICE DAILY 90 Tab 3  
 omeprazole (PRILOSEC) 20 mg capsule TAKE 1 CAPSULE EVERY DAY 90 Cap 3  
 KLOR-CON M20 20 mEq tablet TAKE 1 TABLET TWICE DAILY 180 Tab 3  furosemide (LASIX) 40 mg tablet TAKE 1 TABLET TWICE DAILY 180 Tab 3  
  guaiFENesin-codeine (ROBITUSSIN AC) 100-10 mg/5 mL solution Take 5 mL by mouth three (3) times daily as needed for Cough. Max Daily Amount: 15 mL. 120 mL 0  
 phydueptrjbb-DL-qbgelmdfbvr-GG (MUCINEX SINUS-MAX D-N, DIPHEN,) 25 mg-10 mg- 650 mg/20mL(nt) lisq Take  by mouth.  rosuvastatin (CRESTOR) 10 mg tablet TAKE 1 TABLET BY MOUTH EVERY MONDAY AND THURSDAY 24 Tab 3  
 albuterol (VENTOLIN HFA) 90 mcg/actuation inhaler Take 2 Puffs by inhalation every four (4) hours as needed for Wheezing. 3 Inhaler 3  
 traMADol (ULTRAM) 50 mg tablet  menthol 6 % gel by Apply Externally route as needed. \"Abdirashid\"--cool therapy pain gel  coenzyme q10 10 mg cap Take 100 mg by mouth every evening.  Aspirin, Buffered 81 mg tab Take 81 mg by mouth daily. Past History Past Medical History: 
Past Medical History:  
Diagnosis Date  Adverse effect of anesthesia   
 difficult with waking up  Allergic rhinitis  Arthritis  Asthma  Atrial flutter ([address] 9/12/2014  CAD (coronary artery disease) Dr. Tato Campos  Calculus of kidney  Chest pain 2006 Normal stress echo  Chronic bronchitis  Chronic bronchitis ([address] 10/3/2012  Chronic pain   
 knees  Erectile dysfunction  GERD (gastroesophageal reflux disease)   
 hiatal hernia  Gout 1/24/2013  Hemoptysis 2008 Work up by Dr. Quynh Solano; Negative  Hiatal hernia  Hypercholesterolemia  Hypertension  Ill-defined condition   
 bronchitis  Melanoma ([address] back  Nausea & vomiting  Pacemaker Dr. Maria Eugenia Gunn [facility]) 6/4/2012  S/P ablation of atrial flutter 9/12/2014  Sleep apnea   
 states never had test for apnea  Stroke [facility]) 2009  
  tia no residual problems Past Surgical History: 
Past Surgical History:  
Procedure Laterality Date  CARDIAC SURG PROCEDURE UNLIST    
 ablation  HX AFIB ABLATION  2014 Dr. Komal Langston  HX APPENDECTOMY  1979  
 ruptured appendix [address]  HX HERNIA REPAIR  12/17/14 Recurrent left inguinal hernia; Dr. Kris Hurtado  HX HERNIA REPAIR    
 5 hernia repairs total  
 HX PACEMAKER  12/2014  HX PACEMAKER PLACEMENT  2014 Dr. Dove Favors Kidney stone extraction  LA COLSC FLX W/RMVL OF TUMOR POLYP LESION SNARE TQ  4/21/2011 Family History: 
Family History Problem Relation Age of Onset  Stroke Father  Heart Disease Mother Social History: 
Social History Tobacco Use  Smoking status: Never Smoker  Smokeless tobacco: Never Used Substance Use Topics  Alcohol use: Yes Alcohol/week: 1.8 oz Types: 1 Shots of liquor, 2 Standard drinks or equivalent per week Comment: rare  Drug use: No  
 
 
Allergies: Allergies Allergen Reactions  Azithromycin Anaphylaxis  Gadolinium-Containing Contrast Media Other (comments) 1) Moderate to Severe. 2) Post Gadolinium issues as noted: - Diaphoretic, Near Syncope, Nausea and vomiting.  Acetaminophen Other (comments) Indigestion,\"burning sensation\"  Contrast Dye [Iodine] Other (comments) Decrease in BP  Levaquin [Levofloxacin] Other (comments) Joint pain  Medrol [Methylprednisolone] Vertigo  Norvasc [Amlodipine] Other (comments) Numbness and tingling  Nsaids (Non-Steroidal Anti-Inflammatory Drug) Other (comments) Cough up blood  Percocet [Oxycodone-Acetaminophen] Itching  Ranexa [Ranolazine] Other (comments) Cannot remember  Simvastatin Other (comments) Joint pain  Voltaren [Diclofenac Sodium] Other (comments)  
  unknown  Zetia [Ezetimibe] Myalgia Review of Systems Review of Systems Constitutional: Negative for fatigue and fever. HENT: Negative for congestion, ear pain and rhinorrhea. Eyes: Negative for pain and redness. Respiratory: Negative for cough and wheezing. Cardiovascular: Negative for chest pain and palpitations. Gastrointestinal: Negative for abdominal pain, nausea and vomiting. Genitourinary: Negative for dysuria, frequency and urgency. Musculoskeletal: Negative for back pain, neck pain and neck stiffness. Skin: Positive for wound. Negative for rash. Neurological: Negative for weakness, light-headedness, numbness and headaches. Physical Exam  
Physical Exam  
Constitutional: He is oriented to person, place, and time. He appears well-developed and well-nourished. Non-toxic appearance. No distress. HENT:  
Head: Normocephalic and atraumatic. Head is without right periorbital erythema and without left periorbital erythema. Right Ear: External ear normal.  
Left Ear: External ear normal.  
Nose: Nose lacerations present. No nasal deformity or nasal septal hematoma. No epistaxis. Mouth/Throat: Uvula is midline. No trismus in the jaw. Eyes: Conjunctivae and EOM are normal. Pupils are equal, round, and reactive to light. No scleral icterus. Neck: Normal range of motion and full passive range of motion without pain. Supple; no midline tenderness Cardiovascular: Normal rate, regular rhythm and normal heart sounds. Pulmonary/Chest: Effort normal and breath sounds normal. No accessory muscle usage. No tachypnea. No respiratory distress. He has no decreased breath sounds. He has no wheezes. Abdominal: Soft. There is no tenderness. There is no rigidity and no guarding. Musculoskeletal: Normal range of motion. Right hand: He exhibits tenderness and laceration. He exhibits normal range of motion. Hands: 
RIGHT HAND: 
1.5cm flap laceration to the ulnar aspect of the right hand near the 5th MCPJ. There is a skin tear of the right 5th finger between the IPJs. FAROM of all joints. Neurological: He is alert and oriented to person, place, and time.  He is not disoriented. No cranial nerve deficit or sensory deficit. GCS eye subscore is 4. GCS verbal subscore is 5. GCS motor subscore is 6. Skin: Skin is intact. No rash noted. Psychiatric: He has a normal mood and affect. His speech is normal.  
Nursing note and vitals reviewed. Diagnostic Study Results Labs - No results found for this or any previous visit (from the past 12 hour(s)). Radiologic Studies -  
XR HAND RT MIN 3 V Final Result CT MAXILLOFACIAL WO CONT Final Result CT Results  (Last 48 hours) 11/07/18 1304  CT MAXILLOFACIAL WO CONT Final result Impression:  IMPRESSION: Fracture of the nose which is essentially nondisplaced. No other  
fracture. Narrative:  EXAM:  CT MAXILLOFACIAL WO CONT INDICATION:   Pain, max-facial. Trauma to the face from a fall COMPARISON:  None. CONTRAST:   None. TECHNIQUE:  Multislice helical CT of the facial bones was performed in the axial  
plane without intravenous contrast administration. Coronal and sagittal  
reformations were generated. CT dose reduction was achieved through use of a  
standardized protocol tailored for this examination and automatic exposure  
control for dose modulation. FINDINGS:  
   
There is fracture of the right nasal bone and the tip of the nasal bones. No  
additional facial fractures noted. Isma Constantino Minimal mucosal thickening noted in the right maxillary sinus. Remainder the  
paranasal sinuses clear. Mastoid air cells are clear. Isma Constantino The globes, optic nerves and extraocular muscles are normal.  
   
No abnormalities are identified within the visualized portions of the brain or  
nasopharynx. CXR Results  (Last 48 hours) None Medical Decision Making I am the first provider for this patient.  
 
I reviewed the vital signs, available nursing notes, past medical history, past surgical history, family history and social history. Vital Signs-Reviewed the patient's vital signs. Patient Vitals for the past 12 hrs: 
 Temp Pulse Resp BP SpO2  
11/07/18 1210 97.5 °F (36.4 °C) 73 16 142/88 97 % Records Reviewed: Nursing Notes and Old Medical Records Provider Notes (Medical Decision Making): DDx: nasal fracture, nasal laceration, hand laceration, hand fracture Procedure Note - Laceration Repair: 
3:18 PM 
Procedure by Dali Santoro PA-C. Complexity: simple 1cm v-shaped laceration to bridge of the nose was irrigated copiously with NS under jet lavage, prepped with Hibiclens and draped in a sterile fashion. The area was anesthetized with 1 mLs of  Lidocaine 2% with epinephrine via local infiltration. The wound was explored with the following results: No foreign bodies found. The wound was repaired with One layer suture closure: Skin Layer:  3 sutures placed, stitch type:simple interrupted, suture: 6-0 nylon. .  The wound was closed with good hemostasis and approximation. Sterile dressing applied. Estimated blood loss: < 1mL The procedure took 1-15 minutes, and pt tolerated well. Procedure Note - Laceration Repair: 
3:19 PM 
Procedure by Dali Santoro PA-C. Complexity: simple 1.5cm flap laceration to right hand was irrigated copiously with NS under jet lavage, prepped with Hibiclens and drape in a sterile fashion. The area was anesthetized with 2 mLs of  Lidocaine 2% with epinephrine via local infiltration. The wound was explored with the following results: No foreign bodies found. The wound was repaired with One layer suture closure: Skin Layer:  4 sutures placed, stitch type:simple interrupted, suture: 4-0 nylon. .  The wound was closed with good hemostasis and approximation. Sterile dressing applied. Estimated blood loss: < 1mL The procedure took 1-15 minutes, and pt tolerated well.  
 
Procedure Note - Laceration Repair: 
3:33 PM 
 Procedure by Elva Jimenez PA-C. Complexity: simple 1cm flapped skin tear laceration to right hand was irrigated copiously with NS under jet lavage, prepped with Hibiclens and draped in a sterile fashion. The area was anesthetized with 1 mLs of  Lidocaine 2% with epinephrine via local infiltration. The wound was explored with the following results: No foreign bodies found. The wound was repaired with One layer suture closure: Skin Layer:  2 sutures placed, stitch type:simple interrupted, suture: 4-0 nylon. .  The wound was closed with good hemostasis and approximation. Sterile dressing applied. Estimated blood loss: < 1mL The procedure took 1-15 minutes, and pt tolerated well. ED Course:  
Initial assessment performed. The patients presenting problems have been discussed, and they are in agreement with the care plan formulated and outlined with them. I have encouraged them to ask questions as they arise throughout their visit. Disposition: 
Discharge PLAN: 
1. Discharge Medication List as of 11/7/2018  3:25 PM  
  
START taking these medications Details  
amoxicillin-clavulanate (AUGMENTIN) 875-125 mg per tablet Take 1 Tab by mouth two (2) times a day for 7 days. , Normal, Disp-14 Tab, R-0  
  
HYDROcodone-acetaminophen (NORCO) 5-325 mg per tablet Take 1 Tab by mouth every eight (8) hours as needed for Pain. Max Daily Amount: 3 Tabs., Print, Disp-20 Tab, R-0  
  
ondansetron (ZOFRAN ODT) 4 mg disintegrating tablet Take 1 Tab by mouth every eight (8) hours as needed for Nausea., Normal, Disp-10 Tab, R-0  
  
  
CONTINUE these medications which have NOT CHANGED  Details  
isosorbide mononitrate ER (IMDUR) 60 mg CR tablet TAKE 1 TABLET EVERY DAY, Normal, Disp-90 Tab, R-3  
  
metoprolol tartrate (LOPRESSOR) 25 mg tablet TAKE 1/2 TABLET TWICE DAILY, Normal, Disp-90 Tab, R-3  
  
omeprazole (PRILOSEC) 20 mg capsule TAKE 1 CAPSULE EVERY DAY, Normal, Disp-90 Cap, R-3  
  
 KLOR-CON M20 20 mEq tablet TAKE 1 TABLET TWICE DAILY, Normal, Disp-180 Tab, R-3  
  
furosemide (LASIX) 40 mg tablet TAKE 1 TABLET TWICE DAILY, Normal, Disp-180 Tab, R-3  
  
guaiFENesin-codeine (ROBITUSSIN AC) 100-10 mg/5 mL solution Take 5 mL by mouth three (3) times daily as needed for Cough. Max Daily Amount: 15 mL. , Print, Disp-120 mL, R-0  
  
sggkexqtusts-OV-hbloqhzqyyt-GG (MUCINEX SINUS-MAX D-N, DIPHEN,) 25 mg-10 mg- 650 mg/20mL(nt) lisq Take  by mouth., Historical Med  
  
rosuvastatin (CRESTOR) 10 mg tablet TAKE 1 TABLET BY MOUTH EVERY MONDAY AND THURSDAY, Normal, Disp-24 Tab, R-3  
  
albuterol (VENTOLIN HFA) 90 mcg/actuation inhaler Take 2 Puffs by inhalation every four (4) hours as needed for Wheezing., Normal, Disp-3 Inhaler, R-3  
  
traMADol (ULTRAM) 50 mg tablet Historical Med  
  
menthol 6 % gel by Apply Externally route as needed. \"Abdirashid\"--cool therapy pain gel, Historical Med  
  
coenzyme q10 10 mg cap Take 100 mg by mouth every evening., Historical Med Aspirin, Buffered 81 mg tab Take 81 mg by mouth daily. , Historical Med 2. Follow-up Information Follow up With Specialties Details Why Contact Info Amanuel Sorensen MD Otolaryngology Schedule an appointment as soon as possible for a visit ENT: call to schedule follow up [address] [facility] 
[phone] Chloe Harding MD Internal Medicine Schedule an appointment as soon as possible for a visit in 1 week PRIMARY CARE: have stitches removed in 7-10 days [address] [facility] 
[phone] Return to ED if worse Diagnosis Clinical Impression: 1. Open fracture of nasal bone, initial encounter 2. Laceration of right hand without foreign body, initial encounter 3. Fall on same level from slipping, tripping or stumbling, initial encounter

## 2018-11-07 NOTE — ED NOTES
Pt discharged by [facility]NA Copeland. Pt provided with discharge instructions Rx and instructions on follow up care. Pt out of ED in stable condition accompanied by self.

## 2018-11-15 ENCOUNTER — OFFICE VISIT (OUTPATIENT)
Dept: INTERNAL MEDICINE CLINIC | Age: 83
End: 2018-11-15

## 2018-11-15 VITALS
SYSTOLIC BLOOD PRESSURE: 100 MMHG | TEMPERATURE: 97.8 F | HEIGHT: 72 IN | RESPIRATION RATE: 16 BRPM | WEIGHT: 238 LBS | HEART RATE: 71 BPM | DIASTOLIC BLOOD PRESSURE: 55 MMHG | BODY MASS INDEX: 32.23 KG/M2 | OXYGEN SATURATION: 95 %

## 2018-11-15 DIAGNOSIS — E78.00 HYPERCHOLESTEROLEMIA: ICD-10-CM

## 2018-11-15 DIAGNOSIS — S01.21XD LACERATION OF NOSE, SUBSEQUENT ENCOUNTER: Primary | ICD-10-CM

## 2018-11-15 DIAGNOSIS — R73.9 BORDERLINE HYPERGLYCEMIA: ICD-10-CM

## 2018-11-15 DIAGNOSIS — M10.9 GOUT, UNSPECIFIED CAUSE, UNSPECIFIED CHRONICITY, UNSPECIFIED SITE: ICD-10-CM

## 2018-11-15 DIAGNOSIS — S61.411D LACERATION OF RIGHT HAND WITHOUT FOREIGN BODY, SUBSEQUENT ENCOUNTER: ICD-10-CM

## 2018-11-15 DIAGNOSIS — J42 CHRONIC BRONCHITIS, UNSPECIFIED CHRONIC BRONCHITIS TYPE (HCC): ICD-10-CM

## 2018-11-15 DIAGNOSIS — I25.10 CORONARY ARTERY DISEASE INVOLVING NATIVE CORONARY ARTERY OF NATIVE HEART WITHOUT ANGINA PECTORIS: ICD-10-CM

## 2018-11-15 DIAGNOSIS — I63.9 CEREBROVASCULAR ACCIDENT (CVA), UNSPECIFIED MECHANISM (HCC): ICD-10-CM

## 2018-11-15 DIAGNOSIS — R97.20 PSA ELEVATION: ICD-10-CM

## 2019-01-29 RX ORDER — ROSUVASTATIN CALCIUM 10 MG/1
TABLET, COATED ORAL
Qty: 24 TAB | Refills: 3 | Status: SHIPPED | OUTPATIENT
Start: 2019-01-29 | End: 2019-12-13 | Stop reason: SDUPTHER

## 2019-01-30 ENCOUNTER — CLINICAL SUPPORT (OUTPATIENT)
Dept: CARDIOLOGY CLINIC | Age: 84
End: 2019-01-30

## 2019-01-30 DIAGNOSIS — Z95.0 PACEMAKER: Primary | ICD-10-CM

## 2019-01-30 DIAGNOSIS — Z86.79 S/P ABLATION OF ATRIAL FLUTTER: ICD-10-CM

## 2019-01-30 DIAGNOSIS — Z98.890 S/P ABLATION OF ATRIAL FLUTTER: ICD-10-CM

## 2019-01-30 DIAGNOSIS — I49.5 SSS (SICK SINUS SYNDROME) (HCC): ICD-10-CM

## 2019-02-18 ENCOUNTER — OFFICE VISIT (OUTPATIENT)
Dept: CARDIOLOGY CLINIC | Age: 84
End: 2019-02-18

## 2019-02-18 VITALS
HEART RATE: 78 BPM | WEIGHT: 238.2 LBS | BODY MASS INDEX: 32.26 KG/M2 | OXYGEN SATURATION: 94 % | SYSTOLIC BLOOD PRESSURE: 104 MMHG | HEIGHT: 72 IN | RESPIRATION RATE: 18 BRPM | DIASTOLIC BLOOD PRESSURE: 68 MMHG

## 2019-02-18 DIAGNOSIS — Z98.890 S/P ABLATION OF ATRIAL FLUTTER: ICD-10-CM

## 2019-02-18 DIAGNOSIS — Z95.2 S/P AVR (AORTIC VALVE REPLACEMENT): ICD-10-CM

## 2019-02-18 DIAGNOSIS — Z95.0 PACEMAKER: ICD-10-CM

## 2019-02-18 DIAGNOSIS — I35.0 AORTIC VALVE STENOSIS, ETIOLOGY OF CARDIAC VALVE DISEASE UNSPECIFIED: ICD-10-CM

## 2019-02-18 DIAGNOSIS — Z86.79 S/P ABLATION OF ATRIAL FLUTTER: ICD-10-CM

## 2019-02-18 DIAGNOSIS — I49.5 SSS (SICK SINUS SYNDROME) (HCC): ICD-10-CM

## 2019-02-18 DIAGNOSIS — I25.10 ASHD (ARTERIOSCLEROTIC HEART DISEASE): Primary | ICD-10-CM

## 2019-02-18 DIAGNOSIS — I10 ESSENTIAL HYPERTENSION: ICD-10-CM

## 2019-02-18 NOTE — PROGRESS NOTES
[address] [place] [address]  [phone]     Subjective:      Wandy Smith is a 81 y.o. male is here for routine f/u. The patient denies chest pain/ shortness of breath, orthopnea, PND, palpitations, syncope, or presyncope. Occasional leg swelling. Patient Active Problem List    Diagnosis Date Noted    Irregular heartbeat 05/03/2016    S/P AVR (aortic valve replacement) 03/16/2016    ASHD (arteriosclerotic heart disease) 01/19/2016    CAD (coronary artery disease) 06/05/2015    Pacemaker 05/19/2015    MARYLOU (obstructive sleep apnea) 12/23/2014    SSS (sick sinus syndrome) ([address] 12/11/2014    Aortic stenosis 11/04/2014    SOB (shortness of breath) 11/04/2014    Atrial flutter ([address] 09/12/2014    S/P ablation of atrial flutter 09/12/2014    H/O TIA (transient ischemic attack) and stroke 09/11/2014    Chest pain 09/11/2014    Sinus tachycardia 09/11/2014    Gout 01/24/2013    Chronic bronchitis (HCC) 10/03/2012    Allergic rhinitis 10/03/2012    Prostate cancer ([address] 06/04/2012    Hypercholesterolemia     Stroke ([address]     Asthma     Calculus of kidney     Erectile dysfunction     GERD (gastroesophageal reflux disease)       Irma Figueroa MD  Past Medical History:   Diagnosis Date    Adverse effect of anesthesia     difficult with waking up     Allergic rhinitis     Arthritis     Asthma     Atrial flutter ([address] 9/12/2014    CAD (coronary artery disease)     Dr. Sharon Alves Calculus of kidney     Chest pain 2006    Normal stress echo    Chronic bronchitis     Chronic bronchitis ([address] 10/3/2012    Chronic pain     knees    Erectile dysfunction     GERD (gastroesophageal reflux disease)     hiatal hernia    Gout 1/24/2013    Hemoptysis 2008    Work up by Dr. Geraldine Jung;  Negative    Hiatal hernia     Hypercholesterolemia     Hypertension     Ill-defined condition     bronchitis    Melanoma (HCC)     back    Nausea & vomiting     Pacemaker     Dr. Mustafa Rater [facility]) 6/4/2012    S/P ablation of atrial flutter 9/12/2014    Sleep apnea     states never had test for apnea    Stroke [facility]) 2009     tia no residual problems      Past Surgical History:   Procedure Laterality Date    CARDIAC SURG PROCEDURE UNLIST      ablation    HX AFIB ABLATION  2014    Dr. Wil Cano    ruptured appendix    HX HEART CATHETERIZATION      [address]    HX HERNIA REPAIR  12/17/14    Recurrent left inguinal hernia; Dr. Josephine Taveras      5 hernia repairs total    HX PACEMAKER  12/2014    HX PACEMAKER PLACEMENT  2014    Dr. Tess Mercedes    Kidney stone extraction    NE COLSC FLX W/RMVL OF TUMOR POLYP LESION SNARE TQ  4/21/2011          Allergies   Allergen Reactions    Azithromycin Anaphylaxis    Gadolinium-Containing Contrast Media Other (comments)     1) Moderate to Severe. 2) Post Gadolinium issues as noted:   - Diaphoretic, Near Syncope, Nausea and vomiting.     Acetaminophen Other (comments)     Indigestion,\"burning sensation\"    Contrast Dye [Iodine] Other (comments)     Decrease in BP    Levaquin [Levofloxacin] Other (comments)     Joint pain    Medrol [Methylprednisolone] Vertigo    Norvasc [Amlodipine] Other (comments)     Numbness and tingling    Nsaids (Non-Steroidal Anti-Inflammatory Drug) Other (comments)     Cough up blood      Percocet [Oxycodone-Acetaminophen] Itching    Ranexa [Ranolazine] Other (comments)     Cannot remember    Simvastatin Other (comments)     Joint pain    Voltaren [Diclofenac Sodium] Other (comments)     unknown    Zetia [Ezetimibe] Myalgia      Family History   Problem Relation Age of Onset    Stroke Father     Heart Disease Mother       Social History     Socioeconomic History    Marital status:      Spouse name: Not on file    Number of children: Not on file    Years of education: Not on file    Highest education level: Not on file   Social Needs    Financial resource strain: Not on file    Food insecurity - worry: Not on file    Food insecurity - inability: Not on file    Transportation needs - medical: Not on file   Scientific Intake needs - non-medical: Not on file   Occupational History    Not on file   Tobacco Use    Smoking status: Never Smoker    Smokeless tobacco: Never Used   Substance and Sexual Activity    Alcohol use: Yes     Alcohol/week: 1.8 oz     Types: 1 Shots of liquor, 2 Standard drinks or equivalent per week     Comment: rare    Drug use: No    Sexual activity: Not Currently     Partners: Female   Other Topics Concern    Not on file   Social History Narrative    Not on file      Current Outpatient Medications   Medication Sig    rosuvastatin (CRESTOR) 10 mg tablet TAKE 1 TABLET  EVERY  MONDAY  AND  THURSDAY    HYDROcodone-acetaminophen (NORCO) 5-325 mg per tablet Take 1 Tab by mouth every eight (8) hours as needed for Pain. Max Daily Amount: 3 Tabs.  isosorbide mononitrate ER (IMDUR) 60 mg CR tablet TAKE 1 TABLET EVERY DAY    metoprolol tartrate (LOPRESSOR) 25 mg tablet TAKE 1/2 TABLET TWICE DAILY    omeprazole (PRILOSEC) 20 mg capsule TAKE 1 CAPSULE EVERY DAY    KLOR-CON M20 20 mEq tablet TAKE 1 TABLET TWICE DAILY    furosemide (LASIX) 40 mg tablet TAKE 1 TABLET TWICE DAILY    guaiFENesin-codeine (ROBITUSSIN AC) 100-10 mg/5 mL solution Take 5 mL by mouth three (3) times daily as needed for Cough. Max Daily Amount: 15 mL.  albuterol (VENTOLIN HFA) 90 mcg/actuation inhaler Take 2 Puffs by inhalation every four (4) hours as needed for Wheezing.  traMADol (ULTRAM) 50 mg tablet Take 50 mg by mouth every eight (8) hours as needed.  menthol 6 % gel by Apply Externally route as needed. \"Abdirashid\"--cool therapy pain gel    coenzyme q10 10 mg cap Take 100 mg by mouth every evening.  Aspirin, Buffered 81 mg tab Take 81 mg by mouth daily.     ondansetron (ZOFRAN ODT) 4 mg disintegrating tablet Take 1 Tab by mouth every eight (8) hours as needed for Nausea. (Patient taking differently: Take  by mouth every eight (8) hours as needed for Nausea.)    zwsydngpucfz-RO-mmpzqjgyjjs-GG (MUCINEX SINUS-MAX D-N, DIPHEN,) 25 mg-10 mg- 650 mg/20mL(nt) lisq Take  by mouth. No current facility-administered medications for this visit. Review of Symptoms:  11 systems reviewed, negative other than as stated in the HPI    Physical ExamPhysical Exam:    Vitals:    02/18/19 0922 02/18/19 0935   BP: 102/62 104/68   Pulse: 78    Resp: 18    SpO2: 94%    Weight: 238 lb 3.2 oz (108 kg)    Height: 6' (1.829 m)      Body mass index is 32.31 kg/m². General PE   Gen:  NAD  Mental Status - Alert. General Appearance - Not in acute distress. Chest and Lung Exam   Inspection: Accessory muscles - No use of accessory muscles in breathing. Auscultation:   Breath sounds: - Normal.   Cardiovascular   Inspection: Jugular vein - Bilateral - Inspection Normal.   Palpation/Percussion:   Apical Impulse: - Normal.   Auscultation: Rhythm - Regular. Heart Sounds - S1 WNL and S2 WNL. No S3 or S4. Murmurs & Other Heart Sounds: Auscultation of the heart reveals - No Murmurs. Peripheral Vascular   Upper Extremity: Inspection - Bilateral - No Cyanotic nailbeds or Digital clubbing. Lower Extremity:   Palpation: Edema - Bilateral - No edema. Abdomen:   Soft, non-tender, bowel sounds are active.   Neuro: A&O times 3, CN and motor grossly WNL    Labs:   Lab Results   Component Value Date/Time    Cholesterol, total 173 10/29/2018 08:28 AM    Cholesterol, total 171 05/22/2018 09:53 AM    Cholesterol, total 170 07/17/2017 08:15 AM    Cholesterol, total 143 01/11/2017 08:11 AM    Cholesterol, total 144 07/07/2016 08:05 AM    HDL Cholesterol 57 10/29/2018 08:28 AM    HDL Cholesterol 47 05/22/2018 09:53 AM    HDL Cholesterol 54 07/17/2017 08:15 AM    HDL Cholesterol 47 01/11/2017 08:11 AM HDL Cholesterol 45 07/07/2016 08:05 AM    LDL, calculated 95 10/29/2018 08:28 AM    LDL, calculated 101 (H) 05/22/2018 09:53 AM    LDL, calculated 96 07/17/2017 08:15 AM    LDL, calculated 70 01/11/2017 08:11 AM    LDL, calculated 78 07/07/2016 08:05 AM    Triglyceride 106 10/29/2018 08:28 AM    Triglyceride 116 05/22/2018 09:53 AM    Triglyceride 100 07/17/2017 08:15 AM    Triglyceride 132 01/11/2017 08:11 AM    Triglyceride 104 07/07/2016 08:05 AM    CHOL/HDL Ratio 3.6 09/17/2010 08:27 AM    CHOL/HDL Ratio 4.8 12/11/2009 11:14 AM     Lab Results   Component Value Date/Time     (H) 04/01/2015 11:55 PM     Lab Results   Component Value Date/Time    Sodium 145 (H) 10/29/2018 08:28 AM    Potassium 4.1 10/29/2018 08:28 AM    Chloride 101 10/29/2018 08:28 AM    CO2 27 10/29/2018 08:28 AM    Anion gap 8 03/14/2017 11:02 AM    Glucose 108 (H) 10/29/2018 08:28 AM    BUN 19 10/29/2018 08:28 AM    Creatinine 1.17 10/29/2018 08:28 AM    BUN/Creatinine ratio 16 10/29/2018 08:28 AM    GFR est AA 67 10/29/2018 08:28 AM    GFR est non-AA 58 (L) 10/29/2018 08:28 AM    Calcium 9.6 10/29/2018 08:28 AM    Bilirubin, total 0.5 10/29/2018 08:28 AM    AST (SGOT) 20 10/29/2018 08:28 AM    Alk. phosphatase 82 10/29/2018 08:28 AM    Protein, total 6.9 10/29/2018 08:28 AM    Albumin 4.3 10/29/2018 08:28 AM    Globulin 3.7 03/14/2017 11:02 AM    A-G Ratio 1.7 10/29/2018 08:28 AM    ALT (SGPT) 17 10/29/2018 08:28 AM       EKG:  Paced     Assessment:     Assessment:      1. ASHD (arteriosclerotic heart disease)    2. SSS (sick sinus syndrome) ([facility]    3. Aortic valve stenosis, etiology of cardiac valve disease unspecified    4. S/P ablation of atrial flutter    5. S/P AVR (aortic valve replacement)    6. Pacemaker    7.  Essential hypertension        Orders Placed This Encounter    AMB POC EKG ROUTINE W/ 12 LEADS, INTER & REP     Order Specific Question:   Reason for Exam:     Answer:   routine        Plan:     Patient presents for 6 mos f/u, doing well and stable from cardiac standpoint. 1. S/p bioprosthetic aortic valve replacement in 2016 with normal functioning per  Echo in 2017. No significant CAD at time precath (1/16)--on ASA, Imdur, BB, statin.  schedule repeat echo ordered last OV. 2. Hyperlipidemia: 10/18 LDL at 95. On statin. Lipids and labs followed by PCP. 3. S/p a. Flutter ablation, sss, s/p PPM: f/u with EP. Last seen April 2018. No events per device check 2/19.  4. MARYLOU: not using CPAP.    5. Leg swelling. Occasional swelling, resolves with Lasix.      Continue current care and f/u in 6 months. Pamela Raygoza NP       Patient seen and examined by me with nurse practitioner. Wade Lisa personally performed all components of the history, physical, and medical decision making and agree with the assessment and plan with minor modifications as noted.     Bam Knott MD

## 2019-02-18 NOTE — PROGRESS NOTES
1. Have you been to the ER, urgent care clinic since your last visit? Hospitalized since your last visit? Yes in November for a fall. 2. Have you seen or consulted any other health care providers outside of the [address] [place] [address] since your last visit? Include any pap smears or colon screening.    No.      Chief Complaint   Patient presents with    Follow-up     6 month- pt denies any cardiac symptoms

## 2019-02-26 ENCOUNTER — TELEPHONE (OUTPATIENT)
Dept: CARDIOLOGY CLINIC | Age: 84
End: 2019-02-26

## 2019-02-26 NOTE — TELEPHONE ENCOUNTER
----- Message from Nolan Darling MD sent at 2/20/2019  3:41 PM EST -----  Inform him Echo is k    Called pt,verified pt with two pt identifiers, told pt that his echo is okay. Pt verbalized understanding.

## 2019-03-04 ENCOUNTER — OFFICE VISIT (OUTPATIENT)
Dept: INTERNAL MEDICINE CLINIC | Age: 84
End: 2019-03-04

## 2019-03-04 VITALS
RESPIRATION RATE: 20 BRPM | HEART RATE: 74 BPM | DIASTOLIC BLOOD PRESSURE: 59 MMHG | OXYGEN SATURATION: 96 % | BODY MASS INDEX: 32.23 KG/M2 | TEMPERATURE: 98.8 F | HEIGHT: 72 IN | SYSTOLIC BLOOD PRESSURE: 97 MMHG | WEIGHT: 238 LBS

## 2019-03-04 DIAGNOSIS — R10.32 GROIN PAIN, LEFT: Primary | ICD-10-CM

## 2019-03-04 NOTE — PROGRESS NOTES
SUBJECTIVE  Mr. Faith Tellez presents today acutely for     Chief Complaint   Patient presents with    Hernia (Non Specific)     pt here today concernd of hernia -L lower abdomin/L groin      He has had some tenderness in L groin/suprapubic area, for a couple of weeks. \"It hurts when I'm lifting, and sometimes if I strain when having a bowel movement. \" No visible bulges. \"I hope it's not another damn hernia. \"   He has had several previous surgeries. OBJECTIVE  Visit Vitals  BP 97/59 (BP 1 Location: Left arm, BP Patient Position: Sitting)   Pulse 74   Temp 98.8 °F (37.1 °C) (Oral)   Resp 20   Ht 6' (1.829 m)   Wt 238 lb (108 kg)   SpO2 96%   BMI 32.28 kg/m²     Gen: Pleasant 81 y.o.  male in NAD.   HEENT: PERRLA. EOMI. OP moist and pink.  Neck: Supple.  No LAD.  HEART: RRR, No M/G/R.   LUNGS: CTAB No W/R.   ABDOMEN: S, NT, ND, BS+.  : no visible or palpable hernias with valsalva. SKIN: Warm. Dry. No rashes or other lesions noted. ASSESSMENT / PLAN  L groin pain: Likely muscle strain rather than hernia. For now, tylenol prn. I have reviewed with the patient details of the assessment and plan and all questions were answered. Relevant patient education was performed. Follow-up Disposition:  Return if symptoms worsen or fail to improve.

## 2019-03-04 NOTE — PROGRESS NOTES
1. Have you been to the ER, urgent care clinic since your last visit? Hospitalized since your last visit?no    2. Have you seen or consulted any other health care providers outside of the [address] [place] [address] since your last visit? Include any pap smears or colon screening.  no

## 2019-04-25 ENCOUNTER — OFFICE VISIT (OUTPATIENT)
Dept: CARDIOLOGY CLINIC | Age: 84
End: 2019-04-25

## 2019-04-25 ENCOUNTER — CLINICAL SUPPORT (OUTPATIENT)
Dept: CARDIOLOGY CLINIC | Age: 84
End: 2019-04-25

## 2019-04-25 VITALS
WEIGHT: 236.3 LBS | RESPIRATION RATE: 18 BRPM | SYSTOLIC BLOOD PRESSURE: 104 MMHG | BODY MASS INDEX: 32 KG/M2 | DIASTOLIC BLOOD PRESSURE: 64 MMHG | HEIGHT: 72 IN | HEART RATE: 76 BPM | OXYGEN SATURATION: 95 %

## 2019-04-25 DIAGNOSIS — I49.5 SSS (SICK SINUS SYNDROME) (HCC): ICD-10-CM

## 2019-04-25 DIAGNOSIS — I25.10 ASHD (ARTERIOSCLEROTIC HEART DISEASE): Primary | ICD-10-CM

## 2019-04-25 DIAGNOSIS — I10 ESSENTIAL HYPERTENSION: ICD-10-CM

## 2019-04-25 DIAGNOSIS — I49.9 IRREGULAR HEARTBEAT: ICD-10-CM

## 2019-04-25 DIAGNOSIS — I44.1 MOBITZ II: ICD-10-CM

## 2019-04-25 DIAGNOSIS — Z95.0 PACEMAKER: ICD-10-CM

## 2019-04-25 DIAGNOSIS — Z95.0 PACEMAKER: Primary | ICD-10-CM

## 2019-04-25 DIAGNOSIS — E78.00 HYPERCHOLESTEROLEMIA: ICD-10-CM

## 2019-04-25 NOTE — PROGRESS NOTES
1. Have you been to the ER, urgent care clinic since your last visit? Hospitalized since your last visit? NO.    2. Have you seen or consulted any other health care providers outside of the [address] [place] [address] since your last visit? Include any pap smears or colon screening. NO.       Chief Complaint   Patient presents with    Annual Exam     DEVICE CHECK- PT DENIES ANY CARDIAC SYMPTOMS

## 2019-04-25 NOTE — PROGRESS NOTES
Subjective:      Sheree Herr is a 81 y.o. male is here for annual device follow up. The patient denies chest pain/ shortness of breath, orthopnea, PND, LE edema, palpitations, syncope, presyncope or fatigue. Patient Active Problem List    Diagnosis Date Noted    Irregular heartbeat 05/03/2016    S/P AVR (aortic valve replacement) 03/16/2016    ASHD (arteriosclerotic heart disease) 01/19/2016    CAD (coronary artery disease) 06/05/2015    Pacemaker 05/19/2015    MARYLOU (obstructive sleep apnea) 12/23/2014    SSS (sick sinus syndrome) ([address] 12/11/2014    Aortic stenosis 11/04/2014    SOB (shortness of breath) 11/04/2014    Atrial flutter ([address] 09/12/2014    S/P ablation of atrial flutter 09/12/2014    H/O TIA (transient ischemic attack) and stroke 09/11/2014    Chest pain 09/11/2014    Sinus tachycardia 09/11/2014    Gout 01/24/2013    Chronic bronchitis (HCC) 10/03/2012    Allergic rhinitis 10/03/2012    Prostate cancer ([address] 06/04/2012    Hypercholesterolemia     Stroke ([address]     Asthma     Calculus of kidney     Erectile dysfunction     GERD (gastroesophageal reflux disease)       Marguerite Reveles MD  Past Medical History:   Diagnosis Date    Adverse effect of anesthesia     difficult with waking up     Allergic rhinitis     Arthritis     Asthma     Atrial flutter ([address] 9/12/2014    CAD (coronary artery disease)     Dr. Wynelle Severin Calculus of kidney     Chest pain 2006    Normal stress echo    Chronic bronchitis     Chronic bronchitis ([address] 10/3/2012    Chronic pain     knees    Erectile dysfunction     GERD (gastroesophageal reflux disease)     hiatal hernia    Gout 1/24/2013    Hemoptysis 2008    Work up by Dr. Silvio Hernandez;  Negative    Hiatal hernia     Hypercholesterolemia     Hypertension     Ill-defined condition     bronchitis    Melanoma (HCC)     back    Nausea & vomiting     Pacemaker     Dr. Theresa Spaulding [facility]) 6/4/2012    S/P ablation of atrial flutter 9/12/2014    Sleep apnea     states never had test for apnea    Stroke [facility]) 2009     tia no residual problems      Past Surgical History:   Procedure Laterality Date    CARDIAC SURG PROCEDURE UNLIST      ablation    HX AFIB ABLATION  2014    Dr. Anderson Murray    ruptured appendix    HX HEART CATHETERIZATION      [address]    HX HERNIA REPAIR  12/17/14    Recurrent left inguinal hernia; Dr. Joie Pulido      5 hernia repairs total    HX PACEMAKER  12/2014    HX PACEMAKER PLACEMENT  2014    Dr. Tristan Parada    Kidney stone extraction    RI COLSC FLX W/RMVL OF TUMOR POLYP LESION SNARE TQ  4/21/2011          Allergies   Allergen Reactions    Azithromycin Anaphylaxis    Gadolinium-Containing Contrast Media Other (comments)     1) Moderate to Severe. 2) Post Gadolinium issues as noted:   - Diaphoretic, Near Syncope, Nausea and vomiting.     Acetaminophen Other (comments)     Indigestion,\"burning sensation\"    Contrast Dye [Iodine] Other (comments)     Decrease in BP    Levaquin [Levofloxacin] Other (comments)     Joint pain    Medrol [Methylprednisolone] Vertigo    Norvasc [Amlodipine] Other (comments)     Numbness and tingling    Nsaids (Non-Steroidal Anti-Inflammatory Drug) Other (comments)     Cough up blood      Percocet [Oxycodone-Acetaminophen] Itching    Ranexa [Ranolazine] Other (comments)     Cannot remember    Simvastatin Other (comments)     Joint pain    Voltaren [Diclofenac Sodium] Other (comments)     unknown    Zetia [Ezetimibe] Myalgia      Family History   Problem Relation Age of Onset    Stroke Father     Heart Disease Mother     negative for cardiac disease  Social History     Socioeconomic History    Marital status:      Spouse name: Not on file    Number of children: Not on file    Years of education: Not on file    Highest education level: Not on file   Tobacco Use    Smoking status: Never Smoker    Smokeless tobacco: Never Used   Substance and Sexual Activity    Alcohol use: Yes     Alcohol/week: 1.8 oz     Types: 1 Shots of liquor, 2 Standard drinks or equivalent per week     Comment: rare    Drug use: No     Types: OTC, Prescription    Sexual activity: Not Currently     Partners: Female     Current Outpatient Medications   Medication Sig    rosuvastatin (CRESTOR) 10 mg tablet TAKE 1 TABLET  EVERY  MONDAY  AND  THURSDAY    isosorbide mononitrate ER (IMDUR) 60 mg CR tablet TAKE 1 TABLET EVERY DAY    metoprolol tartrate (LOPRESSOR) 25 mg tablet TAKE 1/2 TABLET TWICE DAILY    omeprazole (PRILOSEC) 20 mg capsule TAKE 1 CAPSULE EVERY DAY    KLOR-CON M20 20 mEq tablet TAKE 1 TABLET TWICE DAILY (Patient taking differently: TAKE 1 TABLET TWICE DAILY-)    furosemide (LASIX) 40 mg tablet TAKE 1 TABLET TWICE DAILY    guaiFENesin-codeine (ROBITUSSIN AC) 100-10 mg/5 mL solution Take 5 mL by mouth three (3) times daily as needed for Cough. Max Daily Amount: 15 mL.  albuterol (VENTOLIN HFA) 90 mcg/actuation inhaler Take 2 Puffs by inhalation every four (4) hours as needed for Wheezing.  traMADol (ULTRAM) 50 mg tablet Take 50 mg by mouth every eight (8) hours as needed.  menthol 6 % gel by Apply Externally route as needed. \"Abdirashid\"--cool therapy pain gel    coenzyme q10 10 mg cap Take 100 mg by mouth every evening.  Aspirin, Buffered 81 mg tab Take 81 mg by mouth daily.  HYDROcodone-acetaminophen (NORCO) 5-325 mg per tablet Take 1 Tab by mouth every eight (8) hours as needed for Pain. Max Daily Amount: 3 Tabs. No current facility-administered medications for this visit.        Vitals:    04/25/19 0920   BP: 104/64   Pulse: 76   Resp: 18   SpO2: 95%   Weight: 236 lb 4.8 oz (107.2 kg)   Height: 6' (1.829 m)       I have reviewed the nurses notes, vitals, problem list, allergy list, medical history, family, social history and medications. Review of Symptoms:    General: Pt denies excessive weight gain or loss. Pt is able to conduct ADL's  HEENT: Denies blurred vision, headaches, epistaxis and difficulty swallowing. Respiratory: Denies shortness of breath, POLANCO, wheezing or stridor. Cardiovascular: Denies precordial pain, palpitations, edema or PND  Gastrointestinal: Denies poor appetite, indigestion, abdominal pain or blood in stool  Urinary: Denies dysuria, pyuria  Musculoskeletal: Denies pain or swelling from muscles or joints  Neurologic: Denies tremor, paresthesias, or sensory motor disturbance  Skin: Denies rash, itching or texture change. Psych: Denies depression      Physical Exam:      General: Well developed, in no acute distress. HEENT: Eyes - PERRL, no jvd  Heart:  Normal S1/S2 negative S3 or S4. Regular, no murmur, gallop or rub.   Respiratory: Clear bilaterally x 4, no wheezing or rales  Abdomen:   Soft, non-tender, bowel sounds are active.   Extremities:  No edema, normal cap refill, no cyanosis. Musculoskeletal: No clubbing  Neuro: A&Ox3, speech clear, gait stable. Skin: Skin color is normal. No rashes or lesions.  Non diaphoretic  Vascular: 2+ pulses symmetric in all extremities    Cardiographics    Ekg: a paced    Pacer - a paced 91%, v paced 15%    Echo - lvef 60%    Results for orders placed or performed during the hospital encounter of 03/16/16   EKG, 12 LEAD, INITIAL   Result Value Ref Range    Ventricular Rate 60 BPM    Atrial Rate 60 BPM    P-R Interval 208 ms    QRS Duration 172 ms    Q-T Interval 528 ms    QTC Calculation (Bezet) 528 ms    Calculated P Axis 71 degrees    Calculated R Axis -72 degrees    Calculated T Axis 37 degrees    Diagnosis       Normal sinus rhythm  Right bundle branch block  Left anterior fascicular block  ** Bifascicular block **  Left ventricular hypertrophy with QRS widening  When compared with ECG of 10-MAR-2016 14:56,  Sinus rhythm has replaced Electronic atrial pacemaker  Right bundle branch block is now present  Minimal criteria for Septal infarct are no longer present  Confirmed by Sheryl Lopez ([id number]) on 3/16/2016 1:57:21 PM     Results for orders placed or performed in visit on 12/04/14   CARDIAC HOLTER MONITOR, 24 HOURS    Narrative    ECG Monitor/24 hours, Complete    Reason for Holter Monitor   A-FLUTTER    Heartbeat    Slowest 48  Average 63  Fastest  97      Results:   Underlying Rhythm: Normal sinus rhythm      Atrial Arrhythmias: premature atrial contractions; frequent             AV Conduction: normal    Ventricular Arrhythmias: premature ventricular contractions; rare    ST Segment Analysis:normal     Symptom Correlation:  None reported    Comment:   Sinus rhythm with occasional atrial ectopy - morning bradycardia  of 48 bpm and highest heart rate of 97 bpm. Clinical correlation  advised. Alexandria Momin MD, [facility]                    Lab Results   Component Value Date/Time    WBC 6.4 10/29/2018 08:28 AM    Hemoglobin (POC) 13.9 12/17/2014 12:06 PM    HGB 15.2 10/29/2018 08:28 AM    Hematocrit (POC) 41 12/17/2014 12:06 PM    HCT 44.6 10/29/2018 08:28 AM    PLATELET 975 86/71/8460 08:28 AM    MCV 93 10/29/2018 08:28 AM      Lab Results   Component Value Date/Time    Sodium 145 (H) 10/29/2018 08:28 AM    Potassium 4.1 10/29/2018 08:28 AM    Chloride 101 10/29/2018 08:28 AM    CO2 27 10/29/2018 08:28 AM    Anion gap 8 03/14/2017 11:02 AM    Glucose 108 (H) 10/29/2018 08:28 AM    BUN 19 10/29/2018 08:28 AM    Creatinine 1.17 10/29/2018 08:28 AM    BUN/Creatinine ratio 16 10/29/2018 08:28 AM    GFR est AA 67 10/29/2018 08:28 AM    GFR est non-AA 58 (L) 10/29/2018 08:28 AM    Calcium 9.6 10/29/2018 08:28 AM    Bilirubin, total 0.5 10/29/2018 08:28 AM    AST (SGOT) 20 10/29/2018 08:28 AM    Alk.  phosphatase 82 10/29/2018 08:28 AM    Protein, total 6.9 10/29/2018 08:28 AM    Albumin 4.3 10/29/2018 08:28 AM    Globulin 3.7 03/14/2017 11:02 AM    A-G Ratio 1.7 10/29/2018 08:28 AM    ALT (SGPT) 17 10/29/2018 08:28 AM         Assessment:     Assessment:        ICD-10-CM ICD-9-CM    1. ASHD (arteriosclerotic heart disease) I25.10 414.00 AMB POC EKG ROUTINE W/ 12 LEADS, INTER & REP   2. Pacemaker Z95.0 V45.01 AMB POC EKG ROUTINE W/ 12 LEADS, INTER & REP   3. Irregular heartbeat I49.9 427.9 AMB POC EKG ROUTINE W/ 12 LEADS, INTER & REP   4. SSS (sick sinus syndrome) (HCC) I49.5 427.81    5. Hypercholesterolemia E78.00 272.0    6. Mobitz II I44.1 426.12    7. Essential hypertension I10 401.9      Orders Placed This Encounter    AMB POC EKG ROUTINE W/ 12 LEADS, INTER & REP     Order Specific Question:   Reason for Exam:     Answer:   ROUTINE        Plan:   Mr Gayle Whitt is doing well - he is A paced 91% for sick sinus and V paced 15% for high grade av block. His lvef is wnl. He will follow up in the device clinic per routine and with me in one year. Continue medical management for sss, htn and hyperlipidemia. Thank you for allowing me to participate in [address] [place] [address].     Kelley Kuo MD, Deni Kincaid

## 2019-05-08 ENCOUNTER — HOSPITAL ENCOUNTER (OUTPATIENT)
Dept: LAB | Age: 84
Discharge: HOME | End: 2019-05-08
Payer: MEDICARE

## 2019-05-08 DIAGNOSIS — S61.411D LACERATION OF RIGHT HAND WITHOUT FOREIGN BODY, SUBSEQUENT ENCOUNTER: ICD-10-CM

## 2019-05-08 DIAGNOSIS — I63.9 CEREBROVASCULAR ACCIDENT (CVA), UNSPECIFIED MECHANISM (HCC): ICD-10-CM

## 2019-05-08 DIAGNOSIS — R73.9 BORDERLINE HYPERGLYCEMIA: ICD-10-CM

## 2019-05-08 DIAGNOSIS — R97.20 PSA ELEVATION: ICD-10-CM

## 2019-05-08 DIAGNOSIS — J42 CHRONIC BRONCHITIS, UNSPECIFIED CHRONIC BRONCHITIS TYPE (HCC): ICD-10-CM

## 2019-05-08 DIAGNOSIS — M10.9 GOUT, UNSPECIFIED CAUSE, UNSPECIFIED CHRONICITY, UNSPECIFIED SITE: ICD-10-CM

## 2019-05-08 DIAGNOSIS — I25.10 CORONARY ARTERY DISEASE INVOLVING NATIVE CORONARY ARTERY OF NATIVE HEART WITHOUT ANGINA PECTORIS: ICD-10-CM

## 2019-05-08 DIAGNOSIS — E78.00 HYPERCHOLESTEROLEMIA: ICD-10-CM

## 2019-05-08 DIAGNOSIS — S01.21XD LACERATION OF NOSE, SUBSEQUENT ENCOUNTER: ICD-10-CM

## 2019-05-08 PROCEDURE — 84550 ASSAY OF BLOOD/URIC ACID: CPT

## 2019-05-08 PROCEDURE — 84153 ASSAY OF PSA TOTAL: CPT

## 2019-05-08 PROCEDURE — 83036 HEMOGLOBIN GLYCOSYLATED A1C: CPT

## 2019-05-08 PROCEDURE — 80061 LIPID PANEL: CPT

## 2019-05-08 PROCEDURE — 80053 COMPREHEN METABOLIC PANEL: CPT

## 2019-05-08 PROCEDURE — 85025 COMPLETE CBC W/AUTO DIFF WBC: CPT

## 2019-05-08 PROCEDURE — 36415 COLL VENOUS BLD VENIPUNCTURE: CPT

## 2019-05-09 LAB
ALBUMIN SERPL-MCNC: 4 G/DL (ref 3.5–4.7)
ALBUMIN/GLOB SERPL: 1.6 {RATIO} (ref 1.2–2.2)
ALP SERPL-CCNC: 74 IU/L (ref 39–117)
ALT SERPL-CCNC: 16 IU/L (ref 0–44)
AST SERPL-CCNC: 21 IU/L (ref 0–40)
BASOPHILS # BLD AUTO: 0 X10E3/UL (ref 0–0.2)
BASOPHILS NFR BLD AUTO: 1 %
BILIRUB SERPL-MCNC: 0.5 MG/DL (ref 0–1.2)
BUN SERPL-MCNC: 19 MG/DL (ref 8–27)
BUN/CREAT SERPL: 17 (ref 10–24)
CALCIUM SERPL-MCNC: 9.3 MG/DL (ref 8.6–10.2)
CHLORIDE SERPL-SCNC: 99 MMOL/L (ref 96–106)
CHOLEST SERPL-MCNC: 153 MG/DL (ref 100–199)
CO2 SERPL-SCNC: 28 MMOL/L (ref 20–29)
CREAT SERPL-MCNC: 1.11 MG/DL (ref 0.76–1.27)
EOSINOPHIL # BLD AUTO: 0.6 X10E3/UL (ref 0–0.4)
EOSINOPHIL NFR BLD AUTO: 10 %
ERYTHROCYTE [DISTWIDTH] IN BLOOD BY AUTOMATED COUNT: 13.7 % (ref 12.3–15.4)
EST. AVERAGE GLUCOSE BLD GHB EST-MCNC: 137 MG/DL
GLOBULIN SER CALC-MCNC: 2.5 G/DL (ref 1.5–4.5)
GLUCOSE SERPL-MCNC: 117 MG/DL (ref 65–99)
HBA1C MFR BLD: 6.4 % (ref 4.8–5.6)
HCT VFR BLD AUTO: 43.2 % (ref 37.5–51)
HDLC SERPL-MCNC: 48 MG/DL
HGB BLD-MCNC: 14.2 G/DL (ref 13–17.7)
IMM GRANULOCYTES # BLD AUTO: 0 X10E3/UL (ref 0–0.1)
IMM GRANULOCYTES NFR BLD AUTO: 0 %
LDLC SERPL CALC-MCNC: 85 MG/DL (ref 0–99)
LYMPHOCYTES # BLD AUTO: 1.7 X10E3/UL (ref 0.7–3.1)
LYMPHOCYTES NFR BLD AUTO: 30 %
MCH RBC QN AUTO: 31.3 PG (ref 26.6–33)
MCHC RBC AUTO-ENTMCNC: 32.9 G/DL (ref 31.5–35.7)
MCV RBC AUTO: 95 FL (ref 79–97)
MONOCYTES # BLD AUTO: 0.6 X10E3/UL (ref 0.1–0.9)
MONOCYTES NFR BLD AUTO: 10 %
NEUTROPHILS # BLD AUTO: 2.8 X10E3/UL (ref 1.4–7)
NEUTROPHILS NFR BLD AUTO: 49 %
PLATELET # BLD AUTO: 183 X10E3/UL (ref 150–379)
POTASSIUM SERPL-SCNC: 4.1 MMOL/L (ref 3.5–5.2)
PROT SERPL-MCNC: 6.5 G/DL (ref 6–8.5)
PSA SERPL-MCNC: 5.7 NG/ML (ref 0–4)
RBC # BLD AUTO: 4.53 X10E6/UL (ref 4.14–5.8)
SODIUM SERPL-SCNC: 141 MMOL/L (ref 134–144)
TRIGL SERPL-MCNC: 98 MG/DL (ref 0–149)
URATE SERPL-MCNC: 7.5 MG/DL (ref 3.7–8.6)
VLDLC SERPL CALC-MCNC: 20 MG/DL (ref 5–40)
WBC # BLD AUTO: 5.6 X10E3/UL (ref 3.4–10.8)

## 2019-05-15 ENCOUNTER — OFFICE VISIT (OUTPATIENT)
Dept: INTERNAL MEDICINE CLINIC | Age: 84
End: 2019-05-15

## 2019-05-15 VITALS
RESPIRATION RATE: 18 BRPM | OXYGEN SATURATION: 99 % | SYSTOLIC BLOOD PRESSURE: 99 MMHG | TEMPERATURE: 97.8 F | WEIGHT: 242 LBS | DIASTOLIC BLOOD PRESSURE: 61 MMHG | HEART RATE: 69 BPM | BODY MASS INDEX: 32.78 KG/M2 | HEIGHT: 72 IN

## 2019-05-15 DIAGNOSIS — I25.10 CORONARY ARTERY DISEASE INVOLVING NATIVE CORONARY ARTERY OF NATIVE HEART WITHOUT ANGINA PECTORIS: ICD-10-CM

## 2019-05-15 RX ORDER — FUROSEMIDE 40 MG/1
TABLET ORAL
Qty: 180 TAB | Refills: 1 | Status: SHIPPED | OUTPATIENT
Start: 2019-05-15 | End: 2019-12-20

## 2019-05-15 RX ORDER — ALBUTEROL SULFATE 90 UG/1
2 AEROSOL, METERED RESPIRATORY (INHALATION)
Qty: 3 INHALER | Refills: 1 | Status: SHIPPED | OUTPATIENT
Start: 2019-05-15 | End: 2021-09-14 | Stop reason: SDUPTHER

## 2019-05-15 NOTE — PROGRESS NOTES
Reviewed record in preparation for visit and have obtained necessary documentation. Identified pt with two pt identifiers(name and ). Chief Complaint Patient presents with  Follow-up Health Maintenance Due Topic Date Due  Shingles Vaccine (1 of 2) 1986 Lei Annual Well Visit  2018  Glaucoma Screening   2018 Mr. Veras Sr has a reminder for a \"due or due soon\" health maintenance. I have asked that he discuss this further with his primary care provider for follow-up on this health maintenance. Coordination of Care Questionnaire: 
:  
 
1) Have you been to an emergency room, urgent care clinic since your last visit? no  
Hospitalized since your last visit? no          
 
2) Have you seen or consulted any other health care providers outside of [address] [place] [address] since your last visit? no  (Include any pap smears or colon screenings in this section.) 3) In the event something were to happen to you and you were unable to speak on your behalf, do you have an Advance Directive/ Living Will in place stating your wishes? YES Do you have an Advance Directive on file? yes 4) Are you interested in receiving information on Advance Directives? NO Patient is accompanied by self I have received verbal consent from Ryan Gilbert Sr to discuss any/all medical information while they are present in the room.

## 2019-05-15 NOTE — PROGRESS NOTES
SUBJECTIVE Mr. King Callejas is a 81 y.o. male patient of mine who presents today acutely for routine follow up visit. Chief Complaint Patient presents with  Follow-up He states that he has a \"little cold. \"  \"I used that damn nebulizer for the first time yesterday since last year in April. \"  He feels it is slowly improving. Chronic bronchitis has otherwise been doing well: He has seen Dr. Bre Marquez. Uses albuterol prn. Doing better. He has undergone valve replacement in March 2016 with Dr. Macho Ga. It is recalled from early 2016 that he saw Dr. Rafael Daley, for exertional dyspnea, and had cardiac cath. Knee pain: \"Doing pretty good right now. \"   Sees Dr. Bae, and has had corticosteroid injections. Surgery on hold due to heart issues. He has prostate cancer, but doesn't want to treat this. He has had biopsy proven adenocarcinoma of prostate, by Dr. Tono Zavala with [facility]. As we noted before: At this time \"I aint doin' nothin'. If the PSA gets high, then I'll take the shots. \"  From our records, it appears he was referred to [facility] for consideration of radiation therapy. He refused this. Past Medical History: Hyperlipidemia, chronic bronchitis, allergic rhinitis, urolithiasis, hiatal hernia with GERD, ED, Asthma, prostate cancer 2012 (Dr. Tono Zavala). Normal stress echo in 2006; Echo 2010 showed LVH, EF 55-60%. Hemoptysis with negative workup in 2008saw Dr. Tyson Ashby. EGD in 2009 Dr. Andrew Swift showing Hiatal hernia. R lid chronic ptosis noted in 2009 by Dr. Manuel Grover, optometrist.  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 Had exertional chest pain in 2012, recurred in 2014--cardiac work up with Dr. Rafael Daley. His ophthalmologist is Dr. Janay Cobos. Past Surgical History: Hernia in 86. Kidney stone 85.   Appy (ruptured) in 79. Resection of melanoma. Colon polypectomy 2011 Dr. Dom Brumfield. A flutter ablation 2014 Dr. Manish Silva 2014 Dr. Nadia Go repair 2014 Dr. Sofi Beasley. Aortic valve replacement 2016. Cataracts 2016. Allergies: IVP dye (BP drop). Medications:  
Current Outpatient Medications on File Prior to Visit Medication Sig Dispense Refill  rosuvastatin (CRESTOR) 10 mg tablet TAKE 1 TABLET  EVERY  MONDAY  AND  THURSDAY 24 Tab 3  
 isosorbide mononitrate ER (IMDUR) 60 mg CR tablet TAKE 1 TABLET EVERY DAY 90 Tab 3  
 metoprolol tartrate (LOPRESSOR) 25 mg tablet TAKE 1/2 TABLET TWICE DAILY 90 Tab 3  
 omeprazole (PRILOSEC) 20 mg capsule TAKE 1 CAPSULE EVERY DAY 90 Cap 3  
 KLOR-CON M20 20 mEq tablet TAKE 1 TABLET TWICE DAILY (Patient taking differently: TAKE 1 TABLET TWICE DAILY-) 180 Tab 3  
 traMADol (ULTRAM) 50 mg tablet Take 50 mg by mouth every eight (8) hours as needed.  menthol 6 % gel by Apply Externally route as needed. \"Abdirashid\"--cool therapy pain gel  coenzyme q10 10 mg cap Take 100 mg by mouth every evening.  Aspirin, Buffered 81 mg tab Take 81 mg by mouth daily.  HYDROcodone-acetaminophen (NORCO) 5-325 mg per tablet Take 1 Tab by mouth every eight (8) hours as needed for Pain. Max Daily Amount: 3 Tabs. 20 Tab 0  
 guaiFENesin-codeine (ROBITUSSIN AC) 100-10 mg/5 mL solution Take 5 mL by mouth three (3) times daily as needed for Cough. Max Daily Amount: 15 mL. 120 mL 0 No current facility-administered medications on file prior to visit. Social History:  . Retired hol[employer]. Drinks a pint to 1 1/2 pints hard liquor daily. When last assessed. No tobacco, or drugs. Family History: F CVAs. Brother valve disease. Review of systems: Unremarkable except as noted above. Objective:   
Visit Vitals BP 99/61 (BP 1 Location: Right arm, BP Patient Position: Sitting) Pulse 69 Temp 97.8 °F (36.6 °C) (Oral) Resp 18 Ht 6' (1.829 m) Wt 242 lb (109.8 kg) SpO2 99% BMI 32.82 kg/m² [place] General appearance: Pleasant, obese elderly male in NAD. HEENT: PERRLA. EOMI. He has a mild right facial droop. OP moist, pink. Neck: Supple with No LAD. Lungs: CTAB. No wheezes. No rales. Heart: RRR. Pacemaker incision healed. Abdomen: S, NT, ND, BS +. Extremities: Warm. No C/C/E. Neuro: Sensation intact. Lab Results Component Value Date/Time Cholesterol, total 153 05/08/2019 08:23 AM  
 HDL Cholesterol 48 05/08/2019 08:23 AM  
 LDL, calculated 85 05/08/2019 08:23 AM  
 VLDL, calculated 20 05/08/2019 08:23 AM  
 Triglyceride 98 05/08/2019 08:23 AM  
 CHOL/HDL Ratio 3.6 09/17/2010 08:27 AM  
 
Lab Results Component Value Date/Time Hemoglobin A1c 6.4 (H) 05/08/2019 08:23 AM  
 
 
 
Assessment / Plan: 1. CAD: No CP. As per his cardiologist, Dr. Janes Dunham 2. A flutter, SSS: now with pacemaker. As per cardiology. 3. Aortic stenosis: s/p AVR. Doing well. 4. Asthma: Stable. Uses albuterol prn; Less than daily currently. 5. Prostate Cancer: Patient reaffirms desire for conservative approach today. Surveillance for now. Pt willing to do hormonal treatments if PSA rises to higher levels. His urologist has suggested a PSA of 20 or above as a triggering value 6. Problem drinking: Not discussed today. 7. Hyperlipidemia:  Not at goal, but options limited. Did not tolerate simvastatin or zetia. Now on fish oil. Will recheck lipids and CMP. Continue pulse dosing of crestor. 8. GERD:  Controlled on prn prilosec; Pt. may continue this. 9. ED: Not discussed. 10. All rhinitis: Stable. 11. Possible TIA/Diplopia/Amaurosis fugax: No recent episode. Work up as above. 12. Gout: no recent flares. 13. Borderline DM. Recheck labs. 14. Obesity: Watch diet. More exercise as able (limited now by postsurgical recovery). 15. Noncompliance. Follow up in 6 months.

## 2019-05-21 ENCOUNTER — OFFICE VISIT (OUTPATIENT)
Dept: CARDIOLOGY CLINIC | Age: 84
End: 2019-05-21

## 2019-05-21 VITALS
HEIGHT: 72 IN | WEIGHT: 238.6 LBS | OXYGEN SATURATION: 95 % | BODY MASS INDEX: 32.32 KG/M2 | DIASTOLIC BLOOD PRESSURE: 70 MMHG | RESPIRATION RATE: 16 BRPM | SYSTOLIC BLOOD PRESSURE: 120 MMHG | HEART RATE: 90 BPM

## 2019-05-21 DIAGNOSIS — Z98.890 S/P ABLATION OF ATRIAL FLUTTER: ICD-10-CM

## 2019-05-21 DIAGNOSIS — Z86.79 S/P ABLATION OF ATRIAL FLUTTER: ICD-10-CM

## 2019-05-21 DIAGNOSIS — I35.0 AORTIC VALVE STENOSIS, ETIOLOGY OF CARDIAC VALVE DISEASE UNSPECIFIED: Primary | ICD-10-CM

## 2019-05-21 DIAGNOSIS — I49.5 SSS (SICK SINUS SYNDROME) (HCC): ICD-10-CM

## 2019-05-21 DIAGNOSIS — Z95.2 S/P AVR (AORTIC VALVE REPLACEMENT): ICD-10-CM

## 2019-05-21 NOTE — PROGRESS NOTES
Esther Barajas DNP, ANP-BC  Subjective/HPI:     Evette Cazares is a 81 y.o. male is here for routine f/u. The patient denies chest pain/resting shortness of breath, orthopnea, PND, LE edema, palpitations, syncope, presyncope or fatigue. Patient reports small amount of dyspnea, getting over URI asthma type symptoms. Rare intermittent episodes of dizziness does not occur with positional change there have been no falls no visual disturbance headaches. He has had a history of a CVA, patient has a pacemaker    2016 cardiac cath  CARDIAC STRUCTURES:  --  Global left ventricular function was normal.  --  There was severe aortic stenosis. --  CORONARY CIRCULATION:  --  Distal left main: There was a 20 % stenosis. --  Proximal LAD: There was a tubular 20 % stenosis. PCP Provider  Adrian Magallon MD  Past Medical History:   Diagnosis Date    Adverse effect of anesthesia     difficult with waking up     Allergic rhinitis     Arthritis     Asthma     Atrial flutter ([facility] [address] 9/12/2014    CAD (coronary artery disease)     Dr. Simona Alanis Calculus of kidney     Chest pain 2006    Normal stress echo    Chronic bronchitis     Chronic bronchitis ([address] 10/3/2012    Chronic pain     knees    Erectile dysfunction     GERD (gastroesophageal reflux disease)     hiatal hernia    Gout 1/24/2013    Hemoptysis 2008    Work up by Dr. Juan Diego Daigle;  Negative    Hiatal hernia     Hypercholesterolemia     Hypertension     Ill-defined condition     bronchitis    Melanoma ([address]     back    Nausea & vomiting     Pacemaker     Dr. Oscar Atkinson [facility]) 6/4/2012    S/P ablation of atrial flutter 9/12/2014    Sleep apnea     states never had test for apnea    Stroke [facility]) 2009     tia no residual problems      Past Surgical History:   Procedure Laterality Date    CARDIAC SURG PROCEDURE UNLIST      ablation    HX AFIB ABLATION  2014    Dr. Chel Momin 1979    ruptured appendix    HX HEART CATHETERIZATION      HX HERNIA REPAIR  1986    HX HERNIA REPAIR  12/17/14    Recurrent left inguinal hernia; Dr. Laird Layer      5 hernia repairs total    HX PACEMAKER  12/2014    HX PACEMAKER PLACEMENT  2014    Dr. Kinga Lloyd    Kidney stone extraction    KS COLSC FLX W/RMVL OF TUMOR POLYP LESION SNARE TQ  4/21/2011          Allergies   Allergen Reactions    Azithromycin Anaphylaxis    Gadolinium-Containing Contrast Media Other (comments)     1) Moderate to Severe. 2) Post Gadolinium issues as noted:   - Diaphoretic, Near Syncope, Nausea and vomiting.  Acetaminophen Other (comments)     Indigestion,\"burning sensation\"    Contrast Dye [Iodine] Other (comments)     Decrease in BP    Levaquin [Levofloxacin] Other (comments)     Joint pain    Medrol [Methylprednisolone] Vertigo    Norvasc [Amlodipine] Other (comments)     Numbness and tingling    Nsaids (Non-Steroidal Anti-Inflammatory Drug) Other (comments)     Cough up blood      Percocet [Oxycodone-Acetaminophen] Itching    Ranexa [Ranolazine] Other (comments)     Cannot remember    Simvastatin Other (comments)     Joint pain    Voltaren [Diclofenac Sodium] Other (comments)     unknown    Zetia [Ezetimibe] Myalgia      Family History   Problem Relation Age of Onset    Stroke Father     Heart Disease Mother       Current Outpatient Medications   Medication Sig    albuterol (VENTOLIN HFA) 90 mcg/actuation inhaler Take 2 Puffs by inhalation every four (4) hours as needed for Wheezing.     furosemide (LASIX) 40 mg tablet TAKE 1 TABLET TWICE DAILY    rosuvastatin (CRESTOR) 10 mg tablet TAKE 1 TABLET  EVERY  MONDAY  AND  THURSDAY    isosorbide mononitrate ER (IMDUR) 60 mg CR tablet TAKE 1 TABLET EVERY DAY    metoprolol tartrate (LOPRESSOR) 25 mg tablet TAKE 1/2 TABLET TWICE DAILY    omeprazole (PRILOSEC) 20 mg capsule TAKE 1 CAPSULE EVERY DAY    KLOR-CON M20 20 mEq tablet TAKE 1 TABLET TWICE DAILY (Patient taking differently: TAKE 1 TABLET TWICE DAILY-)    guaiFENesin-codeine (ROBITUSSIN AC) 100-10 mg/5 mL solution Take 5 mL by mouth three (3) times daily as needed for Cough. Max Daily Amount: 15 mL.  traMADol (ULTRAM) 50 mg tablet Take 50 mg by mouth every eight (8) hours as needed.  menthol 6 % gel by Apply Externally route as needed. \"Abdirashid\"--cool therapy pain gel    coenzyme q10 10 mg cap Take 100 mg by mouth every evening.  Aspirin, Buffered 81 mg tab Take 81 mg by mouth daily.  HYDROcodone-acetaminophen (NORCO) 5-325 mg per tablet Take 1 Tab by mouth every eight (8) hours as needed for Pain. Max Daily Amount: 3 Tabs. No current facility-administered medications for this visit. Vitals:    05/21/19 1146 05/21/19 1147   BP: 104/70 120/70   Pulse: 90    Resp: 16    SpO2: 95%    Weight: 238 lb 9.6 oz (108.2 kg)    Height: 6' (1.829 m)      Social History     Socioeconomic History    Marital status:      Spouse name: Not on file    Number of children: Not on file    Years of education: Not on file    Highest education level: Not on file   Occupational History    Not on file   Social Needs    Financial resource strain: Not on file    Food insecurity:     Worry: Not on file     Inability: Not on file    Transportation needs:     Medical: Not on file     Non-medical: Not on file   Tobacco Use    Smoking status: Never Smoker    Smokeless tobacco: Never Used   Substance and Sexual Activity    Alcohol use:  Yes     Alcohol/week: 1.8 oz     Types: 1 Shots of liquor, 2 Standard drinks or equivalent per week     Comment: rare    Drug use: No     Types: OTC, Prescription    Sexual activity: Not Currently     Partners: Female   Lifestyle    Physical activity:     Days per week: Not on file     Minutes per session: Not on file    Stress: Not on file   Relationships    Social connections:     Talks on phone: Not on file     Gets together: Not on file     Attends Faith service: Not on file     Active member of club or organization: Not on file     Attends meetings of clubs or organizations: Not on file     Relationship status: Not on file    Intimate partner violence:     Fear of current or ex partner: Not on file     Emotionally abused: Not on file     Physically abused: Not on file     Forced sexual activity: Not on file   Other Topics Concern    Not on file   Social History Narrative    Not on file       I have reviewed the nurses notes, vitals, problem list, allergy list, medical history, family, social history and medications. Review of Symptoms:    General: Pt denies excessive weight gain or loss. Pt is able to conduct ADL's  HEENT: Denies blurred vision, headaches, epistaxis and difficulty swallowing. Respiratory: Denies shortness of breath, POLANCO, wheezing or stridor. Cardiovascular: Denies precordial pain, palpitations, edema or PND  Gastrointestinal: Denies poor appetite, indigestion, abdominal pain or blood in stool  Musculoskeletal: Bilateral knee osteoarthritis right greater than left  Neurologic: Denies tremor, intermittent dizziness has been present since CVA  Skin: Denies rash, itching or texture change. Physical Exam:      General: Well developed, in no acute distress, cooperative and alert  HEENT: No carotid bruits, no JVD, trach is midline. Neck Supple, t. Heart:  Normal S1/S2 negative S3 or S4. Regular, 1/6 systolic murmur, no gallop or rub.   Respiratory: Clear bilaterally x 4, no wheezing or rales  Abdomen:   Soft, non-tender, no masses, bowel sounds are active.   Extremities:  No edema, normal cap refill, no cyanosis, atraumatic. Neuro: A&Ox3, speech clear, gait stable. Skin: Skin color is normal. No rashes or lesions.  Non diaphoretic  Vascular: 2+ pulses symmetric in all extremities    Cardiographics    ECG: Pacemaker  Results for orders placed or performed during the hospital encounter of 03/16/16   EKG, 12 LEAD, INITIAL   Result Value Ref Range    Ventricular Rate 60 BPM    Atrial Rate 60 BPM    P-R Interval 208 ms    QRS Duration 172 ms    Q-T Interval 528 ms    QTC Calculation (Bezet) 528 ms    Calculated P Axis 71 degrees    Calculated R Axis -72 degrees    Calculated T Axis 37 degrees    Diagnosis       Normal sinus rhythm  Right bundle branch block  Left anterior fascicular block  ** Bifascicular block **  Left ventricular hypertrophy with QRS widening  When compared with ECG of 10-MAR-2016 14:56,  Sinus rhythm has replaced Electronic atrial pacemaker  Right bundle branch block is now present  Minimal criteria for Septal infarct are no longer present  Confirmed by Raad Waterman ([id number]) on 3/16/2016 1:57:21 PM     Results for orders placed or performed in visit on 12/04/14   CARDIAC HOLTER MONITOR, 24 HOURS    Narrative    ECG Monitor/24 hours, Complete    Reason for Holter Monitor   A-FLUTTER    Heartbeat    Slowest 48  Average 63  Fastest  97      Results:   Underlying Rhythm: Normal sinus rhythm      Atrial Arrhythmias: premature atrial contractions; frequent             AV Conduction: normal    Ventricular Arrhythmias: premature ventricular contractions; rare    ST Segment Analysis:normal     Symptom Correlation:  None reported    Comment:   Sinus rhythm with occasional atrial ectopy - morning bradycardia  of 48 bpm and highest heart rate of 97 bpm. Clinical correlation  advised.      Venecia Hook MD, [facility]                    Cardiology Labs:  Lab Results   Component Value Date/Time    Cholesterol, total 153 05/08/2019 08:23 AM    HDL Cholesterol 48 05/08/2019 08:23 AM    LDL, calculated 85 05/08/2019 08:23 AM    Triglyceride 98 05/08/2019 08:23 AM    CHOL/HDL Ratio 3.6 09/17/2010 08:27 AM       Lab Results   Component Value Date/Time    Sodium 141 05/08/2019 08:23 AM    Potassium 4.1 05/08/2019 08:23 AM    Chloride 99 05/08/2019 08:23 AM    CO2 28 05/08/2019 08:23 AM    Anion gap 8 03/14/2017 11:02 AM    Glucose 117 (H) 05/08/2019 08:23 AM    BUN 19 05/08/2019 08:23 AM    Creatinine 1.11 05/08/2019 08:23 AM    BUN/Creatinine ratio 17 05/08/2019 08:23 AM    GFR est AA 71 05/08/2019 08:23 AM    GFR est non-AA 61 05/08/2019 08:23 AM    Calcium 9.3 05/08/2019 08:23 AM    Bilirubin, total 0.5 05/08/2019 08:23 AM    AST (SGOT) 21 05/08/2019 08:23 AM    Alk. phosphatase 74 05/08/2019 08:23 AM    Protein, total 6.5 05/08/2019 08:23 AM    Albumin 4.0 05/08/2019 08:23 AM    Globulin 3.7 03/14/2017 11:02 AM    A-G Ratio 1.6 05/08/2019 08:23 AM    ALT (SGPT) 16 05/08/2019 08:23 AM           Assessment:     Assessment:     Diagnoses and all orders for this visit:    1. Aortic valve stenosis, etiology of cardiac valve disease unspecified    2. SSS (sick sinus syndrome) ([facility] [address]    3. S/P ablation of atrial flutter    4. S/P AVR (aortic valve replacement)        ICD-10-CM ICD-9-CM    1. Aortic valve stenosis, etiology of cardiac valve disease unspecified I35.0 424.1    2. SSS (sick sinus syndrome) ([facility]) I49.5 427.81    3. S/P ablation of atrial flutter Z98.890 V45.89     Z86.79     4. S/P AVR (aortic valve replacement) Z95.2 V43.3      No orders of the defined types were placed in this encounter. Plan:     1. History of severe aortic stenosis he is post bioprosthetic AVR functioning well on recent echocardiogram normal ejection fraction. 2.  Sick sinus syndrome with a flutter ablation: Paced, recent device check in May reviewed. 3.  Shortness of breath: Multifactorial given recent URI, history of asthma. He has a normal ejection fraction, minimal atherosclerotic heart disease on preoperative cath in 2016. 4.  Hyperlipidemia: On statin therapy LDL 85  5. Hypertension: Controlled continue current therapy  Follow-up in 1 year. Belen Sullivan NP    This note was created using voice recognition software. Despite editing, there may be syntax errors.        Patient seen and examined by me with nurse practitioner. Isac Gotti personally performed all components of the history, physical, and medical decision making and agree with the assessment and plan with minor modifications as noted.     Doug Aleman MD

## 2019-05-21 NOTE — PROGRESS NOTES
1. Have you been to the ER, urgent care clinic since your last visit? Hospitalized since your last visit? No    2. Have you seen or consulted any other health care providers outside of the [address] [place] [address] since your last visit? Include any pap smears or colon screening. Chief Complaint   Patient presents with    Results    Coronary Artery Disease     Patient here for review of recent testing C/O SOB and dizziness at times.

## 2019-07-31 ENCOUNTER — OFFICE VISIT (OUTPATIENT)
Dept: CARDIOLOGY CLINIC | Age: 84
End: 2019-07-31

## 2019-07-31 DIAGNOSIS — Z95.0 PACEMAKER: Primary | ICD-10-CM

## 2019-07-31 DIAGNOSIS — I49.5 SSS (SICK SINUS SYNDROME) (HCC): ICD-10-CM

## 2019-09-04 ENCOUNTER — OFFICE VISIT (OUTPATIENT)
Dept: INTERNAL MEDICINE CLINIC | Age: 84
End: 2019-09-04

## 2019-09-04 ENCOUNTER — HOSPITAL ENCOUNTER (OUTPATIENT)
Dept: LAB | Age: 84
Discharge: HOME | End: 2019-09-04
Payer: MEDICARE

## 2019-09-04 VITALS
OXYGEN SATURATION: 95 % | DIASTOLIC BLOOD PRESSURE: 70 MMHG | TEMPERATURE: 97.7 F | RESPIRATION RATE: 18 BRPM | WEIGHT: 243.4 LBS | HEART RATE: 75 BPM | HEIGHT: 72 IN | BODY MASS INDEX: 32.97 KG/M2 | SYSTOLIC BLOOD PRESSURE: 108 MMHG

## 2019-09-04 DIAGNOSIS — R68.89 OTHER GENERAL SYMPTOMS AND SIGNS: ICD-10-CM

## 2019-09-04 DIAGNOSIS — R53.83 MALAISE AND FATIGUE: Primary | ICD-10-CM

## 2019-09-04 DIAGNOSIS — R53.81 MALAISE AND FATIGUE: Primary | ICD-10-CM

## 2019-09-04 LAB
BILIRUB UR QL STRIP: NEGATIVE
GLUCOSE UR-MCNC: NEGATIVE MG/DL
KETONES P FAST UR STRIP-MCNC: NEGATIVE MG/DL
PH UR STRIP: 6.5 [PH] (ref 4.6–8)
PROT UR QL STRIP: NEGATIVE
SP GR UR STRIP: 1 (ref 1–1.03)
UA UROBILINOGEN AMB POC: NORMAL (ref 0.2–1)
URINALYSIS CLARITY POC: CLEAR
URINALYSIS COLOR POC: YELLOW
URINE BLOOD POC: NEGATIVE
URINE LEUKOCYTES POC: NORMAL
URINE NITRITES POC: NEGATIVE

## 2019-09-04 PROCEDURE — 85651 RBC SED RATE NONAUTOMATED: CPT

## 2019-09-04 PROCEDURE — 87086 URINE CULTURE/COLONY COUNT: CPT

## 2019-09-04 PROCEDURE — 85025 COMPLETE CBC W/AUTO DIFF WBC: CPT

## 2019-09-04 PROCEDURE — 80053 COMPREHEN METABOLIC PANEL: CPT

## 2019-09-04 PROCEDURE — 82607 VITAMIN B-12: CPT

## 2019-09-04 PROCEDURE — 36415 COLL VENOUS BLD VENIPUNCTURE: CPT

## 2019-09-04 PROCEDURE — 84443 ASSAY THYROID STIM HORMONE: CPT

## 2019-09-04 NOTE — PROGRESS NOTES
1. Have you been to the ER, urgent care clinic since your last visit? Hospitalized since your last visit?no    2. Have you seen or consulted any other health care providers outside of the [address] [place] [address] since your last visit? Include any pap smears or colon screening.  no

## 2019-09-04 NOTE — PROGRESS NOTES
SUBJECTIVE  Mr. Erick Urias presents today acutely for     Chief Complaint   Patient presents with    Fatigue     pt here today c/o feeling tired and dizzy; pt feels short winded and feels worn out; pt has been feeling like this since Sunday     \"I don't feel worth a damn. \"  He has been feeling lightheaded. Fatigue: \"I have no energy. \"    He gets winded easily. No F/C. He had a tightness in chest once, but it was relieved with a belch. No N/V, but appetite is a bit diminished. OBJECTIVE  Visit Vitals  /57 (BP 1 Location: Left arm, BP Patient Position: Sitting)   Pulse 72   Temp 97.7 °F (36.5 °C) (Oral)   Resp 18   Ht 6' (1.829 m)   Wt 243 lb 6.4 oz (110.4 kg)   SpO2 95%   BMI 33.01 kg/m²     Gen: Pleasant 81 y.o.  male in NAD.  Ambulates with cane.  HEENT: PERRLA. EOMI. OP moist and pink.  Neck: Supple.  No LAD.  HEART: RRR, No M/G/R.   LUNGS: CTAB No W/R.   ABDOMEN: S, NT, ND, BS+.   EXTREMITIES: Warm. No C/C/E. MUSCULOSKELETAL: Normal ROM, muscle strength 5/5 all groups. NEURO: Somewhat [place]. Alert and oriented x 3.  Cranial nerves grossly intact.  No focal sensory or motor deficits noted. SKIN: Warm. Dry. No rashes or other lesions noted. ASSESSMENT / PLAN    ICD-10-CM ICD-9-CM    1. Malaise and fatigue K80.62 800.57 METABOLIC PANEL, COMPREHENSIVE    R53.83  CBC WITH AUTOMATED DIFF      SED RATE (ESR)      TSH 3RD GENERATION      VITAMIN B12      AMB POC URINALYSIS DIP STICK AUTO W/O MICRO   2. Other general symptoms and signs  R68.89 780.99 VITAMIN B12      AMB POC URINALYSIS DIP STICK AUTO W/O MICRO       I have reviewed with the patient details of the assessment and plan and all questions were answered. Relevant patient education was performed. F/u as planned.

## 2019-09-05 LAB
ALBUMIN SERPL-MCNC: 4.2 G/DL (ref 3.5–4.7)
ALBUMIN/GLOB SERPL: 1.8 {RATIO} (ref 1.2–2.2)
ALP SERPL-CCNC: 71 IU/L (ref 39–117)
ALT SERPL-CCNC: 14 IU/L (ref 0–44)
AST SERPL-CCNC: 18 IU/L (ref 0–40)
BASOPHILS # BLD AUTO: 0.1 X10E3/UL (ref 0–0.2)
BASOPHILS NFR BLD AUTO: 1 %
BILIRUB SERPL-MCNC: 0.2 MG/DL (ref 0–1.2)
BUN SERPL-MCNC: 18 MG/DL (ref 8–27)
BUN/CREAT SERPL: 16 (ref 10–24)
CALCIUM SERPL-MCNC: 9.2 MG/DL (ref 8.6–10.2)
CHLORIDE SERPL-SCNC: 101 MMOL/L (ref 96–106)
CO2 SERPL-SCNC: 25 MMOL/L (ref 20–29)
CREAT SERPL-MCNC: 1.1 MG/DL (ref 0.76–1.27)
EOSINOPHIL # BLD AUTO: 0.6 X10E3/UL (ref 0–0.4)
EOSINOPHIL NFR BLD AUTO: 8 %
ERYTHROCYTE [DISTWIDTH] IN BLOOD BY AUTOMATED COUNT: 12.7 % (ref 12.3–15.4)
ERYTHROCYTE [SEDIMENTATION RATE] IN BLOOD BY WESTERGREN METHOD: 6 MM/HR (ref 0–30)
GLOBULIN SER CALC-MCNC: 2.4 G/DL (ref 1.5–4.5)
GLUCOSE SERPL-MCNC: 83 MG/DL (ref 65–99)
HCT VFR BLD AUTO: 41.9 % (ref 37.5–51)
HGB BLD-MCNC: 13.9 G/DL (ref 13–17.7)
IMM GRANULOCYTES # BLD AUTO: 0 X10E3/UL (ref 0–0.1)
IMM GRANULOCYTES NFR BLD AUTO: 0 %
LYMPHOCYTES # BLD AUTO: 2.4 X10E3/UL (ref 0.7–3.1)
LYMPHOCYTES NFR BLD AUTO: 32 %
MCH RBC QN AUTO: 31.5 PG (ref 26.6–33)
MCHC RBC AUTO-ENTMCNC: 33.2 G/DL (ref 31.5–35.7)
MCV RBC AUTO: 95 FL (ref 79–97)
MONOCYTES # BLD AUTO: 0.6 X10E3/UL (ref 0.1–0.9)
MONOCYTES NFR BLD AUTO: 9 %
NEUTROPHILS # BLD AUTO: 3.8 X10E3/UL (ref 1.4–7)
NEUTROPHILS NFR BLD AUTO: 50 %
PLATELET # BLD AUTO: 204 X10E3/UL (ref 150–450)
POTASSIUM SERPL-SCNC: 4.2 MMOL/L (ref 3.5–5.2)
PROT SERPL-MCNC: 6.6 G/DL (ref 6–8.5)
RBC # BLD AUTO: 4.41 X10E6/UL (ref 4.14–5.8)
SODIUM SERPL-SCNC: 143 MMOL/L (ref 134–144)
TSH SERPL DL<=0.005 MIU/L-ACNC: 7.14 UIU/ML (ref 0.45–4.5)
VIT B12 SERPL-MCNC: 269 PG/ML (ref 232–1245)
WBC # BLD AUTO: 7.6 X10E3/UL (ref 3.4–10.8)

## 2019-09-05 NOTE — TELEPHONE ENCOUNTER
----- Message from Toni Guillen sent at 9/5/2019  1:23 PM EDT -----  Regarding: Dr. Cheyenne Jeffries  General Message/Vendor Calls    Caller's first and last name:      Reason for call:      Callback required yes/no and why: yes      Best contact number(s): [phone]      Details to clarify the request: Patient is checking the status of his prescription for an infection in his left ear.       Toni Guillen

## 2019-09-05 NOTE — PROGRESS NOTES
Called, spoke with Lily Veras (HIPAA). Two pt identifiers confirmed. Lily informed per Dr. Leyda Ba Pt has hypothyroid and he wants pt to start taking levothyroxine 50mcg daily. Lily confirmed rx to go to [facility] on file. Advised Lily medicine should be taken before breakfast every morning. Lily verbalized understanding of information discussed w/ no further questions at this time.

## 2019-09-06 LAB — BACTERIA UR CULT: NORMAL

## 2019-09-06 RX ORDER — LEVOTHYROXINE SODIUM 50 UG/1
50 TABLET ORAL
Qty: 90 TAB | Refills: 1 | Status: SHIPPED | OUTPATIENT
Start: 2019-09-06 | End: 2019-10-15

## 2019-09-06 NOTE — TELEPHONE ENCOUNTER
2 pt identifiers verified, pt informed that urine culture came back negative- so no antibiotic was needed per Dr Ferrell Mess & prescription for thyroid medication has been sent to [facility].

## 2019-10-15 ENCOUNTER — OFFICE VISIT (OUTPATIENT)
Dept: INTERNAL MEDICINE CLINIC | Age: 84
End: 2019-10-15

## 2019-10-15 VITALS
OXYGEN SATURATION: 95 % | DIASTOLIC BLOOD PRESSURE: 53 MMHG | TEMPERATURE: 97.7 F | BODY MASS INDEX: 33.35 KG/M2 | SYSTOLIC BLOOD PRESSURE: 92 MMHG | HEART RATE: 70 BPM | RESPIRATION RATE: 16 BRPM | HEIGHT: 72 IN | WEIGHT: 246.2 LBS

## 2019-10-15 DIAGNOSIS — R73.9 BORDERLINE HYPERGLYCEMIA: ICD-10-CM

## 2019-10-15 DIAGNOSIS — M10.9 GOUT, UNSPECIFIED CAUSE, UNSPECIFIED CHRONICITY, UNSPECIFIED SITE: ICD-10-CM

## 2019-10-15 DIAGNOSIS — E03.9 ACQUIRED HYPOTHYROIDISM: ICD-10-CM

## 2019-10-15 DIAGNOSIS — I25.10 CORONARY ARTERY DISEASE INVOLVING NATIVE CORONARY ARTERY OF NATIVE HEART WITHOUT ANGINA PECTORIS: Primary | ICD-10-CM

## 2019-10-15 DIAGNOSIS — K21.9 GASTROESOPHAGEAL REFLUX DISEASE WITHOUT ESOPHAGITIS: ICD-10-CM

## 2019-10-15 DIAGNOSIS — E78.00 HYPERCHOLESTEROLEMIA: ICD-10-CM

## 2019-10-15 PROBLEM — I25.118 CORONARY ARTERY DISEASE WITH STABLE ANGINA PECTORIS (HCC): Status: ACTIVE | Noted: 2019-10-15

## 2019-10-15 RX ORDER — LEVOTHYROXINE SODIUM 50 UG/1
50 TABLET ORAL
Qty: 90 TAB | Refills: 1 | Status: SHIPPED | OUTPATIENT
Start: 2019-10-15 | End: 2019-10-15 | Stop reason: SDUPTHER

## 2019-10-15 RX ORDER — LEVOTHYROXINE SODIUM 75 UG/1
75 TABLET ORAL
Qty: 90 TAB | Refills: 3 | Status: SHIPPED | OUTPATIENT
Start: 2019-10-15 | End: 2019-10-15

## 2019-10-15 RX ORDER — LEVOTHYROXINE SODIUM 50 UG/1
50 TABLET ORAL
Qty: 90 TAB | Refills: 1 | Status: SHIPPED | OUTPATIENT
Start: 2019-10-15 | End: 2020-04-08

## 2019-10-15 NOTE — PROGRESS NOTES
SUBJECTIVE  Mr. Romana Finely is a 81 y.o. male patient of mine who presents today acutely for routine follow up visit. Chief Complaint   Patient presents with    Blood Pressure Check     pt here today for blood pressue issue and disscuss thyroid medication      He got steroid injections yesterday by orthopedist. Afterward felt weak. He noted that his BP shot up to 150s/80s. Chronic bronchitis has been doing well: He has seen Dr. Connie Guerrero. Uses albuterol prn. Doing better. He has undergone valve replacement in March 2016 with Dr. Evaristo French. It is recalled from early 2016 that he saw Dr. Moise Pollard, for exertional dyspnea, and had cardiac cath. Knee pain: \"Doing pretty good right now. \"   Sees Dr. Shama Chávez, and has had corticosteroid injections. Surgery on hold due to heart issues. He has prostate cancer, but doesn't want to treat this. He has had biopsy proven adenocarcinoma of prostate, by Dr. Nikos Baez with [facility]. As we noted before: At this time \"I aint doin' nothin'. If the PSA gets high, then I'll take the shots. \"  From our records, it appears he was referred to [facility] for consideration of radiation therapy. He refused this. Past Medical History: Hyperlipidemia, chronic bronchitis, allergic rhinitis, urolithiasis, hiatal hernia with GERD, ED, Asthma, prostate cancer 2012 (Dr. Nikos Baze). Normal stress echo in 2006; Echo 2010 showed LVH, EF 55-60%. Hemoptysis with negative workup in 2008--saw Dr. Jamra Berger. EGD in 2009 Dr. Jatinder Conrad showing Hiatal hernia. R lid chronic ptosis noted in 2009 by Dr. Naheed Renee, optometrist.  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 Had exertional chest pain in 2012, recurred in 2014--cardiac work up with Dr. Moise Pollard. His ophthalmologist is Dr. Hazel Miles. Past Surgical History: Hernia in 86. Kidney stone 85. Appy (ruptured) in 79.  Resection of melanoma. Colon polypectomy 2011 Dr. Fiona Mayer. A flutter ablation 2014 Dr. Mike [facility] 2014 Dr. Kaylee Polanco repair 2014 Dr. Roxei Patel. Aortic valve replacement 2016. Cataracts 2016. Allergies: IVP dye (BP drop). Medications:   Current Outpatient Medications on File Prior to Visit   Medication Sig Dispense Refill    albuterol (VENTOLIN HFA) 90 mcg/actuation inhaler Take 2 Puffs by inhalation every four (4) hours as needed for Wheezing. 3 Inhaler 1    furosemide (LASIX) 40 mg tablet TAKE 1 TABLET TWICE DAILY 180 Tab 1    rosuvastatin (CRESTOR) 10 mg tablet TAKE 1 TABLET  EVERY  MONDAY  AND  THURSDAY 24 Tab 3    isosorbide mononitrate ER (IMDUR) 60 mg CR tablet TAKE 1 TABLET EVERY DAY 90 Tab 3    omeprazole (PRILOSEC) 20 mg capsule TAKE 1 CAPSULE EVERY DAY 90 Cap 3    KLOR-CON M20 20 mEq tablet TAKE 1 TABLET TWICE DAILY (Patient taking differently: TAKE 1 TABLET TWICE DAILY-) 180 Tab 3    traMADol (ULTRAM) 50 mg tablet Take 50 mg by mouth every eight (8) hours as needed.  coenzyme q10 10 mg cap Take 100 mg by mouth every evening.  Aspirin, Buffered 81 mg tab Take 81 mg by mouth daily. No current facility-administered medications on file prior to visit. Social History:  . Retired hol[employer]. Drinks a pint to 1 1/2 pints hard liquor daily. When last assessed. No tobacco, or drugs. Family History: F CVAs. Brother valve disease. Review of systems: Unremarkable except as noted above. Objective:    Visit Vitals  BP 92/53 (BP 1 Location: Left arm, BP Patient Position: Sitting)   Pulse 70   Temp 97.7 °F (36.5 °C) (Oral)   Resp 16   Ht 6' (1.829 m)   Wt 246 lb 3.2 oz (111.7 kg)   SpO2 95%   BMI 33.39 kg/m²   . General appearance: Pleasant, obese elderly male in NAD. HEENT: PERRLA. EOMI. He has a mild right facial droop. OP moist, pink. Neck: Supple with No LAD. Lungs: CTAB. No wheezes. No rales. Heart: RRR. Pacemaker incision healed. Abdomen: S, NT, ND, BS +. Extremities: Warm. No C/C/E. Neuro: Sensation intact. Lab Results   Component Value Date/Time    Cholesterol, total 153 05/08/2019 08:23 AM    HDL Cholesterol 48 05/08/2019 08:23 AM    LDL, calculated 85 05/08/2019 08:23 AM    VLDL, calculated 20 05/08/2019 08:23 AM    Triglyceride 98 05/08/2019 08:23 AM    CHOL/HDL Ratio 3.6 09/17/2010 08:27 AM     Lab Results   Component Value Date/Time    Hemoglobin A1c 6.4 (H) 05/08/2019 08:23 AM         Assessment / Plan:   1. BP normal today. 2. CAD: No CP. As per his cardiologist, Dr. Rajinder Keith  3. A flutter, SSS: now with pacemaker. As per cardiology. 4. Aortic stenosis: s/p AVR. Doing well. 5. Asthma: Stable. Uses albuterol prn; Less than daily currently. 6. Prostate Cancer: Patient reaffirms desire for conservative approach today. Surveillance for now. Pt willing to do hormonal treatments if PSA rises to higher levels. His urologist has suggested a PSA of 20 or above as a triggering value  7. Problem drinking: Not discussed today. 8. Hyperlipidemia:  Not at goal, but options limited. Did not tolerate simvastatin or zetia. Now on fish oil. Will recheck lipids and CMP. Continue pulse dosing of crestor. 9. GERD:  Controlled on prn prilosec; Pt. may continue this. 10. ED: Not discussed. 11. All rhinitis: Stable. 12. Possible TIA/Diplopia/Amaurosis fugax: No recent episode. Work up as above. 13. Gout: no recent flares. 14. Borderline DM. Recheck labs. 15. Obesity: Watch diet. More exercise as able (limited now by postsurgical recovery). 16. Noncompliance. Follow up in 6 months.

## 2019-10-15 NOTE — PROGRESS NOTES
1. Have you been to the ER, urgent care clinic since your last visit? Hospitalized since your last visit?no    2. Have you seen or consulted any other health care providers outside of the [address] [place] [address] since your last visit? Include any pap smears or colon screening.  ortho

## 2019-10-18 DIAGNOSIS — I25.10 CORONARY ARTERY DISEASE INVOLVING NATIVE CORONARY ARTERY OF NATIVE HEART WITHOUT ANGINA PECTORIS: ICD-10-CM

## 2019-10-21 RX ORDER — ISOSORBIDE MONONITRATE 60 MG/1
TABLET, EXTENDED RELEASE ORAL
Qty: 90 TAB | Refills: 3 | Status: SHIPPED | OUTPATIENT
Start: 2019-10-21 | End: 2020-07-16

## 2019-10-21 RX ORDER — POTASSIUM CHLORIDE 20 MEQ/1
TABLET, EXTENDED RELEASE ORAL
Qty: 180 TAB | Refills: 3 | Status: SHIPPED | OUTPATIENT
Start: 2019-10-21 | End: 2020-07-16

## 2019-10-21 RX ORDER — METOPROLOL TARTRATE 25 MG/1
TABLET, FILM COATED ORAL
Qty: 90 TAB | Refills: 3 | Status: SHIPPED | OUTPATIENT
Start: 2019-10-21 | End: 2020-07-16

## 2019-10-21 RX ORDER — OMEPRAZOLE 20 MG/1
CAPSULE, DELAYED RELEASE ORAL
Qty: 90 CAP | Refills: 3 | Status: SHIPPED | OUTPATIENT
Start: 2019-10-21 | End: 2020-07-16

## 2019-11-06 ENCOUNTER — OFFICE VISIT (OUTPATIENT)
Dept: CARDIOLOGY CLINIC | Age: 84
End: 2019-11-06

## 2019-11-06 DIAGNOSIS — I44.1 MOBITZ II: ICD-10-CM

## 2019-11-06 DIAGNOSIS — Z95.0 CARDIAC PACEMAKER IN SITU: Primary | ICD-10-CM

## 2019-12-13 RX ORDER — ROSUVASTATIN CALCIUM 10 MG/1
TABLET, COATED ORAL
Qty: 24 TAB | Refills: 0 | Status: SHIPPED | OUTPATIENT
Start: 2019-12-13 | End: 2020-05-01

## 2019-12-18 DIAGNOSIS — I25.10 CORONARY ARTERY DISEASE INVOLVING NATIVE CORONARY ARTERY OF NATIVE HEART WITHOUT ANGINA PECTORIS: ICD-10-CM

## 2019-12-20 RX ORDER — FUROSEMIDE 40 MG/1
TABLET ORAL
Qty: 180 TAB | Refills: 1 | Status: SHIPPED | OUTPATIENT
Start: 2019-12-20 | End: 2020-05-01

## 2020-01-03 ENCOUNTER — TELEPHONE (OUTPATIENT)
Dept: INTERNAL MEDICINE CLINIC | Age: 85
End: 2020-01-03

## 2020-01-03 NOTE — TELEPHONE ENCOUNTER
Called, spoke with Pt. Two pt identifiers confirmed. Pt first stated his wrist hurts. Pt put his wife on the phone. Lily (HIPAA) states she believes patient takes too much Tramadol. Lily states patient has pain in his wrist and all over and would like him to see a doctor for pain. Referred patient to Dr. Darnell Draper (information given for his office). Pt verbalized understanding of information discussed w/ no further questions at this time.

## 2020-01-03 NOTE — TELEPHONE ENCOUNTER
#[phone] pt states he has pain all over pretty much. Hands, knees and general body aches. Pt would like to discuss today due to pain level. He states perhaps he needs to see a pain specialist.  Can you refer one? Please call back today pt is asking. Thanks.

## 2020-02-05 ENCOUNTER — OFFICE VISIT (OUTPATIENT)
Dept: CARDIOLOGY CLINIC | Age: 85
End: 2020-02-05

## 2020-02-05 DIAGNOSIS — I44.1 MOBITZ II: ICD-10-CM

## 2020-02-05 DIAGNOSIS — Z95.0 CARDIAC PACEMAKER IN SITU: Primary | ICD-10-CM

## 2020-02-25 NOTE — PROGRESS NOTES
Subjective:     Bhaskar Awad is a 9 y.o. male who presents for Nausea (vomiting, fever, headache since saturday)       Nausea   This is a new problem. The current episode started in the past 7 days. The problem occurs constantly. The problem has been gradually worsening. Associated symptoms include abdominal pain, congestion, coughing, a fever, nausea and vomiting. Pertinent negatives include no rash.     Past Medical History:   Diagnosis Date   • Strep throat    History reviewed. No pertinent surgical history.  Social History     Lifestyle   • Physical activity     Days per week: Not on file     Minutes per session: Not on file   • Stress: Not on file   Relationships   • Social connections     Talks on phone: Not on file     Gets together: Not on file     Attends Buddhist service: Not on file     Active member of club or organization: Not on file     Attends meetings of clubs or organizations: Not on file     Relationship status: Not on file   • Intimate partner violence     Fear of current or ex partner: Not on file     Emotionally abused: Not on file     Physically abused: Not on file     Forced sexual activity: Not on file   Other Topics Concern   • Interpersonal relationships Not Asked   • Poor school performance Not Asked   • Reading difficulties Not Asked   • Speech difficulties Not Asked   • Writing difficulties Not Asked   • Inadequate sleep Not Asked   • Excessive TV viewing Not Asked   • Excessive video game use Not Asked   • Inadequate exercise Not Asked   • Sports related Not Asked   • Poor diet Not Asked   • Second-hand smoke exposure Not Asked   • Family concerns for drug/alcohol abuse Not Asked   • Violence concerns Not Asked   • Poor oral hygiene Not Asked   • Bike safety Not Asked   • Family concerns vehicle safety Not Asked   Social History Narrative    ** Merged History Encounter **         History reviewed. No pertinent family history. Review of Systems   Constitutional: Positive for  Chief Complaint   Patient presents with    Irregular Heart Beat      6 month f/u         1. Have you been to the ER, urgent care clinic since your last visit? Hospitalized since your last visit? no    2. Have you seen or consulted any other health care providers outside of the [address] [place] [address] since your last visit? Include any pap smears or colon screening.   no "fever.   HENT: Positive for congestion.    Respiratory: Positive for cough.    Gastrointestinal: Positive for abdominal pain, nausea and vomiting.   Skin: Negative for rash.   All other systems reviewed and are negative.  No Known Allergies   Objective:   Pulse 104   Temp (!) 38.9 °C (102 °F)   Ht 1.283 m (4' 2.5\")   Wt 22.6 kg (49 lb 12.8 oz)   SpO2 92%   BMI 13.73 kg/m²   Physical Exam  Constitutional:       General: He is active. He is not in acute distress.     Appearance: He is well-developed. He is not toxic-appearing.   HENT:      Head: Normocephalic and atraumatic.      Right Ear: Tympanic membrane normal.      Left Ear: Tympanic membrane normal.      Nose: Mucosal edema, congestion and rhinorrhea present.      Right Nostril: No foreign body.      Left Nostril: No foreign body.      Mouth/Throat:      Pharynx: Posterior oropharyngeal erythema present. No oropharyngeal exudate.   Eyes:      Conjunctiva/sclera: Conjunctivae normal.      Pupils: Pupils are equal, round, and reactive to light.   Neck:      Musculoskeletal: Normal range of motion and neck supple.   Cardiovascular:      Rate and Rhythm: Normal rate and regular rhythm.      Heart sounds: S1 normal and S2 normal.   Pulmonary:      Effort: Pulmonary effort is normal. No respiratory distress.      Breath sounds: Normal breath sounds.   Abdominal:      General: Bowel sounds are normal. There is no distension.      Palpations: Abdomen is soft.      Tenderness: There is abdominal tenderness (mild epigastric tend).   Skin:     General: Skin is warm and dry.   Neurological:      Mental Status: He is alert and oriented for age.      Sensory: Sensory deficit present.   Psychiatric:         Mood and Affect: Mood normal.         Behavior: Behavior normal.           Assessment/Plan:   Assessment    1. Viral infection  - acetaminophen (TYLENOL) 160 MG/5ML liquid 339.2 mg  - ibuprofen (MOTRIN) oral suspension 226 mg    2. Non-intractable vomiting with " nausea, unspecified vomiting type  - ondansetron (ZOFRAN ODT) 4 MG TABLET DISPERSIBLE; Take 1 Tab by mouth every 6 hours as needed for Nausea.  Dispense: 10 Tab; Refill: 0    3. Epigastric pain    High risk conditions including flu, pneumonia, early sepsis was considered in the differential diagnosis  Differential diagnosis, natural history, supportive care, and indications for immediate follow-up discussed.

## 2020-02-26 ENCOUNTER — OFFICE VISIT (OUTPATIENT)
Dept: INTERNAL MEDICINE CLINIC | Age: 85
End: 2020-02-26

## 2020-02-26 VITALS
SYSTOLIC BLOOD PRESSURE: 94 MMHG | DIASTOLIC BLOOD PRESSURE: 55 MMHG | OXYGEN SATURATION: 95 % | BODY MASS INDEX: 32.66 KG/M2 | WEIGHT: 241.1 LBS | RESPIRATION RATE: 16 BRPM | HEART RATE: 70 BPM | TEMPERATURE: 97.8 F | HEIGHT: 72 IN

## 2020-02-26 DIAGNOSIS — M26.622 ARTHRALGIA OF LEFT TEMPOROMANDIBULAR JOINT: Primary | ICD-10-CM

## 2020-02-26 DIAGNOSIS — L98.9 LESION OF SKIN OF FOOT: ICD-10-CM

## 2020-02-26 NOTE — PROGRESS NOTES
SUBJECTIVE  Mr. Rylee Link presents today acutely for     Chief Complaint   Patient presents with    Jaw Pain     pt here today c/o pain in L jaw since last Wednesday     Pain is located at the temporal-mandibular joint. Also he has a lesion on dorsum of L foot, which bothers him. \"I feel like something's in there and I can't get it out. \"  He covers it with bandaid and uses neosporin. It is red, raised, irritated. OBJECTIVE  Visit Vitals  BP 94/55 (BP 1 Location: Left arm, BP Patient Position: Sitting)   Pulse 70   Temp 97.8 °F (36.6 °C) (Oral)   Resp 16   Ht 6' (1.829 m)   Wt 241 lb 1.6 oz (109.4 kg)   SpO2 95%   BMI 32.70 kg/m²     Gen: Pleasant 81 y.o.  male in NAD.   HEENT: PERRLA. EOMI. OP moist and pink.  Neck: Supple.  No LAD.  HEART: RRR, No M/G/R.   LUNGS: CTAB No W/R.   ABDOMEN: S, NT, ND, BS+.   EXTREMITIES: Warm. No C/C/E. MUSCULOSKELETAL: Normal ROM, muscle strength 5/5 all groups. NEURO: Alert and oriented x 3.  Cranial nerves grossly intact.  No focal sensory or motor deficits noted. SKIN: Warm. Dry. No On doral L foot, above the MTP joint, has 2 mm round raised area of redness. ASSESSMENT / PLAN    ICD-10-CM ICD-9-CM    1. Arthralgia of left temporomandibular joint M26.622 524.62 Handout. Recommend OTC analgesics. Suggested f/u with his dentist.    2. Lesion of skin of foot L98.9 709.9 REFERRAL TO PODIATRY       I have reviewed with the patient details of the assessment and plan and all questions were answered. Relevant patient education was performed.

## 2020-02-26 NOTE — PATIENT INSTRUCTIONS
Office Policies Phone calls/patient messages: Please allow up to 24 hours for someone in the office to contact you about your call or message. Be mindful your provider may be out of the office or your message may require further review. We encourage you to use [employer] for your messages as this is a faster, more efficient way to communicate with our office Medication Refills: 
         
Prescription medications require 48-72 business hours to process. We encourage you to use [employer] for your refills. For controlled medications: Please allow 72 business hours to process. Certain medications may require you to  a written prescription at our office. NO narcotic/controlled medications will be prescribed after 4pm Monday through Friday or on weekends Form/Paperwork Completion: 
         
Please note a $25 fee may incur for all paperwork for completed by our providers. We ask that you allow 7-10 business days. Pre-payment is due prior to picking up/faxing the completed form. You may also download your forms to [employer] to have your doctor print off. 
 
 
1. Have you been to the ER, urgent care clinic since your last visit? Hospitalized since your last visit?no 2. Have you seen or consulted any other health care providers outside of the [address] [place] [address] since your last visit? Include any pap smears or colon screening. no 
 
  
Temporomandibular Disorder: Care Instructions Your Care Instructions Temporomandibular (TM) disorders are a problem with the muscles and joints that connect your jaw to your skull. They cause pain when you open your mouth, chew, or yawn. You may feel this pain on one or both sides. TM disorders are often caused by tight jaw muscles. The tightness can be caused by clenching or grinding your teeth. This may happen when you have a lot of stress in your life. If you lower your stress, you may be able to stop clenching or grinding your teeth. This will help relax your jaw and reduce your pain. You may also be able to do some things at home to feel better. But if none of this works, your doctor may prescribe medicine to help relax your muscles and control the pain. Follow-up care is a key part of your treatment and safety. Be sure to make and go to all appointments, and call your doctor if you are having problems. It's also a good idea to know your test results and keep a list of the medicines you take. How can you care for yourself at home? · Put a warm, moist cloth or heating pad set on low on your jaw. Do this for 10 to 20 minutes at a time. Put a thin cloth between the heating pad and your skin. · Avoid hard or chewy foods that cause your jaws to work very hard. Examples include popcorn, jerky, tough meats, chewy breads, gum, and raw apples and carrots. · Choose softer foods that are easy to chew. These include eggs, yogurt, and soup. · Cut your food into small pieces. Chew slowly. · If your jaw gets too painful to chew, or if it locks, you may need to puree your food for a few days or weeks. · To relax your jaw, repeat this exercise for a few minutes every morning and evening. Watch yourself in a mirror. Gently open and close your mouth. Move your jaw straight up and down. But don't do this if it makes your pain worse. · Get at least 30 minutes of exercise on most days of the week to relieve stress. Walking is a good choice. You also may want to do other activities, such as running, swimming, cycling, or playing tennis or team sports. · Do not: 
? Hold a phone between your shoulder and your jaw. ? Open your mouth all the way, like when you sing loudly or yawn. ? Clench or grind your teeth, bite your lips, or chew your fingernails. ? Clench things such as pens, pipes, or cigars between your teeth. When should you call for help? Call your doctor now or seek immediate medical care if: 
  · Your jaw is locked open or shut or it is hard to move your jaw.  
 Watch closely for changes in your health, and be sure to contact your doctor if: 
  · Your jaw pain gets worse.  
  · Your face is swollen.  
  · You do not get better as expected. Where can you learn more? Go to http://francisco-nikki.info/. Enter C961 in the search box to learn more about \"Temporomandibular Disorder: Care Instructions. \" Current as of: October 3, 2018 Content Version: 12.2 © 8648-8935 [employer]. Care instructions adapted under license by [employer] (which disclaims liability or warranty for this information). If you have questions about a medical condition or this instruction, always ask your healthcare professional. [address] any warranty or liability for your use of this information.

## 2020-04-08 RX ORDER — LEVOTHYROXINE SODIUM 50 UG/1
TABLET ORAL
Qty: 90 TAB | Refills: 1 | Status: SHIPPED | OUTPATIENT
Start: 2020-04-08 | End: 2020-09-21

## 2020-04-29 DIAGNOSIS — I25.10 CORONARY ARTERY DISEASE INVOLVING NATIVE CORONARY ARTERY OF NATIVE HEART WITHOUT ANGINA PECTORIS: ICD-10-CM

## 2020-05-01 RX ORDER — FUROSEMIDE 40 MG/1
TABLET ORAL
Qty: 180 TAB | Refills: 1 | Status: SHIPPED | OUTPATIENT
Start: 2020-05-01 | End: 2020-09-21

## 2020-05-01 RX ORDER — ROSUVASTATIN CALCIUM 10 MG/1
TABLET, COATED ORAL
Qty: 24 TAB | Refills: 0 | Status: SHIPPED | OUTPATIENT
Start: 2020-05-01 | End: 2020-06-02

## 2020-05-05 NOTE — PROGRESS NOTES
6 month follow up and here also for surgical clearance Rt total knee arthroplasty 03/28/2017. Patient also states has sob with exertion and swelling legs. no

## 2020-06-02 ENCOUNTER — HOSPITAL ENCOUNTER (OUTPATIENT)
Dept: LAB | Age: 85
Discharge: HOME | End: 2020-06-02
Payer: MEDICARE

## 2020-06-02 ENCOUNTER — OFFICE VISIT (OUTPATIENT)
Dept: CARDIOLOGY CLINIC | Age: 85
End: 2020-06-02

## 2020-06-02 ENCOUNTER — CLINICAL SUPPORT (OUTPATIENT)
Dept: CARDIOLOGY CLINIC | Age: 85
End: 2020-06-02

## 2020-06-02 VITALS
HEART RATE: 71 BPM | SYSTOLIC BLOOD PRESSURE: 110 MMHG | RESPIRATION RATE: 18 BRPM | OXYGEN SATURATION: 98 % | BODY MASS INDEX: 33.05 KG/M2 | WEIGHT: 244 LBS | HEIGHT: 72 IN | DIASTOLIC BLOOD PRESSURE: 60 MMHG

## 2020-06-02 VITALS
OXYGEN SATURATION: 96 % | BODY MASS INDEX: 33.05 KG/M2 | RESPIRATION RATE: 18 BRPM | HEIGHT: 72 IN | WEIGHT: 244 LBS | HEART RATE: 71 BPM | DIASTOLIC BLOOD PRESSURE: 60 MMHG | SYSTOLIC BLOOD PRESSURE: 106 MMHG

## 2020-06-02 DIAGNOSIS — Z95.0 PACEMAKER: ICD-10-CM

## 2020-06-02 DIAGNOSIS — I49.5 SSS (SICK SINUS SYNDROME) (HCC): ICD-10-CM

## 2020-06-02 DIAGNOSIS — I47.29 NSVT (NONSUSTAINED VENTRICULAR TACHYCARDIA): ICD-10-CM

## 2020-06-02 DIAGNOSIS — I35.0 AORTIC VALVE STENOSIS, ETIOLOGY OF CARDIAC VALVE DISEASE UNSPECIFIED: ICD-10-CM

## 2020-06-02 DIAGNOSIS — Z86.79 S/P ABLATION OF ATRIAL FLUTTER: ICD-10-CM

## 2020-06-02 DIAGNOSIS — Z98.890 S/P ABLATION OF ATRIAL FLUTTER: ICD-10-CM

## 2020-06-02 DIAGNOSIS — Z95.0 CARDIAC PACEMAKER IN SITU: Primary | ICD-10-CM

## 2020-06-02 DIAGNOSIS — I25.10 ASHD (ARTERIOSCLEROTIC HEART DISEASE): Primary | ICD-10-CM

## 2020-06-02 DIAGNOSIS — E78.2 MIXED HYPERLIPIDEMIA: ICD-10-CM

## 2020-06-02 DIAGNOSIS — I44.1 MOBITZ II: ICD-10-CM

## 2020-06-02 DIAGNOSIS — I48.92 ATRIAL FLUTTER, UNSPECIFIED TYPE (HCC): Primary | ICD-10-CM

## 2020-06-02 DIAGNOSIS — Z95.2 S/P AVR (AORTIC VALVE REPLACEMENT): ICD-10-CM

## 2020-06-02 PROCEDURE — 85025 COMPLETE CBC W/AUTO DIFF WBC: CPT

## 2020-06-02 PROCEDURE — 36415 COLL VENOUS BLD VENIPUNCTURE: CPT

## 2020-06-02 PROCEDURE — 80053 COMPREHEN METABOLIC PANEL: CPT

## 2020-06-02 PROCEDURE — 80061 LIPID PANEL: CPT

## 2020-06-02 PROCEDURE — 84550 ASSAY OF BLOOD/URIC ACID: CPT

## 2020-06-02 PROCEDURE — 84439 ASSAY OF FREE THYROXINE: CPT

## 2020-06-02 PROCEDURE — 83036 HEMOGLOBIN GLYCOSYLATED A1C: CPT

## 2020-06-02 PROCEDURE — 84443 ASSAY THYROID STIM HORMONE: CPT

## 2020-06-02 NOTE — PROGRESS NOTES
Subjective:      Funmilayo Lee is a 81 y.o. male is here for EP consult. The patient denies chest pain/ shortness of breath, orthopnea, PND, LE edema, palpitations, syncope, presyncope or fatigue. 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 ([address] 10/15/2019    Irregular heartbeat 05/03/2016    S/P AVR (aortic valve replacement) 03/16/2016    ASHD (arteriosclerotic heart disease) 01/19/2016    CAD (coronary artery disease) 06/05/2015    Pacemaker 05/19/2015    MARYLOU (obstructive sleep apnea) 12/23/2014    SSS (sick sinus syndrome) ([address] 12/11/2014    Aortic stenosis 11/04/2014    SOB (shortness of breath) 11/04/2014    Atrial flutter ([address] 09/12/2014    S/P ablation of atrial flutter 09/12/2014    H/O TIA (transient ischemic attack) and stroke 09/11/2014    Chest pain 09/11/2014    Sinus tachycardia 09/11/2014    Gout 01/24/2013    Chronic bronchitis ([address] 10/03/2012    Allergic rhinitis 10/03/2012    Prostate cancer ([address] 06/04/2012    Hypercholesterolemia     [facility]     Asthma     Calculus of kidney     Erectile dysfunction     GERD (gastroesophageal reflux disease)       Eligio Gdooy MD  Past Medical History:   Diagnosis Date    Adverse effect of anesthesia     difficult with waking up     Allergic rhinitis     Arthritis     Asthma     Atrial flutter ([address] 9/12/2014    CAD (coronary artery disease)     Dr. Ming Kay Calculus of kidney     Chest pain 2006    Normal stress echo    Chronic bronchitis     Chronic bronchitis ([address] 10/3/2012    Chronic pain     knees    Erectile dysfunction     GERD (gastroesophageal reflux disease)     hiatal hernia    Gout 1/24/2013    Hemoptysis 2008    Work up by Dr. Jong Clements;  Negative    Hiatal hernia     Hypercholesterolemia     Hypertension     Ill-defined condition     bronchitis    Melanoma (HCC)     back    Nausea & vomiting     Pacemaker Dr. Jesus Hua [facility]) 6/4/2012    S/P ablation of atrial flutter 9/12/2014    Sleep apnea     states never had test for apnea    Stroke [facility]) 2009     tia no residual problems      Past Surgical History:   Procedure Laterality Date    CARDIAC SURG PROCEDURE UNLIST      ablation    HX AFIB ABLATION  2014    Dr. Murray Duty    ruptured appendix    HX HEART CATHETERIZATION      [address]    HX HERNIA REPAIR  12/17/14    Recurrent left inguinal hernia; Dr. Catalino Ocasio      5 hernia repairs total    HX PACEMAKER  12/2014    HX PACEMAKER PLACEMENT  2014    Dr. Ravindra Sheldon    Kidney stone extraction    SC COLSC FLX W/RMVL OF TUMOR POLYP LESION SNARE TQ  4/21/2011          Allergies   Allergen Reactions    Azithromycin Anaphylaxis    Gadolinium-Containing Contrast Media Other (comments)     1) Moderate to Severe. 2) Post Gadolinium issues as noted:   - Diaphoretic, Near Syncope, Nausea and vomiting.     Acetaminophen Other (comments)     Indigestion,\"burning sensation\"    Contrast Dye [Iodine] Other (comments)     Decrease in BP    Levaquin [Levofloxacin] Other (comments)     Joint pain    Medrol [Methylprednisolone] Vertigo    Norvasc [Amlodipine] Other (comments)     Numbness and tingling    Nsaids (Non-Steroidal Anti-Inflammatory Drug) Other (comments)     Cough up blood      Percocet [Oxycodone-Acetaminophen] Itching    Ranexa [Ranolazine] Other (comments)     Cannot remember    Simvastatin Other (comments)     Joint pain    Voltaren [Diclofenac Sodium] Other (comments)     unknown    Zetia [Ezetimibe] Myalgia      Family History   Problem Relation Age of Onset    Stroke Father     Heart Disease Mother     negative for cardiac disease  Social History     Socioeconomic History    Marital status:      Spouse name: Not on file    Number of children: Not on file    Years of education: Not on file    Highest education level: Not on file   Tobacco Use    Smoking status: Never Smoker    Smokeless tobacco: Never Used   Substance and Sexual Activity    Alcohol use: Yes     Alcohol/week: 3.0 standard drinks     Types: 1 Shots of liquor, 2 Standard drinks or equivalent per week     Comment: rare    Drug use: No     Types: OTC, Prescription    Sexual activity: Not Currently     Partners: Female     Current Outpatient Medications   Medication Sig    furosemide (LASIX) 40 mg tablet TAKE 1 TABLET TWICE DAILY    levothyroxine (SYNTHROID) 50 mcg tablet TAKE 1 TABLET EVERY DAY BEFORE BREAKFAST    isosorbide mononitrate ER (IMDUR) 60 mg CR tablet TAKE 1 TABLET EVERY DAY    metoprolol tartrate (LOPRESSOR) 25 mg tablet TAKE 1/2 TABLET TWICE DAILY    potassium chloride (K-DUR, KLOR-CON) 20 mEq tablet TAKE 1 TABLET TWICE DAILY    omeprazole (PRILOSEC) 20 mg capsule TAKE 1 CAPSULE EVERY DAY    albuterol (VENTOLIN HFA) 90 mcg/actuation inhaler Take 2 Puffs by inhalation every four (4) hours as needed for Wheezing.  traMADol (ULTRAM) 50 mg tablet Take 50 mg by mouth every eight (8) hours as needed.  coenzyme q10 10 mg cap Take 100 mg by mouth every evening.  Aspirin, Buffered 81 mg tab Take 81 mg by mouth daily.  rosuvastatin (CRESTOR) 10 mg tablet TAKE 1 TABLET  EVERY  MONDAY  AND  THURSDAY     No current facility-administered medications for this visit. Vitals:    06/02/20 1509   BP: 106/60   Pulse: 71   Resp: 18   SpO2: 96%   Weight: 244 lb (110.7 kg)   Height: 6' (1.829 m)       I have reviewed the nurses notes, vitals, problem list, allergy list, medical history, family, social history and medications. Review of Symptoms:    General: Pt denies excessive weight gain or loss. Pt is able to conduct ADL's  HEENT: Denies blurred vision, headaches, epistaxis and difficulty swallowing. Respiratory: Denies shortness of breath, POLANCO, wheezing or stridor.   Cardiovascular: Denies precordial pain, palpitations, edema or PND  Gastrointestinal: Denies poor appetite, indigestion, abdominal pain or blood in stool  Urinary: Denies dysuria, pyuria  Musculoskeletal: Denies pain or swelling from muscles or joints  Neurologic: Denies tremor, paresthesias, or sensory motor disturbance  Skin: Denies rash, itching or texture change. Psych: Denies depression    Physical Exam:      General: Well developed, in no acute distress. HEENT: Eyes - PERRL, no jvd  Heart:  Normal S1/S2 negative S3 or S4. Regular, no murmur, gallop or rub. Respiratory: Clear bilaterally x 4, no wheezing or rales  Extremities:  No edema, normal cap refill, no cyanosis. Musculoskeletal: No clubbing  Neuro: A&Ox3, speech clear, gait stable. Skin: Skin color is normal. No rashes or lesions.  Non diaphoretic  Vascular: 2+ pulses symmetric in all extremities    Cardiographics    Ekg: nsr, lbbb    Results for orders placed or performed during the hospital encounter of 03/16/16   EKG, 12 LEAD, INITIAL   Result Value Ref Range    Ventricular Rate 60 BPM    Atrial Rate 60 BPM    P-R Interval 208 ms    QRS Duration 172 ms    Q-T Interval 528 ms    QTC Calculation (Bezet) 528 ms    Calculated P Axis 71 degrees    Calculated R Axis -72 degrees    Calculated T Axis 37 degrees    Diagnosis       Normal sinus rhythm  Right bundle branch block  Left anterior fascicular block  ** Bifascicular block **  Left ventricular hypertrophy with QRS widening  When compared with ECG of 10-MAR-2016 14:56,  Sinus rhythm has replaced Electronic atrial pacemaker  Right bundle branch block is now present  Minimal criteria for Septal infarct are no longer present  Confirmed by Sebastian Joseph ([id number]) on 3/16/2016 1:57:21 PM     Results for orders placed or performed in visit on 12/04/14   CARDIAC HOLTER MONITOR, 24 HOURS    Narrative    ECG Monitor/24 hours, Complete    Reason for Holter Monitor   A-FLUTTER    Heartbeat    Slowest 48  Average 63  Fastest 97      Results:   Underlying Rhythm: Normal sinus rhythm      Atrial Arrhythmias: premature atrial contractions; frequent             AV Conduction: normal    Ventricular Arrhythmias: premature ventricular contractions; rare    ST Segment Analysis:normal     Symptom Correlation:  None reported    Comment:   Sinus rhythm with occasional atrial ectopy - morning bradycardia  of 48 bpm and highest heart rate of 97 bpm. Clinical correlation  advised. Nancy Quinteros MD, [facility]                    Lab Results   Component Value Date/Time    WBC 7.6 09/04/2019 12:39 PM    Hemoglobin (POC) 13.9 12/17/2014 12:06 PM    HGB 13.9 09/04/2019 12:39 PM    Hematocrit (POC) 41 12/17/2014 12:06 PM    HCT 41.9 09/04/2019 12:39 PM    PLATELET 784 64/54/8778 12:39 PM    MCV 95 09/04/2019 12:39 PM      Lab Results   Component Value Date/Time    Sodium 143 09/04/2019 12:39 PM    Potassium 4.2 09/04/2019 12:39 PM    Chloride 101 09/04/2019 12:39 PM    CO2 25 09/04/2019 12:39 PM    Anion gap 8 03/14/2017 11:02 AM    Glucose 83 09/04/2019 12:39 PM    BUN 18 09/04/2019 12:39 PM    Creatinine 1.10 09/04/2019 12:39 PM    BUN/Creatinine ratio 16 09/04/2019 12:39 PM    GFR est AA 71 09/04/2019 12:39 PM    GFR est non-AA 62 09/04/2019 12:39 PM    Calcium 9.2 09/04/2019 12:39 PM    Bilirubin, total 0.2 09/04/2019 12:39 PM    Alk. phosphatase 71 09/04/2019 12:39 PM    Protein, total 6.6 09/04/2019 12:39 PM    Albumin 4.2 09/04/2019 12:39 PM    Globulin 3.7 03/14/2017 11:02 AM    A-G Ratio 1.8 09/04/2019 12:39 PM    ALT (SGPT) 14 09/04/2019 12:39 PM      Lab Results   Component Value Date/Time    TSH 7.140 (H) 09/04/2019 12:39 PM        Assessment:             ICD-10-CM ICD-9-CM    1. Atrial flutter, unspecified type (HCC) I48.92 427.32 AMB POC EKG ROUTINE W/ 12 LEADS, INTER & REP      ECHO ADULT COMPLETE      NUCLEAR CARDIAC STRESS TEST   2. Mob[place] II I44.1 426.12 ECHO ADULT COMPLETE      NUCLEAR CARDIAC STRESS TEST   3.  S/P ablation of atrial flutter Z98.890 V45.89 ECHO ADULT COMPLETE    Z86.79  NUCLEAR CARDIAC STRESS TEST   4. SSS (sick sinus syndrome) ([facility]) I49.5 427.81 ECHO ADULT COMPLETE      NUCLEAR CARDIAC STRESS TEST   5. Pacemaker Z95.0 V45.01 ECHO ADULT COMPLETE      NUCLEAR CARDIAC STRESS TEST   6. NSVT (nonsustained ventricular tachycardia) ([facility]) I47.2 427.1 ECHO ADULT COMPLETE      NUCLEAR CARDIAC STRESS TEST     Orders Placed This Encounter    AMB POC EKG ROUTINE W/ 12 LEADS, INTER & REP     Order Specific Question:   Reason for Exam:     Answer:   routine        Plan:     Mr Santiago Walters is here for annual follow up and device check. He is doing well - he is A paced 93% for sick sinus and V paced 16% for high grade av block. One episode of NSVT 10 beats - asymptomatic. Will repeat echo and get a nuclear stress test. He will follow up in the device clinic per routine and with me in one year.     Continue medical management for sss, htn and hyperlipidemia. Thank you for allowing me to participate in [address] [place] [address]. Diana Talavera NP    Patient seen and examined. All pertinent data reviewed. I have reviewed detailed note as outlined by Diana Talavera NP. Case discussed with Nursing/medical assistant staff and Diana Talavera NP. Plans as outlined. A paced 93% for sick sinus and v paced 16% for hih grade av block. Had NSVT - will obtain echo and stress to r/o ischemia. If negative, will f/u in one year.  Cont med rx for htn and hyperlipidemia    Keyona Chase MD, [facility]

## 2020-06-02 NOTE — PROGRESS NOTES
Chief Complaint   Patient presents with    Coronary Artery Disease     Follow up - denies cardiac sx      1. Have you been to the ER, urgent care clinic since your last visit? Hospitalized since your last visit? No     2. Have you seen or consulted any other health care providers outside of the [address] [place] [address] since your last visit? Include any pap smears or colon screening.   No

## 2020-06-02 NOTE — LETTER
6/2/20 Patient: Adela Lundy RIYIC Sr  
YOB: 1935 Date of Visit: 6/2/2020 Dario Howell MD 
[address] [address] VIA In Basket Dear Dario Howell MD, Thank you for referring Mr. Carlos Malin to [address] [place] [address] for evaluation. My notes for this consultation are attached. If you have questions, please do not hesitate to call me. I look forward to following your patient along with you. Sincerely, Perlita Thacker MD

## 2020-06-02 NOTE — PROGRESS NOTES
[address], [address] [facility]  [phone]     Subjective:      Mushtaq Orr is a 81 y.o. male is here for routine f/u. Last seen in clinic in 5/2019. He remains in his usual state of health, denies any cardiac symptoms, does yardwork with no exertional cp or sob. He stopped taking his statin about a mos ago d/t bilateral knee / leg pain, which resolved upon stopping the med. The patient denies chest pain/ shortness of breath, orthopnea, PND, LE edema, palpitations, syncope, or presyncope.        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 ([address] 10/15/2019    Irregular heartbeat 05/03/2016    S/P AVR (aortic valve replacement) 03/16/2016    ASHD (arteriosclerotic heart disease) 01/19/2016    CAD (coronary artery disease) 06/05/2015    Pacemaker 05/19/2015    MARYLOU (obstructive sleep apnea) 12/23/2014    SSS (sick sinus syndrome) ([address] 12/11/2014    Aortic stenosis 11/04/2014    SOB (shortness of breath) 11/04/2014    Atrial flutter ([address] 09/12/2014    S/P ablation of atrial flutter 09/12/2014    H/O TIA (transient ischemic attack) and stroke 09/11/2014    Chest pain 09/11/2014    Sinus tachycardia 09/11/2014    Gout 01/24/2013    Chronic bronchitis ([address] 10/03/2012    Allergic rhinitis 10/03/2012    Prostate cancer ([address] 06/04/2012    Hypercholesterolemia     Stroke ([address]     Asthma     Calculus of kidney     Erectile dysfunction     GERD (gastroesophageal reflux disease)       Albert Schafer MD  Past Medical History:   Diagnosis Date    Adverse effect of anesthesia     difficult with waking up     Allergic rhinitis     Arthritis     Asthma     Atrial flutter ([address] 9/12/2014    CAD (coronary artery disease)     Dr. Keyonna Rios    Calculus of kidney     Chest pain 2006    Normal stress echo    Chronic bronchitis     Chronic bronchitis ([address] 10/3/2012    Chronic pain     knees    Erectile dysfunction     GERD (gastroesophageal reflux disease)     hiatal hernia    Gout 1/24/2013    Hemoptysis 2008    Work up by Dr. Karen Delaney; Negative    Hiatal hernia     Hypercholesterolemia     Hypertension     Ill-defined condition     bronchitis    Melanoma ([address]     back    Nausea & vomiting     Pacemaker     Dr. Pako Salazar [facility]) 6/4/2012    S/P ablation of atrial flutter 9/12/2014    Sleep apnea     states never had test for apnea    Stroke [facility]) 2009     tia no residual problems      Past Surgical History:   Procedure Laterality Date    CARDIAC SURG PROCEDURE UNLIST      ablation    HX AFIB ABLATION  2014    Dr. Crystal Quezada    ruptured appendix    HX HEART CATHETERIZATION      [address]    HX HERNIA REPAIR  12/17/14    Recurrent left inguinal hernia; Dr. Ann Alvarado      5 hernia repairs total    HX PACEMAKER  12/2014    HX PACEMAKER PLACEMENT  2014    Dr. Roxy Buenrostro    Kidney stone extraction    AR COLSC FLX W/RMVL OF TUMOR POLYP LESION SNARE TQ  4/21/2011          Allergies   Allergen Reactions    Azithromycin Anaphylaxis    Gadolinium-Containing Contrast Media Other (comments)     1) Moderate to Severe. 2) Post Gadolinium issues as noted:   - Diaphoretic, Near Syncope, Nausea and vomiting.     Acetaminophen Other (comments)     Indigestion,\"burning sensation\"    Contrast Dye [Iodine] Other (comments)     Decrease in BP    Crestor [Rosuvastatin] Myalgia    Levaquin [Levofloxacin] Other (comments)     Joint pain    Medrol [Methylprednisolone] Vertigo    Norvasc [Amlodipine] Other (comments)     Numbness and tingling    Nsaids (Non-Steroidal Anti-Inflammatory Drug) Other (comments)     Cough up blood      Percocet [Oxycodone-Acetaminophen] Itching    Ranexa [Ranolazine] Other (comments)     Cannot remember    Simvastatin Other (comments)     Joint pain  Voltaren [Diclofenac Sodium] Other (comments)     unknown    Zetia [Ezetimibe] Myalgia      Family History   Problem Relation Age of Onset    Stroke Father     Heart Disease Mother       Social History     Socioeconomic History    Marital status:      Spouse name: Not on file    Number of children: Not on file    Years of education: Not on file    Highest education level: Not on file   Occupational History    Not on file   Social Needs    Financial resource strain: Not on file    Food insecurity     Worry: Not on file     Inability: Not on file    Transportation needs     Medical: Not on file     Non-medical: Not on file   Tobacco Use    Smoking status: Never Smoker    Smokeless tobacco: Never Used   Substance and Sexual Activity    Alcohol use:  Yes     Alcohol/week: 3.0 standard drinks     Types: 1 Shots of liquor, 2 Standard drinks or equivalent per week     Comment: rare    Drug use: No     Types: OTC, Prescription    Sexual activity: Not Currently     Partners: Female   Lifestyle    Physical activity     Days per week: Not on file     Minutes per session: Not on file    Stress: Not on file   Relationships    Social connections     Talks on phone: Not on file     Gets together: Not on file     Attends Confucianist service: Not on file     Active member of club or organization: Not on file     Attends meetings of clubs or organizations: Not on file     Relationship status: Not on file    Intimate partner violence     Fear of current or ex partner: Not on file     Emotionally abused: Not on file     Physically abused: Not on file     Forced sexual activity: Not on file   Other Topics Concern    Not on file   Social History Narrative    Not on file      Current Outpatient Medications   Medication Sig    furosemide (LASIX) 40 mg tablet TAKE 1 TABLET TWICE DAILY    levothyroxine (SYNTHROID) 50 mcg tablet TAKE 1 TABLET EVERY DAY BEFORE BREAKFAST    isosorbide mononitrate ER (IMDUR) 60 mg CR tablet TAKE 1 TABLET EVERY DAY    metoprolol tartrate (LOPRESSOR) 25 mg tablet TAKE 1/2 TABLET TWICE DAILY    potassium chloride (K-DUR, KLOR-CON) 20 mEq tablet TAKE 1 TABLET TWICE DAILY    omeprazole (PRILOSEC) 20 mg capsule TAKE 1 CAPSULE EVERY DAY    albuterol (VENTOLIN HFA) 90 mcg/actuation inhaler Take 2 Puffs by inhalation every four (4) hours as needed for Wheezing.  traMADol (ULTRAM) 50 mg tablet Take 50 mg by mouth every eight (8) hours as needed.  coenzyme q10 10 mg cap Take 100 mg by mouth every evening.  Aspirin, Buffered 81 mg tab Take 81 mg by mouth daily. No current facility-administered medications for this visit. Review of Symptoms:  11 systems reviewed, negative other than as stated in the HPI    Physical ExamPhysical Exam:    Vitals:    06/02/20 1526   BP: 110/60   Pulse: 71   Resp: 18   SpO2: 98%   Weight: 244 lb (110.7 kg)   Height: 6' (1.829 m)     Body mass index is 33.09 kg/m². General PE  Gen:  NAD  Mental Status - Alert. General Appearance - Not in acute distress. HEENT:  PERRL, no carotid bruits or JVD  Chest and Lung Exam   Inspection: Accessory muscles - No use of accessory muscles in breathing. Auscultation:   Breath sounds: - Normal.   Cardiovascular   Inspection: Jugular vein - Bilateral - Inspection Normal.   Palpation/Percussion:   Apical Impulse: - Normal.   Auscultation: Rhythm - Regular. Heart Sounds - S1 WNL and S2 WNL. No S3 or S4. Murmurs & Other Heart Sounds: Auscultation of the heart reveals - No Murmurs. Peripheral Vascular   Upper Extremity: Inspection - Bilateral - No Cyanotic nailbeds or Digital clubbing. Lower Extremity:   Palpation: Edema - Bilateral - No edema. Abdomen:   Soft, non-tender, bowel sounds are active.   Neuro: A&O times 3, CN and motor grossly WNL    Labs:   Lab Results   Component Value Date/Time    Cholesterol, total 153 05/08/2019 08:23 AM    Cholesterol, total 173 10/29/2018 08:28 AM    Cholesterol, total 171 05/22/2018 09:53 AM    Cholesterol, total 170 07/17/2017 08:15 AM    Cholesterol, total 143 01/11/2017 08:11 AM    HDL Cholesterol 48 05/08/2019 08:23 AM    HDL Cholesterol 57 10/29/2018 08:28 AM    HDL Cholesterol 47 05/22/2018 09:53 AM    HDL Cholesterol 54 07/17/2017 08:15 AM    HDL Cholesterol 47 01/11/2017 08:11 AM    LDL, calculated 85 05/08/2019 08:23 AM    LDL, calculated 95 10/29/2018 08:28 AM    LDL, calculated 101 (H) 05/22/2018 09:53 AM    LDL, calculated 96 07/17/2017 08:15 AM    LDL, calculated 70 01/11/2017 08:11 AM    Triglyceride 98 05/08/2019 08:23 AM    Triglyceride 106 10/29/2018 08:28 AM    Triglyceride 116 05/22/2018 09:53 AM    Triglyceride 100 07/17/2017 08:15 AM    Triglyceride 132 01/11/2017 08:11 AM    CHOL/HDL Ratio 3.6 09/17/2010 08:27 AM    CHOL/HDL Ratio 4.8 12/11/2009 11:14 AM     Lab Results   Component Value Date/Time     (H) 04/01/2015 11:55 PM     Lab Results   Component Value Date/Time    Sodium 143 09/04/2019 12:39 PM    Potassium 4.2 09/04/2019 12:39 PM    Chloride 101 09/04/2019 12:39 PM    CO2 25 09/04/2019 12:39 PM    Anion gap 8 03/14/2017 11:02 AM    Glucose 83 09/04/2019 12:39 PM    BUN 18 09/04/2019 12:39 PM    Creatinine 1.10 09/04/2019 12:39 PM    BUN/Creatinine ratio 16 09/04/2019 12:39 PM    GFR est AA 71 09/04/2019 12:39 PM    GFR est non-AA 62 09/04/2019 12:39 PM    Calcium 9.2 09/04/2019 12:39 PM    Bilirubin, total 0.2 09/04/2019 12:39 PM    Alk. phosphatase 71 09/04/2019 12:39 PM    Protein, total 6.6 09/04/2019 12:39 PM    Albumin 4.2 09/04/2019 12:39 PM    Globulin 3.7 03/14/2017 11:02 AM    A-G Ratio 1.8 09/04/2019 12:39 PM    ALT (SGPT) 14 09/04/2019 12:39 PM       EKG:  SR     Assessment:     Assessment:      1. ASHD (arteriosclerotic heart disease)    2. SSS (sick sinus syndrome) ([facility]    3. Pacemaker    4. Aortic valve stenosis, etiology of cardiac valve disease unspecified    5. S/P ablation of atrial flutter    6.  S/P AVR (aortic valve replacement)    7. Mixed hyperlipidemia        No orders of the defined types were placed in this encounter. Plan:     Patient presents for follow up. Last seen in clinic in 5/2019. He remains in his usual state of health, denies any cardiac symptoms, does yardwork with no exertional cp or sob. He stopped taking his statin about a mos ago d/t bilateral knee / leg pain, which resolved upon stopping the med. 1.  History of severe aortic stenosis he is post bioprosthetic AVR in 3/2016 functioning well on last echocardiogram normal ejection fraction in 2/2019. He knows to take proph. abx prior to dental work. 2.  Sick sinus syndrome with a flutter ablation, has DC PM: saw EP today, Noted VT per device check, repeating echo and NST    3. ASHD: Minimal atherosclerotic heart disease on preoperative cath in 2016. On ASA Imdur BB. Not taking statin x 1 mos  4. Hyperlipidemia: 5/19 LDL at 85 not taking crestor d/t myalgia Labs and lipids per PCP-has annual f/u scheduled with pcp 6/15/2020. Consider PCSK9 inh therapy  5. Hypertension: Controlled continue current therapy    Ioana Barrios NP       Patient seen and examined by me with nurse practitioner. I personally performed all components of the history, physical, and medical decision making and agree with the assessment and plan as noted. D/w pt.      Jorge Rivera MD

## 2020-06-02 NOTE — PROGRESS NOTES
Chief Complaint   Patient presents with    Pacemaker Check     Annual follow up - denies cardiac sx      1. Have you been to the ER, urgent care clinic since your last visit? Hospitalized since your last visit? No     2. Have you seen or consulted any other health care providers outside of the [address] [place] [address] since your last visit? Include any pap smears or colon screening.   No

## 2020-06-11 ENCOUNTER — VIRTUAL VISIT (OUTPATIENT)
Dept: INTERNAL MEDICINE CLINIC | Age: 85
End: 2020-06-11

## 2020-06-11 DIAGNOSIS — C61 PROSTATE CANCER (HCC): ICD-10-CM

## 2020-06-11 DIAGNOSIS — R73.9 BORDERLINE HYPERGLYCEMIA: ICD-10-CM

## 2020-06-11 DIAGNOSIS — I25.10 CORONARY ARTERY DISEASE INVOLVING NATIVE CORONARY ARTERY OF NATIVE HEART WITHOUT ANGINA PECTORIS: Primary | ICD-10-CM

## 2020-06-11 DIAGNOSIS — E78.00 HYPERCHOLESTEROLEMIA: ICD-10-CM

## 2020-06-11 DIAGNOSIS — M10.9 GOUT, UNSPECIFIED CAUSE, UNSPECIFIED CHRONICITY, UNSPECIFIED SITE: ICD-10-CM

## 2020-06-11 DIAGNOSIS — E03.9 ACQUIRED HYPOTHYROIDISM: ICD-10-CM

## 2020-06-11 NOTE — PROGRESS NOTES
Zack Blackwell is a 81 y.o. male evaluated via audio only technology on 2020. Consent: He and/or his health care decision maker is aware that he may receive a bill for this audio only encounter, depending on his insurance coverage, and has provided verbal consent to proceed: Yes    I communicated with the patient and/or health care decision maker about the nature and details of the followin  Subjective:   Zack Blackwell is a 81 y.o. male who was seen for Hypertension (f/u )    SUBJECTIVE  Mr. Zack Blackwell is a 81 y.o. male patient of mine who presents today acutely for routine follow up visit. Chief Complaint   Patient presents with    Hypertension     f/u      106/65, 79. Dr. Kamaljit Myrick plans stress test.   \"I quit taking the cholesterol medicine, and my knees are a whole lot better. \"   Chronic bronchitis has been doing well: He has seen Dr. Jerri Mancuso. Uses albuterol prn. Doing better. He has undergone valve replacement in 2016 with Dr. Robin Joyce. It is recalled from early  that he saw Dr. Kamaljit Myrick, for exertional dyspnea, and had cardiac cath. Knee pain: \"Doing pretty good right now. \"   Sees Dr. Miguel Ibarra, and has had corticosteroid injections. Surgery on hold due to heart issues. He has prostate cancer, but doesn't want to treat this--though today he is open to referral to urologist.  He has had biopsy proven adenocarcinoma of prostate, by Dr. Josh Moss with [facility]. As we noted before: At this time \"I aint doin' nothin'. If the PSA gets high, then I'll take the shots. \"  From our records, it appears he was referred to [address] [place] [address] for consideration of radiation therapy. He refused this. Past Medical History: Hyperlipidemia, chronic bronchitis, allergic rhinitis, urolithiasis, hiatal hernia with GERD, ED, Asthma, prostate cancer  (Dr. Josh Moss). Normal stress echo in ; Echo  showed LVH, EF 55-60%. Hemoptysis with negative workup in saw Dr. Fuentes Carrillo.   EGD in 2009 Dr. Michell Luz showing Hiatal hernia. R lid chronic ptosis noted in 2009 by Dr. Vince Nice, optometrist.  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 Had exertional chest pain in 2012, recurred in 2014--cardiac work up with Dr. Merline Linsey. His ophthalmologist is Dr. Berna Manning. Past Surgical History: Hernia in 86. Kidney stone 85. Appy (ruptured) in 79. Resection of melanoma. Colon polypectomy 2011 Dr. Michell Luz. A flutter ablation 2014 Dr. Kristen Law 2014 Dr. Fong Lux repair 2014 Dr. Greer Mata. Aortic valve replacement 2016. Cataracts 2016. Allergies: IVP dye (BP drop). Medications:   Current Outpatient Medications on File Prior to Visit   Medication Sig Dispense Refill    furosemide (LASIX) 40 mg tablet TAKE 1 TABLET TWICE DAILY 180 Tab 1    levothyroxine (SYNTHROID) 50 mcg tablet TAKE 1 TABLET EVERY DAY BEFORE BREAKFAST 90 Tab 1    isosorbide mononitrate ER (IMDUR) 60 mg CR tablet TAKE 1 TABLET EVERY DAY 90 Tab 3    metoprolol tartrate (LOPRESSOR) 25 mg tablet TAKE 1/2 TABLET TWICE DAILY 90 Tab 3    potassium chloride (K-DUR, KLOR-CON) 20 mEq tablet TAKE 1 TABLET TWICE DAILY 180 Tab 3    omeprazole (PRILOSEC) 20 mg capsule TAKE 1 CAPSULE EVERY DAY 90 Cap 3    albuterol (VENTOLIN HFA) 90 mcg/actuation inhaler Take 2 Puffs by inhalation every four (4) hours as needed for Wheezing. 3 Inhaler 1    traMADol (ULTRAM) 50 mg tablet Take 50 mg by mouth every eight (8) hours as needed.  coenzyme q10 10 mg cap Take 100 mg by mouth every evening.  Aspirin, Buffered 81 mg tab Take 81 mg by mouth daily. No current facility-administered medications on file prior to visit. Social History:  . Retired hol[employer]. Drinks a pint to 1 1/2 pints hard liquor daily. When last assessed. No tobacco, or drugs. Family History: F CVAs. Brother valve disease.   Review of systems: Unremarkable except as noted above. Objective: There were no vitals taken for this visit. .    No exam    Lab Results   Component Value Date/Time    Cholesterol, total 197 06/02/2020 08:39 AM    HDL Cholesterol 44 06/02/2020 08:39 AM    LDL, calculated 126 (H) 06/02/2020 08:39 AM    VLDL, calculated 27 06/02/2020 08:39 AM    Triglyceride 136 06/02/2020 08:39 AM    CHOL/HDL Ratio 3.6 09/17/2010 08:27 AM     Lab Results   Component Value Date/Time    Hemoglobin A1c 6.5 (H) 06/02/2020 08:39 AM         Assessment / Plan:   1. BP normal today. 2. CAD: No CP. As per his cardiologist, Dr. Bipin Henderson. 3. A flutter, SSS: now with pacemaker. As per cardiology. 4. Aortic stenosis: s/p AVR. Doing well. 5. Asthma: Stable. Uses albuterol prn; Less than daily currently. 6. Prostate Cancer: Patient reaffirms desire for conservative approach today. Surveillance for now. Pt willing to do hormonal treatments if PSA rises to higher levels. His urologist has suggested a PSA of 20 or above as a triggering value  7. Problem drinking: Not discussed today. 8. Hyperlipidemia:  Not at goal, but options limited. Did not tolerate simvastatin or zetia. Now on fish oil. Will recheck lipids and CMP. Continue pulse dosing of crestor. 9. GERD:  Controlled on prn prilosec; Pt. may continue this. 10. ED: Not discussed. 11. All rhinitis: Stable. 12. Possible TIA/Diplopia/Amaurosis fugax: No recent episode. Work up as above. 13. Gout: no recent flares. 14. Borderline DM. Recheck labs. 15. Obesity: Watch diet. More exercise as able (limited now by postsurgical recovery). 16. Noncompliance. Follow up in 6 months. I affirm this is a Patient-Initiated Episode with a Patient who has not had a related appointment within my department in the past 7 days or scheduled within the next 24 hours.     Total Time: minutes: 5-10 minutes    Note: not billable if this call serves to triage the patient into an appointment for the relevant concern      Nadir Ndiaye MD

## 2020-06-11 NOTE — PROGRESS NOTES
Identified pt with two pt identifiers(name and ). Reviewed record in preparation for visit and have obtained necessary documentation. Chief Complaint   Patient presents with    Hypertension     f/u         There were no vitals taken for this visit. Health Maintenance Due   Topic    Foot Exam Q1     Eye Exam Retinal or Dilated     Shingrix Vaccine Age 50> (1 of 2)    Medicare Yearly Exam     GLAUCOMA SCREENING Q2Y     MICROALBUMIN Q1        Med Reconciliation: Completed    Coordination of Care Questionnaire:  :   1) Have you been to an emergency room, urgent care, or hospitalized since your last visit? If yes, where when, and reason for visit? No       2. Have seen or consulted any other health care provider since your last visit? If yes, where when, and reason for visit? No       3) Do you have an Advanced Directive/ Living Will in place? No  If yes, do we have a copy on file   If no, would you like information       This is an established visit conducted via telemedicine. The patient has been instructed that this meets HIPAA criteria and acknowledges and agrees to this method of visitation.     Karissa Son  20  11:30 AM

## 2020-06-17 ENCOUNTER — TELEPHONE (OUTPATIENT)
Dept: INTERNAL MEDICINE CLINIC | Age: 85
End: 2020-06-17

## 2020-06-17 NOTE — TELEPHONE ENCOUNTER
#[phone] pt states the specialist (?) that Dr. Dorcas Tavarez referred him to can't see him until 9-25-20. Can Dr. Dorcas Tavarez get him in any sooner he ask? Pt would also like to have his lab orders mailed to him as soon as possible. Address verified.

## 2020-06-22 DIAGNOSIS — Z86.79 S/P ABLATION OF ATRIAL FLUTTER: ICD-10-CM

## 2020-06-22 DIAGNOSIS — M10.9 GOUT, UNSPECIFIED CAUSE, UNSPECIFIED CHRONICITY, UNSPECIFIED SITE: ICD-10-CM

## 2020-06-22 DIAGNOSIS — Z95.0 PACEMAKER: ICD-10-CM

## 2020-06-22 DIAGNOSIS — Z98.890 S/P ABLATION OF ATRIAL FLUTTER: ICD-10-CM

## 2020-06-22 DIAGNOSIS — I48.92 ATRIAL FLUTTER, UNSPECIFIED TYPE (HCC): ICD-10-CM

## 2020-06-22 DIAGNOSIS — I47.29 NSVT (NONSUSTAINED VENTRICULAR TACHYCARDIA): ICD-10-CM

## 2020-06-22 DIAGNOSIS — I44.1 MOBITZ II: ICD-10-CM

## 2020-06-22 DIAGNOSIS — I49.5 SSS (SICK SINUS SYNDROME) (HCC): ICD-10-CM

## 2020-06-22 DIAGNOSIS — E03.9 ACQUIRED HYPOTHYROIDISM: ICD-10-CM

## 2020-06-22 DIAGNOSIS — C61 PROSTATE CANCER (HCC): ICD-10-CM

## 2020-06-22 DIAGNOSIS — I25.10 CORONARY ARTERY DISEASE INVOLVING NATIVE CORONARY ARTERY OF NATIVE HEART WITHOUT ANGINA PECTORIS: ICD-10-CM

## 2020-06-22 DIAGNOSIS — R73.9 BORDERLINE HYPERGLYCEMIA: ICD-10-CM

## 2020-06-22 DIAGNOSIS — E78.00 HYPERCHOLESTEROLEMIA: ICD-10-CM

## 2020-06-22 NOTE — TELEPHONE ENCOUNTER
Called, spoke with Pt. Two pt identifiers confirmed. Pt informed to call and see which  At [facility] can see him sooner than Dr. Larry Franklin. Pt informed I will mail out his lab work as well. Pt verbalized understanding of information discussed w/ no further questions at this time.

## 2020-06-22 NOTE — TELEPHONE ENCOUNTER
Labs were ordered 6/11. Maybe we could fax them somewhere? He could inquire at [facility] if someone else would be able to see him sooner.      bf

## 2020-06-25 ENCOUNTER — HOSPITAL ENCOUNTER (OUTPATIENT)
Dept: LAB | Age: 85
Discharge: HOME | End: 2020-06-25
Payer: MEDICARE

## 2020-06-25 ENCOUNTER — OFFICE VISIT (OUTPATIENT)
Dept: CARDIOLOGY CLINIC | Age: 85
End: 2020-06-25

## 2020-06-25 VITALS
WEIGHT: 239.8 LBS | RESPIRATION RATE: 16 BRPM | BODY MASS INDEX: 31.78 KG/M2 | OXYGEN SATURATION: 97 % | SYSTOLIC BLOOD PRESSURE: 132 MMHG | HEIGHT: 73 IN | HEART RATE: 78 BPM | DIASTOLIC BLOOD PRESSURE: 70 MMHG

## 2020-06-25 DIAGNOSIS — E78.2 MIXED HYPERLIPIDEMIA: ICD-10-CM

## 2020-06-25 DIAGNOSIS — Z86.79 S/P ABLATION OF ATRIAL FLUTTER: ICD-10-CM

## 2020-06-25 DIAGNOSIS — I10 ESSENTIAL HYPERTENSION: ICD-10-CM

## 2020-06-25 DIAGNOSIS — I63.9 CEREBROVASCULAR ACCIDENT (CVA), UNSPECIFIED MECHANISM (HCC): ICD-10-CM

## 2020-06-25 DIAGNOSIS — Z95.2 S/P AVR (AORTIC VALVE REPLACEMENT): ICD-10-CM

## 2020-06-25 DIAGNOSIS — Z95.0 CARDIAC PACEMAKER IN SITU: ICD-10-CM

## 2020-06-25 DIAGNOSIS — I25.10 ASHD (ARTERIOSCLEROTIC HEART DISEASE): Primary | ICD-10-CM

## 2020-06-25 DIAGNOSIS — Z98.890 S/P ABLATION OF ATRIAL FLUTTER: ICD-10-CM

## 2020-06-25 DIAGNOSIS — I47.29 NSVT (NONSUSTAINED VENTRICULAR TACHYCARDIA): ICD-10-CM

## 2020-06-25 DIAGNOSIS — I35.0 AORTIC VALVE STENOSIS, ETIOLOGY OF CARDIAC VALVE DISEASE UNSPECIFIED: ICD-10-CM

## 2020-06-25 DIAGNOSIS — I49.5 SSS (SICK SINUS SYNDROME) (HCC): ICD-10-CM

## 2020-06-25 DIAGNOSIS — Z78.9 STATIN INTOLERANCE: ICD-10-CM

## 2020-06-25 DIAGNOSIS — R07.9 CHEST PAIN, UNSPECIFIED TYPE: ICD-10-CM

## 2020-06-25 PROCEDURE — 85027 COMPLETE CBC AUTOMATED: CPT

## 2020-06-25 PROCEDURE — 85610 PROTHROMBIN TIME: CPT

## 2020-06-25 PROCEDURE — 36415 COLL VENOUS BLD VENIPUNCTURE: CPT

## 2020-06-25 PROCEDURE — 80053 COMPREHEN METABOLIC PANEL: CPT

## 2020-06-25 NOTE — PROGRESS NOTES
6/25/2020 3:36 PM      Subjective:     Gopal Chaparro Rito  is here to discuss stress test. On pacer interrogation NSVT noted. Has noticed atypical chest discomfort at night over last few days. He denies palpitations, irregular heart beats, near-syncope, syncope, fatigue, orthopnea, paroxysmal nocturnal dyspnea, exertional chest pressure/discomfort, claudication, lower extremity edema. Visit Vitals  /70 (BP 1 Location: Left arm, BP Patient Position: Sitting)   Pulse 78   Resp 16   Ht 6' 1\" (1.854 m)   Wt 239 lb 12.8 oz (108.8 kg)   SpO2 97%   BMI 31.64 kg/m²     Current Outpatient Medications   Medication Sig    furosemide (LASIX) 40 mg tablet TAKE 1 TABLET TWICE DAILY    levothyroxine (SYNTHROID) 50 mcg tablet TAKE 1 TABLET EVERY DAY BEFORE BREAKFAST    isosorbide mononitrate ER (IMDUR) 60 mg CR tablet TAKE 1 TABLET EVERY DAY    metoprolol tartrate (LOPRESSOR) 25 mg tablet TAKE 1/2 TABLET TWICE DAILY    potassium chloride (K-DUR, KLOR-CON) 20 mEq tablet TAKE 1 TABLET TWICE DAILY    omeprazole (PRILOSEC) 20 mg capsule TAKE 1 CAPSULE EVERY DAY    albuterol (VENTOLIN HFA) 90 mcg/actuation inhaler Take 2 Puffs by inhalation every four (4) hours as needed for Wheezing.  traMADol (ULTRAM) 50 mg tablet Take 50 mg by mouth every eight (8) hours as needed.  coenzyme q10 10 mg cap Take 100 mg by mouth every evening.  Aspirin, Buffered 81 mg tab Take 81 mg by mouth daily. No current facility-administered medications for this visit. Objective:      Visit Vitals  /70 (BP 1 Location: Left arm, BP Patient Position: Sitting)   Pulse 78   Resp 16   Ht 6' 1\" (1.854 m)   Wt 239 lb 12.8 oz (108.8 kg)   SpO2 97%   BMI 31.64 kg/m²       Data Review:   Labs:  No results found for this or any previous visit (from the past 24 hour(s)).     EKG: Normal sinus rhythm, no acute st/t changes    Reviewed and/or ordered active problem list, medication list tests    Past Medical History:   Diagnosis Date    Adverse effect of anesthesia     difficult with waking up     Allergic rhinitis     Arthritis     Asthma     Atrial flutter ([facility] [address] 9/12/2014    CAD (coronary artery disease)     Dr. Tanisha Stoll    Calculus of kidney     Chest pain 2006    Normal stress echo    Chronic bronchitis     Chronic bronchitis ([facility] [address] 10/3/2012    Chronic pain     knees    Erectile dysfunction     GERD (gastroesophageal reflux disease)     hiatal hernia    Gout 1/24/2013    Hemoptysis 2008    Work up by Dr. Anju Lanza; Negative    Hiatal hernia     Hypercholesterolemia     Hypertension     Ill-defined condition     bronchitis    Melanoma ([facility] [address]     back    Nausea & vomiting     Pacemaker     Dr. Darci Marshall [facility]) 6/4/2012    S/P ablation of atrial flutter 9/12/2014    Sleep apnea     states never had test for apnea    Stroke [facility]) 2009     tia no residual problems      Past Surgical History:   Procedure Laterality Date    CARDIAC SURG PROCEDURE UNLIST      ablation    HX AFIB ABLATION  2014    Dr. Celso Hardwick    ruptured appendix    HX HEART CATHETERIZATION      [address]    HX HERNIA REPAIR  12/17/14    Recurrent left inguinal hernia; Dr. Alma Rosa Aldridge      5 hernia repairs total    HX PACEMAKER  12/2014    HX PACEMAKER PLACEMENT  2014    Dr. El Garcia    Kidney stone extraction    [place] COLSC FLX W/RMVL OF TUMOR POLYP LESION SNARE TQ  4/21/2011          Allergies   Allergen Reactions    Azithromycin Anaphylaxis    Gadolinium-Containing Contrast Media Other (comments)     1) Moderate to Severe. 2) Post Gadolinium issues as noted:   - Diaphoretic, Near Syncope, Nausea and vomiting.     Acetaminophen Other (comments)     Indigestion,\"burning sensation\"    Contrast Dye [Iodine] Other (comments)     Decrease in BP    Crestor [Rosuvastatin] Myalgia    Levaquin [Levofloxacin] Other (comments)     Joint pain    Medrol [Methylprednisolone] Vertigo    Norvasc [Amlodipine] Other (comments)     Numbness and tingling    Nsaids (Non-Steroidal Anti-Inflammatory Drug) Other (comments)     Cough up blood      Percocet [Oxycodone-Acetaminophen] Itching    Ranexa [Ranolazine] Other (comments)     Cannot remember    Simvastatin Other (comments)     Joint pain    Voltaren [Diclofenac Sodium] Other (comments)     unknown    Zetia [Ezetimibe] Myalgia      Family History   Problem Relation Age of Onset    Stroke Father     Heart Disease Mother       Social History     Socioeconomic History    Marital status:      Spouse name: Not on file    Number of children: Not on file    Years of education: Not on file    Highest education level: Not on file   Occupational History    Not on file   Social Needs    Financial resource strain: Not on file    Food insecurity     Worry: Not on file     Inability: Not on file    Transportation needs     Medical: Not on file     Non-medical: Not on file   Tobacco Use    Smoking status: Never Smoker    Smokeless tobacco: Never Used   Substance and Sexual Activity    Alcohol use:  Yes     Alcohol/week: 3.0 standard drinks     Types: 1 Shots of liquor, 2 Standard drinks or equivalent per week     Comment: rare    Drug use: No     Types: OTC, Prescription    Sexual activity: Not Currently     Partners: Female   Lifestyle    Physical activity     Days per week: Not on file     Minutes per session: Not on file    Stress: Not on file   Relationships    Social connections     Talks on phone: Not on file     Gets together: Not on file     Attends Sabianism service: Not on file     Active member of club or organization: Not on file     Attends meetings of clubs or organizations: Not on file     Relationship status: Not on file    Intimate partner violence     Fear of current or ex partner: Not on file     Emotionally abused: Not on file     Physically abused: Not on file     Forced sexual activity: Not on file   Other Topics Concern    Not on file   Social History Narrative    Not on file         Review of Systems     General: Not Present- Anorexia, Chills, Dietary Changes, Fatigue, Fever, Medication Changes, Night Sweats, Weight Gain > 10lbs. and Weight Loss > 10lbs. .  Skin: Not Present- Bruising and Excessive Sweating. HEENT: Not Present- Headache, Visual Loss and Vertigo. Respiratory: Not Present- Cough, Decreased Exercise Tolerance, Difficulty Breathing, Snoring and Wheezing. Cardiovascular: Not Present- Abnormal Blood Pressure, Claudications, Difficulty Breathing On Exertion, Edema, Fainting / Blacking Out, Irregular Heart Beat, Night Cramps, Orthopnea, Palpitations, Paroxysmal Nocturnal Dyspnea, Rapid Heart Rate, Shortness of Breath and Swelling of Extremities. Gastrointestinal: Not Present- Black, Tarry Stool, Bloody Stool, Diarrhea, Hematemesis, Rectal Bleeding and Vomiting. Musculoskeletal: Not Present- Muscle Pain and Muscle Weakness. Neurological: Not Present- Dizziness. Psychiatric: Not Present- Depression. Endocrine: Not Present- Cold Intolerance, Heat Intolerance and Thyroid Problems. Hematology: Not Present- Abnormal Bleeding, Anemia, Blood Clots and Easy Bruising. Physical Exam   The physical exam findings are as follows:       General   Mental Status - Alert. General Appearance - Not in acute distress. Chest and Lung Exam   Inspection: Accessory muscles - No use of accessory muscles in breathing. Auscultation:   Breath sounds: - Normal.      Cardiovascular   Inspection: Jugular vein - Bilateral - Inspection Normal.  Palpation/Percussion:   Apical Impulse: - Normal.  Auscultation: Rhythm - Regular. Heart Sounds - S1 WNL and S2 WNL. No S3 or S4. Murmurs & Other Heart Sounds: Auscultation of the heart reveals - No Murmurs. Carotid arteries - No Carotid bruit.       Peripheral Vascular   Upper Extremity: Inspection - Bilateral - No Cyanotic nailbeds or Digital clubbing. Lower Extremity:   Palpation: Dorsalis pedis pulse - Bilateral - Normal. Posterior tibia pulse - Bilateral - Normal. Edema - Bilateral - No edema. Assessment:       ICD-10-CM ICD-9-CM    1. ASHD (arteriosclerotic heart disease) I25.10 414.00 CBC W/O DIFF      METABOLIC PANEL, COMPREHENSIVE      PROTHROMBIN TIME + INR   2. SSS (sick sinus syndrome) ([facility]) I49.5 427.81 CBC W/O DIFF      METABOLIC PANEL, COMPREHENSIVE      PROTHROMBIN TIME + INR   3. S/P ablation of atrial flutter Z98.890 V45.89 CBC W/O DIFF    N69.47  METABOLIC PANEL, COMPREHENSIVE      PROTHROMBIN TIME + INR   4. Cerebrovascular accident (CVA), unspecified mechanism ([facility] [address] I63.9 434.91 CBC W/O DIFF      METABOLIC PANEL, COMPREHENSIVE      PROTHROMBIN TIME + INR   5. Mixed hyperlipidemia E78.2 272.2 CBC W/O DIFF      METABOLIC PANEL, COMPREHENSIVE      PROTHROMBIN TIME + INR   6. Cardiac pacemaker in situ Z95.0 V45.01 CBC W/O DIFF      METABOLIC PANEL, COMPREHENSIVE      PROTHROMBIN TIME + INR   7. S/P AVR (aortic valve replacement) Z95.2 V43.3 CBC W/O DIFF      METABOLIC PANEL, COMPREHENSIVE      PROTHROMBIN TIME + INR   8. Aortic valve stenosis, etiology of cardiac valve disease unspecified I35.0 424.1 CBC W/O DIFF      METABOLIC PANEL, COMPREHENSIVE      PROTHROMBIN TIME + INR   9. Essential hypertension I10 401.9 CBC W/O DIFF      METABOLIC PANEL, COMPREHENSIVE      PROTHROMBIN TIME + INR   10. Chest pain, unspecified type R07.9 786.50 AMB POC EKG ROUTINE W/ 12 LEADS, INTER & REP      CBC W/O DIFF      METABOLIC PANEL, COMPREHENSIVE      PROTHROMBIN TIME + INR   11. NSVT (nonsustained ventricular tachycardia) ([facility]) I47.2 427.1 CBC W/O DIFF      METABOLIC PANEL, COMPREHENSIVE      PROTHROMBIN TIME + INR   12. Statin intolerance Z78.9 995.27 CBC W/O DIFF      METABOLIC PANEL, COMPREHENSIVE      PROTHROMBIN TIME + INR       Plan:     1.  ASHD: Minimal atherosclerotic heart disease on preoperative cath in 2016. Now with chest discomfort, abnormal stress test and NSVT. Recommend cardiac cath. Continue current meds. I discussed the risks/benefits/alternatives of the procedure with the patient. Risks include (but are not limited to) bleeding, infection, cva/mi/tamponade/death. The patient understands and agrees to proceed. [id number]  2. s/p post bioprosthetic AVR in 3/2016 functioning well on last echocardiogram normal ejection fraction in 2/2019.  3. Sick sinus syndrome with a flutter ablation, has DC PM: saw EP today, Noted VT per device check. 4. Hyperlipidemia: 5/19 LDL at 85 statin intolerant now. Will consider PCSK9 inh therapy after cath.    5. Hypertension: Controlled continue current therapy

## 2020-06-25 NOTE — PROGRESS NOTES
Identified pt with two pt identifiers(name and ). Reviewed record in preparation for visit and have obtained necessary documentation. Jonah Reynoso is a 81 y.o. male here today for:   Chief Complaint   Patient presents with    Results     discuss stress test results    Chest Pain     pt reports chest discomfort last night after working in his garage, stating \"It doesn't hurt, it just feels different'       Visit Vitals  /70 (BP 1 Location: Left arm, BP Patient Position: Sitting)   Pulse 78   Resp 16   Ht 6' 1\" (1.854 m)   Wt 239 lb 12.8 oz (108.8 kg)   SpO2 97%   BMI 31.64 kg/m²           Health Maintenance Review: Patient reminded of \"due or due soon\" health maintenance. I have asked the patient to contact his/her primary care provider (PCP) for follow-up on his/her health maintenance. Coordination of Care Questionnaire:  :   1) Have you been to an emergency room, urgent care, or hospitalized since your last visit? If yes, where when, and reason for visit? no       2. Have seen or consulted any other health care provider since your last visit? If yes, where when, and reason for visit? NO      Patient is accompanied by self I have received verbal consent from Torsten Salguero Sr to discuss any/all medical information while they are present in the room.

## 2020-06-26 LAB
ALBUMIN SERPL-MCNC: 4.3 G/DL (ref 3.6–4.6)
ALBUMIN/GLOB SERPL: 1.7 {RATIO} (ref 1.2–2.2)
ALP SERPL-CCNC: 82 IU/L (ref 39–117)
ALT SERPL-CCNC: 16 IU/L (ref 0–44)
AST SERPL-CCNC: 18 IU/L (ref 0–40)
BILIRUB SERPL-MCNC: 0.3 MG/DL (ref 0–1.2)
BUN SERPL-MCNC: 26 MG/DL (ref 8–27)
BUN/CREAT SERPL: 19 (ref 10–24)
CALCIUM SERPL-MCNC: 9.8 MG/DL (ref 8.6–10.2)
CHLORIDE SERPL-SCNC: 98 MMOL/L (ref 96–106)
CO2 SERPL-SCNC: 25 MMOL/L (ref 20–29)
CREAT SERPL-MCNC: 1.38 MG/DL (ref 0.76–1.27)
ERYTHROCYTE [DISTWIDTH] IN BLOOD BY AUTOMATED COUNT: 12.2 % (ref 11.6–15.4)
GLOBULIN SER CALC-MCNC: 2.6 G/DL (ref 1.5–4.5)
GLUCOSE SERPL-MCNC: 108 MG/DL (ref 65–99)
HCT VFR BLD AUTO: 42.5 % (ref 37.5–51)
HGB BLD-MCNC: 14.6 G/DL (ref 13–17.7)
INR PPP: 1 (ref 0.8–1.2)
INTERPRETATION: NORMAL
MCH RBC QN AUTO: 31.5 PG (ref 26.6–33)
MCHC RBC AUTO-ENTMCNC: 34.4 G/DL (ref 31.5–35.7)
MCV RBC AUTO: 92 FL (ref 79–97)
PLATELET # BLD AUTO: 219 X10E3/UL (ref 150–450)
POTASSIUM SERPL-SCNC: 4.4 MMOL/L (ref 3.5–5.2)
PROT SERPL-MCNC: 6.9 G/DL (ref 6–8.5)
PROTHROMBIN TIME: 10.4 SEC (ref 9.1–12)
RBC # BLD AUTO: 4.64 X10E6/UL (ref 4.14–5.8)
SODIUM SERPL-SCNC: 138 MMOL/L (ref 134–144)
WBC # BLD AUTO: 8.6 X10E3/UL (ref 3.4–10.8)

## 2020-07-06 ENCOUNTER — OFFICE VISIT (OUTPATIENT)
Dept: URGENT CARE | Age: 85
End: 2020-07-06

## 2020-07-06 VITALS — TEMPERATURE: 98.2 F | RESPIRATION RATE: 18 BRPM | HEART RATE: 75 BPM | OXYGEN SATURATION: 94 %

## 2020-07-06 DIAGNOSIS — Z20.822 ENCOUNTER FOR LABORATORY TESTING FOR COVID-19 VIRUS: Primary | ICD-10-CM

## 2020-07-07 ENCOUNTER — DOCUMENTATION ONLY (OUTPATIENT)
Dept: CARDIOLOGY CLINIC | Age: 85
End: 2020-07-07

## 2020-07-07 NOTE — PROGRESS NOTES
Called patient to screen for COVID 19 symptoms and/or possible exposure prior to their LEFT HEART CATH 7/10/2020 AT 730AM.   Pt with no identifiable symptoms/risks. Given instructions for procedure to continue to self isolate.

## 2020-07-08 LAB — SARS-COV-2, NAA: NOT DETECTED

## 2020-07-10 ENCOUNTER — HOSPITAL ENCOUNTER (OUTPATIENT)
Age: 85
Discharge: HOME | End: 2020-07-10
Attending: INTERNAL MEDICINE | Admitting: INTERNAL MEDICINE
Payer: MEDICARE

## 2020-07-10 VITALS
SYSTOLIC BLOOD PRESSURE: 116 MMHG | RESPIRATION RATE: 20 BRPM | DIASTOLIC BLOOD PRESSURE: 45 MMHG | TEMPERATURE: 97.6 F | HEART RATE: 70 BPM | OXYGEN SATURATION: 96 %

## 2020-07-10 DIAGNOSIS — R07.9 CHEST PAIN, UNSPECIFIED TYPE: ICD-10-CM

## 2020-07-10 LAB
GLUCOSE BLD STRIP.AUTO-MCNC: 114 MG/DL (ref 65–100)
SERVICE CMNT-IMP: ABNORMAL

## 2020-07-10 PROCEDURE — 93454 CORONARY ARTERY ANGIO S&I: CPT | Performed by: INTERNAL MEDICINE

## 2020-07-10 PROCEDURE — 77030015766: Performed by: INTERNAL MEDICINE

## 2020-07-10 PROCEDURE — 77030019569 HC BND COMPR RAD TERU -B: Performed by: INTERNAL MEDICINE

## 2020-07-10 PROCEDURE — C1769 GUIDE WIRE: HCPCS | Performed by: INTERNAL MEDICINE

## 2020-07-10 PROCEDURE — 82962 GLUCOSE BLOOD TEST: CPT

## 2020-07-10 PROCEDURE — C1894 INTRO/SHEATH, NON-LASER: HCPCS | Performed by: INTERNAL MEDICINE

## 2020-07-10 PROCEDURE — 77030010221 HC SPLNT WR POS TELE -B: Performed by: INTERNAL MEDICINE

## 2020-07-10 PROCEDURE — 99152 MOD SED SAME PHYS/QHP 5/>YRS: CPT | Performed by: INTERNAL MEDICINE

## 2020-07-10 PROCEDURE — 77030019698 HC SYR ANGI MDLON MRTM -A: Performed by: INTERNAL MEDICINE

## 2020-07-10 PROCEDURE — 77030008543 HC TBNG MON PRSS MRTM -A: Performed by: INTERNAL MEDICINE

## 2020-07-10 PROCEDURE — 74011000250 HC RX REV CODE- 250: Performed by: INTERNAL MEDICINE

## 2020-07-10 PROCEDURE — 74011636320 HC RX REV CODE- 636/320: Performed by: INTERNAL MEDICINE

## 2020-07-10 PROCEDURE — 74011250636 HC RX REV CODE- 250/636: Performed by: INTERNAL MEDICINE

## 2020-07-10 PROCEDURE — 77030028837 HC SYR ANGI PWR INJ COEU -A: Performed by: INTERNAL MEDICINE

## 2020-07-10 PROCEDURE — 77030004549 HC CATH ANGI DX PRF MRTM -A: Performed by: INTERNAL MEDICINE

## 2020-07-10 RX ORDER — GUAIFENESIN 100 MG/5ML
LIQUID (ML) ORAL
Status: DISCONTINUED
Start: 2020-07-10 | End: 2020-07-10 | Stop reason: HOSPADM

## 2020-07-10 RX ORDER — SODIUM CHLORIDE 9 MG/ML
100 INJECTION, SOLUTION INTRAVENOUS CONTINUOUS
Status: CANCELLED | OUTPATIENT
Start: 2020-07-10

## 2020-07-10 RX ORDER — HEPARIN SODIUM 1000 [USP'U]/ML
INJECTION, SOLUTION INTRAVENOUS; SUBCUTANEOUS AS NEEDED
Status: DISCONTINUED | OUTPATIENT
Start: 2020-07-10 | End: 2020-07-10 | Stop reason: HOSPADM

## 2020-07-10 RX ORDER — HEPARIN SODIUM 200 [USP'U]/100ML
INJECTION, SOLUTION INTRAVENOUS
Status: COMPLETED | OUTPATIENT
Start: 2020-07-10 | End: 2020-07-10

## 2020-07-10 RX ORDER — MIDAZOLAM HYDROCHLORIDE 1 MG/ML
INJECTION, SOLUTION INTRAMUSCULAR; INTRAVENOUS AS NEEDED
Status: DISCONTINUED | OUTPATIENT
Start: 2020-07-10 | End: 2020-07-10 | Stop reason: HOSPADM

## 2020-07-10 RX ORDER — HYDROCORTISONE SODIUM SUCCINATE 100 MG/2ML
INJECTION, POWDER, FOR SOLUTION INTRAMUSCULAR; INTRAVENOUS AS NEEDED
Status: DISCONTINUED | OUTPATIENT
Start: 2020-07-10 | End: 2020-07-10 | Stop reason: HOSPADM

## 2020-07-10 RX ORDER — LIDOCAINE HYDROCHLORIDE 10 MG/ML
INJECTION, SOLUTION EPIDURAL; INFILTRATION; INTRACAUDAL; PERINEURAL AS NEEDED
Status: DISCONTINUED | OUTPATIENT
Start: 2020-07-10 | End: 2020-07-10 | Stop reason: HOSPADM

## 2020-07-10 RX ORDER — DIPHENHYDRAMINE HYDROCHLORIDE 50 MG/ML
INJECTION, SOLUTION INTRAMUSCULAR; INTRAVENOUS AS NEEDED
Status: DISCONTINUED | OUTPATIENT
Start: 2020-07-10 | End: 2020-07-10 | Stop reason: HOSPADM

## 2020-07-10 RX ORDER — FENTANYL CITRATE 50 UG/ML
INJECTION, SOLUTION INTRAMUSCULAR; INTRAVENOUS AS NEEDED
Status: DISCONTINUED | OUTPATIENT
Start: 2020-07-10 | End: 2020-07-10 | Stop reason: HOSPADM

## 2020-07-10 RX ORDER — IODIXANOL 320 MG/ML
INJECTION, SOLUTION INTRAVASCULAR AS NEEDED
Status: DISCONTINUED | OUTPATIENT
Start: 2020-07-10 | End: 2020-07-10 | Stop reason: HOSPADM

## 2020-07-10 RX ORDER — VERAPAMIL HYDROCHLORIDE 2.5 MG/ML
INJECTION, SOLUTION INTRAVENOUS AS NEEDED
Status: DISCONTINUED | OUTPATIENT
Start: 2020-07-10 | End: 2020-07-10 | Stop reason: HOSPADM

## 2020-07-10 NOTE — ROUTINE PROCESS
The following appointments have been successfully scheduled:    Date/time Friday, July 24, 2020 10:45 AM  Patient Silva Parker 1935 ([id number]) [phone]  Department [facility]-MAIN OFFICE-[facility] [address]  Appointment type Hospital Follow-Up  Provider [facility]

## 2020-07-10 NOTE — Clinical Note
TRANSFER - OUT REPORT:     Verbal report given to: Aundrea. Report consisted of patient's Situation, Background, Assessment and   Recommendations(SBAR). Opportunity for questions and clarification was provided. Patient transported with a Registered Nurse. Patient transported to: [facility].

## 2020-07-10 NOTE — PROGRESS NOTES
0830 - pt returned from cath lab, vitals stable    0845 - patent hemostasis at 13 cc TR band    1010 - TR band removed, hemostasis    1320 - No medication changes. Vitals stable. Discharge instructions understood. Follow-up information given. Discharged to home with wife.

## 2020-07-10 NOTE — DISCHARGE INSTRUCTIONS
[address] [place] [address]  [phone]        Patient ID:  Cele Knowles   [id number]  89 y.o.  1935    Admit Date: 7/10/2020    Discharge Date: 7/10/2020     Admitting Physician: Xuan Felix MD     Discharge Physician: Xuan Felix MD    Admission Diagnoses:   Chest pain, unspecified type [R07.9]    Discharge Diagnoses: Active Problems:    * No active hospital problems. *      Discharge Condition: Good    Cardiology Procedures this Admission:  Diagnostic left heart catheterization    Disposition: home    Reference discharge instructions provided by nursing for diet and activity. Signed: Xuan Felix MD  7/10/2020  8:35 AM        Radial Cardiac Catheterization/Angiography Discharge Instructions    It is normal to feel tired the first couple days. Take it easy and follow the physicians instructions. CHECK THE CATHETER INSERTION SITE DAILY:    Remove the wrist dressing 24 hours after the procedure. You may shower 24 hours after the procedure. Wash with soap and water and pat dry. Gentle cleaning of the site with soap and water is sufficient, cover with a dry clean dressing or bandage. Do not apply creams or powders to the area. No soaking the wrist for 3 days. Leave the puncture site open to air after 24 hours post-procedure. CALL THE PHYSICIANS:     If the site becomes red, swollen or feels warm to the touch  If there is bleeding or drainage or if there is unusual pain at the radial site. If there is any minor oozing, you may apply a band-aid and remove after 12 hours. If the bleeding continues, hold pressure with the middle finger against the puncture site and the thumb against the back of the wrist,call 911 to be transported to the hospital.  DO NOT DRIVE YOURSELF, [address].     ACTIVITY:   For the first 24 hours do not manipulate the wrist.  No lifting, pushing or pulling over 3-5 pounds with the affected wrist for 7 daysand no straining the insertion site. Do not life grocery bags or the garbage can, do not run the vacuum  or  for 7 days. Start with short walks as in the hospital and gradually increase as tolerated each day. It is recommended to walk 30 minutes 5-7 days per week. Follow your physicians instructions on activity. Avoid walking outside in extremes of heat or cold. Walk inside when it is cold and windy or hot and humid. Things to keep in mind:  No driving for at least 24 hours, or as designated by your physician. Limit the number of times you go up and down the stairs  Take rests and pace yourself with activity. Be careful and do not strain with bowel movements. MEDICATIONS:    Take all medications as prescribed  Call your physician if you have any questions  Keep an updated list of your medications with you at all times and give a list to your physician and pharmacist    SIGNS AND SYMPTOMS:   Be cautious of symptoms of angina or recurrent symptoms such as chest discomfort, unusual shortness of breath or fatigue. These could be symptoms of restenosis, a new blockage or a heart attack. If your symptoms are relieved with rest it is still recommended that you notify your physician of recurrent chest pain or discomfort. For CHEST PAIN or symptoms of angina not relieved with rest:  If the discomfort is not relieved with rest, and you have been prescribed Nitroglycerin, take as directed (taken under the tongue, one at a time 5 minutes apart for a total of 3 doses). If the discomfort is not relieved after the 3rd nitroglycerin, call 911. If you have not been prescribed Nitroglycerin  and your chest discomfort is not relieved with rest, call 911. AFTER CARE:   Follow up with your physician as instructed.   Follow a heart healthy diet with proper portion control, daily stress management, daily exercise, blood pressure and cholesterol control , and smoking cessation.

## 2020-07-15 DIAGNOSIS — I25.10 CORONARY ARTERY DISEASE INVOLVING NATIVE CORONARY ARTERY OF NATIVE HEART WITHOUT ANGINA PECTORIS: ICD-10-CM

## 2020-07-16 RX ORDER — POTASSIUM CHLORIDE 20 MEQ/1
TABLET, EXTENDED RELEASE ORAL
Qty: 180 TAB | Refills: 3 | Status: SHIPPED | OUTPATIENT
Start: 2020-07-16 | End: 2021-06-09

## 2020-07-16 RX ORDER — ISOSORBIDE MONONITRATE 60 MG/1
TABLET, EXTENDED RELEASE ORAL
Qty: 90 TAB | Refills: 3 | Status: SHIPPED | OUTPATIENT
Start: 2020-07-16 | End: 2021-07-27 | Stop reason: SDUPTHER

## 2020-07-16 RX ORDER — METOPROLOL TARTRATE 25 MG/1
TABLET, FILM COATED ORAL
Qty: 90 TAB | Refills: 3 | Status: SHIPPED | OUTPATIENT
Start: 2020-07-16 | End: 2021-07-27 | Stop reason: SDUPTHER

## 2020-07-16 RX ORDER — OMEPRAZOLE 20 MG/1
CAPSULE, DELAYED RELEASE ORAL
Qty: 90 CAP | Refills: 3 | Status: SHIPPED | OUTPATIENT
Start: 2020-07-16 | End: 2021-07-27 | Stop reason: SDUPTHER

## 2020-09-03 ENCOUNTER — OFFICE VISIT (OUTPATIENT)
Dept: CARDIOLOGY CLINIC | Age: 85
End: 2020-09-03
Payer: MEDICARE

## 2020-09-03 DIAGNOSIS — Z95.0 CARDIAC PACEMAKER IN SITU: Primary | ICD-10-CM

## 2020-09-03 DIAGNOSIS — I49.5 SSS (SICK SINUS SYNDROME) (HCC): ICD-10-CM

## 2020-09-03 PROCEDURE — 93294 REM INTERROG EVL PM/LDLS PM: CPT | Performed by: INTERNAL MEDICINE

## 2020-09-03 PROCEDURE — 93296 REM INTERROG EVL PM/IDS: CPT | Performed by: INTERNAL MEDICINE

## 2020-09-11 ENCOUNTER — HOSPITAL ENCOUNTER (OUTPATIENT)
Dept: LAB | Age: 85
Discharge: HOME | End: 2020-09-11
Payer: MEDICARE

## 2020-09-11 PROCEDURE — 84550 ASSAY OF BLOOD/URIC ACID: CPT

## 2020-09-11 PROCEDURE — 82043 UR ALBUMIN QUANTITATIVE: CPT

## 2020-09-11 PROCEDURE — 84443 ASSAY THYROID STIM HORMONE: CPT

## 2020-09-11 PROCEDURE — 80061 LIPID PANEL: CPT

## 2020-09-11 PROCEDURE — 83036 HEMOGLOBIN GLYCOSYLATED A1C: CPT

## 2020-09-11 PROCEDURE — 36415 COLL VENOUS BLD VENIPUNCTURE: CPT

## 2020-09-11 PROCEDURE — 84153 ASSAY OF PSA TOTAL: CPT

## 2020-09-11 PROCEDURE — 80053 COMPREHEN METABOLIC PANEL: CPT

## 2020-09-11 PROCEDURE — 84439 ASSAY OF FREE THYROXINE: CPT

## 2020-09-11 PROCEDURE — 85025 COMPLETE CBC W/AUTO DIFF WBC: CPT

## 2020-09-12 LAB
ALBUMIN SERPL-MCNC: 4.3 G/DL (ref 3.6–4.6)
ALBUMIN/CREAT UR: <6 MG/G CREAT (ref 0–29)
ALBUMIN/GLOB SERPL: 1.5 {RATIO} (ref 1.2–2.2)
ALP SERPL-CCNC: 85 IU/L (ref 39–117)
ALT SERPL-CCNC: 19 IU/L (ref 0–44)
AST SERPL-CCNC: 23 IU/L (ref 0–40)
BASOPHILS # BLD AUTO: 0.1 X10E3/UL (ref 0–0.2)
BASOPHILS NFR BLD AUTO: 1 %
BILIRUB SERPL-MCNC: 0.4 MG/DL (ref 0–1.2)
BUN SERPL-MCNC: 18 MG/DL (ref 8–27)
BUN/CREAT SERPL: 16 (ref 10–24)
CALCIUM SERPL-MCNC: 9.2 MG/DL (ref 8.6–10.2)
CHLORIDE SERPL-SCNC: 100 MMOL/L (ref 96–106)
CHOLEST SERPL-MCNC: 215 MG/DL (ref 100–199)
CO2 SERPL-SCNC: 28 MMOL/L (ref 20–29)
CREAT SERPL-MCNC: 1.16 MG/DL (ref 0.76–1.27)
CREAT UR-MCNC: 52.4 MG/DL
EOSINOPHIL # BLD AUTO: 0.5 X10E3/UL (ref 0–0.4)
EOSINOPHIL NFR BLD AUTO: 7 %
ERYTHROCYTE [DISTWIDTH] IN BLOOD BY AUTOMATED COUNT: 12.3 % (ref 11.6–15.4)
EST. AVERAGE GLUCOSE BLD GHB EST-MCNC: 134 MG/DL
GLOBULIN SER CALC-MCNC: 2.8 G/DL (ref 1.5–4.5)
GLUCOSE SERPL-MCNC: 123 MG/DL (ref 65–99)
HBA1C MFR BLD: 6.3 % (ref 4.8–5.6)
HCT VFR BLD AUTO: 46.6 % (ref 37.5–51)
HDLC SERPL-MCNC: 44 MG/DL
HGB BLD-MCNC: 15 G/DL (ref 13–17.7)
IMM GRANULOCYTES # BLD AUTO: 0 X10E3/UL (ref 0–0.1)
IMM GRANULOCYTES NFR BLD AUTO: 0 %
LDLC SERPL CALC-MCNC: 149 MG/DL (ref 0–99)
LYMPHOCYTES # BLD AUTO: 2.4 X10E3/UL (ref 0.7–3.1)
LYMPHOCYTES NFR BLD AUTO: 32 %
MCH RBC QN AUTO: 31.5 PG (ref 26.6–33)
MCHC RBC AUTO-ENTMCNC: 32.2 G/DL (ref 31.5–35.7)
MCV RBC AUTO: 98 FL (ref 79–97)
MICROALBUMIN UR-MCNC: <3 UG/ML
MONOCYTES # BLD AUTO: 0.7 X10E3/UL (ref 0.1–0.9)
MONOCYTES NFR BLD AUTO: 9 %
NEUTROPHILS # BLD AUTO: 3.9 X10E3/UL (ref 1.4–7)
NEUTROPHILS NFR BLD AUTO: 51 %
PLATELET # BLD AUTO: 228 X10E3/UL (ref 150–450)
POTASSIUM SERPL-SCNC: 4.4 MMOL/L (ref 3.5–5.2)
PROT SERPL-MCNC: 7.1 G/DL (ref 6–8.5)
PSA SERPL-MCNC: 5.6 NG/ML (ref 0–4)
RBC # BLD AUTO: 4.76 X10E6/UL (ref 4.14–5.8)
SODIUM SERPL-SCNC: 141 MMOL/L (ref 134–144)
T4 FREE SERPL-MCNC: 1.08 NG/DL (ref 0.82–1.77)
TRIGL SERPL-MCNC: 124 MG/DL (ref 0–149)
TSH SERPL DL<=0.005 MIU/L-ACNC: 3.78 UIU/ML (ref 0.45–4.5)
URATE SERPL-MCNC: 8.3 MG/DL (ref 3.7–8.6)
VLDLC SERPL CALC-MCNC: 22 MG/DL (ref 5–40)
WBC # BLD AUTO: 7.5 X10E3/UL (ref 3.4–10.8)

## 2020-09-14 ENCOUNTER — TELEPHONE (OUTPATIENT)
Dept: INTERNAL MEDICINE CLINIC | Age: 85
End: 2020-09-14

## 2020-09-18 NOTE — PROGRESS NOTES
SUBJECTIVE Mr. Danilo Bauman presents today acutely for ER follow up; also due for routine follow up visit. Chief Complaint Patient presents with  Suture Removal  
  pt here today to get stiches removed from hand-- 7 stiches in R hand and 2 in nose- pt had a fall and broke his nose and his ribs He fell and injured his face and R hand:  
 
Chief Complaint Patient presents with  Fall  
    Ambulatory into the ED with c/o laceration to Rt hand and pain/swelling to nose s/p glf-tripped on a curb and fell foreward onto his face. Denies LOC.  Hand Pain  Facial Pain  
  
  
History Provided By: Patient 
  
HPI: Danilo Bauman, 81 y.o. male presents ambulatory  to the ED with cc of 1 hours of 4 out of 10 constant aching nasal and right hand pain that are worse with palpation and started suddenly when he tripped over a curb and fell. No LOC. No neck pain. No headache or dizziness.  
  
Chief Complaint: fall Duration: 1 Hours Timing:  Constant Location: right hand and bridge of nose Quality: Aching Severity: 4 out of 10 Modifying Factors: worse with palpation Associated Symptoms: denies any other associated signs or symptoms He had seven sutures placed in hand and 3 on the bridge of his nose. He was given TDaP and started on an antibiotic. He is also due for reoutine follow up of chronic medical conditions. Chronic bronchitis: Has seen Dr. Dayday Flores. He continues to have wheezing at times. Uses albuterol prn. Doing better. He had cataracts removed in December 2016. He has undergone valve replacement in March 2016 with Dr. Polo Torres. It is recalled from early 2016 that he saw Dr. Gia Maza, for exertional dyspnea, and had cardiac cath. \"My valve is real bad. \" Knee pain: \"Doing pretty good right now. \"   Sees Dr. Gabriela Nance, and has had corticosteroid injections. Surgery on hold due to heart issues. He has prostate cancer, but doesn't want to treat this.   He has had biopsy proven adenocarcinoma of prostate, by Dr. Dorota Baez with [facility]. As we noted before: At this time \"I aint doin' nothin'. If the PSA gets high, then I'll take the shots. \"  From our records, it appears he was referred to [facility] for consideration of radiation therapy. He refused this. Past Medical History: Hyperlipidemia, chronic bronchitis, allergic rhinitis, urolithiasis, hiatal hernia with GERD, ED, Asthma, prostate cancer 2012 (Dr. Dorota Baez). Normal stress echo in 2006; Echo 2010 showed LVH, EF 55-60%. Hemoptysis with negative workup in 2008saw Dr. Mariann Badillo. EGD in 2009 Dr. Arnol Monk showing Hiatal hernia. R lid chronic ptosis noted in 2009 by Dr. Philip Reddy, optometrist.  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 Had exertional chest pain in 2012, recurred in 2014--cardiac work up with Dr. Vidhi Thacker. His ophthalmologist is Dr. Rj Norton. Past Surgical History: Hernia in 86. Kidney stone 85. Appy (ruptured) in 79. Resection of melanoma. Colon polypectomy 2011 Dr. Arnol Monk. A flutter ablation 2014 Dr. Scott Obrien 2014 Dr. Milton Toledo repair 2014 Dr. Ina Car. Aortic valve replacement 2016. Cataracts 2016. Allergies: IVP dye (BP drop). Medications:  
Current Outpatient Medications on File Prior to Visit Medication Sig Dispense Refill  
 HYDROcodone-acetaminophen (NORCO) 5-325 mg per tablet Take 1 Tab by mouth every eight (8) hours as needed for Pain. Max Daily Amount: 3 Tabs. 20 Tab 0  
 ondansetron (ZOFRAN ODT) 4 mg disintegrating tablet Take 1 Tab by mouth every eight (8) hours as needed for Nausea.  10 Tab 0  
 isosorbide mononitrate ER (IMDUR) 60 mg CR tablet TAKE 1 TABLET EVERY DAY 90 Tab 3  
 metoprolol tartrate (LOPRESSOR) 25 mg tablet TAKE 1/2 TABLET TWICE DAILY 90 Tab 3  
 omeprazole (PRILOSEC) 20 mg capsule TAKE 1 CAPSULE EVERY DAY 90 Cap 3  
  KLOR-CON M20 20 mEq tablet TAKE 1 TABLET TWICE DAILY 180 Tab 3  furosemide (LASIX) 40 mg tablet TAKE 1 TABLET TWICE DAILY 180 Tab 3  
 guaiFENesin-codeine (ROBITUSSIN AC) 100-10 mg/5 mL solution Take 5 mL by mouth three (3) times daily as needed for Cough. Max Daily Amount: 15 mL. 120 mL 0  
 lveveoqlinla-PZ-epaaxtogceu-GG (MUCINEX SINUS-MAX D-N, DIPHEN,) 25 mg-10 mg- 650 mg/20mL(nt) lisq Take  by mouth.  rosuvastatin (CRESTOR) 10 mg tablet TAKE 1 TABLET BY MOUTH EVERY MONDAY AND THURSDAY 24 Tab 3  
 albuterol (VENTOLIN HFA) 90 mcg/actuation inhaler Take 2 Puffs by inhalation every four (4) hours as needed for Wheezing. 3 Inhaler 3  
 traMADol (ULTRAM) 50 mg tablet  menthol 6 % gel by Apply Externally route as needed. \"Abdirashid\"--cool therapy pain gel  coenzyme q10 10 mg cap Take 100 mg by mouth every evening.  Aspirin, Buffered 81 mg tab Take 81 mg by mouth daily.  [] amoxicillin-clavulanate (AUGMENTIN) 875-125 mg per tablet Take 1 Tab by mouth two (2) times a day for 7 days. 14 Tab 0 No current facility-administered medications on file prior to visit. Social History:  . Retired hol[employer]. Drinks a pint to 1 1/2 pints hard liquor daily. When last assessed. No tobacco, or drugs. Family History: F CVAs. Brother valve disease. Review of systems: Unremarkable except as noted above. Objective:   
Visit Vitals /55 (BP 1 Location: Left arm, BP Patient Position: Sitting) Pulse 71 Temp 97.8 °F (36.6 °C) (Oral) Resp 16 Ht 6' (1.829 m) Wt 238 lb (108 kg) SpO2 95% BMI 32.28 kg/m² Donaldo Earing General appearance: Pleasant, obese elderly male in NAD. HEENT: PERRLA. EOMI. He has a mild right facial droop. OP moist, pink. Neck: Supple with No LAD. Lungs: CTAB. No wheezes. No rales. Heart: RRR. Pacemaker incision healed. Abdomen: S, NT, ND, BS +. Extremities: Warm. No C/C/E. Neuro: Sensation intact. Lab Results Component Value Date/Time Cholesterol, total 173 10/29/2018 08:28 AM  
 HDL Cholesterol 57 10/29/2018 08:28 AM  
 LDL, calculated 95 10/29/2018 08:28 AM  
 VLDL, calculated 21 10/29/2018 08:28 AM  
 Triglyceride 106 10/29/2018 08:28 AM  
 CHOL/HDL Ratio 3.6 09/17/2010 08:27 AM  
 
Lab Results Component Value Date/Time Hemoglobin A1c 6.3 (H) 10/29/2018 08:28 AM  
 
 
 
Assessment / Plan: 1. Lacerations of bridge of nose and medial R hand due to fall--closed with simple interrupted sutures--wounds healing well. Sutures removed. He did have some oozing of blood from one part of the wound on his hand, and a bandaid was placed. 2. CAD: No CP. As per his cardiologist, Dr. Brittany Linton 3. A flutter, SSS: now with pacemaker. As per cardiology. 4. Aortic stenosis: s/p AVR. Doing well. 5. Asthma: Stable. Uses albuterol prn; Less than daily currently. 6. Prostate Cancer: Surveillance for now. Pt willing to do hormonal treatments if PSA rises to higher levels. His urologist has suggested a PSA of 20 or above as a triggering value 7. Problem drinking: Not discussed today. 8. Hyperlipidemia:  Not at goal, but options limited. Did not tolerate simvastatin or zetia. Now on fish oil. Will recheck lipids and CMP. Continue pulse dosing of crestor. 9. GERD:  Controlled on prn prilosec; Pt. may continue this. 10. ED: Not discussed. 11. All rhinitis: Stable. 12. Possible TIA/Diplopia/Amaurosis fugax: No recent episode. Work up as above. 13. Gout: no recent flares. 14. Borderline DM. Recheck labs. 15. Obesity: Watch diet. More exercise as able (limited now by postsurgical recovery). 16. Noncompliance. Follow up in 6 months. stated

## 2020-09-21 DIAGNOSIS — I25.10 CORONARY ARTERY DISEASE INVOLVING NATIVE CORONARY ARTERY OF NATIVE HEART WITHOUT ANGINA PECTORIS: ICD-10-CM

## 2020-09-21 RX ORDER — LEVOTHYROXINE SODIUM 50 UG/1
TABLET ORAL
Qty: 90 TAB | Refills: 1 | Status: SHIPPED | OUTPATIENT
Start: 2020-09-21 | End: 2021-07-27 | Stop reason: SDUPTHER

## 2020-09-21 RX ORDER — FUROSEMIDE 40 MG/1
TABLET ORAL
Qty: 180 TAB | Refills: 1 | Status: SHIPPED | OUTPATIENT
Start: 2020-09-21 | End: 2021-06-09

## 2020-10-02 ENCOUNTER — TELEPHONE (OUTPATIENT)
Dept: INTERNAL MEDICINE CLINIC | Age: 85
End: 2020-10-02

## 2020-10-02 NOTE — TELEPHONE ENCOUNTER
----- Message from Jeimy Madison sent at 10/1/2020  8:00 PM EDT -----  Regarding: Dr. Sisi Jenkins first and last name: Stoney Walsh  Reason for call: Requesting a call back to schedule a flu shot appt.   Callback required yes/no and why: yes  Best contact number(s): [phone]  Details to clarify the request: n/a

## 2020-10-05 ENCOUNTER — OFFICE VISIT (OUTPATIENT)
Dept: CARDIOLOGY CLINIC | Age: 85
End: 2020-10-05
Payer: MEDICARE

## 2020-10-05 VITALS
WEIGHT: 238 LBS | BODY MASS INDEX: 31.54 KG/M2 | HEIGHT: 73 IN | OXYGEN SATURATION: 97 % | SYSTOLIC BLOOD PRESSURE: 100 MMHG | RESPIRATION RATE: 18 BRPM | DIASTOLIC BLOOD PRESSURE: 60 MMHG | HEART RATE: 71 BPM

## 2020-10-05 DIAGNOSIS — Z98.890 S/P ABLATION OF ATRIAL FLUTTER: ICD-10-CM

## 2020-10-05 DIAGNOSIS — Z95.2 S/P AVR (AORTIC VALVE REPLACEMENT): ICD-10-CM

## 2020-10-05 DIAGNOSIS — Z78.9 STATIN INTOLERANCE: ICD-10-CM

## 2020-10-05 DIAGNOSIS — E78.00 HYPERCHOLESTEROLEMIA: ICD-10-CM

## 2020-10-05 DIAGNOSIS — Z86.79 S/P ABLATION OF ATRIAL FLUTTER: ICD-10-CM

## 2020-10-05 DIAGNOSIS — I25.10 ASHD (ARTERIOSCLEROTIC HEART DISEASE): Primary | ICD-10-CM

## 2020-10-05 DIAGNOSIS — G47.33 OSA (OBSTRUCTIVE SLEEP APNEA): ICD-10-CM

## 2020-10-05 PROCEDURE — G8752 SYS BP LESS 140: HCPCS | Performed by: INTERNAL MEDICINE

## 2020-10-05 PROCEDURE — G0463 HOSPITAL OUTPT CLINIC VISIT: HCPCS | Performed by: INTERNAL MEDICINE

## 2020-10-05 PROCEDURE — G8510 SCR DEP NEG, NO PLAN REQD: HCPCS | Performed by: INTERNAL MEDICINE

## 2020-10-05 PROCEDURE — 93010 ELECTROCARDIOGRAM REPORT: CPT | Performed by: INTERNAL MEDICINE

## 2020-10-05 PROCEDURE — 93005 ELECTROCARDIOGRAM TRACING: CPT | Performed by: INTERNAL MEDICINE

## 2020-10-05 PROCEDURE — G8417 CALC BMI ABV UP PARAM F/U: HCPCS | Performed by: INTERNAL MEDICINE

## 2020-10-05 PROCEDURE — G8427 DOCREV CUR MEDS BY ELIG CLIN: HCPCS | Performed by: INTERNAL MEDICINE

## 2020-10-05 PROCEDURE — G8754 DIAS BP LESS 90: HCPCS | Performed by: INTERNAL MEDICINE

## 2020-10-05 PROCEDURE — G8536 NO DOC ELDER MAL SCRN: HCPCS | Performed by: INTERNAL MEDICINE

## 2020-10-05 PROCEDURE — 1101F PT FALLS ASSESS-DOCD LE1/YR: CPT | Performed by: INTERNAL MEDICINE

## 2020-10-05 PROCEDURE — 99214 OFFICE O/P EST MOD 30 MIN: CPT | Performed by: INTERNAL MEDICINE

## 2020-10-05 RX ORDER — EVOLOCUMAB 140 MG/ML
140 INJECTION, SOLUTION SUBCUTANEOUS
Qty: 1 PEN | Refills: 5 | Status: SHIPPED | OUTPATIENT
Start: 2020-10-05 | End: 2021-01-04

## 2020-10-05 NOTE — PROGRESS NOTES
10/5/2020 9:28 AM      Subjective:     Phillip Moss Sr is seen in office today for f/u visit. He denies chest pain, chest pressure/discomfort, dyspnea, palpitations, irregular heart beats, near-syncope, syncope, fatigue, orthopnea, paroxysmal nocturnal dyspnea, exertional chest pressure/discomfort. Visit Vitals  /60 (BP 1 Location: Right arm, BP Patient Position: Sitting) Comment: lg cuff   Pulse 71   Resp 18   Ht 6' 1\" (1.854 m)   Wt 238 lb (108 kg)   SpO2 97%   BMI 31.40 kg/m²     Current Outpatient Medications   Medication Sig    evolocumab (Repatha SureClick) pen injection 1 mL by SubCUTAneous route every fourteen (14) days.  levothyroxine (SYNTHROID) 50 mcg tablet TAKE 1 TABLET EVERY DAY BEFORE BREAKFAST    furosemide (LASIX) 40 mg tablet TAKE 1 TABLET TWICE DAILY    metoprolol tartrate (LOPRESSOR) 25 mg tablet TAKE 1/2 TABLET TWICE DAILY    potassium chloride (K-DUR, KLOR-CON) 20 mEq tablet TAKE 1 TABLET TWICE DAILY    isosorbide mononitrate ER (IMDUR) 60 mg CR tablet TAKE 1 TABLET EVERY DAY    omeprazole (PRILOSEC) 20 mg capsule TAKE 1 CAPSULE EVERY DAY    albuterol (VENTOLIN HFA) 90 mcg/actuation inhaler Take 2 Puffs by inhalation every four (4) hours as needed for Wheezing.  traMADol (ULTRAM) 50 mg tablet Take 50 mg by mouth every eight (8) hours as needed.  coenzyme q10 10 mg cap Take 100 mg by mouth every evening.  Aspirin, Buffered 81 mg tab Take 81 mg by mouth daily. No current facility-administered medications for this visit.           Objective:      Visit Vitals  /60 (BP 1 Location: Right arm, BP Patient Position: Sitting)   Pulse 71   Resp 18   Ht 6' 1\" (1.854 m)   Wt 238 lb (108 kg)   SpO2 97%   BMI 31.40 kg/m²       Data Review:     EKG: SR, IVCD, non specific T wave changes    Reviewed and/or ordered active problem list, medication list tests    Past Medical History:   Diagnosis Date    Adverse effect of anesthesia     difficult with waking up     Allergic rhinitis     Arthritis     Asthma     Atrial flutter ([facility] [address] 9/12/2014    CAD (coronary artery disease)     Dr. Sara Woodall Calculus of kidney     Chest pain 2006    Normal stress echo    Chronic bronchitis     Chronic bronchitis ([facility] [address] 10/3/2012    Chronic pain     knees    Erectile dysfunction     GERD (gastroesophageal reflux disease)     hiatal hernia    Gout 1/24/2013    Hemoptysis 2008    Work up by Dr. Jelani Villela; Negative    Hiatal hernia     Hypercholesterolemia     Hypertension     Ill-defined condition     bronchitis    Melanoma ([facility] [address]     back    Nausea & vomiting     Pacemaker     Dr. Flor Reyna [facility]) 6/4/2012    S/P ablation of atrial flutter 9/12/2014    Sleep apnea     states never had test for apnea    Stroke [facility]) 2009     tia no residual problems      Past Surgical History:   Procedure Laterality Date    CARDIAC SURG PROCEDURE UNLIST      ablation    HX AFIB ABLATION  2014    Dr. Paul Almaraz    ruptured appendix    HX HEART CATHETERIZATION      [address]    HX HERNIA REPAIR  12/17/14    Recurrent left inguinal hernia; Dr. Hook Punch      5 hernia repairs total    HX PACEMAKER  12/2014    HX PACEMAKER PLACEMENT  2014    Dr. Jai Valiente    Kidney stone extraction    MT COLSC FLX W/RMVL OF TUMOR POLYP LESION SNARE TQ  4/21/2011          Allergies   Allergen Reactions    Azithromycin Anaphylaxis    Gadolinium-Containing Contrast Media Other (comments)     1) Moderate to Severe. 2) Post Gadolinium issues as noted:   - Diaphoretic, Near Syncope, Nausea and vomiting.     Acetaminophen Other (comments)     Indigestion,\"burning sensation\"    Contrast Dye [Iodine] Other (comments)     Decrease in BP    Crestor [Rosuvastatin] Myalgia    Levaquin [Levofloxacin] Other (comments)     Joint pain    Medrol [Methylprednisolone] Vertigo    Norvasc [Amlodipine] Other (comments)     Numbness and tingling    Nsaids (Non-Steroidal Anti-Inflammatory Drug) Other (comments)     Cough up blood      Percocet [Oxycodone-Acetaminophen] Itching    Ranexa [Ranolazine] Other (comments)     Cannot remember    Simvastatin Other (comments)     Joint pain    Voltaren [Diclofenac Sodium] Other (comments)     unknown    Zetia [Ezetimibe] Myalgia      Family History   Problem Relation Age of Onset    Stroke Father     Heart Disease Mother       Social History     Socioeconomic History    Marital status:      Spouse name: Not on file    Number of children: Not on file    Years of education: Not on file    Highest education level: Not on file   Occupational History    Not on file   Social Needs    Financial resource strain: Not on file    Food insecurity     Worry: Not on file     Inability: Not on file    Transportation needs     Medical: Not on file     Non-medical: Not on file   Tobacco Use    Smoking status: Never Smoker    Smokeless tobacco: Never Used   Substance and Sexual Activity    Alcohol use:  Yes     Alcohol/week: 3.0 standard drinks     Types: 1 Shots of liquor, 2 Standard drinks or equivalent per week     Comment: rare    Drug use: No     Types: OTC, Prescription    Sexual activity: Not Currently     Partners: Female   Lifestyle    Physical activity     Days per week: Not on file     Minutes per session: Not on file    Stress: Not on file   Relationships    Social connections     Talks on phone: Not on file     Gets together: Not on file     Attends Taoism service: Not on file     Active member of club or organization: Not on file     Attends meetings of clubs or organizations: Not on file     Relationship status: Not on file    Intimate partner violence     Fear of current or ex partner: Not on file     Emotionally abused: Not on file     Physically abused: Not on file     Forced sexual activity: Not on file   Other Topics Concern    Not on file   Social History Narrative    Not on file         Review of Systems     General: Not Present- Anorexia, Chills, Dietary Changes, Fatigue, Fever, Medication Changes, Night Sweats, Weight Gain > 10lbs. and Weight Loss > 10lbs. .  Skin: Not Present- Bruising and Excessive Sweating. HEENT: Not Present- Headache, Visual Loss and Vertigo. Respiratory: Not Present- Cough, Decreased Exercise Tolerance, Difficulty Breathing, Snoring and Wheezing. Cardiovascular: Not Present- Abnormal Blood Pressure, Chest Pain, Claudications, Difficulty Breathing On Exertion, Edema, Fainting / Blacking Out, Irregular Heart Beat, Night Cramps, Orthopnea, Palpitations, Paroxysmal Nocturnal Dyspnea, Rapid Heart Rate, Shortness of Breath and Swelling of Extremities. Gastrointestinal: Not Present- Black, Tarry Stool, Bloody Stool, Diarrhea, Hematemesis, Rectal Bleeding and Vomiting. Musculoskeletal: Not Present- Muscle Pain and Muscle Weakness. Neurological: Not Present- Dizziness. Psychiatric: Not Present- Depression. Endocrine: Not Present- Cold Intolerance, Heat Intolerance and Thyroid Problems. Hematology: Not Present- Abnormal Bleeding, Anemia, Blood Clots and Easy Bruising. Physical Exam   The physical exam findings are as follows:       General   Mental Status - Alert. General Appearance - Not in acute distress. Chest and Lung Exam   Inspection: Accessory muscles - No use of accessory muscles in breathing. Auscultation:   Breath sounds: - Normal.      Cardiovascular   Inspection: Jugular vein - Bilateral - Inspection Normal.  Palpation/Percussion:   Apical Impulse: - Normal.  Auscultation: Rhythm - Regular. Heart Sounds - S1 WNL and S2 WNL. No S3 or S4. Murmurs & Other Heart Sounds: Auscultation of the heart reveals - No Murmurs. Carotid arteries - No Carotid bruit. Peripheral Vascular   Upper Extremity: Inspection - Bilateral - No Cyanotic nailbeds or Digital clubbing.   Lower Extremity: Palpation: Edema - Bilateral - No edema. Assessment:       ICD-10-CM ICD-9-CM    1. ASHD (arteriosclerotic heart disease)  I25.10 414.00 AMB POC EKG ROUTINE W/ 12 LEADS, INTER & REP   2. S/P AVR (aortic valve replacement)  Z95.2 V43.3    3. S/P ablation of atrial flutter  Z98.890 V45.89     Z86.79     4. Hypercholesterolemia  E78.00 272.0    5. MARYLOU (obstructive sleep apnea)  G47.33 327.23    6. Statin intolerance  Z78.9 995.27        Plan:     1. No significant CAD on recent cath. 2. s/p post bioprosthetic AVR in 3/2016 functioning well on last echocardiogram normal ejection fraction in 2/2019.  3. Sick sinus syndrome with a flutter ablation, s/p PPM.    4. Hyperlipidemia: last FLP noted. Intolerant to statin and zetia. Will try repatha.     5. Hypertension: Controlled continue current therapy

## 2020-10-05 NOTE — PROGRESS NOTES
1. Have you been to the ER, urgent care clinic since your last visit? Hospitalized since your last visit? No    2. Have you seen or consulted any other health care providers outside of the [address] [place] [address] since your last visit? Include any pap smears or colon screening. No    Chief Complaint   Patient presents with    Coronary Artery Disease     3 mo appt. C/O mild SOB with exertion.

## 2020-10-06 ENCOUNTER — CLINICAL SUPPORT (OUTPATIENT)
Dept: INTERNAL MEDICINE CLINIC | Age: 85
End: 2020-10-06
Payer: MEDICARE

## 2020-10-06 VITALS — TEMPERATURE: 97.8 F

## 2020-10-06 DIAGNOSIS — Z23 NEEDS FLU SHOT: Primary | ICD-10-CM

## 2020-10-06 DIAGNOSIS — Z23 ENCOUNTER FOR IMMUNIZATION: ICD-10-CM

## 2020-10-06 PROCEDURE — 90686 IIV4 VACC NO PRSV 0.5 ML IM: CPT | Performed by: INTERNAL MEDICINE

## 2020-10-06 PROCEDURE — 90471 IMMUNIZATION ADMIN: CPT | Performed by: INTERNAL MEDICINE

## 2020-10-06 PROCEDURE — 90750 HZV VACC RECOMBINANT IM: CPT | Performed by: INTERNAL MEDICINE

## 2020-10-06 NOTE — PROGRESS NOTES
Chief Complaint   Patient presents with    Immunization/Injection       1. Have you been to the ER, urgent care clinic since your last visit? Hospitalized since your last visit? No    2. Have you seen or consulted any other health care providers outside of the [address] [place] [address] since your last visit? Include any pap smears or colon screening. Herlinda     Yulissa Mcpherson is a 81 y.o. male who presents for routine immunizations. He denies any symptoms , reactions or allergies that would exclude them from being immunized today. Risks and adverse reactions were discussed and the VIS was given to them. All questions were addressed. He was observed for 5 min post injection. There were no reactions observed.     Colleen Sicard

## 2020-10-12 ENCOUNTER — TELEPHONE (OUTPATIENT)
Dept: CARDIOLOGY CLINIC | Age: 85
End: 2020-10-12

## 2020-10-12 NOTE — TELEPHONE ENCOUNTER
Went on cover my meds and filled out info. It was sent to plan, however [address] [place] [address] is waiting to respond with the questions. It said to check later. Will check on web site later for the clinical questions.

## 2020-10-20 NOTE — TELEPHONE ENCOUNTER
Called cover my meds to advise that I cannot get the questions loaded. She advised that they cannot find the pt in system. She advised to call the plan at [phone] to do a verbal prior auth.

## 2020-10-23 NOTE — TELEPHONE ENCOUNTER
Called prior auth to start Bryannaeliud Chika on Repatha. Spoke to Rashida. Answered all questions , it was sent to sebastien. [facility] # [id number] and she advised I would receive fax confirmation 24-72 hours. Could call [phone].

## 2020-10-23 NOTE — TELEPHONE ENCOUNTER
Called pt, spoke to wife Yuly Holguin on [place], advised that Lashell Harris was approved thru insurance and he will need to call the pharmacy to . She verbalized understanding and would tell pt. Called pharmacy to advise that Lashell Harris was approved. She ran it thru and advised the cost would be 472.00 monthly. Advised the pt will probably call me.

## 2020-10-23 NOTE — TELEPHONE ENCOUNTER
Received fax from [facility] was approved until 4/21/21.     Will call pt later to advise of approval.

## 2020-11-30 ENCOUNTER — OFFICE VISIT (OUTPATIENT)
Dept: INTERNAL MEDICINE CLINIC | Age: 85
End: 2020-11-30
Payer: MEDICARE

## 2020-11-30 VITALS
BODY MASS INDEX: 30.64 KG/M2 | SYSTOLIC BLOOD PRESSURE: 109 MMHG | RESPIRATION RATE: 16 BRPM | WEIGHT: 231.2 LBS | TEMPERATURE: 96.4 F | HEIGHT: 73 IN | DIASTOLIC BLOOD PRESSURE: 64 MMHG | HEART RATE: 71 BPM | OXYGEN SATURATION: 96 %

## 2020-11-30 DIAGNOSIS — R63.4 WEIGHT LOSS: Primary | ICD-10-CM

## 2020-11-30 DIAGNOSIS — R00.2 PALPITATIONS: ICD-10-CM

## 2020-11-30 DIAGNOSIS — J42 CHRONIC BRONCHITIS, UNSPECIFIED CHRONIC BRONCHITIS TYPE (HCC): ICD-10-CM

## 2020-11-30 DIAGNOSIS — R73.9 BORDERLINE HYPERGLYCEMIA: ICD-10-CM

## 2020-11-30 DIAGNOSIS — E03.9 ACQUIRED HYPOTHYROIDISM: ICD-10-CM

## 2020-11-30 DIAGNOSIS — M10.9 GOUT, UNSPECIFIED CAUSE, UNSPECIFIED CHRONICITY, UNSPECIFIED SITE: ICD-10-CM

## 2020-11-30 DIAGNOSIS — E78.00 HYPERCHOLESTEROLEMIA: ICD-10-CM

## 2020-11-30 PROCEDURE — 99213 OFFICE O/P EST LOW 20 MIN: CPT | Performed by: INTERNAL MEDICINE

## 2020-11-30 PROCEDURE — G8752 SYS BP LESS 140: HCPCS | Performed by: INTERNAL MEDICINE

## 2020-11-30 PROCEDURE — G8754 DIAS BP LESS 90: HCPCS | Performed by: INTERNAL MEDICINE

## 2020-11-30 PROCEDURE — G8427 DOCREV CUR MEDS BY ELIG CLIN: HCPCS | Performed by: INTERNAL MEDICINE

## 2020-11-30 PROCEDURE — G8432 DEP SCR NOT DOC, RNG: HCPCS | Performed by: INTERNAL MEDICINE

## 2020-11-30 PROCEDURE — G8536 NO DOC ELDER MAL SCRN: HCPCS | Performed by: INTERNAL MEDICINE

## 2020-11-30 PROCEDURE — G8417 CALC BMI ABV UP PARAM F/U: HCPCS | Performed by: INTERNAL MEDICINE

## 2020-11-30 PROCEDURE — 1101F PT FALLS ASSESS-DOCD LE1/YR: CPT | Performed by: INTERNAL MEDICINE

## 2020-11-30 PROCEDURE — G0463 HOSPITAL OUTPT CLINIC VISIT: HCPCS | Performed by: INTERNAL MEDICINE

## 2020-11-30 NOTE — PATIENT INSTRUCTIONS
Office Policies Phone calls/patient messages: Please allow up to 24 hours for someone in the office to contact you about your call or message. Be mindful your provider may be out of the office or your message may require further review. We encourage you to use [employer] for your messages as this is a faster, more efficient way to communicate with our office Medication Refills: 
         
Prescription medications require 48-72 business hours to process. We encourage you to use [employer] for your refills. For controlled medications: Please allow 72 business hours to process. Certain medications may require you to  a written prescription at our office. NO narcotic/controlled medications will be prescribed after 4pm Monday through Friday or on weekends Form/Paperwork Completion: 
         
Please note a $25 fee may incur for all paperwork for completed by our providers. We ask that you allow 7-10 business days. Pre-payment is due prior to picking up/faxing the completed form. You may also download your forms to [employer] to have your doctor print off. 
 
 
1. Have you been to the ER, urgent care clinic since your last visit? Hospitalized since your last visit? NO 
 
2. Have you seen or consulted any other health care providers outside of the [address] [place] [address] since your last visit? Include any pap smears or colon screening.  NO

## 2020-11-30 NOTE — PROGRESS NOTES
SUBJECTIVE  Mr. Karen Villalobos presents today acutely for     Chief Complaint   Patient presents with    Weight Loss     pt has lost 9lbs over 2 weeks        He has lost weight, but also says, \"I'm eating better. \"   Wt Readings from Last 3 Encounters:   11/30/20 231 lb 3.2 oz (104.9 kg)   10/05/20 238 lb (108 kg)   06/25/20 239 lb 12.8 oz (108.8 kg)       Had some palpitations last week: \"I wonder if my potassium is high. \"   He cut back the dose and feels better. No whiskey in 3 weeks. No recent bronchitis exacerbations. \"It was the toothpaste; Since I switched to the Crest with 5% nitrates I haven't have problems. \"     OBJECTIVE  Visit Vitals  /64 (BP 1 Location: Left arm, BP Patient Position: Sitting)   Pulse 71   Temp (!) 96.4 °F (35.8 °C) (Temporal)   Resp 16   Ht 6' 1\" (1.854 m)   Wt 231 lb 3.2 oz (104.9 kg)   SpO2 96%   BMI 30.50 kg/m²     Gen: Pleasant 81 y.o.  male in NAD.   HEENT: PERRLA. EOMI. OP moist and pink.  Neck: Supple.  No LAD.  HEART: RRR, No M/G/R.   LUNGS: CTAB No W/R.   ABDOMEN: S, NT, ND, BS+.   EXTREMITIES: Warm. No C/C/E. MUSCULOSKELETAL: Normal ROM, muscle strength 5/5 all groups. NEURO: Alert and oriented x 3.  Cranial nerves grossly intact.  No focal sensory or motor deficits noted. SKIN: Warm. Dry. No rashes or other lesions noted. ASSESSMENT / PLAN    Weight loss:  -     CBC WITH AUTOMATED DIFF  -     METABOLIC PANEL, COMPREHENSIVE  -     TSH 3RD GENERATION  -     T4, FREE    Palpitations: Improved. -     CBC WITH AUTOMATED DIFF  -     METABOLIC PANEL, COMPREHENSIVE  -     TSH 3RD GENERATION  -     T4, FREE    RTC as planned in Jan; labs ordered in anticipation of visit.

## 2020-12-10 ENCOUNTER — OFFICE VISIT (OUTPATIENT)
Dept: CARDIOLOGY CLINIC | Age: 85
End: 2020-12-10
Payer: MEDICARE

## 2020-12-10 DIAGNOSIS — Z95.0 CARDIAC PACEMAKER IN SITU: Primary | ICD-10-CM

## 2020-12-10 DIAGNOSIS — I49.5 SSS (SICK SINUS SYNDROME) (HCC): ICD-10-CM

## 2020-12-10 PROCEDURE — 93296 REM INTERROG EVL PM/IDS: CPT | Performed by: INTERNAL MEDICINE

## 2020-12-10 PROCEDURE — 93294 REM INTERROG EVL PM/LDLS PM: CPT | Performed by: INTERNAL MEDICINE

## 2020-12-29 ENCOUNTER — HOSPITAL ENCOUNTER (OUTPATIENT)
Dept: LAB | Age: 85
Discharge: HOME | End: 2020-12-29
Payer: MEDICARE

## 2020-12-29 PROCEDURE — 80053 COMPREHEN METABOLIC PANEL: CPT

## 2020-12-29 PROCEDURE — 84443 ASSAY THYROID STIM HORMONE: CPT

## 2020-12-29 PROCEDURE — 84550 ASSAY OF BLOOD/URIC ACID: CPT

## 2020-12-29 PROCEDURE — 36415 COLL VENOUS BLD VENIPUNCTURE: CPT

## 2020-12-29 PROCEDURE — 80061 LIPID PANEL: CPT

## 2020-12-29 PROCEDURE — 85025 COMPLETE CBC W/AUTO DIFF WBC: CPT

## 2020-12-29 PROCEDURE — 83036 HEMOGLOBIN GLYCOSYLATED A1C: CPT

## 2020-12-29 PROCEDURE — 84439 ASSAY OF FREE THYROXINE: CPT

## 2020-12-30 LAB
ALBUMIN SERPL-MCNC: 4.2 G/DL (ref 3.6–4.6)
ALBUMIN/GLOB SERPL: 1.6 {RATIO} (ref 1.2–2.2)
ALP SERPL-CCNC: 78 IU/L (ref 39–117)
ALT SERPL-CCNC: 15 IU/L (ref 0–44)
AST SERPL-CCNC: 19 IU/L (ref 0–40)
BASOPHILS # BLD AUTO: 0.1 X10E3/UL (ref 0–0.2)
BASOPHILS NFR BLD AUTO: 1 %
BILIRUB SERPL-MCNC: 0.5 MG/DL (ref 0–1.2)
BUN SERPL-MCNC: 23 MG/DL (ref 8–27)
BUN/CREAT SERPL: 21 (ref 10–24)
CALCIUM SERPL-MCNC: 9.4 MG/DL (ref 8.6–10.2)
CHLORIDE SERPL-SCNC: 101 MMOL/L (ref 96–106)
CHOLEST SERPL-MCNC: 184 MG/DL (ref 100–199)
CO2 SERPL-SCNC: 27 MMOL/L (ref 20–29)
CREAT SERPL-MCNC: 1.12 MG/DL (ref 0.76–1.27)
EOSINOPHIL # BLD AUTO: 0.7 X10E3/UL (ref 0–0.4)
EOSINOPHIL NFR BLD AUTO: 10 %
ERYTHROCYTE [DISTWIDTH] IN BLOOD BY AUTOMATED COUNT: 12 % (ref 11.6–15.4)
EST. AVERAGE GLUCOSE BLD GHB EST-MCNC: 128 MG/DL
GLOBULIN SER CALC-MCNC: 2.7 G/DL (ref 1.5–4.5)
GLUCOSE SERPL-MCNC: 151 MG/DL (ref 65–99)
HBA1C MFR BLD: 6.1 % (ref 4.8–5.6)
HCT VFR BLD AUTO: 43.3 % (ref 37.5–51)
HDLC SERPL-MCNC: 37 MG/DL
HGB BLD-MCNC: 14.5 G/DL (ref 13–17.7)
IMM GRANULOCYTES # BLD AUTO: 0 X10E3/UL (ref 0–0.1)
IMM GRANULOCYTES NFR BLD AUTO: 0 %
LDLC SERPL CALC-MCNC: 126 MG/DL (ref 0–99)
LYMPHOCYTES # BLD AUTO: 2.2 X10E3/UL (ref 0.7–3.1)
LYMPHOCYTES NFR BLD AUTO: 33 %
MCH RBC QN AUTO: 31.5 PG (ref 26.6–33)
MCHC RBC AUTO-ENTMCNC: 33.5 G/DL (ref 31.5–35.7)
MCV RBC AUTO: 94 FL (ref 79–97)
MONOCYTES # BLD AUTO: 0.6 X10E3/UL (ref 0.1–0.9)
MONOCYTES NFR BLD AUTO: 9 %
NEUTROPHILS # BLD AUTO: 3 X10E3/UL (ref 1.4–7)
NEUTROPHILS NFR BLD AUTO: 47 %
PLATELET # BLD AUTO: 219 X10E3/UL (ref 150–450)
POTASSIUM SERPL-SCNC: 4 MMOL/L (ref 3.5–5.2)
PROT SERPL-MCNC: 6.9 G/DL (ref 6–8.5)
RBC # BLD AUTO: 4.61 X10E6/UL (ref 4.14–5.8)
SODIUM SERPL-SCNC: 141 MMOL/L (ref 134–144)
T4 FREE SERPL-MCNC: 1.24 NG/DL (ref 0.82–1.77)
TRIGL SERPL-MCNC: 117 MG/DL (ref 0–149)
TSH SERPL DL<=0.005 MIU/L-ACNC: 4.21 UIU/ML (ref 0.45–4.5)
URATE SERPL-MCNC: 7.5 MG/DL (ref 3.8–8.4)
VLDLC SERPL CALC-MCNC: 21 MG/DL (ref 5–40)
WBC # BLD AUTO: 6.5 X10E3/UL (ref 3.4–10.8)

## 2021-01-04 ENCOUNTER — OFFICE VISIT (OUTPATIENT)
Dept: CARDIOLOGY CLINIC | Age: 86
End: 2021-01-04
Payer: MEDICARE

## 2021-01-04 VITALS
DIASTOLIC BLOOD PRESSURE: 72 MMHG | BODY MASS INDEX: 31.42 KG/M2 | WEIGHT: 237.1 LBS | SYSTOLIC BLOOD PRESSURE: 116 MMHG | RESPIRATION RATE: 16 BRPM | HEART RATE: 73 BPM | HEIGHT: 73 IN | OXYGEN SATURATION: 100 %

## 2021-01-04 DIAGNOSIS — Z78.9 STATIN INTOLERANCE: ICD-10-CM

## 2021-01-04 DIAGNOSIS — Z95.2 S/P AVR (AORTIC VALVE REPLACEMENT): ICD-10-CM

## 2021-01-04 DIAGNOSIS — I25.10 CORONARY ARTERY DISEASE INVOLVING NATIVE CORONARY ARTERY OF NATIVE HEART WITHOUT ANGINA PECTORIS: Primary | ICD-10-CM

## 2021-01-04 DIAGNOSIS — I49.5 SSS (SICK SINUS SYNDROME) (HCC): ICD-10-CM

## 2021-01-04 DIAGNOSIS — Z98.890 S/P ABLATION OF ATRIAL FLUTTER: ICD-10-CM

## 2021-01-04 DIAGNOSIS — E78.2 MIXED HYPERLIPIDEMIA: ICD-10-CM

## 2021-01-04 DIAGNOSIS — Z86.79 S/P ABLATION OF ATRIAL FLUTTER: ICD-10-CM

## 2021-01-04 DIAGNOSIS — I48.92 ATRIAL FLUTTER, UNSPECIFIED TYPE (HCC): ICD-10-CM

## 2021-01-04 DIAGNOSIS — Z95.0 CARDIAC PACEMAKER IN SITU: ICD-10-CM

## 2021-01-04 PROCEDURE — G8427 DOCREV CUR MEDS BY ELIG CLIN: HCPCS | Performed by: INTERNAL MEDICINE

## 2021-01-04 PROCEDURE — 93010 ELECTROCARDIOGRAM REPORT: CPT | Performed by: INTERNAL MEDICINE

## 2021-01-04 PROCEDURE — 1101F PT FALLS ASSESS-DOCD LE1/YR: CPT | Performed by: INTERNAL MEDICINE

## 2021-01-04 PROCEDURE — 93005 ELECTROCARDIOGRAM TRACING: CPT | Performed by: INTERNAL MEDICINE

## 2021-01-04 PROCEDURE — G8536 NO DOC ELDER MAL SCRN: HCPCS | Performed by: INTERNAL MEDICINE

## 2021-01-04 PROCEDURE — G8510 SCR DEP NEG, NO PLAN REQD: HCPCS | Performed by: INTERNAL MEDICINE

## 2021-01-04 PROCEDURE — G8417 CALC BMI ABV UP PARAM F/U: HCPCS | Performed by: INTERNAL MEDICINE

## 2021-01-04 PROCEDURE — 99214 OFFICE O/P EST MOD 30 MIN: CPT | Performed by: INTERNAL MEDICINE

## 2021-01-04 PROCEDURE — G0463 HOSPITAL OUTPT CLINIC VISIT: HCPCS | Performed by: INTERNAL MEDICINE

## 2021-01-04 NOTE — PROGRESS NOTES
Identified pt with two pt identifiers(name and ). Reviewed record in preparation for visit and have obtained necessary documentation. Chief Complaint   Patient presents with    Follow-up     3 month      Vitals:    21 0918   BP: 116/72   Pulse: 73   Resp: 16   SpO2: 100%   Weight: 237 lb 1.6 oz (107.5 kg)   Height: 6' 1\" (1.854 m)   PainSc:   0 - No pain       Health Maintenance Review: Patient reminded of \"due or due soon\" health maintenance. I have asked the patient to contact his/her primary care provider (PCP) for follow-up on his/her health maintenance. Coordination of Care Questionnaire:  :   1) Have you been to an emergency room, urgent care, or hospitalized since your last visit? If yes, where when, and reason for visit? no       2. Have seen or consulted any other health care provider since your last visit? If yes, where when, and reason for visit? NO      Patient is accompanied by self I have received verbal consent from [facility] to discuss any/all medical information while they are present in the room.

## 2021-01-04 NOTE — PROGRESS NOTES
[address], [address] [facility]  [phone]     Subjective:      Faith Salazar is a 81 y.o. male is here for routine f/u. He has pmhx AS s/p AVR, AFLs/p AFL ablation, CAD, HTN, MARYLOU and HLD. Last seen by us in 10/2020. At that time we ordered Repatha but he states it was cost prohibitive. He is doing well, rare episodes mild chest discomfort when he lays down at night. He's able to go up and down the ladder  Or steps with no sob or cp. The patient denies shortness of breath, orthopnea, PND, LE edema, palpitations, syncope, or presyncope.        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 ([address] 10/15/2019    Irregular heartbeat 05/03/2016    S/P AVR (aortic valve replacement) 03/16/2016    ASHD (arteriosclerotic heart disease) 01/19/2016    CAD (coronary artery disease) 06/05/2015    Pacemaker 05/19/2015    MARYLOU (obstructive sleep apnea) 12/23/2014    SSS (sick sinus syndrome) ([address] 12/11/2014    Aortic stenosis 11/04/2014    SOB (shortness of breath) 11/04/2014    Atrial flutter ([address] 09/12/2014    S/P ablation of atrial flutter 09/12/2014    H/O TIA (transient ischemic attack) and stroke 09/11/2014    Chest pain 09/11/2014    Sinus tachycardia 09/11/2014    Gout 01/24/2013    Chronic bronchitis ([address] 10/03/2012    Allergic rhinitis 10/03/2012    Prostate cancer ([address] 06/04/2012    Hypercholesterolemia     Stroke ([address]     Asthma     Calculus of kidney     Erectile dysfunction     GERD (gastroesophageal reflux disease)       Angeles Crow MD  Past Medical History:   Diagnosis Date    Adverse effect of anesthesia     difficult with waking up     Allergic rhinitis     Arthritis     Asthma     Atrial flutter ([address] 9/12/2014    CAD (coronary artery disease)     Dr. Ayala Gonzalez of kidney     Chest pain 2006    Normal stress echo    Chronic bronchitis     Chronic bronchitis ([address] 10/3/2012    Chronic pain     knees    Erectile dysfunction     GERD (gastroesophageal reflux disease)     hiatal hernia    Gout 1/24/2013    Hemoptysis 2008    Work up by Dr. Stan Rosales; Negative    Hiatal hernia     Hypercholesterolemia     Hypertension     Ill-defined condition     bronchitis    Melanoma ([address]     back    Nausea & vomiting     Pacemaker     Dr. Danis Moncada [facility]) 6/4/2012    S/P ablation of atrial flutter 9/12/2014    Sleep apnea     states never had test for apnea    Stroke [facility]) 2009     tia no residual problems      Past Surgical History:   Procedure Laterality Date    HX AFIB ABLATION  2014    Dr. Elizabeth Clark    ruptured appendix    HX HEART CATHETERIZATION      [address]    HX HERNIA REPAIR  12/17/14    Recurrent left inguinal hernia; Dr. Jorge Collins      5 hernia repairs total    HX PACEMAKER  12/2014    HX PACEMAKER PLACEMENT  2014    Dr. Weston Expose    Kidney stone extraction    [place] CARDIAC SURG PROCEDURE UNLIST      ablation    [place] COLSC FLX W/RMVL OF TUMOR POLYP LESION SNARE TQ  4/21/2011          Allergies   Allergen Reactions    Azithromycin Anaphylaxis    Gadolinium-Containing Contrast Media Other (comments)     1) Moderate to Severe. 2) Post Gadolinium issues as noted:   - Diaphoretic, Near Syncope, Nausea and vomiting.     Acetaminophen Other (comments)     Indigestion,\"burning sensation\"    Contrast Dye [Iodine] Other (comments)     Decrease in BP    Crestor [Rosuvastatin] Myalgia    Levaquin [Levofloxacin] Other (comments)     Joint pain    Medrol [Methylprednisolone] Vertigo    Norvasc [Amlodipine] Other (comments)     Numbness and tingling    Nsaids (Non-Steroidal Anti-Inflammatory Drug) Other (comments)     Cough up blood      Percocet [Oxycodone-Acetaminophen] Itching    Ranexa [Ranolazine] Other (comments)     Cannot remember    Simvastatin Other (comments)     Joint pain    Voltaren [Diclofenac Sodium] Other (comments)     unknown    Zetia [Ezetimibe] Myalgia      Family History   Problem Relation Age of Onset    Stroke Father     Heart Disease Mother       Social History     Socioeconomic History    Marital status:      Spouse name: Not on file    Number of children: Not on file    Years of education: Not on file    Highest education level: Not on file   Occupational History    Not on file   Social Needs    Financial resource strain: Not on file    Food insecurity     Worry: Not on file     Inability: Not on file    Transportation needs     Medical: Not on file     Non-medical: Not on file   Tobacco Use    Smoking status: Never Smoker    Smokeless tobacco: Never Used   Substance and Sexual Activity    Alcohol use:  Yes     Alcohol/week: 3.0 standard drinks     Types: 1 Shots of liquor, 2 Standard drinks or equivalent per week     Comment: rare    Drug use: No     Types: OTC, Prescription    Sexual activity: Not Currently     Partners: Female   Lifestyle    Physical activity     Days per week: Not on file     Minutes per session: Not on file    Stress: Not on file   Relationships    Social connections     Talks on phone: Not on file     Gets together: Not on file     Attends Denominational service: Not on file     Active member of club or organization: Not on file     Attends meetings of clubs or organizations: Not on file     Relationship status: Not on file    Intimate partner violence     Fear of current or ex partner: Not on file     Emotionally abused: Not on file     Physically abused: Not on file     Forced sexual activity: Not on file   Other Topics Concern    Not on file   Social History Narrative    Not on file      Current Outpatient Medications   Medication Sig    levothyroxine (SYNTHROID) 50 mcg tablet TAKE 1 TABLET EVERY DAY BEFORE BREAKFAST    furosemide (LASIX) 40 mg tablet TAKE 1 TABLET TWICE DAILY    metoprolol tartrate (LOPRESSOR) 25 mg tablet TAKE 1/2 TABLET TWICE DAILY    potassium chloride (K-DUR, KLOR-CON) 20 mEq tablet TAKE 1 TABLET TWICE DAILY    isosorbide mononitrate ER (IMDUR) 60 mg CR tablet TAKE 1 TABLET EVERY DAY    omeprazole (PRILOSEC) 20 mg capsule TAKE 1 CAPSULE EVERY DAY    albuterol (VENTOLIN HFA) 90 mcg/actuation inhaler Take 2 Puffs by inhalation every four (4) hours as needed for Wheezing.  traMADol (ULTRAM) 50 mg tablet Take 50 mg by mouth every eight (8) hours as needed.  coenzyme q10 10 mg cap Take 100 mg by mouth every evening.  Aspirin, Buffered 81 mg tab Take 81 mg by mouth daily. No current facility-administered medications for this visit. Review of Symptoms:  11 systems reviewed, negative other than as stated in the HPI    Physical ExamPhysical Exam:    Vitals:    01/04/21 0918   BP: 116/72   Pulse: 73   Resp: 16   SpO2: 100%   Weight: 237 lb 1.6 oz (107.5 kg)   Height: 6' 1\" (1.854 m)     Body mass index is 31.28 kg/m². General PE  Gen:  NAD  Mental Status - Alert. General Appearance - Not in acute distress. HEENT:  PERRL, no carotid bruits or JVD  Chest and Lung Exam   Inspection: Accessory muscles - No use of accessory muscles in breathing. Auscultation:   Breath sounds: - Normal.   Cardiovascular   Inspection: Jugular vein - Bilateral - Inspection Normal.   Palpation/Percussion:   Apical Impulse: - Normal.   Auscultation: Rhythm - Regular. Heart Sounds - S1 WNL and S2 WNL. No S3 or S4. Murmurs & Other Heart Sounds: Auscultation of the heart reveals - No Murmurs. Peripheral Vascular   Upper Extremity: Inspection - Bilateral - No Cyanotic nailbeds or Digital clubbing. Lower Extremity:   Palpation: Edema - Bilateral - No edema. Abdomen:   Soft, non-tender, bowel sounds are active.   Neuro: A&O times 3, CN and motor grossly WNL    Labs:   Lab Results   Component Value Date/Time    Cholesterol, total 184 12/29/2020 08:03 AM    Cholesterol, total 215 (H) 09/11/2020 09:36 AM    Cholesterol, total 197 06/02/2020 08:39 AM    Cholesterol, total 153 05/08/2019 08:23 AM    Cholesterol, total 173 10/29/2018 08:28 AM    HDL Cholesterol 37 (L) 12/29/2020 08:03 AM    HDL Cholesterol 44 09/11/2020 09:36 AM    HDL Cholesterol 44 06/02/2020 08:39 AM    HDL Cholesterol 48 05/08/2019 08:23 AM    HDL Cholesterol 57 10/29/2018 08:28 AM    LDL, calculated 126 (H) 12/29/2020 08:03 AM    LDL, calculated 149 (H) 09/11/2020 09:36 AM    LDL, calculated 126 (H) 06/02/2020 08:39 AM    LDL, calculated 85 05/08/2019 08:23 AM    LDL, calculated 95 10/29/2018 08:28 AM    LDL, calculated 101 (H) 05/22/2018 09:53 AM    LDL, calculated 96 07/17/2017 08:15 AM    Triglyceride 117 12/29/2020 08:03 AM    Triglyceride 124 09/11/2020 09:36 AM    Triglyceride 136 06/02/2020 08:39 AM    Triglyceride 98 05/08/2019 08:23 AM    Triglyceride 106 10/29/2018 08:28 AM    CHOL/HDL Ratio 3.6 09/17/2010 08:27 AM    CHOL/HDL Ratio 4.8 12/11/2009 11:14 AM     Lab Results   Component Value Date/Time     (H) 04/01/2015 11:55 PM     Lab Results   Component Value Date/Time    Sodium 141 12/29/2020 08:03 AM    Potassium 4.0 12/29/2020 08:03 AM    Chloride 101 12/29/2020 08:03 AM    CO2 27 12/29/2020 08:03 AM    Anion gap 8 03/14/2017 11:02 AM    Glucose 151 (H) 12/29/2020 08:03 AM    BUN 23 12/29/2020 08:03 AM    Creatinine 1.12 12/29/2020 08:03 AM    BUN/Creatinine ratio 21 12/29/2020 08:03 AM    GFR est AA 69 12/29/2020 08:03 AM    GFR est non-AA 60 12/29/2020 08:03 AM    Calcium 9.4 12/29/2020 08:03 AM    Bilirubin, total 0.5 12/29/2020 08:03 AM    Alk.  phosphatase 78 12/29/2020 08:03 AM    Protein, total 6.9 12/29/2020 08:03 AM    Albumin 4.2 12/29/2020 08:03 AM    Globulin 3.7 03/14/2017 11:02 AM    A-G Ratio 1.6 12/29/2020 08:03 AM    ALT (SGPT) 15 12/29/2020 08:03 AM EKG:  Paced     Assessment:     Assessment:      1. Coronary artery disease involving native coronary artery of native heart without angina pectoris    2. S/P AVR (aortic valve replacement)    3. S/P ablation of atrial flutter    4. SSS (sick sinus syndrome) ([address]    5. Mixed hyperlipidemia    6. Statin intolerance    7. Cardiac pacemaker in situ    8. Atrial flutter, unspecified type ([address]        Orders Placed This Encounter    AMB POC EKG ROUTINE W/ 12 LEADS, INTER & REP     Order Specific Question:   Reason for Exam:     Answer:   3 month follow-up        Plan:     1. No significant CAD on recent cath in 7/2020. On ASA, Imdur, BB.   2. s/p post bioprosthetic AVR in 3/2016 functioning well on last echocardiogram normal ejection fraction in 2/2019. Repeat echo pending  3. Sick sinus syndrome with a flutter ablation, s/p PPM.  device followed by EP  4. Hyperlipidemia: 12/2020  Intolerant to statin and zetia. Oliva Nesha was cost prohibitive. 5. Hypertension: Controlled continue current therapy    Continue current care and f/u in 6 months. Mike Carias NP       Patient seen and examined by me with nurse practitioner. I personally performed all components of the history, physical, and medical decision making and agree with the assessment and plan as noted.     Don Lenz MD

## 2021-02-01 ENCOUNTER — VIRTUAL VISIT (OUTPATIENT)
Dept: INTERNAL MEDICINE CLINIC | Age: 86
End: 2021-02-01
Payer: MEDICARE

## 2021-02-01 DIAGNOSIS — C61 PROSTATE CANCER (HCC): ICD-10-CM

## 2021-02-01 DIAGNOSIS — M10.9 GOUT, UNSPECIFIED CAUSE, UNSPECIFIED CHRONICITY, UNSPECIFIED SITE: ICD-10-CM

## 2021-02-01 DIAGNOSIS — I25.10 CORONARY ARTERY DISEASE INVOLVING NATIVE CORONARY ARTERY OF NATIVE HEART WITHOUT ANGINA PECTORIS: ICD-10-CM

## 2021-02-01 DIAGNOSIS — E78.00 HYPERCHOLESTEROLEMIA: ICD-10-CM

## 2021-02-01 DIAGNOSIS — E03.9 ACQUIRED HYPOTHYROIDISM: ICD-10-CM

## 2021-02-01 DIAGNOSIS — M54.9 BACK PAIN, UNSPECIFIED BACK LOCATION, UNSPECIFIED BACK PAIN LATERALITY, UNSPECIFIED CHRONICITY: ICD-10-CM

## 2021-02-01 DIAGNOSIS — Z00.00 MEDICARE ANNUAL WELLNESS VISIT, SUBSEQUENT: ICD-10-CM

## 2021-02-01 DIAGNOSIS — R73.9 BORDERLINE HYPERGLYCEMIA: Primary | ICD-10-CM

## 2021-02-01 PROCEDURE — G0439 PPPS, SUBSEQ VISIT: HCPCS | Performed by: INTERNAL MEDICINE

## 2021-02-01 PROCEDURE — G8536 NO DOC ELDER MAL SCRN: HCPCS | Performed by: INTERNAL MEDICINE

## 2021-02-01 PROCEDURE — 1101F PT FALLS ASSESS-DOCD LE1/YR: CPT | Performed by: INTERNAL MEDICINE

## 2021-02-01 PROCEDURE — G8427 DOCREV CUR MEDS BY ELIG CLIN: HCPCS | Performed by: INTERNAL MEDICINE

## 2021-02-01 PROCEDURE — G8432 DEP SCR NOT DOC, RNG: HCPCS | Performed by: INTERNAL MEDICINE

## 2021-02-01 PROCEDURE — G8417 CALC BMI ABV UP PARAM F/U: HCPCS | Performed by: INTERNAL MEDICINE

## 2021-02-01 RX ORDER — TRAMADOL HYDROCHLORIDE 50 MG/1
50 TABLET ORAL
Qty: 30 TAB | Refills: 0 | Status: SHIPPED | OUTPATIENT
Start: 2021-02-01 | End: 2021-02-08

## 2021-02-01 NOTE — PROGRESS NOTES
Jenna George is a 81 y.o. male who was seen by synchronous (real-time) audio-only technology on 2/1/2021 for Results (pt wants to discuss lab results) and Medication Refill (med refill on tramadol)        Subjective:     SUBJECTIVE  Mr. Jenna George is a 81 y.o. male patient of mine who presents today for routine follow up visit. Chief Complaint   Patient presents with    Results     pt wants to discuss lab results    Medication Refill     med refill on tramadol       He has had some pain flare up lately, nonspecific low back. Requests refill of tramadol; \"I hardly ever take it. \"   Chronic bronchitis has been doing well: He has seen Dr. Lexus Plascencia. Uses albuterol prn. Doing better. He has undergone valve replacement in March 2016 with Dr. Ivon Vitale. It is recalled from early 2016 that he saw Dr. Shelley Turner, for exertional dyspnea, and had cardiac cath. Knee pain: \"Doing pretty good right now. \"   Sees Dr. Benjamin Mcnair, and has had corticosteroid injections. Surgery on hold due to heart issues. He has prostate cancer, but doesn't want to treat this. He has had biopsy proven adenocarcinoma of prostate, by Dr. Lakeisha Coley with [facility]. As we noted before: At this time \"I aint doin' nothin'. If the PSA gets high, then I'll take the shots. \"  From our records, it appears he was referred to [facility] for consideration of radiation therapy. He refused this. Past Medical History: Hyperlipidemia, chronic bronchitis, allergic rhinitis, urolithiasis, hiatal hernia with GERD, ED, Asthma, prostate cancer 2012 (Dr. Lakeisha Coley). Normal stress echo in 2006; Echo 2010 showed LVH, EF 55-60%. Hemoptysis with negative workup in 2008saw Dr. Rajani Barnett. EGD in 2009 Dr. Alejandra Casarez showing Hiatal hernia. R lid chronic ptosis noted in 2009 by Dr. Robinson Montoya, optometrist.  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   Had exertional chest pain in 2012, recurred in 2014--cardiac work up with Dr. Nancy Jack. His ophthalmologist is Dr. Robert Klein. Past Surgical History: Hernia in 86. Kidney stone 85. Appy (ruptured) in 79. Resection of melanoma. Colon polypectomy 2011 Dr. Orquidea Marin. A flutter ablation 2014 Dr. Kristal Loja 2014 Dr. Rios Passey repair 2014 Dr. Shelby Fair. Aortic valve replacement 2016. Cataracts 2016. Allergies: IVP dye (BP drop). Medications:   Current Outpatient Medications on File Prior to Visit   Medication Sig Dispense Refill    levothyroxine (SYNTHROID) 50 mcg tablet TAKE 1 TABLET EVERY DAY BEFORE BREAKFAST 90 Tab 1    furosemide (LASIX) 40 mg tablet TAKE 1 TABLET TWICE DAILY 180 Tab 1    metoprolol tartrate (LOPRESSOR) 25 mg tablet TAKE 1/2 TABLET TWICE DAILY 90 Tab 3    potassium chloride (K-DUR, KLOR-CON) 20 mEq tablet TAKE 1 TABLET TWICE DAILY 180 Tab 3    isosorbide mononitrate ER (IMDUR) 60 mg CR tablet TAKE 1 TABLET EVERY DAY 90 Tab 3    omeprazole (PRILOSEC) 20 mg capsule TAKE 1 CAPSULE EVERY DAY 90 Cap 3    albuterol (VENTOLIN HFA) 90 mcg/actuation inhaler Take 2 Puffs by inhalation every four (4) hours as needed for Wheezing. 3 Inhaler 1    coenzyme q10 10 mg cap Take 100 mg by mouth every evening.  Aspirin, Buffered 81 mg tab Take 81 mg by mouth daily. No current facility-administered medications on file prior to visit. Social History:  . Retired holt. Drinks a pint to 1 1/2 pints hard liquor daily. When last assessed. No tobacco, or drugs. Family History: F CVAs. Brother valve disease. Review of systems: Unremarkable except as noted above. Objective: There were no vitals taken for this visit. .        Lab Results   Component Value Date/Time    Cholesterol, total 184 12/29/2020 08:03 AM    HDL Cholesterol 37 (L) 12/29/2020 08:03 AM    LDL, calculated 126 (H) 12/29/2020 08:03 AM    LDL, calculated 126 (H) 06/02/2020 08:39 AM    VLDL, calculated 21 12/29/2020 08:03 AM    VLDL, calculated 27 06/02/2020 08:39 AM    Triglyceride 117 12/29/2020 08:03 AM    CHOL/HDL Ratio 3.6 09/17/2010 08:27 AM     Lab Results   Component Value Date/Time    Hemoglobin A1c 6.1 (H) 12/29/2020 08:03 AM         Assessment / Plan:   1. CAD: No CP. As per his cardiologist, Dr. Maryann Sweeney  2. A flutter, SSS: now with pacemaker. As per cardiology. 3. Aortic stenosis: s/p AVR. Doing well. 4. Asthma: Stable. Uses albuterol prn; Less than daily currently. 5. Prostate Cancer: Patient reaffirms desire for conservative approach today. Surveillance for now. Pt willing to do hormonal treatments if PSA rises to higher levels. His urologist has suggested a PSA of 20 or above as a triggering value  6. Problem drinking: He has stopped drinking during [address]. 7. Hyperlipidemia:  Not at goal, but options limited. Did not tolerate simvastatin or zetia. Now on fish oil. Will recheck lipids and CMP. Continue pulse dosing of Crestor. 8. GERD:  Controlled on prn prilosec; Pt. may continue this. 9. ED: Not discussed. 10. All rhinitis: Stable. 11. Possible TIA/Diplopia/Amaurosis fugax: No recent episode. Work up as above. 12. Gout: no recent flares. 13. Borderline DM. Recheck labs. 14. Obesity: Watch diet. More exercise as able (limited now by postsurgical recovery). 15. Hx of Noncompliance. Follow up in 6 months. [facility], who was evaluated through a patient-initiated, synchronous (real-time) audio only encounter, and/or her healthcare decision maker, is aware that it is a billable service, with coverage as determined by his insurance carrier. He provided verbal consent to proceed: Yes. He has not had a related appointment within my department in the past 7 days or scheduled within the next 24 hours.       Total Time: minutes: 11-20 minutes    Dario Howell MD

## 2021-02-01 NOTE — PATIENT INSTRUCTIONS
Medicare Wellness Visit, Male The best way to live healthy is to have a lifestyle where you eat a well-balanced diet, exercise regularly, limit alcohol use, and quit all forms of tobacco/nicotine, if applicable. Regular preventive services are another way to keep healthy. Preventive services (vaccines, screening tests, monitoring & exams) can help personalize your care plan, which helps you manage your own care. Screening tests can find health problems at the earliest stages, when they are easiest to treat. [facility] follows the current, evidence-based guidelines published by the [facility] Lucian Pina ([facility]) when recommending preventive services for our patients. Because we follow these guidelines, sometimes recommendations change over time as research supports it. (For example, a prostate screening blood test is no longer routinely recommended for men with no symptoms). Of course, you and your doctor may decide to screen more often for some diseases, based on your risk and co-morbidities (chronic disease you are already diagnosed with). Preventive services for you include: - Medicare offers their members a free annual wellness visit, which is time for you and your primary care provider to discuss and plan for your preventive service needs. Take advantage of this benefit every year! 
-All adults over age 72 should receive the recommended pneumonia vaccines. Current USPSTF guidelines recommend a series of two vaccines for the best pneumonia protection.  
-All adults should have a flu vaccine yearly and tetanus vaccine every 10 years. 
-All adults age 48 and older should receive the shingles vaccines (series of two vaccines). -All adults age 38-68 who are overweight should have a diabetes screening test once every three years. -Other screening tests & preventive services for persons with diabetes include: an eye exam to screen for diabetic retinopathy, a kidney function test, a foot exam, and stricter control over your cholesterol.  
-Cardiovascular screening for adults with routine risk involves an electrocardiogram (ECG) at intervals determined by the provider.  
-Colorectal cancer screening should be done for adults age 54-65 with no increased risk factors for colorectal cancer. There are a number of acceptable methods of screening for this type of cancer. Each test has its own benefits and drawbacks. Discuss with your provider what is most appropriate for you during your annual wellness visit. The different tests include: colonoscopy (considered the best screening method), a fecal occult blood test, a fecal DNA test, and sigmoidoscopy. 
-All adults born between [facility] should be screened once for Hepatitis C. 
-An Abdominal Aortic Aneurysm (AAA) Screening is recommended for men age 73-68 who has ever smoked in their lifetime. Here is a list of your current Health Maintenance items (your personalized list of preventive services) with a due date: 
Health Maintenance Due Topic Date Due  
 COVID-19 Vaccine (1 of 2) 02/21/1951  Glaucoma Screening   12/01/2018  Shingles Vaccine (2 of 2) 12/01/2020

## 2021-02-01 NOTE — PROGRESS NOTES
This is the Subsequent Medicare Annual Wellness Exam, performed 12 months or more after the Initial AWV or the last Subsequent AWV    I have reviewed the patient's medical history in detail and updated the computerized patient record. Depression Risk Factor Screening:     3 most recent PHQ Screens 1/4/2021   Little interest or pleasure in doing things Not at all   Feeling down, depressed, irritable, or hopeless Not at all   Total Score PHQ 2 0       Alcohol Risk Screen    Do you average more than 1 drink per night or more than 7 drinks a week: No    In the past three months have you have had more than 4 drinks containing alcohol on one occasion: No        Functional Ability and Level of Safety:    Hearing: Hearing is good. Activities of Daily Living: The home contains: no safety equipment. Patient does total self care      Ambulation: with difficulty, uses a cane     Fall Risk:  Fall Risk Assessment, last 12 mths 1/4/2021   Able to walk? Yes   Fall in past 12 months? 0   Do you feel unsteady? 0   Are you worried about falling 0   Is the gait abnormal? 1   Number of falls in past 12 months 0   Fall with injury?  -      Abuse Screen:  Patient is not abused       Cognitive Screening    Has your family/caregiver stated any concerns about your memory: no    Cognitive Screening: Normal.     Assessment/Plan   Education and counseling provided:  Are appropriate based on today's review and evaluation        Health Maintenance Due     Health Maintenance Due   Topic Date Due    COVID-19 Vaccine (1 of 2) 02/21/1951    Medicare Yearly Exam  07/07/2018    GLAUCOMA SCREENING Q2Y  12/01/2018    Shingrix Vaccine Age 50> (2 of 2) 12/01/2020       Patient Care Team   Patient Care Team:  Treva Otero MD as PCP - Symone More MD as PCP - [facility]  Elo Cramer MD (Orthopedic Surgery)  Hailee Ortez MD (Cardiology)  Jean Rucker MD as Physician (Cardiology)  Damian Negron, Romain Maria MD as Surgeon (General Surgery)  Ok Rand NP as Nurse Practitioner (Nurse Practitioner)  Dr. Barry Sun (Dental General Practice)    History     Patient Active Problem List   Diagnosis Code    Hypercholesterolemia E78.00    Stroke ([facility] I63.9    Asthma J45.909    Calculus of kidney N20.0    Erectile dysfunction N52.9    GERD (gastroesophageal reflux disease) K21.9    Prostate cancer ([facility] C61    Chronic bronchitis ([facility] J42    Allergic rhinitis J30.9    Gout M10.9    H/O TIA (transient ischemic attack) and stroke Z86.73    Chest pain R07.9    Sinus tachycardia R00.0    Atrial flutter (HCC) I48.92    S/P ablation of atrial flutter Z98.890, Z86.79    Aortic stenosis I35.0    SOB (shortness of breath) R06.02    SSS (sick sinus syndrome) ([facility] I49.5    MARYLOU (obstructive sleep apnea) G47.33    Pacemaker Z95.0    CAD (coronary artery disease) I25.10    ASHD (arteriosclerotic heart disease) I25.10    S/P AVR (aortic valve replacement) Z95.2    Irregular heartbeat I49.9    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 Kamaljit Myrick    Calculus of kidney     Chest pain 2006    Normal stress echo    Chronic bronchitis     Chronic bronchitis ([facility] 10/3/2012    Chronic pain     knees    Erectile dysfunction     GERD (gastroesophageal reflux disease)     hiatal hernia    Gout 1/24/2013    Hemoptysis 2008    Work up by Dr. Fuentes Carrillo;  Negative    Hiatal hernia     Hypercholesterolemia     Hypertension     Ill-defined condition     bronchitis    Melanoma (HCC)     back    Nausea & vomiting     Pacemaker     Dr. Sourav Arita [facility]) 6/4/2012  S/P ablation of atrial flutter 9/12/2014    Sleep apnea     states never had test for apnea    Stroke [facility]) 2009     tia no residual problems      Past Surgical History:   Procedure Laterality Date    HX AFIB ABLATION  2014    Dr. Vidhi Telles    ruptured appendix    HX HEART CATHETERIZATION       [address]    HX HERNIA REPAIR  12/17/14    Recurrent left inguinal hernia; Dr. Trudy Foss      5 hernia repairs total    HX PACEMAKER  12/2014    HX PACEMAKER PLACEMENT  2014    Dr. Sherri Go    Kidney stone extraction   [facility]      ablation    NC COLSC FLX W/RMVL OF TUMOR POLYP LESION SNARE TQ  4/21/2011          Current Outpatient Medications   Medication Sig Dispense Refill    levothyroxine (SYNTHROID) 50 mcg tablet TAKE 1 TABLET EVERY DAY BEFORE BREAKFAST 90 Tab 1    furosemide (LASIX) 40 mg tablet TAKE 1 TABLET TWICE DAILY 180 Tab 1    metoprolol tartrate (LOPRESSOR) 25 mg tablet TAKE 1/2 TABLET TWICE DAILY 90 Tab 3    potassium chloride (K-DUR, KLOR-CON) 20 mEq tablet TAKE 1 TABLET TWICE DAILY 180 Tab 3    isosorbide mononitrate ER (IMDUR) 60 mg CR tablet TAKE 1 TABLET EVERY DAY 90 Tab 3    omeprazole (PRILOSEC) 20 mg capsule TAKE 1 CAPSULE EVERY DAY 90 Cap 3    albuterol (VENTOLIN HFA) 90 mcg/actuation inhaler Take 2 Puffs by inhalation every four (4) hours as needed for Wheezing. 3 Inhaler 1    traMADol (ULTRAM) 50 mg tablet Take 50 mg by mouth every eight (8) hours as needed.  coenzyme q10 10 mg cap Take 100 mg by mouth every evening.  Aspirin, Buffered 81 mg tab Take 81 mg by mouth daily. Allergies   Allergen Reactions    Azithromycin Anaphylaxis    Gadolinium-Containing Contrast Media Other (comments)     1) Moderate to Severe. 2) Post Gadolinium issues as noted:   - Diaphoretic, Near Syncope, Nausea and vomiting.     Acetaminophen Other (comments)     Indigestion,\"burning sensation\"    Contrast Dye [Iodine] Other (comments)     Decrease in BP    Crestor [Rosuvastatin] Myalgia    Levaquin [Levofloxacin] Other (comments)     Joint pain    Medrol [Methylprednisolone] Vertigo    Norvasc [Amlodipine] Other (comments)     Numbness and tingling    Nsaids (Non-Steroidal Anti-Inflammatory Drug) Other (comments)     Cough up blood      Percocet [Oxycodone-Acetaminophen] Itching    Ranexa [Ranolazine] Other (comments)     Cannot remember    Simvastatin Other (comments)     Joint pain    Voltaren [Diclofenac Sodium] Other (comments)     unknown    Zetia [Ezetimibe] Myalgia       Family History   Problem Relation Age of Onset    Stroke Father     Heart Disease Mother      Social History     Tobacco Use    Smoking status: Never Smoker    Smokeless tobacco: Never Used   Substance Use Topics    Alcohol use: Yes     Alcohol/week: 3.0 standard drinks     Types: 1 Shots of liquor, 2 Standard drinks or equivalent per week     Comment: jennie ANDERSON Rito Holcomb, who was evaluated through a synchronous (real-time) audio only encounter, and/or his healthcare decision maker, is aware that it is a billable service, with coverage as determined by his insurance carrier. He provided verbal consent to proceed: Yes, and patient identification was verified. It was conducted pursuant to the emergency declaration under the [address] [place] [address] authority and the [employer] General Act. A caregiver was present when appropriate. Ability to conduct physical exam was limited. I was in the office. The patient was at home.     Shen Saha MD

## 2021-03-11 ENCOUNTER — OFFICE VISIT (OUTPATIENT)
Dept: CARDIOLOGY CLINIC | Age: 86
End: 2021-03-11
Payer: MEDICARE

## 2021-03-11 DIAGNOSIS — Z95.0 CARDIAC PACEMAKER IN SITU: Primary | ICD-10-CM

## 2021-03-11 DIAGNOSIS — I49.5 SSS (SICK SINUS SYNDROME) (HCC): ICD-10-CM

## 2021-03-11 PROCEDURE — 93296 REM INTERROG EVL PM/IDS: CPT | Performed by: INTERNAL MEDICINE

## 2021-03-11 PROCEDURE — 93294 REM INTERROG EVL PM/LDLS PM: CPT | Performed by: INTERNAL MEDICINE

## 2021-04-02 ENCOUNTER — TELEPHONE (OUTPATIENT)
Dept: INTERNAL MEDICINE CLINIC | Age: 86
End: 2021-04-02

## 2021-04-02 DIAGNOSIS — M10.9 GOUT, UNSPECIFIED CAUSE, UNSPECIFIED CHRONICITY, UNSPECIFIED SITE: ICD-10-CM

## 2021-04-02 DIAGNOSIS — I25.10 CORONARY ARTERY DISEASE INVOLVING NATIVE CORONARY ARTERY OF NATIVE HEART WITHOUT ANGINA PECTORIS: Primary | ICD-10-CM

## 2021-04-02 NOTE — TELEPHONE ENCOUNTER
----- Message from Henry Negrete sent at 4/2/2021  9:52 AM EDT -----  Regarding: Dr Tiff Foreman  General Message/Vendor Calls    Caller's first and last name: Suzette Jones, wife      Reason for call: lab work      Callback required yes/no and why: yes. To confirm      Best contact number(s):[phone]       Details to clarify the request: Pt's wife stated that pt wants to come in for lab work on 4/7/2.  He will be having his 2nd shingles shot       Message from [facility]

## 2021-04-06 NOTE — TELEPHONE ENCOUNTER
I returned call to pt's wife who is asking for the following for pt: 1. Pt wants lab slip mailed out to home address.     2. Pt wants to get second shingles vaccine

## 2021-04-08 NOTE — TELEPHONE ENCOUNTER
Per verbal order from Dr. Inder Rapp, CBC, Lipid, Uric Acid, Metabolic Panel order originated and printed and mailed to patient.

## 2021-04-23 NOTE — MR AVS SNAPSHOT
[address] [facility] 
[phone] Patient: Jayro Cárdenas 
MRN:  [id number] Visit Information Date & Time Provider Department Dept. Phone Encounter #  
 4/3/2018  3:00 PM Mariann Yen, [address] [place] [address],[address] [phone] [id number] Follow-up Instructions Return if symptoms worsen or fail to improve. Your Appointments 4/19/2018  8:45 AM  
PACEMAKER with PACEMAKER, [facility] Appt Note: [address] [place] [address]  
[phone]  [address]  
  
    
 4/19/2018  9:00 AM  
ANNUAL with Rodo James MD  
[facility]) Appt Note: BSC DCPM 6mo check, annual with Dr. Radha Story [facility]  
[phone] 2 [address] [address]  
  
    
 8/7/2018  9:00 AM  
ESTABLISHED PATIENT with Helga Walsh MD  
[facility]) Appt Note: 6mo f/up  $0cp ekr [address]  
[phone] [address]  
  
    
  
 4/11/2018  8:00 AM  
REMOTE OFFICE VISIT with [facility]) Appt Note: NOT AN OFFICE VISIT - REMOTE BSC PM  
 [address]  
[phone] [address] Upcoming Health Maintenance Date Due  
 MEDICARE YEARLY EXAM 7/7/2018 GLAUCOMA SCREENING Q2Y 12/1/2018 DTaP/Tdap/Td series (2 - Td) 7/19/2026 Allergies as of 4/3/2018  Review Complete On: 4/3/2018 By: Yoshi Abbott LPN Severity Noted Reaction Type Reactions Azithromycin High 03/13/2018    Anaphylaxis Gadolinium-containing Contrast Media Medium 06/11/2012   Intolerance Other (comments) 1) Moderate to Severe. 2) Post Gadolinium issues as noted: - Diaphoretic, Near Syncope, Nausea and vomiting. Acetaminophen  03/14/2017    Other (comments) Indigestion,\"burning sensation\" Contrast Dye [Iodine]  11/20/2009    Other (comments) Decrease in BP Levaquin [Levofloxacin]  11/20/2009    Other (comments) Joint pain Medrol [Methylprednisolone]  06/18/2015    Vertigo Norvasc [Amlodipine]  03/04/2015    Other (comments) Numbness and tingling Nsaids (Non-steroidal Anti-inflammatory Drug)  03/10/2016    Other (comments) Cough up blood Percocet [Oxycodone-acetaminophen]  12/02/2014    Itching Ranexa [Ranolazine]  04/02/2015    Other (comments) Cannot remember Simvastatin  11/20/2009    Other (comments) Joint pain  
 Voltaren [Diclofenac Sodium]  03/14/2017    Other (comments)  
 unknown Zetia [Ezetimibe]  12/11/2009    Myalgia Current Immunizations  Reviewed on 4/14/2016 Name Date Influenza High Dose Vaccine PF 9/29/2016 Influenza Vaccine 10/12/2017, 10/1/2015, 10/17/2014, 10/21/2013 Influenza Vaccine Split 10/3/2012 Pneumococcal Conjugate (PCV-13) 9/29/2016 Pneumococcal Vaccine (Unspecified Type) 4/14/2015 Td 1/1/2008 Tdap 7/19/2016 Zoster Vaccine, Live 1/1/2012 Not reviewed this visit You Were Diagnosed With   
  
 Codes Comments Thrush    -  Primary ICD-10-CM: B37.0 ICD-9-CM: 112.0 Vitals BP Pulse Temp Height(growth percentile) Weight(growth percentile) SpO2  
 121/68 (BP 1 Location: Left arm, BP Patient Position: Sitting) 71 98 °F (36.7 °C) (Oral) 6' (1.829 m) 238 lb (108 kg) 93% BMI Smoking Status 32.28 kg/m2 Never Smoker BMI and BSA Data Body Mass Index Body Surface Area  
 32.28 kg/m 2 2.34 m 2 Preferred Pharmacy Pharmacy Name Phone [facility] [address] [place] [address] Avenue [phone] Your Updated Medication List  
  
   
This list is accurate as of 4/3/18  3:34 PM.  Always use your most recent med list.  
  
  
  
  
 albuterol 90 mcg/actuation inhaler Commonly known as:  VENTOLIN HFA Take 2 Puffs by inhalation every four (4) hours as needed for Wheezing. aspirin, buffered 81 mg Tab Take 81 mg by mouth daily. coenzyme q10 10 mg Cap Take 100 mg by mouth every evening. fluticasone-salmeterol 250-50 mcg/dose diskus inhaler Commonly known as:  ADVAIR Take 1 Puff by inhalation two (2) times a day. furosemide 40 mg tablet Commonly known as:  LASIX Take 1 Tab by mouth two (2) times a day. guaiFENesin-codeine 100-10 mg/5 mL solution Commonly known as:  ROBITUSSIN AC Take 5 mL by mouth nightly as needed for Cough. Max Daily Amount: 5 mL. isosorbide mononitrate ER 60 mg CR tablet Commonly known as:  IMDUR  
TAKE ONE TABLET BY MOUTH ONCE DAILY  
  
 menthol 6 % Gel  
by Apply Externally route as needed. \"Abdirashid\"--cool therapy pain gel  
  
 metoprolol tartrate 25 mg tablet Commonly known as:  LOPRESSOR  
TAKE ONE-HALF TABLET BY MOUTH TWICE DAILY MUCINEX SINUS-MAX D-N (DIPHEN) 25 mg-10 mg- 650 mg/20mL(nt) Lisq Generic drug:  [id number]-GG Take  by mouth. nystatin 100,000 unit/mL suspension Commonly known as:  MYCOSTATIN Take 5 mL by mouth four (4) times daily. swish and spit  
  
 omeprazole 20 mg capsule Commonly known as:  PRILOSEC  
TAKE 1 CAPSULE EVERY DAY  
  
 potassium chloride 20 mEq tablet Commonly known as:  K-DUR, KLOR-CON Take 1 Tab by mouth two (2) times a day. * predniSONE 20 mg tablet Commonly known as:  Dejuanina Sax 60mg po daily for 2days, 40mg po daily for 2 days, 20mg po daily for 2days then stop * predniSONE 20 mg tablet Commonly known as:  Jean-Claude Lopez  
 Take 2 tablets (40mg) by mouth for 3 days, reduce to 1 tablet (20mg) for 4 days. rosuvastatin 10 mg tablet Commonly known as:  CRESTOR  
TAKE 1 TABLET BY MOUTH EVERY MONDAY AND THURSDAY  
  
 traMADol 50 mg tablet Commonly known as:  ULTRAM  
  
 * Notice: This list has 2 medication(s) that are the same as other medications prescribed for you. Read the directions carefully, and ask your doctor or other care provider to review them with you. Prescriptions Sent to Pharmacy Refills  
 nystatin (MYCOSTATIN) 100,000 unit/mL suspension 0 Sig: Take 5 mL by mouth four (4) times daily. swish and spit Class: Normal  
 Pharmacy: [address] [facility] [address] #: [phone] Route: Oral  
  
Follow-up Instructions Return if symptoms worsen or fail to improve. Introducing [facility] SERVICES! Will Hankins introduces [employer] patient portal. Now you can access parts of your medical record, email your doctor's office, and request medication refills online. 1. In your internet browser, go to [email or web address] 2. Click on the First Time User? Click Here link in the Sign In box. You will see the New Member Sign Up page. 3. Enter your [employer] Access Code exactly as it appears below. You will not need to use this code after youve completed the sign-up process. If you do not sign up before the expiration date, you must request a new code. · [employer] Access Code: [email or web address] Expires: 6/24/2018  2:51 PM 
 
4. Enter the last four digits of your Social Security Number (xxxx) and Date of Birth (mm/dd/yyyy) as indicated and click Submit. You will be taken to the next sign-up page. 5. Create a [employer] ID. This will be your [employer] login ID and cannot be changed, so think of one that is secure and easy to remember. 6. Create a [employer] password. You can change your password at any time. 7. Enter your Password Reset Question and Answer. This can be used at a later time if you forget your password. 8. Enter your e-mail address. You will receive e-mail notification when new information is available in [address] 9. Click Sign Up. You can now view and download portions of your medical record. 10. Click the Download Summary menu link to download a portable copy of your medical information. If you have questions, please visit the Frequently Asked Questions section of the [employer] website. Remember, [employer] is NOT to be used for urgent needs. For medical emergencies, dial 911. Now available from your iPhone and Android! Please provide this summary of care documentation to your next provider. Your primary care clinician is listed as [place]. If you have any questions after today's visit, please call [phone]. 23-Apr-2021 10:54

## 2021-04-28 ENCOUNTER — TELEPHONE (OUTPATIENT)
Dept: INTERNAL MEDICINE CLINIC | Age: 86
End: 2021-04-28

## 2021-04-28 NOTE — TELEPHONE ENCOUNTER
Called out and spoke to pt. Two pt identifiers confirmed. Informed pt that he could come in on Tuesday May 4th at 9am for a nurse visit for 2nd shingles vaccine. Pt verbalized understanding of information discussed w/ no further questions at this time.

## 2021-04-28 NOTE — TELEPHONE ENCOUNTER
----- Message from Binu Crouch sent at 4/28/2021 12:38 PM EDT -----  Regarding: Dr. Kina Melo: [phone]  General Message/Vendor Calls    Caller's first and last name: N/A      Reason for call: Returning missed call from nurse      Callback required yes/no and why: Yes/follow up       Best contact number(s): [phone]      Message from [facility]

## 2021-04-28 NOTE — TELEPHONE ENCOUNTER
#[phone]  Wife, Hema Lepe states pt is due for his second shingles vaccine. Please call to let them know if we have in stock and when pt can come in to get this.

## 2021-05-04 ENCOUNTER — CLINICAL SUPPORT (OUTPATIENT)
Dept: INTERNAL MEDICINE CLINIC | Age: 86
End: 2021-05-04

## 2021-05-04 DIAGNOSIS — Z23 ENCOUNTER FOR IMMUNIZATION: Primary | ICD-10-CM

## 2021-05-04 RX ORDER — ZOSTER VACCINE RECOMBINANT, ADJUVANTED 50 MCG/0.5
0.5 KIT INTRAMUSCULAR ONCE
Qty: 0.5 ML | Refills: 0 | Status: SHIPPED | OUTPATIENT
Start: 2021-05-04 | End: 2021-05-04

## 2021-05-04 NOTE — PROGRESS NOTES
The patient came in for shingrix. However, he doesn't have insurance coverage and was advised that he would be liable for the full price. He elects to go to the pharmacy.  QUEENIE

## 2021-05-05 LAB
ALBUMIN SERPL-MCNC: 4.2 G/DL (ref 3.6–4.6)
ALBUMIN/GLOB SERPL: 1.3 {RATIO} (ref 1.2–2.2)
ALP SERPL-CCNC: 79 IU/L (ref 39–117)
ALT SERPL-CCNC: 20 IU/L (ref 0–44)
AST SERPL-CCNC: 20 IU/L (ref 0–40)
BASOPHILS # BLD AUTO: 0.1 X10E3/UL (ref 0–0.2)
BASOPHILS NFR BLD AUTO: 1 %
BILIRUB SERPL-MCNC: 0.4 MG/DL (ref 0–1.2)
BUN SERPL-MCNC: 18 MG/DL (ref 8–27)
BUN/CREAT SERPL: 15 (ref 10–24)
CALCIUM SERPL-MCNC: 9.4 MG/DL (ref 8.6–10.2)
CHLORIDE SERPL-SCNC: 101 MMOL/L (ref 96–106)
CHOLEST SERPL-MCNC: 187 MG/DL (ref 100–199)
CO2 SERPL-SCNC: 25 MMOL/L (ref 20–29)
CREAT SERPL-MCNC: 1.18 MG/DL (ref 0.76–1.27)
EOSINOPHIL # BLD AUTO: 0.5 X10E3/UL (ref 0–0.4)
EOSINOPHIL NFR BLD AUTO: 9 %
ERYTHROCYTE [DISTWIDTH] IN BLOOD BY AUTOMATED COUNT: 12.6 % (ref 11.6–15.4)
GLOBULIN SER CALC-MCNC: 3.2 G/DL (ref 1.5–4.5)
GLUCOSE SERPL-MCNC: 115 MG/DL (ref 65–99)
HCT VFR BLD AUTO: 44 % (ref 37.5–51)
HDLC SERPL-MCNC: 38 MG/DL
HGB BLD-MCNC: 14.8 G/DL (ref 13–17.7)
IMM GRANULOCYTES # BLD AUTO: 0 X10E3/UL (ref 0–0.1)
IMM GRANULOCYTES NFR BLD AUTO: 0 %
LDLC SERPL CALC-MCNC: 124 MG/DL (ref 0–99)
LYMPHOCYTES # BLD AUTO: 2 X10E3/UL (ref 0.7–3.1)
LYMPHOCYTES NFR BLD AUTO: 32 %
MCH RBC QN AUTO: 32.1 PG (ref 26.6–33)
MCHC RBC AUTO-ENTMCNC: 33.6 G/DL (ref 31.5–35.7)
MCV RBC AUTO: 95 FL (ref 79–97)
MONOCYTES # BLD AUTO: 0.6 X10E3/UL (ref 0.1–0.9)
MONOCYTES NFR BLD AUTO: 10 %
NEUTROPHILS # BLD AUTO: 3 X10E3/UL (ref 1.4–7)
NEUTROPHILS NFR BLD AUTO: 48 %
PLATELET # BLD AUTO: 218 X10E3/UL (ref 150–450)
POTASSIUM SERPL-SCNC: 4.5 MMOL/L (ref 3.5–5.2)
PROT SERPL-MCNC: 7.4 G/DL (ref 6–8.5)
RBC # BLD AUTO: 4.61 X10E6/UL (ref 4.14–5.8)
SODIUM SERPL-SCNC: 139 MMOL/L (ref 134–144)
TRIGL SERPL-MCNC: 141 MG/DL (ref 0–149)
URATE SERPL-MCNC: 8 MG/DL (ref 3.8–8.4)
VLDLC SERPL CALC-MCNC: 25 MG/DL (ref 5–40)
WBC # BLD AUTO: 6.2 X10E3/UL (ref 3.4–10.8)

## 2021-06-09 ENCOUNTER — OFFICE VISIT (OUTPATIENT)
Dept: CARDIOLOGY CLINIC | Age: 86
End: 2021-06-09
Payer: MEDICARE

## 2021-06-09 ENCOUNTER — OFFICE VISIT (OUTPATIENT)
Dept: CARDIOLOGY CLINIC | Age: 86
End: 2021-06-09

## 2021-06-09 VITALS
OXYGEN SATURATION: 96 % | HEIGHT: 73 IN | DIASTOLIC BLOOD PRESSURE: 80 MMHG | SYSTOLIC BLOOD PRESSURE: 110 MMHG | WEIGHT: 231 LBS | RESPIRATION RATE: 16 BRPM | BODY MASS INDEX: 30.62 KG/M2 | HEART RATE: 72 BPM

## 2021-06-09 DIAGNOSIS — I49.5 SSS (SICK SINUS SYNDROME) (HCC): Primary | ICD-10-CM

## 2021-06-09 DIAGNOSIS — Z95.0 CARDIAC PACEMAKER IN SITU: Primary | ICD-10-CM

## 2021-06-09 DIAGNOSIS — Z86.79 S/P ABLATION OF ATRIAL FLUTTER: ICD-10-CM

## 2021-06-09 DIAGNOSIS — Z98.890 S/P ABLATION OF ATRIAL FLUTTER: ICD-10-CM

## 2021-06-09 DIAGNOSIS — Z95.0 CARDIAC PACEMAKER IN SITU: ICD-10-CM

## 2021-06-09 DIAGNOSIS — I48.92 ATRIAL FLUTTER, UNSPECIFIED TYPE (HCC): ICD-10-CM

## 2021-06-09 DIAGNOSIS — E78.2 MIXED HYPERLIPIDEMIA: ICD-10-CM

## 2021-06-09 DIAGNOSIS — I49.5 SSS (SICK SINUS SYNDROME) (HCC): ICD-10-CM

## 2021-06-09 DIAGNOSIS — I25.10 CORONARY ARTERY DISEASE INVOLVING NATIVE CORONARY ARTERY OF NATIVE HEART WITHOUT ANGINA PECTORIS: ICD-10-CM

## 2021-06-09 PROCEDURE — G8417 CALC BMI ABV UP PARAM F/U: HCPCS | Performed by: NURSE PRACTITIONER

## 2021-06-09 PROCEDURE — 99214 OFFICE O/P EST MOD 30 MIN: CPT | Performed by: NURSE PRACTITIONER

## 2021-06-09 PROCEDURE — G8536 NO DOC ELDER MAL SCRN: HCPCS | Performed by: NURSE PRACTITIONER

## 2021-06-09 PROCEDURE — 93000 ELECTROCARDIOGRAM COMPLETE: CPT | Performed by: NURSE PRACTITIONER

## 2021-06-09 PROCEDURE — 93280 PM DEVICE PROGR EVAL DUAL: CPT | Performed by: INTERNAL MEDICINE

## 2021-06-09 PROCEDURE — 1101F PT FALLS ASSESS-DOCD LE1/YR: CPT | Performed by: NURSE PRACTITIONER

## 2021-06-09 PROCEDURE — G8432 DEP SCR NOT DOC, RNG: HCPCS | Performed by: NURSE PRACTITIONER

## 2021-06-09 PROCEDURE — G8427 DOCREV CUR MEDS BY ELIG CLIN: HCPCS | Performed by: NURSE PRACTITIONER

## 2021-06-09 RX ORDER — TRAMADOL HYDROCHLORIDE 50 MG/1
TABLET ORAL AS NEEDED
COMMUNITY
Start: 2021-05-10 | End: 2022-06-09

## 2021-06-09 RX ORDER — FUROSEMIDE 40 MG/1
40 TABLET ORAL DAILY
Qty: 180 TABLET | Refills: 1
Start: 2021-06-09 | End: 2021-07-27 | Stop reason: SDUPTHER

## 2021-06-09 RX ORDER — POTASSIUM CHLORIDE 20 MEQ/1
20 TABLET, EXTENDED RELEASE ORAL DAILY
Qty: 180 TABLET | Refills: 3
Start: 2021-06-09 | End: 2021-07-27 | Stop reason: SDUPTHER

## 2021-06-09 NOTE — PROGRESS NOTES
Chief Complaint   Patient presents with    Follow-up    Valvular Heart Disease    Irregular Heart Beat    Coronary Artery Disease       1. Have you been to the ER, urgent care clinic since your last visit? No Hospitalized since your last visit? No    2. Have you seen or consulted any other health care providers outside of the [address] [place] [address] since your last visit? Yes Eye Exam & Urology  Include any pap smears or colon screening. No    Patient have received Moderna vaccine no card available he C/o dizziness when he got up from table bending over to place trash in bin.

## 2021-06-09 NOTE — PROGRESS NOTES
ELECTROPHYSIOLOGY        Subjective:      Marie Cohen is a 81 y.o. male is here for EP follow up. Reports dizziness at times with positional changes. Denies chest pain, pressure, tightness or edema. 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 ([address] 10/15/2019    Irregular heartbeat 05/03/2016    S/P AVR (aortic valve replacement) 03/16/2016    ASHD (arteriosclerotic heart disease) 01/19/2016    CAD (coronary artery disease) 06/05/2015    Pacemaker 05/19/2015    MARYLOU (obstructive sleep apnea) 12/23/2014    SSS (sick sinus syndrome) ([address] 12/11/2014    Aortic stenosis 11/04/2014    SOB (shortness of breath) 11/04/2014    Atrial flutter ([address] 09/12/2014    S/P ablation of atrial flutter 09/12/2014    H/O TIA (transient ischemic attack) and stroke 09/11/2014    Chest pain 09/11/2014    Sinus tachycardia 09/11/2014    Gout 01/24/2013    Chronic bronchitis ([address] 10/03/2012    Allergic rhinitis 10/03/2012    Prostate cancer ([address] 06/04/2012    Hypercholesterolemia     Stroke ([address]     Asthma     Calculus of kidney     Erectile dysfunction     GERD (gastroesophageal reflux disease)       Jonatan Redd MD  Past Medical History:   Diagnosis Date    Adverse effect of anesthesia     difficult with waking up     Allergic rhinitis     Arthritis     Asthma     Atrial flutter ([address] 9/12/2014    CAD (coronary artery disease)     Dr. Almas Turner Calculus of kidney     Chest pain 2006    Normal stress echo    Chronic bronchitis     Chronic bronchitis ([address] 10/3/2012    Chronic pain     knees    Erectile dysfunction     GERD (gastroesophageal reflux disease)     hiatal hernia    Gout 1/24/2013    Hemoptysis 2008    Work up by Dr. Bright Reyes;  Negative    Hiatal hernia     Hypercholesterolemia     Hypertension     Ill-defined condition     bronchitis    Melanoma ([facility]     back    Nausea & vomiting     Pacemaker     Dr. Delmi Chambers [facility]) 6/4/2012    S/P ablation of atrial flutter 9/12/2014    Sleep apnea     states never had test for apnea    Stroke [facility]) 2009     tia no residual problems      Past Surgical History:   Procedure Laterality Date    HX AFIB ABLATION  2014    Dr. Mayo Palomino    ruptured appendix    HX HEART CATHETERIZATION      [address]    HX HERNIA REPAIR  12/17/14    Recurrent left inguinal hernia; Dr. Farhat Joseph      5 hernia repairs total    HX PACEMAKER  12/2014    HX PACEMAKER PLACEMENT  2014    Dr. Ninoska Lozano    Kidney stone extraction    AL CARDIAC SURG PROCEDURE UNLIST      ablation    AL COLSC FLX W/RMVL OF TUMOR POLYP LESION SNARE TQ  4/21/2011          Allergies   Allergen Reactions    Azithromycin Anaphylaxis    Gadolinium-Containing Contrast Media Other (comments)     1) Moderate to Severe. 2) Post Gadolinium issues as noted:   - Diaphoretic, Near Syncope, Nausea and vomiting.     Acetaminophen Other (comments)     Indigestion,\"burning sensation\"    Contrast Dye [Iodine] Other (comments)     Decrease in BP    Crestor [Rosuvastatin] Myalgia    Levaquin [Levofloxacin] Other (comments)     Joint pain    Medrol [Methylprednisolone] Vertigo    Norvasc [Amlodipine] Other (comments)     Numbness and tingling    Nsaids (Non-Steroidal Anti-Inflammatory Drug) Other (comments)     Cough up blood      Percocet [Oxycodone-Acetaminophen] Itching    Ranexa [Ranolazine] Other (comments)     Cannot remember    Simvastatin Other (comments)     Joint pain    Voltaren [Diclofenac Sodium] Other (comments)     unknown    Zetia [Ezetimibe] Myalgia      Family History   Problem Relation Age of Onset    Stroke Father     Heart Disease Mother     negative for cardiac disease  Social History     Socioeconomic History    Marital status:      Spouse name: Not on file    Number of children: Not on file    Years of education: Not on file    Highest education level: Not on file   Tobacco Use    Smoking status: Never Smoker    Smokeless tobacco: Never Used   Substance and Sexual Activity    Alcohol use: Yes     Alcohol/week: 3.0 standard drinks     Types: 1 Shots of liquor, 2 Standard drinks or equivalent per week     Comment: rare    Drug use: No     Types: OTC, Prescription    Sexual activity: Not Currently     Partners: Female     Social Determinants of Health     Financial Resource Strain:     Difficulty of Paying Living Expenses:    Food Insecurity:     Worried About Running Out of Food in the Last Year:     Ran Out of Food in the Last Year:    Transportation Needs:     Lack of Transportation (Medical):  Lack of Transportation (Non-Medical):    Physical Activity:     Days of Exercise per Week:     Minutes of Exercise per Session:    Stress:     Feeling of Stress :    Social Connections:     Frequency of Communication with Friends and Family:     Frequency of Social Gatherings with Friends and Family:     Attends Evangelical Services:     Active Member of Clubs or Organizations:     Attends Club or Organization Meetings:     Marital Status:      Current Outpatient Medications   Medication Sig    traMADoL (ULTRAM) 50 mg tablet Take  by mouth as needed.  potassium chloride (K-DUR, KLOR-CON) 20 mEq tablet Take 1 Tablet by mouth daily. Lowered 06/09/21    furosemide (LASIX) 40 mg tablet Take 1 Tablet by mouth daily.     levothyroxine (SYNTHROID) 50 mcg tablet TAKE 1 TABLET EVERY DAY BEFORE BREAKFAST    metoprolol tartrate (LOPRESSOR) 25 mg tablet TAKE 1/2 TABLET TWICE DAILY    isosorbide mononitrate ER (IMDUR) 60 mg CR tablet TAKE 1 TABLET EVERY DAY    omeprazole (PRILOSEC) 20 mg capsule TAKE 1 CAPSULE EVERY DAY    albuterol (VENTOLIN HFA) 90 mcg/actuation inhaler Take 2 Puffs by inhalation every four (4) hours as needed for Wheezing.  coenzyme q10 10 mg cap Take 100 mg by mouth every evening.  Aspirin, Buffered 81 mg tab Take 81 mg by mouth daily. No current facility-administered medications for this visit. Vitals:    21 0905 21 0912 21 0913   BP: 90/60 100/70 110/80   Pulse: 72     Resp: 16     SpO2: 96%     Weight: 231 lb (104.8 kg)     Height: 6' 1\" (1.854 m)         I have reviewed the nurses notes, vitals, problem list, allergy list, medical history, family, social history and medications. Review of Symptoms:    General: Pt denies excessive weight gain or loss. Pt is able to conduct ADL's  HEENT: Denies blurred vision, headaches, epistaxis and difficulty swallowing. Respiratory: Denies shortness of breath, POLANCO, wheezing or stridor. Cardiovascular: Denies precordial pain, palpitations, edema or PND  Gastrointestinal: Denies poor appetite, indigestion, abdominal pain or blood in stool  Urinary: Denies dysuria, pyuria  Musculoskeletal: Denies pain or swelling from muscles or joints  Neurologic: Denies tremor, paresthesias, or sensory motor disturbance  Skin: Denies rash, itching or texture change. Psych: Denies depression      Physical Exam:      General: Well developed, in no acute distress. HEENT: Eyes - PERRL  Heart:  Normal S1/S2 negative S3 or S4. Regular, no murmur  Respiratory: Clear bilaterally x 4, no wheezing or rales  Extremities:  No edema, no cyanosis. Musculoskeletal: No clubbing  Neuro: A&Ox3, speech clear  Skin: No visible rashes or lesions. Non diaphoretic.  No visible ulcers  Vascular: 2+ pulses symmetric in all extremities  Psych - judgement intact and orientation is wnl       Cardiographics    EK21 A paced    Results for orders placed or performed during the hospital encounter of 16   EKG, 12 LEAD, INITIAL   Result Value Ref Range    Ventricular Rate 60 BPM    Atrial Rate 60 BPM    P-R Interval 208 ms    QRS Duration 172 ms    Q-T Interval 528 ms    QTC Calculation (Bezet) 528 ms    Calculated P Axis 71 degrees    Calculated R Axis -72 degrees    Calculated T Axis 37 degrees    Diagnosis       Normal sinus rhythm  Right bundle branch block  Left anterior fascicular block  ** Bifascicular block **  Left ventricular hypertrophy with QRS widening  When compared with ECG of 10-MAR-2016 14:56,  Sinus rhythm has replaced Electronic atrial pacemaker  Right bundle branch block is now present  Minimal criteria for Septal infarct are no longer present  Confirmed by Jn Shen ([id number]) on 3/16/2016 1:57:21 PM     Results for orders placed or performed in visit on 12/04/14   CARDIAC HOLTER MONITOR, 24 HOURS    Narrative    ECG Monitor/24 hours, Complete    Reason for Holter Monitor   A-FLUTTER    Heartbeat    Slowest 48  Average 63  Fastest  97      Results:   Underlying Rhythm: Normal sinus rhythm      Atrial Arrhythmias: premature atrial contractions; frequent             AV Conduction: normal    Ventricular Arrhythmias: premature ventricular contractions; rare    ST Segment Analysis:normal     Symptom Correlation:  None reported    Comment:   Sinus rhythm with occasional atrial ectopy - morning bradycardia  of 48 bpm and highest heart rate of 97 bpm. Clinical correlation  advised.      Cheryl Aleman MD, [facility]                    Lab Results   Component Value Date/Time    WBC 6.2 05/04/2021 07:52 AM    Hemoglobin (POC) 13.9 12/17/2014 12:06 PM    HGB 14.8 05/04/2021 07:52 AM    Hematocrit (POC) 41 12/17/2014 12:06 PM    HCT 44.0 05/04/2021 07:52 AM    PLATELET 111 09/34/1212 07:52 AM    MCV 95 05/04/2021 07:52 AM      Lab Results   Component Value Date/Time    Sodium 139 05/04/2021 07:52 AM    Potassium 4.5 05/04/2021 07:52 AM    Chloride 101 05/04/2021 07:52 AM    CO2 25 05/04/2021 07:52 AM    Anion gap 8 03/14/2017 11:02 AM    Glucose 115 (H) 05/04/2021 07:52 AM    BUN 18 05/04/2021 07:52 AM    Creatinine 1.18 05/04/2021 07:52 AM    BUN/Creatinine ratio 15 05/04/2021 07:52 AM    GFR est AA 65 05/04/2021 07:52 AM    GFR est non-AA 56 (L) 05/04/2021 07:52 AM    Calcium 9.4 05/04/2021 07:52 AM    Bilirubin, total 0.4 05/04/2021 07:52 AM    Alk. phosphatase 79 05/04/2021 07:52 AM    Protein, total 7.4 05/04/2021 07:52 AM    Albumin 4.2 05/04/2021 07:52 AM    Globulin 3.7 03/14/2017 11:02 AM    A-G Ratio 1.3 05/04/2021 07:52 AM    ALT (SGPT) 20 05/04/2021 07:52 AM      Lab Results   Component Value Date/Time    TSH 4.210 12/29/2020 08:03 AM           Assessment:           ICD-10-CM ICD-9-CM    1. SSS (sick sinus syndrome) ([facility])  I49.5 427.81 AMB POC EKG ROUTINE W/ 12 LEADS, INTER & REP   2. S/P ablation of atrial flutter  Z98.890 V45.89 AMB POC EKG ROUTINE W/ 12 LEADS, INTER & REP    Z86.79     3. Atrial flutter, unspecified type ([facility])  I48.92 427.32 AMB POC EKG ROUTINE W/ 12 LEADS, INTER & REP   4. Mixed hyperlipidemia  E78.2 272.2 AMB POC EKG ROUTINE W/ 12 LEADS, INTER & REP   5. Cardiac pacemaker in situ  Z95.0 V45.01 AMB POC EKG ROUTINE W/ 12 LEADS, INTER & REP   6. Coronary artery disease involving native coronary artery of native heart without angina pectoris  I25.10 414.01 potassium chloride (K-DUR, KLOR-CON) 20 mEq tablet      furosemide (LASIX) 40 mg tablet     Orders Placed This Encounter    AMB POC EKG ROUTINE W/ 12 LEADS, INTER & REP     Order Specific Question:   Reason for Exam:     Answer:   routine    traMADoL (ULTRAM) 50 mg tablet     Sig: Take  by mouth as needed.  potassium chloride (K-DUR, KLOR-CON) 20 mEq tablet     Sig: Take 1 Tablet by mouth daily. Lowered 06/09/21     Dispense:  180 Tablet     Refill:  3    furosemide (LASIX) 40 mg tablet     Sig: Take 1 Tablet by mouth daily. Dispense:  180 Tablet     Refill:  1        Plan:     Mr Twila Gray is here for annual follow up and device check.  He is doing well - he is A paced 94% for sick sinus and V paced 25% for high grade av block. Battery remaining is ~3 years. Only event noted was single,4 beat NSVT in April. No significant CAD noted on diagnostic cardiac cath 7/20. During this visit,  the patient and I had a comprehensive discussion of device management using principles of shared decision making. we reviewed device therapy, including the potential risks and benefits of device management. These risks include death, myocardial infarction, stroke, cardiac perforation, vascular injury, injury, pacing induced cardiomyopathy, inappropriate shocks (defibrillator) and other less severe complications. The patient demonstrated a clear understanding of these issues during out discussion. Our plans, determined together after thorough consideration, are outlined else where in this note. His bps are low with hx of normal EF. Will lower his lasix and potassium to every day. Enrolled in remote monitoring and I will see him back in 1 year. Addressed all patient questions and concerns at this visit. Continue medical management for HTN. Discussed side effects, efficacy and good medication compliance with pt re: lizz and IMDUR. Thank you for allowing me to participate in [address] [place] [address].       Derik Treviño NP

## 2021-07-12 ENCOUNTER — TELEPHONE (OUTPATIENT)
Dept: INTERNAL MEDICINE CLINIC | Age: 86
End: 2021-07-12

## 2021-07-12 NOTE — TELEPHONE ENCOUNTER
----- Message from [facility] sent at 7/12/2021  4:37 PM EDT -----  Regarding: Dr. Jorgito Hillman  General Message/Vendor Calls    Caller's first and last name: Pt      Reason for call: Would like to know when his last shingles shot was.  Recently got a call about getting one but thought he just got one not too long ago      Callback required yes/no and why: Yes      Best contact number(s): [phone]      Details to clarify the request: N/A    Message from [facility]

## 2021-07-13 NOTE — TELEPHONE ENCOUNTER
Called out and spoke to pt. Two pt identifiers confirmed. Pt wanted to know when he got his shingles vaccine I told her he got one dose here on 10/06/2020 and he said her got his second dose at [address] in May of this year. Pt verbalized understanding of information discussed w/ no further questions at this time.

## 2021-07-27 DIAGNOSIS — I25.10 CORONARY ARTERY DISEASE INVOLVING NATIVE CORONARY ARTERY OF NATIVE HEART WITHOUT ANGINA PECTORIS: ICD-10-CM

## 2021-07-27 RX ORDER — POTASSIUM CHLORIDE 20 MEQ/1
20 TABLET, EXTENDED RELEASE ORAL DAILY
Qty: 180 TABLET | Refills: 3 | Status: SHIPPED | OUTPATIENT
Start: 2021-07-27 | End: 2022-10-11

## 2021-07-27 RX ORDER — METOPROLOL TARTRATE 25 MG/1
TABLET, FILM COATED ORAL
Qty: 90 TABLET | Refills: 3 | Status: SHIPPED | OUTPATIENT
Start: 2021-07-27 | End: 2022-07-17

## 2021-07-27 RX ORDER — LEVOTHYROXINE SODIUM 50 UG/1
50 TABLET ORAL
Qty: 90 TABLET | Refills: 1 | Status: SHIPPED | OUTPATIENT
Start: 2021-07-27 | End: 2022-01-31

## 2021-07-27 RX ORDER — ISOSORBIDE MONONITRATE 60 MG/1
TABLET, EXTENDED RELEASE ORAL
Qty: 90 TABLET | Refills: 3 | Status: SHIPPED | OUTPATIENT
Start: 2021-07-27 | End: 2022-07-17

## 2021-07-27 RX ORDER — FUROSEMIDE 40 MG/1
40 TABLET ORAL DAILY
Qty: 180 TABLET | Refills: 1 | Status: SHIPPED | OUTPATIENT
Start: 2021-07-27 | End: 2022-08-22

## 2021-07-27 RX ORDER — OMEPRAZOLE 20 MG/1
20 CAPSULE, DELAYED RELEASE ORAL DAILY
Qty: 90 CAPSULE | Refills: 3 | Status: SHIPPED | OUTPATIENT
Start: 2021-07-27 | End: 2021-08-02 | Stop reason: SDUPTHER

## 2021-07-27 NOTE — TELEPHONE ENCOUNTER
Pt states insurance is changing  And needs to get these refills this week before it changes.     Pt request refill 6 meds, 90 day supply:  1. isosorbide mononitrate ER (IMDUR) 60 mg CR tablet  2. furosemide (LASIX) 40 mg tablet  3. levothyroxine (SYNTHROID) 50 mcg tablet  4. potassium chloride (K-DUR, KLOR-CON) 20 mEq tablet  5. omeprazole (PRILOSEC) 20 mg capsule  6. metoprolol tartrate (LOPRESSOR) 25 mg tablet      Please send to:    [address] , [address] [place] [address]

## 2021-07-27 NOTE — TELEPHONE ENCOUNTER
Future Appointments:  Future Appointments   Date Time Provider Lindsey Álvarez   8/2/2021  8:45 AM Mohsen Fairchild MD [facility] BS AMB   8/26/2021  2:15 PM Mayra Nguyen MD [facility] BS AMB   9/9/2021 10:15 AM REMOTE_[facility] AMB   6/22/2022  8:45 AM PACEMAKER, [facility] AMB   6/22/2022  9:00 AM Heather Guadarrama, ANP [facility] AMB        Last Appointment With Me:  2/1/2021     Requested Prescriptions     Pending Prescriptions Disp Refills    isosorbide mononitrate ER (IMDUR) 60 mg CR tablet 90 Tablet 3    furosemide (LASIX) 40 mg tablet 180 Tablet 1     Sig: Take 1 Tablet by mouth daily.  levothyroxine (SYNTHROID) 50 mcg tablet 90 Tablet 1    potassium chloride (K-DUR, KLOR-CON) 20 mEq tablet 180 Tablet 3     Sig: Take 1 Tablet by mouth daily.  Lowered 06/09/21    omeprazole (PRILOSEC) 20 mg capsule 90 Capsule 3    metoprolol tartrate (LOPRESSOR) 25 mg tablet 90 Tablet 3

## 2021-08-02 ENCOUNTER — OFFICE VISIT (OUTPATIENT)
Dept: INTERNAL MEDICINE CLINIC | Age: 86
End: 2021-08-02
Payer: MEDICARE

## 2021-08-02 VITALS
RESPIRATION RATE: 14 BRPM | WEIGHT: 235.4 LBS | OXYGEN SATURATION: 96 % | HEIGHT: 73 IN | BODY MASS INDEX: 31.2 KG/M2 | SYSTOLIC BLOOD PRESSURE: 100 MMHG | DIASTOLIC BLOOD PRESSURE: 58 MMHG | HEART RATE: 74 BPM

## 2021-08-02 DIAGNOSIS — E78.00 HYPERCHOLESTEROLEMIA: ICD-10-CM

## 2021-08-02 DIAGNOSIS — I25.118 CORONARY ARTERY DISEASE OF NATIVE ARTERY OF NATIVE HEART WITH STABLE ANGINA PECTORIS (HCC): Primary | ICD-10-CM

## 2021-08-02 DIAGNOSIS — J42 CHRONIC BRONCHITIS, UNSPECIFIED CHRONIC BRONCHITIS TYPE (HCC): ICD-10-CM

## 2021-08-02 PROCEDURE — G8417 CALC BMI ABV UP PARAM F/U: HCPCS | Performed by: INTERNAL MEDICINE

## 2021-08-02 PROCEDURE — G8510 SCR DEP NEG, NO PLAN REQD: HCPCS | Performed by: INTERNAL MEDICINE

## 2021-08-02 PROCEDURE — G8536 NO DOC ELDER MAL SCRN: HCPCS | Performed by: INTERNAL MEDICINE

## 2021-08-02 PROCEDURE — G8427 DOCREV CUR MEDS BY ELIG CLIN: HCPCS | Performed by: INTERNAL MEDICINE

## 2021-08-02 PROCEDURE — 1101F PT FALLS ASSESS-DOCD LE1/YR: CPT | Performed by: INTERNAL MEDICINE

## 2021-08-02 PROCEDURE — 99214 OFFICE O/P EST MOD 30 MIN: CPT | Performed by: INTERNAL MEDICINE

## 2021-08-02 RX ORDER — OMEPRAZOLE 20 MG/1
20 CAPSULE, DELAYED RELEASE ORAL DAILY
Qty: 90 CAPSULE | Refills: 3 | Status: SHIPPED | OUTPATIENT
Start: 2021-08-02 | End: 2022-09-06

## 2021-08-02 NOTE — PROGRESS NOTES
SUBJECTIVE  Mr. Chris Silveria is a 81 y.o. male patient of mine who presents today for routine follow up visit. Chief Complaint   Patient presents with    Hypertension    Diabetes       Chronic bronchitis has been doing well: He has seen Dr. Carlos Angel. Uses albuterol prn. Doing better. Dr. Dean Garcia changed his diuretic from BID to every day. He has undergone valve replacement in March 2016 with Dr. Rajan Jackson. It is recalled from early 2016 that he saw Dr. Dean Garcia, for exertional dyspnea, and had cardiac cath. Knee pain: \"Doing pretty good right now. \"   Sees Dr. Janey Guan, and has had corticosteroid injections. Surgery on hold due to heart issues. Dr. Cristiane Bland just saw him. \"He told me I was either smart or cristy. \"   He has prostate cancer, but doesn't want to treat this. He has had biopsy proven adenocarcinoma of prostate, by Dr. Radha Purvis with [facility]. As we noted before: At this time \"I aint doin' nothin'. If the PSA gets high, then I'll take the shots. \"  From our records, it appears he was referred to [address] [place] [address] for consideration of radiation therapy. He refused this. Past Medical History: Hyperlipidemia, chronic bronchitis, allergic rhinitis, urolithiasis, hiatal hernia with GERD, ED, Asthma, prostate cancer 2012 (Dr. Radha Purvis). Normal stress echo in 2006; Echo 2010 showed LVH, EF 55-60%. Hemoptysis with negative workup in 2008--saw Dr. Rickey Vann. EGD in 2009 Dr. Albania Brito showing Hiatal hernia. R lid chronic ptosis noted in 2009 by Dr. Alicia Diaz, optometrist.  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 Had exertional chest pain in 2012, recurred in 2014--cardiac work up with Dr. Dean Garcia. His ophthalmologist is Dr. Nayan Maurice. Past Surgical History: Hernia in 86. Kidney stone 85. Appy (ruptured) in 79. Resection of melanoma. Colon polypectomy 2011 Dr. Albania Brito.    A flutter ablation 2014 Dr. Eugenia Dugan 2014 Dr. Noy Don repair 2014 Dr. Emil Manzano. Aortic valve replacement 2016. Cataracts 2016. Allergies: IVP dye (BP drop). Medications:   Current Outpatient Medications on File Prior to Visit   Medication Sig Dispense Refill    isosorbide mononitrate ER (IMDUR) 60 mg CR tablet TAKE 1 TABLET EVERY DAY 90 Tablet 3    furosemide (LASIX) 40 mg tablet Take 1 Tablet by mouth daily. 180 Tablet 1    levothyroxine (SYNTHROID) 50 mcg tablet Take 1 Tablet by mouth Daily (before breakfast). 90 Tablet 1    omeprazole (PRILOSEC) 20 mg capsule Take 1 Capsule by mouth daily. 90 Capsule 3    metoprolol tartrate (LOPRESSOR) 25 mg tablet TAKE 1/2 TABLET TWICE DAILY 90 Tablet 3    traMADoL (ULTRAM) 50 mg tablet Take  by mouth as needed.  albuterol (VENTOLIN HFA) 90 mcg/actuation inhaler Take 2 Puffs by inhalation every four (4) hours as needed for Wheezing. 3 Inhaler 1    coenzyme q10 10 mg cap Take 100 mg by mouth every evening.  Aspirin, Buffered 81 mg tab Take 81 mg by mouth daily.  potassium chloride (K-DUR, KLOR-CON) 20 mEq tablet Take 1 Tablet by mouth daily. Lowered 06/09/21 (Patient not taking: Reported on 8/2/2021) 180 Tablet 3     No current facility-administered medications on file prior to visit. Social History:  . Retired holt. Drinks a pint to 1 1/2 pints hard liquor daily. When last assessed. No tobacco, or drugs. Family History: F CVAs. Brother valve disease. Review of systems: Unremarkable except as noted above. Objective:    Visit Vitals  BP (!) 100/58 (BP 1 Location: Left arm, BP Patient Position: Sitting)   Pulse 74   Resp 14   Ht 6' 1\" (1.854 m)   Wt 235 lb 6.4 oz (106.8 kg)   SpO2 96%   BMI 31.06 kg/m²     Gen: Pleasant 81 y.o.  male in NAD.   HEENT: PERRLA. EOMI. OP moist and pink.  Neck: Supple.  No LAD.  HEART: RRR, No M/G/R.   LUNGS: CTAB No W/R.   ABDOMEN: S, NT, ND, BS+.   EXTREMITIES: Warm.  No C/C/E. MUSCULOSKELETAL: Normal ROM, muscle strength 5/5 all groups. NEURO: Alert and oriented x 3.  Cranial nerves grossly intact.  No focal sensory or motor deficits noted. SKIN: Warm. Dry. No rashes or other lesions noted. Lab Results   Component Value Date/Time    Cholesterol, total 187 05/04/2021 07:52 AM    HDL Cholesterol 38 (L) 05/04/2021 07:52 AM    LDL, calculated 124 (H) 05/04/2021 07:52 AM    LDL, calculated 126 (H) 06/02/2020 08:39 AM    VLDL, calculated 25 05/04/2021 07:52 AM    VLDL, calculated 27 06/02/2020 08:39 AM    Triglyceride 141 05/04/2021 07:52 AM    CHOL/HDL Ratio 3.6 09/17/2010 08:27 AM     Lab Results   Component Value Date/Time    Hemoglobin A1c 6.1 (H) 12/29/2020 08:03 AM       Assessment / Plan:   1. CAD: No CP. As per his cardiologist, Dr. Pickett Salvage  2. A flutter, SSS: now with pacemaker. As per cardiology. 3. Aortic stenosis: s/p AVR. Doing well. 4. Asthma: Stable. Uses albuterol prn; Less than daily currently. 5. Prostate Cancer: So far, doing okay with surveillance. Patient reaffirms desire for conservative approach today. Surveillance for now. Pt willing to do hormonal treatments if PSA rises to higher levels. His urologist has suggested a PSA of 20 or above as a triggering value  6. Problem drinking: He has stopped drinking during [facility]. 7. Hyperlipidemia:  Not at goal, but options limited. Did not tolerate simvastatin or zetia. Now on fish oil. Will recheck lipids and CMP. Continue pulse dosing of Crestor. 8. GERD:  Controlled on prn prilosec; Pt. may continue this. 9. ED: Not discussed. 10. All rhinitis: Stable. 11. Possible TIA/Diplopia/Amaurosis fugax: No recent episode. Work up as above. 12. Gout: no recent flares. 13. Borderline DM. Recheck labs. 14. Obesity: Watch diet. More exercise as able (limited now by postsurgical recovery). 15. Hx of Noncompliance. Follow up in 6 months.

## 2021-09-09 ENCOUNTER — OFFICE VISIT (OUTPATIENT)
Dept: CARDIOLOGY CLINIC | Age: 86
End: 2021-09-09
Payer: MEDICARE

## 2021-09-09 DIAGNOSIS — Z95.0 CARDIAC PACEMAKER IN SITU: Primary | ICD-10-CM

## 2021-09-09 DIAGNOSIS — I49.5 SSS (SICK SINUS SYNDROME) (HCC): ICD-10-CM

## 2021-09-09 PROCEDURE — 93296 REM INTERROG EVL PM/IDS: CPT | Performed by: INTERNAL MEDICINE

## 2021-09-09 PROCEDURE — 93294 REM INTERROG EVL PM/LDLS PM: CPT | Performed by: INTERNAL MEDICINE

## 2021-09-15 RX ORDER — ALBUTEROL SULFATE 90 UG/1
2 AEROSOL, METERED RESPIRATORY (INHALATION)
Qty: 3 EACH | Refills: 5 | Status: SHIPPED | OUTPATIENT
Start: 2021-09-15 | End: 2022-06-09 | Stop reason: SDUPTHER

## 2021-09-15 NOTE — TELEPHONE ENCOUNTER
Future Appointments:  Future Appointments   Date Time Provider Lindsey Álvarez   9/16/2021  9:00 AM Fidencio Cr MD [facility] BS AMB   12/16/2021  1:00 PM REMOTE_[facility] AMB   2/2/2022  8:45 AM Fidencio Cr MD [facility] BS AMB   6/22/2022  8:45 [facility]   6/22/2022  9:00 AM Heather Guadarrama, ANP [facility] AMB        Last Appointment With Me:  8/2/2021     Requested Prescriptions     Pending Prescriptions Disp Refills    albuterol (Ventolin HFA) 90 mcg/actuation inhaler       Sig: Take 2 Puffs by inhalation every four (4) hours as needed for Wheezing.

## 2021-09-16 ENCOUNTER — CLINICAL SUPPORT (OUTPATIENT)
Dept: INTERNAL MEDICINE CLINIC | Age: 86
End: 2021-09-16

## 2021-09-16 VITALS — TEMPERATURE: 97.8 F

## 2021-09-16 DIAGNOSIS — Z23 NEEDS FLU SHOT: ICD-10-CM

## 2021-09-16 DIAGNOSIS — Z23 ENCOUNTER FOR IMMUNIZATION: Primary | ICD-10-CM

## 2021-09-16 NOTE — PATIENT INSTRUCTIONS
Vaccine Information Statement    Influenza (Flu) Vaccine (Inactivated or Recombinant): What You Need to Know    Many vaccine information statements are available in Indonesian and other languages. See www.immunize.org/vis. Hojas de información sobre vacunas están disponibles en español y en muchos otros idiomas. Visite www.immunize.org/vis. 1. Why get vaccinated? Influenza vaccine can prevent influenza (flu). Flu is a contagious disease that spreads around the [facility] every year, usually between October and May. Anyone can get the flu, but it is more dangerous for some people. Infants and young children, people 72 years and older, pregnant people, and people with certain health conditions or a weakened immune system are at greatest risk of flu complications. Pneumonia, bronchitis, sinus infections, and ear infections are examples of flu-related complications. If you have a medical condition, such as heart disease, cancer, or diabetes, flu can make it worse. Flu can cause fever and chills, sore throat, muscle aches, fatigue, cough, headache, and runny or stuffy nose. Some people may have vomiting and diarrhea, though this is more common in children than adults. In an average year, thousands of people in the [facility] die from flu, and many more are hospitalized. Flu vaccine prevents millions of illnesses and flu-related visits to the doctor each year. 2. Influenza vaccines     CDC recommends everyone 6 months and older get vaccinated every flu season. Children 6 months through 6years of age may need 2 doses during a single flu season. Everyone else needs only 1 dose each flu season. It takes about 2 weeks for protection to develop after vaccination. There are many flu viruses, and they are always changing. Each year a new flu vaccine is made to protect against the influenza viruses believed to be likely to cause disease in the upcoming flu season.  Even when the vaccine doesnt exactly match these viruses, it may still provide some protection. Influenza vaccine does not cause flu. Influenza vaccine may be given at the same time as other vaccines. 3. Talk with your health care provider    Tell your vaccination provider if the person getting the vaccine:   Has had an allergic reaction after a previous dose of influenza vaccine, or has any severe, life-threatening allergies    Has ever had Guillain-Barré Syndrome (also called GBS)    In some cases, your health care provider may decide to postpone influenza vaccination until a future visit. Influenza vaccine can be administered at any time during pregnancy. People who are or will be pregnant during influenza season should receive inactivated influenza vaccine. People with minor illnesses, such as a cold, may be vaccinated. People who are moderately or severely ill should usually wait until they recover before getting influenza vaccine. Your health care provider can give you more information. 4. Risks of a vaccine reaction     Soreness, redness, and swelling where the shot is given, fever, muscle aches, and headache can happen after influenza vaccination.  There may be a very small increased risk of Guillain-Barré Syndrome (GBS) after inactivated influenza vaccine (the flu shot). Brook Parker children who get the flu shot along with pneumococcal vaccine (PCV13) and/or DTaP vaccine at the same time might be slightly more likely to have a seizure caused by fever. Tell your health care provider if a child who is getting flu vaccine has ever had a seizure. People sometimes faint after medical procedures, including vaccination. Tell your provider if you feel dizzy or have vision changes or ringing in the ears. As with any medicine, there is a very remote chance of a vaccine causing a severe allergic reaction, other serious injury, or death. 5. What if there is a serious problem?     An allergic reaction could occur after the vaccinated person leaves the clinic. If you see signs of a severe allergic reaction (hives, swelling of the face and throat, difficulty breathing, a fast heartbeat, dizziness, or weakness), call 9-1-1 and get the person to the nearest hospital.    For other signs that concern you, call your health care provider. Adverse reactions should be reported to the Vaccine Adverse Event Reporting System (VAERS). Your health care provider will usually file this report, or you can do it yourself. Visit the VAERS website at www.vaers. [facility].gov or call 3-695.391.8523. VAERS is only for reporting reactions, and VAERS staff members do not give medical advice. 6. The National Vaccine Injury Compensation Program    The [facility] Vaccine Injury Compensation Program (VICP) is a federal program that was created to compensate people who may have been injured by certain vaccines. Claims regarding alleged injury or death due to vaccination have a time limit for filing, which may be as short as two years. Visit the VICP website at www.[facility].gov/vaccinecompensation or call 0-292.839.4008 to learn about the program and about filing a claim. 7. How can I learn more?  Ask your health care provider.  Call your local or state health department.  Visit the website of the Food and Drug Administration (FDA) for vaccine package inserts and additional information at www.fda.gov/vaccines-blood-biologics/vaccines.  Contact the Centers for Disease Control and Prevention (CDC):  - Call 0-732.217.5365 (8-477-CNU-INFO) or  - Visit CDCs influenza website at www.cdc.gov/flu. Vaccine Information Statement   Inactivated Influenza Vaccine   8/6/2021  42 Roverto Sanya Cece [id number]   Department of Health and Human Services  Centers for Disease Control and Prevention    Office Use Only

## 2021-09-16 NOTE — PROGRESS NOTES
After obtaining consent, and per orders of Dr. Ayala Friendship injection of Fluad 0.5 ml Intramuscular in right deltoid given by Liz Alvarez LPN. Patient  Remained  in clinic for 20 minutes afterwards, and and no adverse reactions were reported to me.

## 2021-10-22 ENCOUNTER — TELEPHONE (OUTPATIENT)
Dept: INTERNAL MEDICINE CLINIC | Age: 86
End: 2021-10-22

## 2021-10-22 DIAGNOSIS — M54.50 LOW BACK PAIN, UNSPECIFIED BACK PAIN LATERALITY, UNSPECIFIED CHRONICITY, UNSPECIFIED WHETHER SCIATICA PRESENT: Primary | ICD-10-CM

## 2021-10-22 NOTE — TELEPHONE ENCOUNTER
----- Message from Librado Head sent at 10/21/2021  3:46 PM EDT -----  Subject: Referral Request    QUESTIONS   Reason for referral request? pt injured his back last Thursday and is   having pain. pt would like to have back exrays to check and if he needs   appt. Has the physician seen you for this condition before? No   Preferred Specialist (if applicable)? Do you already have an appointment scheduled? No  Additional Information for Provider?   ---------------------------------------------------------------------------  --------------  CALL BACK INFO  What is the best way for the office to contact you? OK to leave message on   voicemail  Preferred Call Back Phone Number?  [phone]

## 2021-10-22 NOTE — TELEPHONE ENCOUNTER
Called and spoke to patient. Informed patient he can go have his x-ray done. Patient did not have any other questions at this time.

## 2021-11-22 ENCOUNTER — OFFICE VISIT (OUTPATIENT)
Dept: CARDIOLOGY CLINIC | Age: 86
End: 2021-11-22
Payer: MEDICARE

## 2021-11-22 VITALS
HEART RATE: 71 BPM | OXYGEN SATURATION: 94 % | WEIGHT: 238.2 LBS | SYSTOLIC BLOOD PRESSURE: 110 MMHG | DIASTOLIC BLOOD PRESSURE: 76 MMHG | HEIGHT: 73 IN | RESPIRATION RATE: 18 BRPM | BODY MASS INDEX: 31.57 KG/M2

## 2021-11-22 DIAGNOSIS — I25.10 CORONARY ARTERY DISEASE INVOLVING NATIVE CORONARY ARTERY OF NATIVE HEART WITHOUT ANGINA PECTORIS: Primary | ICD-10-CM

## 2021-11-22 PROCEDURE — 93000 ELECTROCARDIOGRAM COMPLETE: CPT | Performed by: INTERNAL MEDICINE

## 2021-11-22 PROCEDURE — G8427 DOCREV CUR MEDS BY ELIG CLIN: HCPCS | Performed by: INTERNAL MEDICINE

## 2021-11-22 PROCEDURE — 99214 OFFICE O/P EST MOD 30 MIN: CPT | Performed by: INTERNAL MEDICINE

## 2021-11-22 PROCEDURE — G8417 CALC BMI ABV UP PARAM F/U: HCPCS | Performed by: INTERNAL MEDICINE

## 2021-11-22 PROCEDURE — 1101F PT FALLS ASSESS-DOCD LE1/YR: CPT | Performed by: INTERNAL MEDICINE

## 2021-11-22 PROCEDURE — G8536 NO DOC ELDER MAL SCRN: HCPCS | Performed by: INTERNAL MEDICINE

## 2021-11-22 PROCEDURE — G8510 SCR DEP NEG, NO PLAN REQD: HCPCS | Performed by: INTERNAL MEDICINE

## 2021-11-22 NOTE — PROGRESS NOTES
1. Have you been to the ER, urgent care clinic since your last visit? Hospitalized since your last visit? No    2. Have you seen or consulted any other health care providers outside of the [address] [place] [address] since your last visit? Include any pap smears or colon screening.  No           Chief Complaint   Patient presents with    Coronary Artery Disease     C/O  SOB,Ankle and leg swelling

## 2021-11-22 NOTE — PROGRESS NOTES
11/22/2021 9:58 AM      Subjective:     Wai Paz Sr is seen for f/u visit. Main complaint is knee pain. Following up with ortho. He denies chest pain, chest pressure/discomfort, dyspnea, palpitations, irregular heart beats, near-syncope, syncope, fatigue, orthopnea, paroxysmal nocturnal dyspnea, exertional chest pressure/discomfort, claudication, lower extremity edema, tachypnea. Visit Vitals  /76 (BP 1 Location: Left upper arm, BP Patient Position: Sitting, BP Cuff Size: Large adult)   Pulse 71   Resp 18   Ht 6' 1\" (1.854 m)   Wt 238 lb 3.2 oz (108 kg)   SpO2 94%   BMI 31.43 kg/m²     Current Outpatient Medications   Medication Sig    acetaminophen (TYLENOL EXTRA STRENGTH PO) Take  by mouth as needed.  albuterol (Ventolin HFA) 90 mcg/actuation inhaler Take 2 Puffs by inhalation every four (4) hours as needed for Wheezing.  omeprazole (PRILOSEC) 20 mg capsule Take 1 Capsule by mouth daily.  isosorbide mononitrate ER (IMDUR) 60 mg CR tablet TAKE 1 TABLET EVERY DAY    levothyroxine (SYNTHROID) 50 mcg tablet Take 1 Tablet by mouth Daily (before breakfast).  potassium chloride (K-DUR, KLOR-CON) 20 mEq tablet Take 1 Tablet by mouth daily. Lowered 06/09/21    metoprolol tartrate (LOPRESSOR) 25 mg tablet TAKE 1/2 TABLET TWICE DAILY    traMADoL (ULTRAM) 50 mg tablet Take  by mouth as needed.  coenzyme q10 10 mg cap Take 100 mg by mouth every evening.  Aspirin, Buffered 81 mg tab Take 81 mg by mouth daily.  furosemide (LASIX) 40 mg tablet Take 1 Tablet by mouth daily. (Patient taking differently: Take 20 mg by mouth two (2) times a day.)     No current facility-administered medications for this visit.          Objective:      Visit Vitals  /76 (BP 1 Location: Left upper arm, BP Patient Position: Sitting, BP Cuff Size: Large adult)   Pulse 71   Resp 18   Ht 6' 1\" (1.854 m)   Wt 238 lb 3.2 oz (108 kg)   SpO2 94%   BMI 31.43 kg/m²         Past Medical History: Diagnosis Date    Adverse effect of anesthesia     difficult with waking up     Allergic rhinitis     Arthritis     Asthma     Atrial flutter ([facility] 9/12/2014    CAD (coronary artery disease)     Dr. Severino Aleman    Calculus of kidney     Chest pain 2006    Normal stress echo    Chronic bronchitis     Chronic bronchitis ([facility] [facility] 10/3/2012    Chronic pain     knees    Erectile dysfunction     GERD (gastroesophageal reflux disease)     hiatal hernia    Gout 1/24/2013    Hemoptysis 2008    Work up by Dr. Stan Rosales; Negative    Hiatal hernia     Hypercholesterolemia     Hypertension     Ill-defined condition     bronchitis    Melanoma ([facility]     back    Nausea & vomiting     Pacemaker     Dr. Danis Moncada [facility]) 6/4/2012    S/P ablation of atrial flutter 9/12/2014    Sleep apnea     states never had test for apnea    Stroke [facility]) 2009     tia no residual problems    Thyroid disease     Valvular heart disease       Past Surgical History:   Procedure Laterality Date    HX AFIB ABLATION  2014    Dr. Elizabeth Clark    ruptured appendix    HX HEART CATHETERIZATION      [address]    HX HERNIA REPAIR  12/17/14    Recurrent left inguinal hernia; Dr. Jorge Collins      5 hernia repairs total    HX PACEMAKER  12/2014    HX PACEMAKER PLACEMENT  2014    Dr. Weston Expose    Kidney stone extraction    MD CARDIAC SURG PROCEDURE UNLIST      ablation    MD COLSC FLX W/RMVL OF TUMOR POLYP LESION SNARE TQ  4/21/2011          Allergies   Allergen Reactions    Azithromycin Anaphylaxis    Gadolinium-Containing Contrast Media Other (comments)     1) Moderate to Severe. 2) Post Gadolinium issues as noted:   - Diaphoretic, Near Syncope, Nausea and vomiting.     Acetaminophen Other (comments)     Indigestion,\"burning sensation\"    Contrast Dye [Iodine] Other (comments)     Decrease in BP    Crestor [Rosuvastatin] Myalgia    Levaquin [Levofloxacin] Other (comments)     Joint pain    Medrol [Methylprednisolone] Vertigo    Norvasc [Amlodipine] Other (comments)     Numbness and tingling    Nsaids (Non-Steroidal Anti-Inflammatory Drug) Other (comments)     Cough up blood      Percocet [Oxycodone-Acetaminophen] Itching    Ranexa [Ranolazine] Other (comments)     Cannot remember    Simvastatin Other (comments)     Joint pain    Voltaren [Diclofenac Sodium] Other (comments)     unknown    Zetia [Ezetimibe] Myalgia      Family History   Problem Relation Age of Onset    Stroke Father     Heart Disease Mother       Social History     Socioeconomic History    Marital status:      Spouse name: Not on file    Number of children: Not on file    Years of education: Not on file    Highest education level: Not on file   Occupational History    Not on file   Tobacco Use    Smoking status: Never Smoker    Smokeless tobacco: Never Used   Vaping Use    Vaping Use: Never used   Substance and Sexual Activity    Alcohol use: Yes     Alcohol/week: 3.0 standard drinks     Types: 1 Shots of liquor, 2 Standard drinks or equivalent per week     Comment: rare    Drug use: No     Types: OTC, Prescription    Sexual activity: Not Currently     Partners: Female   Other Topics Concern    Not on file   Social History Narrative    Not on file     Social Determinants of Health     Financial Resource Strain:     Difficulty of Paying Living Expenses: Not on file   Food Insecurity:     Worried About Running Out of Food in the Last Year: Not on file    Ita of Food in the Last Year: Not on file   Transportation Needs:     Lack of Transportation (Medical): Not on file    Lack of Transportation (Non-Medical):  Not on file   Physical Activity:     Days of Exercise per Week: Not on file    Minutes of Exercise per Session: Not on file   Stress:     Feeling of Stress : Not on file   Social Connections:     Frequency of Communication with Friends and Family: Not on file    Frequency of Social Gatherings with Friends and Family: Not on file    Attends Buddhism Services: Not on file    Active Member of Clubs or Organizations: Not on file    Attends Club or Organization Meetings: Not on file    Marital Status: Not on file   Intimate Partner Violence:     Fear of Current or Ex-Partner: Not on file    Emotionally Abused: Not on file    Physically Abused: Not on file    Sexually Abused: Not on file   Housing Stability:     Unable to Pay for Housing in the Last Year: Not on file    Number of [place] in the Last Year: Not on file    Unstable Housing in the Last Year: Not on file       Assessment:       ICD-10-CM ICD-9-CM    1. Coronary artery disease involving native coronary artery of native heart without angina pectoris  I25.10 414.01 AMB POC EKG ROUTINE W/ 12 LEADS, INTER & REP       Plan:     1. No significant CAD on recent cath in 7/2020. On ASA, Imdur, BB.   2. s/p post bioprosthetic AVR in 3/2016 functioning well on last echocardiogram normal ejection fraction in 2/2019.  3. Sick sinus syndrome with a flutter ablation, s/p PPM.  device followed by EP  4. Hyperlipidemia: 12/2020  Intolerant to statin and zetia. Ariane Garcia was cost prohibitive.   5. Hypertension: Controlled continue current therapy

## 2021-12-16 ENCOUNTER — OFFICE VISIT (OUTPATIENT)
Dept: CARDIOLOGY CLINIC | Age: 86
End: 2021-12-16
Payer: MEDICARE

## 2021-12-16 DIAGNOSIS — Z95.0 CARDIAC PACEMAKER IN SITU: Primary | ICD-10-CM

## 2021-12-16 DIAGNOSIS — I48.92 ATRIAL FLUTTER, UNSPECIFIED TYPE (HCC): ICD-10-CM

## 2021-12-16 DIAGNOSIS — I49.5 SSS (SICK SINUS SYNDROME) (HCC): ICD-10-CM

## 2021-12-16 PROCEDURE — 93296 REM INTERROG EVL PM/IDS: CPT | Performed by: INTERNAL MEDICINE

## 2021-12-16 PROCEDURE — 93294 REM INTERROG EVL PM/LDLS PM: CPT | Performed by: INTERNAL MEDICINE

## 2022-01-31 RX ORDER — LEVOTHYROXINE SODIUM 50 UG/1
TABLET ORAL
Qty: 90 TABLET | Refills: 0 | Status: SHIPPED | OUTPATIENT
Start: 2022-01-31 | End: 2022-05-01

## 2022-02-02 ENCOUNTER — OFFICE VISIT (OUTPATIENT)
Dept: INTERNAL MEDICINE CLINIC | Age: 87
End: 2022-02-02

## 2022-02-02 VITALS
RESPIRATION RATE: 16 BRPM | WEIGHT: 231 LBS | DIASTOLIC BLOOD PRESSURE: 59 MMHG | TEMPERATURE: 97.2 F | OXYGEN SATURATION: 95 % | SYSTOLIC BLOOD PRESSURE: 97 MMHG | BODY MASS INDEX: 30.62 KG/M2 | HEART RATE: 75 BPM | HEIGHT: 73 IN

## 2022-02-02 DIAGNOSIS — Z00.00 MEDICARE ANNUAL WELLNESS VISIT, SUBSEQUENT: ICD-10-CM

## 2022-02-02 DIAGNOSIS — C61 PROSTATE CANCER (HCC): ICD-10-CM

## 2022-02-02 DIAGNOSIS — I49.5 SSS (SICK SINUS SYNDROME) (HCC): ICD-10-CM

## 2022-02-02 DIAGNOSIS — R73.9 BORDERLINE HYPERGLYCEMIA: ICD-10-CM

## 2022-02-02 DIAGNOSIS — M10.9 GOUT, UNSPECIFIED CAUSE, UNSPECIFIED CHRONICITY, UNSPECIFIED SITE: ICD-10-CM

## 2022-02-02 DIAGNOSIS — E03.9 ACQUIRED HYPOTHYROIDISM: Primary | ICD-10-CM

## 2022-02-02 DIAGNOSIS — J42 CHRONIC BRONCHITIS, UNSPECIFIED CHRONIC BRONCHITIS TYPE (HCC): ICD-10-CM

## 2022-02-02 DIAGNOSIS — I25.118 CORONARY ARTERY DISEASE OF NATIVE ARTERY OF NATIVE HEART WITH STABLE ANGINA PECTORIS (HCC): ICD-10-CM

## 2022-02-02 LAB
ALBUMIN SERPL-MCNC: 3.7 G/DL (ref 3.5–5)
ALBUMIN/GLOB SERPL: 1 {RATIO} (ref 1.1–2.2)
ALP SERPL-CCNC: 77 U/L (ref 45–117)
ALT SERPL-CCNC: 26 U/L (ref 12–78)
ANION GAP SERPL CALC-SCNC: 3 MMOL/L (ref 5–15)
AST SERPL-CCNC: 20 U/L (ref 15–37)
BASOPHILS # BLD: 0 K/UL (ref 0–0.1)
BASOPHILS NFR BLD: 1 % (ref 0–1)
BILIRUB SERPL-MCNC: 0.5 MG/DL (ref 0.2–1)
BUN SERPL-MCNC: 21 MG/DL (ref 6–20)
BUN/CREAT SERPL: 17 (ref 12–20)
CALCIUM SERPL-MCNC: 9.4 MG/DL (ref 8.5–10.1)
CHLORIDE SERPL-SCNC: 104 MMOL/L (ref 97–108)
CHOLEST SERPL-MCNC: 186 MG/DL
CO2 SERPL-SCNC: 31 MMOL/L (ref 21–32)
CREAT SERPL-MCNC: 1.21 MG/DL (ref 0.7–1.3)
DIFFERENTIAL METHOD BLD: ABNORMAL
EOSINOPHIL # BLD: 0.4 K/UL (ref 0–0.4)
EOSINOPHIL NFR BLD: 6 % (ref 0–7)
ERYTHROCYTE [DISTWIDTH] IN BLOOD BY AUTOMATED COUNT: 12.9 % (ref 11.5–14.5)
EST. AVERAGE GLUCOSE BLD GHB EST-MCNC: 131 MG/DL
GLOBULIN SER CALC-MCNC: 3.6 G/DL (ref 2–4)
GLUCOSE SERPL-MCNC: 109 MG/DL (ref 65–100)
HBA1C MFR BLD: 6.2 % (ref 4–5.6)
HCT VFR BLD AUTO: 45.3 % (ref 36.6–50.3)
HDLC SERPL-MCNC: 47 MG/DL
HDLC SERPL: 4 {RATIO} (ref 0–5)
HGB BLD-MCNC: 14.6 G/DL (ref 12.1–17)
IMM GRANULOCYTES # BLD AUTO: 0 K/UL (ref 0–0.04)
IMM GRANULOCYTES NFR BLD AUTO: 0 % (ref 0–0.5)
LDLC SERPL CALC-MCNC: 117 MG/DL (ref 0–100)
LYMPHOCYTES # BLD: 1.8 K/UL (ref 0.8–3.5)
LYMPHOCYTES NFR BLD: 29 % (ref 12–49)
MCH RBC QN AUTO: 32.3 PG (ref 26–34)
MCHC RBC AUTO-ENTMCNC: 32.2 G/DL (ref 30–36.5)
MCV RBC AUTO: 100.2 FL (ref 80–99)
MONOCYTES # BLD: 0.5 K/UL (ref 0–1)
MONOCYTES NFR BLD: 9 % (ref 5–13)
NEUTS SEG # BLD: 3.5 K/UL (ref 1.8–8)
NEUTS SEG NFR BLD: 55 % (ref 32–75)
NRBC # BLD: 0 K/UL (ref 0–0.01)
NRBC BLD-RTO: 0 PER 100 WBC
PLATELET # BLD AUTO: 217 K/UL (ref 150–400)
PMV BLD AUTO: 9.3 FL (ref 8.9–12.9)
POTASSIUM SERPL-SCNC: 4 MMOL/L (ref 3.5–5.1)
PROT SERPL-MCNC: 7.3 G/DL (ref 6.4–8.2)
RBC # BLD AUTO: 4.52 M/UL (ref 4.1–5.7)
SODIUM SERPL-SCNC: 138 MMOL/L (ref 136–145)
T4 FREE SERPL-MCNC: 1 NG/DL (ref 0.8–1.5)
TRIGL SERPL-MCNC: 110 MG/DL (ref ?–150)
TSH SERPL DL<=0.05 MIU/L-ACNC: 2.4 UIU/ML (ref 0.36–3.74)
URATE SERPL-MCNC: 6.8 MG/DL (ref 3.5–7.2)
VLDLC SERPL CALC-MCNC: 22 MG/DL
WBC # BLD AUTO: 6.3 K/UL (ref 4.1–11.1)

## 2022-02-02 PROCEDURE — 1101F PT FALLS ASSESS-DOCD LE1/YR: CPT | Performed by: INTERNAL MEDICINE

## 2022-02-02 PROCEDURE — G8510 SCR DEP NEG, NO PLAN REQD: HCPCS | Performed by: INTERNAL MEDICINE

## 2022-02-02 PROCEDURE — G8417 CALC BMI ABV UP PARAM F/U: HCPCS | Performed by: INTERNAL MEDICINE

## 2022-02-02 PROCEDURE — G8427 DOCREV CUR MEDS BY ELIG CLIN: HCPCS | Performed by: INTERNAL MEDICINE

## 2022-02-02 PROCEDURE — G8536 NO DOC ELDER MAL SCRN: HCPCS | Performed by: INTERNAL MEDICINE

## 2022-02-02 PROCEDURE — G0439 PPPS, SUBSEQ VISIT: HCPCS | Performed by: INTERNAL MEDICINE

## 2022-02-02 NOTE — PROGRESS NOTES
This is the Subsequent Medicare Annual Wellness Exam, performed 12 months or more after the Initial AWV or the last Subsequent AWV    I have reviewed the patient's medical history in detail and updated the computerized patient record. Assessment/Plan   Education and counseling provided:  Are appropriate based on today's review and evaluation    1. Acquired hypothyroidism  -     TSH 3RD GENERATION; Future  -     T4, FREE; Future  2. Chronic bronchitis, unspecified chronic bronchitis type ([facility]  3. S[facility] syndrome) [facility]  4. Prostate cancer ([facility]  -     PSA W/ REFLX FREE PSA; Future  5. Coronary artery disease of native artery of native heart with stable angina pectoris ([facility]  -     LIPID PANEL; Future  -     METABOLIC PANEL, COMPREHENSIVE; Future  -     CBC WITH AUTOMATED DIFF; Future  6. Gout, unspecified cause, unspecified chronicity, unspecified site  -     URIC ACID; Future  7. Borderline hyperglycemia  -     HEMOGLOBIN A1C WITH EAG; Future       Depression Risk Factor Screening     3 most recent PHQ Screens 2/2/2022   Little interest or pleasure in doing things Not at all   Feeling down, depressed, irritable, or hopeless Not at all   Total Score PHQ 2 0       Alcohol & Drug Abuse Risk Screen    Do you average more than 1 drink per night or more than 7 drinks a week: No    In the past three months have you have had more than 4 drinks containing alcohol on one occasion: No          Functional Ability and Level of Safety    Hearing: Hearing is good. Activities of Daily Living: The home contains: no safety equipment. Patient does total self care      Ambulation: with difficulty, uses a cane     Fall Risk:  Fall Risk Assessment, last 12 mths 11/22/2021   Able to walk? Yes   Fall in past 12 months? 0   Do you feel unsteady? 0   Are you worried about falling 0   Is the gait abnormal? -   Number of falls in past 12 months -   Fall with injury?  -      Abuse Screen:  Patient is not abused Cognitive Screening    Has your family/caregiver stated any concerns about your memory: no     Cognitive Screening: Normal    Health Maintenance Due     Health Maintenance Due   Topic Date Due    Shingrix Vaccine Age 49> (2 of 2) 12/01/2020    Medicare Yearly Exam  02/02/2022       Patient Care Team   Patient Care Team:  Garland Sosa MD as PCP - Masha Espinosa MD as PCP - Donavan Jesús Jasso Provider  Danella Hatchet, MD (Orthopedic Surgery)  Inessa Mccormick MD (Cardiology)  Yong Herrera MD as Physician (Cardiology)  Mino Aleman MD as Surgeon (General Surgery)  Jody Reyes NP as Nurse Practitioner (Nurse Practitioner)  Dr. Cristiane Smith (Dental General Practice)  BRYAN Bush as Nurse Practitioner (Nurse Practitioner)    History     Patient Active Problem List   Diagnosis Code    Hypercholesterolemia E78.00    Stroke [facility]) I63.9    Asthma J45.909    Calculus of kidney N20.0    Erectile dysfunction N52.9    GERD (gastroesophageal reflux disease) K21.9    Prostate cancer ([address] C61    Chronic bronchitis ([address] J42    Allergic rhinitis J30.9    Gout M10.9    H/O TIA (transient ischemic attack) and stroke Z86.73    Chest pain R07.9    Sinus tachycardia R00.0    Atrial flutter ([address] I48.92    S/P ablation of atrial flutter Z98.890, Z86.79    Aortic stenosis I35.0    SOB (shortness of breath) R06.02    SSS (sick sinus syndrome) ([address] I49.5    MARYLOU (obstructive sleep apnea) G47.33    Pacemaker Z95.0    ASHD (arteriosclerotic heart disease) I25.10    S/P AVR (aortic valve replacement) Z95.2    Irregular heartbeat I49.9    Coronary artery disease with stable angina pectoris ([address] I25.118    NSVT (nonsustained ventricular tachycardia) ([facility]) I47.2    Statin intolerance Z78.9     Past Medical History:   Diagnosis Date    Adverse effect of anesthesia     difficult with waking up     Allergic rhinitis     Arthritis     Asthma     Atrial flutter ([address] 9/12/2014    CAD (coronary artery disease)     Dr. Taylor Ferreira Calculus of kidney     Chest pain 2006    Normal stress echo    Chronic bronchitis     Chronic bronchitis ([address] 10/3/2012    Chronic pain     knees    Erectile dysfunction     GERD (gastroesophageal reflux disease)     hiatal hernia    Gout 1/24/2013    Hemoptysis 2008    Work up by Dr. Yong Alvarez; Negative    Hiatal hernia     Hypercholesterolemia     Hypertension     Ill-defined condition     bronchitis    Melanoma ([address]     back    Nausea & vomiting     Pacemaker     Dr. Diann Chavez [facility]) 6/4/2012    S/P ablation of atrial flutter 9/12/2014    Sleep apnea     states never had test for apnea    Stroke [facility]) 2009     tia no residual problems    Thyroid disease     Valvular heart disease       Past Surgical History:   Procedure Laterality Date    HX AFIB ABLATION  2014    Dr. Fabrizio Jara    ruptured appendix    HX HEART CATHETERIZATION      [address]    HX HERNIA REPAIR  12/17/14    Recurrent left inguinal hernia; Dr. Brittany Melendez      5 hernia repairs total    HX PACEMAKER  12/2014    HX PACEMAKER PLACEMENT  2014    Dr. Jose Cruz Tay    Kidney stone extraction    [id number]      ablation    [place] COLSC FLX W/RMVL OF TUMOR POLYP LESION SNARE TQ  4/21/2011          Current Outpatient Medications   Medication Sig Dispense Refill    Euthyrox 50 mcg tablet TAKE 1 TABLET BY MOUTH ONCE DAILY BEFORE BREAKFAST 90 Tablet 0    acetaminophen (TYLENOL EXTRA STRENGTH PO) Take  by mouth as needed.  albuterol (Ventolin HFA) 90 mcg/actuation inhaler Take 2 Puffs by inhalation every four (4) hours as needed for Wheezing. 3 Each 5    omeprazole (PRILOSEC) 20 mg capsule Take 1 Capsule by mouth daily.  90 Capsule 3    isosorbide mononitrate ER (IMDUR) 60 mg CR tablet TAKE 1 TABLET EVERY DAY 90 Tablet 3    furosemide (LASIX) 40 mg tablet Take 1 Tablet by mouth daily. (Patient taking differently: Take 20 mg by mouth two (2) times a day.) 180 Tablet 1    potassium chloride (K-DUR, KLOR-CON) 20 mEq tablet Take 1 Tablet by mouth daily. Lowered 06/09/21 180 Tablet 3    metoprolol tartrate (LOPRESSOR) 25 mg tablet TAKE 1/2 TABLET TWICE DAILY 90 Tablet 3    traMADoL (ULTRAM) 50 mg tablet Take  by mouth as needed.  coenzyme q10 10 mg cap Take 100 mg by mouth every evening.  Aspirin, Buffered 81 mg tab Take 81 mg by mouth daily. Allergies   Allergen Reactions    Azithromycin Anaphylaxis    Gadolinium-Containing Contrast Media Other (comments)     1) Moderate to Severe. 2) Post Gadolinium issues as noted:   - Diaphoretic, Near Syncope, Nausea and vomiting.  Acetaminophen Other (comments)     Indigestion,\"burning sensation\"    Contrast Dye [Iodine] Other (comments)     Decrease in BP    Crestor [Rosuvastatin] Myalgia    Levaquin [Levofloxacin] Other (comments)     Joint pain    Medrol [Methylprednisolone] Vertigo    Norvasc [Amlodipine] Other (comments)     Numbness and tingling    Nsaids (Non-Steroidal Anti-Inflammatory Drug) Other (comments)     Cough up blood      Percocet [Oxycodone-Acetaminophen] Itching    Ranexa [Ranolazine] Other (comments)     Cannot remember    Simvastatin Other (comments)     Joint pain    Voltaren [Diclofenac Sodium] Other (comments)     unknown    Zetia [Ezetimibe] Myalgia       Family History   Problem Relation Age of Onset    Stroke Father     Heart Disease Mother      Social History     Tobacco Use    Smoking status: Never Smoker    Smokeless tobacco: Never Used   Substance Use Topics    Alcohol use:  Yes     Alcohol/week: 3.0 standard drinks     Types: 1 Shots of liquor, 2 Standard drinks or equivalent per week     Comment: jennie Jefferson MD

## 2022-02-02 NOTE — PATIENT INSTRUCTIONS
Medicare Wellness Visit, Male    The best way to live healthy is to have a lifestyle where you eat a well-balanced diet, exercise regularly, limit alcohol use, and quit all forms of tobacco/nicotine, if applicable. Regular preventive services are another way to keep healthy. Preventive services (vaccines, screening tests, monitoring & exams) can help personalize your care plan, which helps you manage your own care. Screening tests can find health problems at the earliest stages, when they are easiest to treat. [facility] follows the current, evidence-based guidelines published by the [facility] ([facility]) when recommending preventive services for our patients. Because we follow these guidelines, sometimes recommendations change over time as research supports it. (For example, a prostate screening blood test is no longer routinely recommended for men with no symptoms). Of course, you and your doctor may decide to screen more often for some diseases, based on your risk and co-morbidities (chronic disease you are already diagnosed with). Preventive services for you include:  - Medicare offers their members a free annual wellness visit, which is time for you and your primary care provider to discuss and plan for your preventive service needs. Take advantage of this benefit every year!  -All adults over age 72 should receive the recommended pneumonia vaccines. Current USPSTF guidelines recommend a series of two vaccines for the best pneumonia protection.   -All adults should have a flu vaccine yearly and tetanus vaccine every 10 years.  -All adults age 48 and older should receive the shingles vaccines (series of two vaccines).        -All adults age 38-68 who are overweight should have a diabetes screening test once every three years.   -Other screening tests & preventive services for persons with diabetes include: an eye exam to screen for diabetic retinopathy, a kidney function test, a foot exam, and stricter control over your cholesterol.   -Cardiovascular screening for adults with routine risk involves an electrocardiogram (ECG) at intervals determined by the provider.   -Colorectal cancer screening should be done for adults age 54-65 with no increased risk factors for colorectal cancer. There are a number of acceptable methods of screening for this type of cancer. Each test has its own benefits and drawbacks. Discuss with your provider what is most appropriate for you during your annual wellness visit. The different tests include: colonoscopy (considered the best screening method), a fecal occult blood test, a fecal DNA test, and sigmoidoscopy.  -All adults born between [facility] should be screened once for Hepatitis C.  -An Abdominal Aortic Aneurysm (AAA) Screening is recommended for men age 73-68 who has ever smoked in their lifetime.      Here is a list of your current Health Maintenance items (your personalized list of preventive services) with a due date:  Health Maintenance Due   Topic Date Due    Shingles Vaccine (2 of 2) 12/01/2020

## 2022-02-02 NOTE — PROGRESS NOTES
SUBJECTIVE  Mr. Howie De La Torre is a 81 y.o. male patient of mine who presents today for routine follow up visit. Chief Complaint   Patient presents with    Follow-up     6 month       Chronic bronchitis has been doing well: \"I haven't had that for years. \" He has seen Dr. Jarocho Valencia. Uses albuterol prn. Doing better. He has undergone valve replacement in March 2016 with Dr. Rhea Gant. It is recalled from early 2016 that he saw Dr. Nydia Moreno, for exertional dyspnea, and had cardiac cath. Knee pain: \"Doing pretty good right now. \"   Sees Dr. Nick Caballero, and has had corticosteroid injections. Surgery on hold due to heart issues. He has prostate cancer, but doesn't want to treat this. He has had biopsy proven adenocarcinoma of prostate, by Dr. Hussain Lacy with [facility]. As we noted before: At this time \"I aint doin' nothin'. If the PSA gets high, then I'll take the shots. \"  From our records, it appears he was referred to [facility] for consideration of radiation therapy. He refused this. Past Medical History: Hyperlipidemia, chronic bronchitis, allergic rhinitis, urolithiasis, hiatal hernia with GERD, ED, Asthma, prostate cancer 2012 (Dr. Hussain Lacy). Normal stress echo in 2006; Echo 2010 showed LVH, EF 55-60%. Hemoptysis with negative workup in 2008saw Dr. Ilir Nash. EGD in 2009 Dr. Catherine Mahoney showing Hiatal hernia. R lid chronic ptosis noted in 2009 by Dr. Hemant Mancuso, optometrist.  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 Had exertional chest pain in 2012, recurred in 2014--cardiac work up with Dr. Nydia Moreno. His ophthalmologist is Dr. Dagoberto Ty. Past Surgical History: Hernia in 86. Kidney stone 85. Appy (ruptured) in 79. Resection of melanoma. Colon polypectomy 2011 Dr. Catherine Mahoney. A flutter ablation 2014 Dr. Marcos Waters 2014 Dr. Clemons  repair 2014 Dr. Brigid Payan.   Aortic valve replacement 2016. Cataracts 2016. Allergies: IVP dye (BP drop). Medications:   Current Outpatient Medications on File Prior to Visit   Medication Sig Dispense Refill    Euthyrox 50 mcg tablet TAKE 1 TABLET BY MOUTH ONCE DAILY BEFORE BREAKFAST 90 Tablet 0    acetaminophen (TYLENOL EXTRA STRENGTH PO) Take  by mouth as needed.  albuterol (Ventolin HFA) 90 mcg/actuation inhaler Take 2 Puffs by inhalation every four (4) hours as needed for Wheezing. 3 Each 5    omeprazole (PRILOSEC) 20 mg capsule Take 1 Capsule by mouth daily. 90 Capsule 3    isosorbide mononitrate ER (IMDUR) 60 mg CR tablet TAKE 1 TABLET EVERY DAY 90 Tablet 3    furosemide (LASIX) 40 mg tablet Take 1 Tablet by mouth daily. (Patient taking differently: Take 20 mg by mouth two (2) times a day.) 180 Tablet 1    potassium chloride (K-DUR, KLOR-CON) 20 mEq tablet Take 1 Tablet by mouth daily. Lowered 06/09/21 180 Tablet 3    metoprolol tartrate (LOPRESSOR) 25 mg tablet TAKE 1/2 TABLET TWICE DAILY 90 Tablet 3    traMADoL (ULTRAM) 50 mg tablet Take  by mouth as needed.  coenzyme q10 10 mg cap Take 100 mg by mouth every evening.  Aspirin, Buffered 81 mg tab Take 81 mg by mouth daily. No current facility-administered medications on file prior to visit. 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 No tobacco, or drugs. Family History: F CVAs. Brother valve disease. Review of systems: Unremarkable except as noted above. Objective:    Visit Vitals  BP (!) 97/59   Pulse 75   Temp 97.2 °F (36.2 °C) (Temporal)   Resp 16   Ht 6' 1\" (1.854 m)   Wt 231 lb (104.8 kg)   SpO2 95%   BMI 30.48 kg/m²     Gen: Pleasant 81 y.o.  male in NAD.   HEENT: PERRLA. EOMI. OP moist and pink.  Neck: Supple.  No LAD.  HEART: RRR, No M/G/R.   LUNGS: CTAB No W/R.   ABDOMEN: S, NT, ND, BS+.   EXTREMITIES: Warm. No C/C/E. MUSCULOSKELETAL: Normal ROM, muscle strength 5/5 all groups.  NEURO: Alert and oriented x 3.  Cranial nerves grossly intact.  No focal sensory or motor deficits noted. SKIN: Warm. Dry. No rashes or other lesions noted. Lab Results   Component Value Date/Time    Cholesterol, total 187 05/04/2021 07:52 AM    HDL Cholesterol 38 (L) 05/04/2021 07:52 AM    LDL, calculated 124 (H) 05/04/2021 07:52 AM    LDL, calculated 126 (H) 06/02/2020 08:39 AM    VLDL, calculated 25 05/04/2021 07:52 AM    VLDL, calculated 27 06/02/2020 08:39 AM    Triglyceride 141 05/04/2021 07:52 AM    CHOL/HDL Ratio 3.6 09/17/2010 08:27 AM     Lab Results   Component Value Date/Time    Hemoglobin A1c 6.1 (H) 12/29/2020 08:03 AM       Assessment / Plan:   1. CAD: No CP. As per his cardiologist, Dr. Jaren Arce  2. A flutter, SSS: now with pacemaker. As per cardiology. 3. Aortic stenosis: s/p AVR. Doing well. 4. Asthma: Stable. Uses albuterol prn; Less than daily currently. 5. Prostate Cancer: So far, doing okay with surveillance. Patient reaffirms desire for conservative approach today. Surveillance for now. Pt willing to do hormonal treatments if PSA rises to higher levels. His urologist has suggested a PSA of 20 or above as a triggering value  6. Problem drinking: He has stopped drinking during [address]. 7. Hyperlipidemia:  Not at goal, but options limited. Did not tolerate simvastatin or zetia. Now on fish oil. Will recheck lipids and CMP. Continue pulse dosing of Crestor. 8. GERD:  Controlled on prn prilosec; Pt. may continue this. 9. ED: Not discussed. 10. All rhinitis: Stable. 11. Possible TIA/Diplopia/Amaurosis fugax: No recent episode. Work up as above. 12. Gout: no recent flares. 13. Borderline DM. Recheck labs. 14. Obesity: Watch diet. More exercise as able (limited now by postsurgical recovery). 15. Hx of Noncompliance. Follow up in 6 months.

## 2022-02-02 NOTE — PROGRESS NOTES
Roger Crouch is a 81 y.o. male  Chief Complaint   Patient presents with    Follow-up     6 month     Health Maintenance Due   Topic Date Due    Shingrix Vaccine Age 49> (2 of 2) 12/01/2020    COVID-19 Vaccine (3 - Booster for Moderna series) 08/02/2021    Flu Vaccine (1) 09/01/2021    Medicare Yearly Exam  02/02/2022     Visit Vitals  BP (!) 97/59   Pulse 75   Temp 97.2 °F (36.2 °C) (Temporal)   Resp 16   Ht 6' 1\" (1.854 m)   Wt 231 lb (104.8 kg)   SpO2 95%   BMI 30.48 kg/m²

## 2022-02-03 LAB
% FREE PSA, 480797: 15 %
PSA SERPL-MCNC: 6 NG/ML (ref 0–4)
PSA, FREE, 480853: 0.9 NG/ML
REFLEX CRITERIA: ABNORMAL

## 2022-02-21 RX ORDER — LEVOTHYROXINE SODIUM 50 UG/1
50 TABLET ORAL
Qty: 90 TABLET | Refills: 1 | Status: SHIPPED | OUTPATIENT
Start: 2022-02-21 | End: 2022-06-09

## 2022-03-17 ENCOUNTER — OFFICE VISIT (OUTPATIENT)
Dept: CARDIOLOGY CLINIC | Age: 87
End: 2022-03-17
Payer: MEDICARE

## 2022-03-17 DIAGNOSIS — I44.1 MOBITZ II: ICD-10-CM

## 2022-03-17 DIAGNOSIS — I49.5 SSS (SICK SINUS SYNDROME) (HCC): ICD-10-CM

## 2022-03-17 DIAGNOSIS — Z95.0 CARDIAC PACEMAKER IN SITU: Primary | ICD-10-CM

## 2022-03-17 PROCEDURE — 93294 REM INTERROG EVL PM/LDLS PM: CPT | Performed by: INTERNAL MEDICINE

## 2022-03-17 PROCEDURE — 93296 REM INTERROG EVL PM/IDS: CPT | Performed by: INTERNAL MEDICINE

## 2022-03-19 PROBLEM — Z78.9 STATIN INTOLERANCE: Status: ACTIVE | Noted: 2020-06-25

## 2022-03-19 PROBLEM — I47.29 NSVT (NONSUSTAINED VENTRICULAR TACHYCARDIA) (HCC): Status: ACTIVE | Noted: 2020-06-25

## 2022-03-20 PROBLEM — I25.118 CORONARY ARTERY DISEASE WITH STABLE ANGINA PECTORIS (HCC): Status: ACTIVE | Noted: 2019-10-15

## 2022-05-13 ENCOUNTER — TELEPHONE (OUTPATIENT)
Dept: INTERNAL MEDICINE CLINIC | Age: 87
End: 2022-05-13

## 2022-05-13 NOTE — TELEPHONE ENCOUNTER
Contacted Patient and using 2 identifiers (name and ) advised to take plain Mucinex and Delsom cough syrup OTC for the cough. Patient stated he has been taking Mucinex for the last two days and feels so much better. Patient would like to cancel his appointment on 22. Will contact  to cancel. Patient thanked Nurse for calling and had no further answers at this time.

## 2022-06-09 ENCOUNTER — OFFICE VISIT (OUTPATIENT)
Dept: INTERNAL MEDICINE CLINIC | Age: 87
End: 2022-06-09
Payer: MEDICARE

## 2022-06-09 VITALS
HEART RATE: 86 BPM | SYSTOLIC BLOOD PRESSURE: 105 MMHG | BODY MASS INDEX: 30.48 KG/M2 | DIASTOLIC BLOOD PRESSURE: 69 MMHG | HEIGHT: 73 IN | RESPIRATION RATE: 20 BRPM | TEMPERATURE: 97.6 F | WEIGHT: 230 LBS | OXYGEN SATURATION: 98 %

## 2022-06-09 DIAGNOSIS — C61 PROSTATE CANCER (HCC): ICD-10-CM

## 2022-06-09 DIAGNOSIS — R73.9 BORDERLINE HYPERGLYCEMIA: ICD-10-CM

## 2022-06-09 DIAGNOSIS — I25.118 CORONARY ARTERY DISEASE OF NATIVE ARTERY OF NATIVE HEART WITH STABLE ANGINA PECTORIS (HCC): ICD-10-CM

## 2022-06-09 DIAGNOSIS — M10.9 GOUT, UNSPECIFIED CAUSE, UNSPECIFIED CHRONICITY, UNSPECIFIED SITE: ICD-10-CM

## 2022-06-09 DIAGNOSIS — N18.31 STAGE 3A CHRONIC KIDNEY DISEASE (HCC): ICD-10-CM

## 2022-06-09 DIAGNOSIS — E03.9 ACQUIRED HYPOTHYROIDISM: Primary | ICD-10-CM

## 2022-06-09 DIAGNOSIS — E78.00 HYPERCHOLESTEROLEMIA: ICD-10-CM

## 2022-06-09 LAB
ALBUMIN SERPL-MCNC: 3.5 G/DL (ref 3.5–5)
ALBUMIN/GLOB SERPL: 0.9 {RATIO} (ref 1.1–2.2)
ALP SERPL-CCNC: 70 U/L (ref 45–117)
ALT SERPL-CCNC: 27 U/L (ref 12–78)
ANION GAP SERPL CALC-SCNC: 3 MMOL/L (ref 5–15)
AST SERPL-CCNC: 20 U/L (ref 15–37)
BASOPHILS # BLD: 0 K/UL (ref 0–0.1)
BASOPHILS NFR BLD: 0 % (ref 0–1)
BILIRUB SERPL-MCNC: 0.5 MG/DL (ref 0.2–1)
BUN SERPL-MCNC: 17 MG/DL (ref 6–20)
BUN/CREAT SERPL: 17 (ref 12–20)
CALCIUM SERPL-MCNC: 9.3 MG/DL (ref 8.5–10.1)
CHLORIDE SERPL-SCNC: 106 MMOL/L (ref 97–108)
CHOLEST SERPL-MCNC: 201 MG/DL
CO2 SERPL-SCNC: 30 MMOL/L (ref 21–32)
CREAT SERPL-MCNC: 1.03 MG/DL (ref 0.7–1.3)
DIFFERENTIAL METHOD BLD: ABNORMAL
EOSINOPHIL # BLD: 0.2 K/UL (ref 0–0.4)
EOSINOPHIL NFR BLD: 3 % (ref 0–7)
ERYTHROCYTE [DISTWIDTH] IN BLOOD BY AUTOMATED COUNT: 12.6 % (ref 11.5–14.5)
EST. AVERAGE GLUCOSE BLD GHB EST-MCNC: 134 MG/DL
GLOBULIN SER CALC-MCNC: 3.7 G/DL (ref 2–4)
GLUCOSE SERPL-MCNC: 118 MG/DL (ref 65–100)
HBA1C MFR BLD: 6.3 % (ref 4–5.6)
HCT VFR BLD AUTO: 45.2 % (ref 36.6–50.3)
HDLC SERPL-MCNC: 49 MG/DL
HDLC SERPL: 4.1 {RATIO} (ref 0–5)
HGB BLD-MCNC: 13.9 G/DL (ref 12.1–17)
IMM GRANULOCYTES # BLD AUTO: 0 K/UL (ref 0–0.04)
IMM GRANULOCYTES NFR BLD AUTO: 0 % (ref 0–0.5)
LDLC SERPL CALC-MCNC: 127.8 MG/DL (ref 0–100)
LYMPHOCYTES # BLD: 1.8 K/UL (ref 0.8–3.5)
LYMPHOCYTES NFR BLD: 29 % (ref 12–49)
MCH RBC QN AUTO: 31.6 PG (ref 26–34)
MCHC RBC AUTO-ENTMCNC: 30.8 G/DL (ref 30–36.5)
MCV RBC AUTO: 102.7 FL (ref 80–99)
MONOCYTES # BLD: 0.6 K/UL (ref 0–1)
MONOCYTES NFR BLD: 9 % (ref 5–13)
NEUTS SEG # BLD: 3.6 K/UL (ref 1.8–8)
NEUTS SEG NFR BLD: 59 % (ref 32–75)
NRBC # BLD: 0 K/UL (ref 0–0.01)
NRBC BLD-RTO: 0 PER 100 WBC
PLATELET # BLD AUTO: 217 K/UL (ref 150–400)
PMV BLD AUTO: 8.8 FL (ref 8.9–12.9)
POTASSIUM SERPL-SCNC: 4.6 MMOL/L (ref 3.5–5.1)
PROT SERPL-MCNC: 7.2 G/DL (ref 6.4–8.2)
RBC # BLD AUTO: 4.4 M/UL (ref 4.1–5.7)
SODIUM SERPL-SCNC: 139 MMOL/L (ref 136–145)
T4 FREE SERPL-MCNC: 1.1 NG/DL (ref 0.8–1.5)
TRIGL SERPL-MCNC: 121 MG/DL (ref ?–150)
TSH SERPL DL<=0.05 MIU/L-ACNC: 1.05 UIU/ML (ref 0.36–3.74)
URATE SERPL-MCNC: 5.5 MG/DL (ref 3.5–7.2)
VLDLC SERPL CALC-MCNC: 24.2 MG/DL
WBC # BLD AUTO: 6.3 K/UL (ref 4.1–11.1)

## 2022-06-09 PROCEDURE — G8510 SCR DEP NEG, NO PLAN REQD: HCPCS | Performed by: INTERNAL MEDICINE

## 2022-06-09 PROCEDURE — G8417 CALC BMI ABV UP PARAM F/U: HCPCS | Performed by: INTERNAL MEDICINE

## 2022-06-09 PROCEDURE — 99214 OFFICE O/P EST MOD 30 MIN: CPT | Performed by: INTERNAL MEDICINE

## 2022-06-09 PROCEDURE — G8427 DOCREV CUR MEDS BY ELIG CLIN: HCPCS | Performed by: INTERNAL MEDICINE

## 2022-06-09 PROCEDURE — 1101F PT FALLS ASSESS-DOCD LE1/YR: CPT | Performed by: INTERNAL MEDICINE

## 2022-06-09 PROCEDURE — G8536 NO DOC ELDER MAL SCRN: HCPCS | Performed by: INTERNAL MEDICINE

## 2022-06-09 RX ORDER — ALBUTEROL SULFATE 90 UG/1
2 AEROSOL, METERED RESPIRATORY (INHALATION)
Qty: 2 EACH | Refills: 5 | Status: SHIPPED | OUTPATIENT
Start: 2022-06-09

## 2022-06-09 NOTE — PROGRESS NOTES
.1. Have you been to the ER, urgent care clinic since your last visit? Hospitalized since your last visit? No    2. Have you seen or consulted any other health care providers outside of the [address] [place] [address] since your last visit? Include any pap smears or colon screening.  No     Saw derm 6 mos ago     Em lpn

## 2022-06-09 NOTE — PROGRESS NOTES
SUBJECTIVE  Mr. Levon Whitmore is a 81 y.o. male patient of mine who presents today for routine follow up visit. Chief Complaint   Patient presents with    Follow-up    Skin Problem     area to upper back , red and new in the past 6 mos       He has a new complaint of a red spot on his R mid back. Chronic bronchitis has been doing well: \"I haven't had that for years. \" He has seen Dr. Paresh Vargas. Uses albuterol prn. Doing better. He has undergone valve replacement in March 2016 with Dr. Ben Wills. It is recalled from early 2016 that he saw Dr. Gerard Lundborg, for exertional dyspnea, and had cardiac cath. Knee pain: \"Doing pretty good right now. \"   Sees Dr. Bae, and has had corticosteroid injections. Surgery on hold due to heart issues. He has prostate cancer, but doesn't want to treat this. He has had biopsy proven adenocarcinoma of prostate, by Dr. Maitlde Bashir with [facility]. As we noted before: At this time \"I aint doin' nothin'. If the PSA gets high, then I'll take the shots. \"  From our records, it appears he was referred to [facility] for consideration of radiation therapy. He refused this. Past Medical History: Hyperlipidemia, chronic bronchitis, allergic rhinitis, urolithiasis, hiatal hernia with GERD, ED, Asthma, prostate cancer 2012 (Dr. Matilde Bashir). Normal stress echo in 2006; Echo 2010 showed LVH, EF 55-60%. Hemoptysis with negative workup in 2008--saw Dr. Keysha Triplett. EGD in 2009 Dr. Jackie Carter showing Hiatal hernia. R lid chronic ptosis noted in 2009 by Dr. Ruth Leigh, optometrist.  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 Had exertional chest pain in 2012, recurred in 2014--cardiac work up with Dr. Gerard Lundborg. His ophthalmologist is Dr. Tom Null. Past Surgical History: Hernia in 86. Kidney stone 85. Appy (ruptured) in 79. Resection of melanoma. Colon polypectomy 2011 Dr. Jackie Carter.    A flutter ablation 2014 Dr. Cruz Flight 2014 Dr. Loera Dowse repair 2014 Dr. Aileen Mcgee. Aortic valve replacement 2016. Cataracts 2016. Allergies: IVP dye (BP drop). Medications:   Current Outpatient Medications on File Prior to Visit   Medication Sig Dispense Refill    levothyroxine (Euthyrox) 50 mcg tablet Take 1 Tablet by mouth Daily (before breakfast). 90 Tablet 3    acetaminophen (TYLENOL EXTRA STRENGTH PO) Take  by mouth as needed.  albuterol (Ventolin HFA) 90 mcg/actuation inhaler Take 2 Puffs by inhalation every four (4) hours as needed for Wheezing. 3 Each 5    omeprazole (PRILOSEC) 20 mg capsule Take 1 Capsule by mouth daily. 90 Capsule 3    isosorbide mononitrate ER (IMDUR) 60 mg CR tablet TAKE 1 TABLET EVERY DAY 90 Tablet 3    furosemide (LASIX) 40 mg tablet Take 1 Tablet by mouth daily. (Patient taking differently: Take 20 mg by mouth two (2) times a day.) 180 Tablet 1    potassium chloride (K-DUR, KLOR-CON) 20 mEq tablet Take 1 Tablet by mouth daily. Lowered 06/09/21 180 Tablet 3    metoprolol tartrate (LOPRESSOR) 25 mg tablet TAKE 1/2 TABLET TWICE DAILY 90 Tablet 3    coenzyme q10 10 mg cap Take 100 mg by mouth every evening.  Aspirin, Buffered 81 mg tab Take 81 mg by mouth daily. No current facility-administered medications on file prior to visit. 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 No tobacco, or drugs. Family History: F CVAs. Brother valve disease. Review of systems: Unremarkable except as noted above. Objective:    Visit Vitals  /69 (BP 1 Location: Left arm, BP Patient Position: Sitting, BP Cuff Size: Adult)   Pulse 86   Temp 97.6 °F (36.4 °C) (Temporal)   Resp 20   Ht 6' 1\" (1.854 m)   Wt 230 lb (104.3 kg)   SpO2 98%   BMI 30.34 kg/m²     Gen: Pleasant 81 y.o.  male in NAD.   HEENT: PERRLA. EOMI.  OP moist and pink.  Neck: Supple.  No LAD.  HEART: RRR, No M/G/R.   LUNGS: CTAB No W/R.   ABDOMEN: S, NT, ND, BS+.   EXTREMITIES: Warm. No C/C/E. MUSCULOSKELETAL: Normal ROM, muscle strength 5/5 all groups. NEURO: Alert and oriented x 3.  Cranial nerves grossly intact.  No focal sensory or motor deficits noted. SKIN: Warm. Dry. The \"red spot\" turns out to be an embedded tick. Lab Results   Component Value Date/Time    Cholesterol, total 186 02/02/2022 09:35 AM    HDL Cholesterol 47 02/02/2022 09:35 AM    LDL, calculated 117 (H) 02/02/2022 09:35 AM    VLDL, calculated 22 02/02/2022 09:35 AM    Triglyceride 110 02/02/2022 09:35 AM    CHOL/HDL Ratio 4.0 02/02/2022 09:35 AM     Lab Results   Component Value Date/Time    Hemoglobin A1c 6.2 (H) 02/02/2022 09:35 AM       Assessment / Plan:   1. CAD: No CP. As per his cardiologist, Dr. Bj Magaña. 2. A flutter, SSS: now with pacemaker. As per cardiology. 3. Aortic stenosis: s/p AVR. Doing well. 4. Asthma: Stable. Uses albuterol prn; Less than daily currently. 5. Prostate Cancer: So far, doing okay with surveillance. Patient reaffirms desire for conservative approach today. Surveillance for now. Pt willing to do hormonal treatments if PSA rises to higher levels. His urologist has suggested a PSA of 20 or above as a triggering value  6. Problem drinking: He has stopped drinking during [address]. 7. Hyperlipidemia:  Not at goal, but options limited. Did not tolerate simvastatin or zetia. Now on fish oil. Will recheck lipids and CMP. Continue pulse dosing of Crestor. 8. GERD:  Controlled on prn prilosec; Pt. may continue this. 9. ED: Not discussed. 10. All rhinitis: Stable. 11. Possible TIA/Diplopia/Amaurosis fugax: No recent episode. Work up as above. 12. Gout: no recent flares. 13. Borderline DM. Recheck labs. 14. Obesity: Watch diet. More exercise as able (limited now by postsurgical recovery). 15. Hx of Noncompliance. Tick: This was completely removed with forceps, and remained alive, moving until killed.  The skin was cleaned with alcohol. The tick appears to be a dog tick. Follow up in 6 months.

## 2022-06-13 ENCOUNTER — TELEPHONE (OUTPATIENT)
Dept: INTERNAL MEDICINE CLINIC | Age: 87
End: 2022-06-13

## 2022-07-02 ENCOUNTER — OFFICE VISIT (OUTPATIENT)
Dept: URGENT CARE | Age: 87
End: 2022-07-02
Payer: MEDICARE

## 2022-07-02 VITALS
OXYGEN SATURATION: 95 % | RESPIRATION RATE: 18 BRPM | BODY MASS INDEX: 30.75 KG/M2 | WEIGHT: 232 LBS | TEMPERATURE: 98.4 F | HEIGHT: 73 IN | HEART RATE: 72 BPM | SYSTOLIC BLOOD PRESSURE: 122 MMHG | DIASTOLIC BLOOD PRESSURE: 66 MMHG

## 2022-07-02 DIAGNOSIS — M25.512 ACUTE PAIN OF LEFT SHOULDER: Primary | ICD-10-CM

## 2022-07-02 DIAGNOSIS — T14.8XXA INFECTED SKIN TEAR: ICD-10-CM

## 2022-07-02 DIAGNOSIS — L08.9 INFECTED SKIN TEAR: ICD-10-CM

## 2022-07-02 PROCEDURE — G8427 DOCREV CUR MEDS BY ELIG CLIN: HCPCS | Performed by: NURSE PRACTITIONER

## 2022-07-02 PROCEDURE — G8536 NO DOC ELDER MAL SCRN: HCPCS | Performed by: NURSE PRACTITIONER

## 2022-07-02 PROCEDURE — 1101F PT FALLS ASSESS-DOCD LE1/YR: CPT | Performed by: NURSE PRACTITIONER

## 2022-07-02 PROCEDURE — G8432 DEP SCR NOT DOC, RNG: HCPCS | Performed by: NURSE PRACTITIONER

## 2022-07-02 PROCEDURE — 1123F ACP DISCUSS/DSCN MKR DOCD: CPT | Performed by: NURSE PRACTITIONER

## 2022-07-02 PROCEDURE — S9083 URGENT CARE CENTER GLOBAL: HCPCS | Performed by: NURSE PRACTITIONER

## 2022-07-02 PROCEDURE — G8417 CALC BMI ABV UP PARAM F/U: HCPCS | Performed by: NURSE PRACTITIONER

## 2022-07-02 RX ORDER — CEPHALEXIN 500 MG/1
500 CAPSULE ORAL 2 TIMES DAILY
Qty: 20 CAPSULE | Refills: 0 | Status: SHIPPED | OUTPATIENT
Start: 2022-07-02 | End: 2022-07-12

## 2022-07-02 NOTE — PATIENT INSTRUCTIONS
Skin Tears: Care Instructions  Your Care Instructions  As we get older, our skin gets drier and more fragile. Sometimes this can cause the outer layers of skin to split and tear open. Skin tears are treated in different ways. In some cases, doctors use pieces of tape called Steri-Strips to pull the skin together and help it heal. Other times, it's best to leave the tear open and cover it with a special wound-care bandage. Skin tears are usually not serious. They usually heal in a few weeks. But how long you take to heal depends on your body and the type of tear you have. Sometimes the torn piece of skin is used to protect the wound while it heals. But that piece of skin does not heal. It may fall off on its own. Or the doctor may remove it. As your tear heals, it's important to keep it clean to help prevent infection. The doctor has checked you carefully, but problems can develop later. If you notice any problems or new symptoms, get medical treatment right away. Follow-up care is a key part of your treatment and safety. Be sure to make and go to all appointments, and call your doctor if you are having problems. It's also a good idea to know your test results and keep a list of the medicines you take. How can you care for yourself at home? · If you have pain, ask your doctor if you can take an over-the-counter pain medicine, such as acetaminophen (Tylenol), ibuprofen (Advil, Motrin), or naproxen (Aleve). Be safe with medicines. Read and follow all instructions on the label. · If you have a bandage, follow your doctor's instructions for changing it. · If you have Steri-Strips, leave them on until they fall off. · Follow your doctor's instructions about bathing. · Gently wash the skin tear with plain water 2 times a day. Do not rub the area. · Let the area air dry. Or you can pat it carefully with a soft towel. When should you call for help?    Call your doctor now or seek immediate medical care if:    · You have signs of infection, such as:  ? Increased pain, swelling, warmth, or redness around the tear. ? Red streaks leading from the tear. ? Pus draining from the tear. ? A fever.     · The tear starts to bleed a lot. Small amounts of blood are normal.   Watch closely for changes in your health, and be sure to contact your doctor if:    · You do not get better as expected. Where can you learn more? Go to http://www.gray.com/  Enter P100 in the search box to learn more about \"Skin Tears: Care Instructions. \"  Current as of: July 1, 2021               Content Version: 13.2  © 9753-9125 [employer] Care instructions adapted under license by [employer] (which disclaims liability or warranty for this information). If you have questions about a medical condition or this instruction, always ask your healthcare professional. [address] any warranty or liability for your use of this information.

## 2022-07-13 DIAGNOSIS — M54.50 LOW BACK PAIN, UNSPECIFIED BACK PAIN LATERALITY, UNSPECIFIED CHRONICITY, UNSPECIFIED WHETHER SCIATICA PRESENT: Primary | ICD-10-CM

## 2022-07-13 NOTE — TELEPHONE ENCOUNTER
----- Message from Mio Dubon sent at 7/13/2022 10:24 AM EDT -----  Subject: Refill Request    QUESTIONS  Name of Medication? Other - traMADoL (ULTRAM) 50mg tablet  Patient-reported dosage and instructions? 50mg tablet every 6 hours as   needed  How many days do you have left? 1  Preferred Pharmacy? [facility]  Pharmacy phone number (if available)? [phone]  ---------------------------------------------------------------------------  --------------  Julián GANN  What is the best way for the office to contact you? OK to leave message on   voicemail  Preferred Call Back Phone Number? [phone]  ---------------------------------------------------------------------------  --------------  SCRIPT ANSWERS  Relationship to Patient?  Self

## 2022-07-13 NOTE — TELEPHONE ENCOUNTER
Future Appointments:  Future Appointments   Date Time Provider Lindsey Álvarez   12/12/2022  9:00 AM Pablo Damon MD [facility] AMB        Last Appointment With Me:  6/9/2022     Requested Prescriptions     Pending Prescriptions Disp Refills    traMADoL (ULTRAM) 50 mg tablet 30 Tablet 0     Sig: Take 1 Tablet by mouth every six (6) hours as needed for Pain for up to 3 days. Max Daily Amount: 200 mg.

## 2022-07-14 RX ORDER — TRAMADOL HYDROCHLORIDE 50 MG/1
50 TABLET ORAL
Qty: 30 TABLET | Refills: 0 | Status: SHIPPED | OUTPATIENT
Start: 2022-07-14 | End: 2022-07-17

## 2022-09-06 RX ORDER — OMEPRAZOLE 20 MG/1
CAPSULE, DELAYED RELEASE ORAL
Qty: 90 CAPSULE | Refills: 0 | Status: SHIPPED | OUTPATIENT
Start: 2022-09-06

## 2022-10-11 DIAGNOSIS — I25.10 CORONARY ARTERY DISEASE INVOLVING NATIVE CORONARY ARTERY OF NATIVE HEART WITHOUT ANGINA PECTORIS: ICD-10-CM

## 2022-10-11 RX ORDER — POTASSIUM CHLORIDE 20 MEQ/1
TABLET, EXTENDED RELEASE ORAL
Qty: 90 TABLET | Refills: 0 | Status: SHIPPED | OUTPATIENT
Start: 2022-10-11

## 2022-10-14 ENCOUNTER — OFFICE VISIT (OUTPATIENT)
Dept: URGENT CARE | Age: 87
End: 2022-10-14
Payer: MEDICARE

## 2022-10-14 VITALS
DIASTOLIC BLOOD PRESSURE: 56 MMHG | BODY MASS INDEX: 30.34 KG/M2 | OXYGEN SATURATION: 94 % | WEIGHT: 230 LBS | HEART RATE: 72 BPM | SYSTOLIC BLOOD PRESSURE: 101 MMHG | RESPIRATION RATE: 16 BRPM | TEMPERATURE: 97.8 F

## 2022-10-14 DIAGNOSIS — L08.9 INFECTED ABRASION: Primary | ICD-10-CM

## 2022-10-14 DIAGNOSIS — T14.8XXA INFECTED ABRASION: Primary | ICD-10-CM

## 2022-10-14 PROCEDURE — 1123F ACP DISCUSS/DSCN MKR DOCD: CPT | Performed by: NURSE PRACTITIONER

## 2022-10-14 PROCEDURE — G8536 NO DOC ELDER MAL SCRN: HCPCS | Performed by: NURSE PRACTITIONER

## 2022-10-14 PROCEDURE — G8432 DEP SCR NOT DOC, RNG: HCPCS | Performed by: NURSE PRACTITIONER

## 2022-10-14 PROCEDURE — S9083 URGENT CARE CENTER GLOBAL: HCPCS | Performed by: NURSE PRACTITIONER

## 2022-10-14 PROCEDURE — G8427 DOCREV CUR MEDS BY ELIG CLIN: HCPCS | Performed by: NURSE PRACTITIONER

## 2022-10-14 PROCEDURE — 1101F PT FALLS ASSESS-DOCD LE1/YR: CPT | Performed by: NURSE PRACTITIONER

## 2022-10-14 PROCEDURE — G8417 CALC BMI ABV UP PARAM F/U: HCPCS | Performed by: NURSE PRACTITIONER

## 2022-10-14 RX ORDER — CEPHALEXIN 500 MG/1
500 CAPSULE ORAL 2 TIMES DAILY
Qty: 14 CAPSULE | Refills: 0 | Status: SHIPPED | OUTPATIENT
Start: 2022-10-14 | End: 2022-10-21

## 2022-10-14 NOTE — PROGRESS NOTES
Here for possible infected insect bite of right lower leg (back side)  Onset 2 days ago  Itchy. He has been scratching it constantly  Now a little more sore today  No fever, chills or known injury  Feels well otherwise            Past Medical History:   Diagnosis Date    Adverse effect of anesthesia     difficult with waking up     Allergic rhinitis     Arthritis     Asthma     Atrial flutter ([address] 9/12/2014    CAD (coronary artery disease)     Dr. Caroline Simmons    Calculus of kidney     Chest pain 2006    Normal stress echo    Chronic bronchitis     Chronic bronchitis ([address] 10/3/2012    Chronic pain     knees    Erectile dysfunction     GERD (gastroesophageal reflux disease)     hiatal hernia    Gout 1/24/2013    Hemoptysis 2008    Work up by Dr. Edgardo Villegas;  Negative    Hiatal hernia     Hypercholesterolemia     Hypertension     Ill-defined condition     bronchitis    Melanoma (HCC)     back    Nausea & vomiting     Pacemaker     Dr. Erasmo Garcia    Prostate cancer [facility]) 6/4/2012    S/P ablation of atrial flutter 9/12/2014    Sleep apnea     states never had test for apnea    Stroke [facility]) 2009     tia no residual problems    Thyroid disease     Valvular heart disease         Past Surgical History:   Procedure Laterality Date    HX AFIB ABLATION  2014    Dr. Sheryl Hernandez    ruptured appendix    HX HEART CATHETERIZATION      [address]    HX HERNIA REPAIR  12/17/14    Recurrent left inguinal hernia; Dr. Asim Hebert      5 hernia repairs total    HX PACEMAKER  12/2014    HX PACEMAKER PLACEMENT  2014    Dr. Best Dural    Kidney stone extraction    [id number] [facility]      ablation    IN COLSC FLX W/RMVL OF TUMOR POLYP LESION SNARE TQ  4/21/2011              Family History   Problem Relation Age of Onset    Stroke Father     Heart Disease Mother         Social History     Socioeconomic History    Marital status:  Spouse name: Not on file    Number of children: Not on file    Years of education: Not on file    Highest education level: Not on file   Occupational History    Not on file   Tobacco Use    Smoking status: Never    Smokeless tobacco: Never   Vaping Use    Vaping Use: Never used   Substance and Sexual Activity    Alcohol use: Yes     Alcohol/week: 3.0 standard drinks     Types: 1 Shots of liquor, 2 Standard drinks or equivalent per week     Comment: rare    Drug use: No     Types: OTC, Prescription    Sexual activity: Not Currently     Partners: Female   Other Topics Concern    Not on file   Social History Narrative    Not on file     Social Determinants of Health     Financial Resource Strain: Not on file   Food Insecurity: Not on file   Transportation Needs: Not on file   Physical Activity: Not on file   Stress: Not on file   Social Connections: Not on file   Intimate Partner Violence: Not on file   Housing Stability: Not on file                ALLERGIES: Azithromycin, Gadolinium-containing contrast media, Acetaminophen, Contrast dye [iodine], Crestor [rosuvastatin], Levaquin [levofloxacin], Medrol [methylprednisolone], Norvasc [amlodipine], Nsaids (non-steroidal anti-inflammatory drug), Percocet [oxycodone-acetaminophen], Ranexa [ranolazine], Simvastatin, Voltaren [diclofenac sodium], and Zetia [ezetimibe]    Review of Systems   All other systems reviewed and are negative. Vitals:    10/14/22 1332   BP: (!) 101/56   Pulse: 72   Resp: 16   Temp: 97.8 °F (36.6 °C)   SpO2: 94%   Weight: 230 lb (104.3 kg)       Physical Exam  Vitals reviewed. Constitutional:       General: He is not in acute distress. Appearance: Normal appearance. He is not ill-appearing or toxic-appearing. HENT:      Head: Normocephalic and atraumatic. Eyes:      Extraocular Movements: Extraocular movements intact. Cardiovascular:      Rate and Rhythm: Normal rate and regular rhythm.    Pulmonary:      Effort: Pulmonary effort is normal.      Breath sounds: Normal breath sounds. Skin:     Capillary Refill: Capillary refill takes less than 2 seconds. Neurological:      Mental Status: He is alert and oriented to person, place, and time. Psychiatric:         Mood and Affect: Mood normal.         Behavior: Behavior normal.         Thought Content: Thought content normal.       MDM     Differential Diagnosis; Clinical Impression; Plan:       CLINICAL IMPRESSION:  (T14.8XXA,  L08.9) Infected abrasion  (primary encounter diagnosis)    Orders Placed This Encounter      cephALEXin (KEFLEX) 500 mg capsule          Sig: Take 1 Capsule by mouth two (2) times a day for 7 days. Dispense:  14 Capsule          Refill:  0      Plan:  Infected abrasion  Ddx infected insect bite  Early infection; start cephalexin  Try not to scratch  Keep area clean     We have reviewed concerning signs/symptoms, normal vs abnormal progression of medical condition and when to seek immediate medical attention. Schedule with PCP or Urgent Care immediately for worsening or new symptoms. See your PCP if there is not at least some improvement in symptoms within the next 1 week  You should see your PCP for updates on your routine health maintenance.              Procedures

## 2022-10-25 ENCOUNTER — HOSPITAL ENCOUNTER (EMERGENCY)
Age: 87
Discharge: HOME | End: 2022-10-25
Attending: STUDENT IN AN ORGANIZED HEALTH CARE EDUCATION/TRAINING PROGRAM
Payer: MEDICARE

## 2022-10-25 VITALS
RESPIRATION RATE: 16 BRPM | BODY MASS INDEX: 30.75 KG/M2 | DIASTOLIC BLOOD PRESSURE: 89 MMHG | TEMPERATURE: 97.5 F | SYSTOLIC BLOOD PRESSURE: 158 MMHG | HEART RATE: 70 BPM | OXYGEN SATURATION: 98 % | HEIGHT: 73 IN | WEIGHT: 232 LBS

## 2022-10-25 DIAGNOSIS — R00.0 TACHYCARDIA: Primary | ICD-10-CM

## 2022-10-25 DIAGNOSIS — I10 HYPERTENSION, UNSPECIFIED TYPE: ICD-10-CM

## 2022-10-25 LAB
ALBUMIN SERPL-MCNC: 3.6 G/DL (ref 3.5–5)
ALBUMIN/GLOB SERPL: 0.9 {RATIO} (ref 1.1–2.2)
ALP SERPL-CCNC: 69 U/L (ref 45–117)
ALT SERPL-CCNC: 33 U/L (ref 12–78)
ANION GAP SERPL CALC-SCNC: 5 MMOL/L (ref 5–15)
AST SERPL-CCNC: 16 U/L (ref 15–37)
BASOPHILS # BLD: 0 K/UL (ref 0–0.1)
BASOPHILS NFR BLD: 0 % (ref 0–1)
BILIRUB SERPL-MCNC: 0.5 MG/DL (ref 0.2–1)
BUN SERPL-MCNC: 26 MG/DL (ref 6–20)
BUN/CREAT SERPL: 26 (ref 12–20)
CALCIUM SERPL-MCNC: 9.8 MG/DL (ref 8.5–10.1)
CHLORIDE SERPL-SCNC: 102 MMOL/L (ref 97–108)
CO2 SERPL-SCNC: 31 MMOL/L (ref 21–32)
CREAT SERPL-MCNC: 0.99 MG/DL (ref 0.7–1.3)
DIFFERENTIAL METHOD BLD: ABNORMAL
EOSINOPHIL # BLD: 0 K/UL (ref 0–0.4)
EOSINOPHIL NFR BLD: 0 % (ref 0–7)
ERYTHROCYTE [DISTWIDTH] IN BLOOD BY AUTOMATED COUNT: 13.1 % (ref 11.5–14.5)
GLOBULIN SER CALC-MCNC: 3.8 G/DL (ref 2–4)
GLUCOSE SERPL-MCNC: 123 MG/DL (ref 65–100)
HCT VFR BLD AUTO: 46.1 % (ref 36.6–50.3)
HGB BLD-MCNC: 15.1 G/DL (ref 12.1–17)
IMM GRANULOCYTES # BLD AUTO: 0.1 K/UL (ref 0–0.04)
IMM GRANULOCYTES NFR BLD AUTO: 1 % (ref 0–0.5)
LYMPHOCYTES # BLD: 1.6 K/UL (ref 0.8–3.5)
LYMPHOCYTES NFR BLD: 19 % (ref 12–49)
MAGNESIUM SERPL-MCNC: 2.5 MG/DL (ref 1.6–2.4)
MCH RBC QN AUTO: 32.3 PG (ref 26–34)
MCHC RBC AUTO-ENTMCNC: 32.8 G/DL (ref 30–36.5)
MCV RBC AUTO: 98.7 FL (ref 80–99)
MONOCYTES # BLD: 0.7 K/UL (ref 0–1)
MONOCYTES NFR BLD: 9 % (ref 5–13)
NEUTS SEG # BLD: 5.9 K/UL (ref 1.8–8)
NEUTS SEG NFR BLD: 71 % (ref 32–75)
NRBC # BLD: 0 K/UL (ref 0–0.01)
NRBC BLD-RTO: 0 PER 100 WBC
PHOSPHATE SERPL-MCNC: 3.8 MG/DL (ref 2.6–4.7)
PLATELET # BLD AUTO: 196 K/UL (ref 150–400)
PMV BLD AUTO: 8.6 FL (ref 8.9–12.9)
POTASSIUM SERPL-SCNC: 4.3 MMOL/L (ref 3.5–5.1)
PROT SERPL-MCNC: 7.4 G/DL (ref 6.4–8.2)
RBC # BLD AUTO: 4.67 M/UL (ref 4.1–5.7)
SODIUM SERPL-SCNC: 138 MMOL/L (ref 136–145)
TROPONIN-HIGH SENSITIVITY: 11 NG/L (ref 0–76)
TROPONIN-HIGH SENSITIVITY: 9 NG/L (ref 0–76)
WBC # BLD AUTO: 8.4 K/UL (ref 4.1–11.1)

## 2022-10-25 PROCEDURE — 99284 EMERGENCY DEPT VISIT MOD MDM: CPT

## 2022-10-25 PROCEDURE — 36415 COLL VENOUS BLD VENIPUNCTURE: CPT

## 2022-10-25 PROCEDURE — 83735 ASSAY OF MAGNESIUM: CPT

## 2022-10-25 PROCEDURE — 93005 ELECTROCARDIOGRAM TRACING: CPT

## 2022-10-25 PROCEDURE — 85025 COMPLETE CBC W/AUTO DIFF WBC: CPT

## 2022-10-25 PROCEDURE — 84484 ASSAY OF TROPONIN QUANT: CPT

## 2022-10-25 PROCEDURE — 84100 ASSAY OF PHOSPHORUS: CPT

## 2022-10-25 PROCEDURE — 80053 COMPREHEN METABOLIC PANEL: CPT

## 2022-10-25 NOTE — DISCHARGE INSTRUCTIONS
Please make a followup with your primary care physician and Dr. Kelley Beasley.  If you have any new or worsening concerning medical symptoms please return to the emergency department.

## 2022-10-25 NOTE — ED PROVIDER NOTES
EMERGENCY DEPARTMENT HISTORY AND PHYSICAL EXAM      Date: 10/25/2022  Patient Name: Pieter Hart    History of Presenting Illness     Chief Complaint   Patient presents with    Hypertension     Woke up at 4am in a sweat, took his bp and it was 222 systolic. Took 4 baby aspirin and went back to sleep. Woke up again with the same thing. EMS out and bs 115 and bp's still high. Pt denies headache, dizziness, cp, sob. History Provided By: Patient    HPI: Pieter Hart, 81 y.o. male with a past medical history significant for sick sinus syndrome and aflutter s/p PPM as stated below presents to the ED with cc of tachycardia with associated diaphoresis that woke him up from sleep at 4:00 in the morning. He woke up and he reports that his heart rate was going at 195 or so. He reports that this has happened before but his heart rate had never been that high. He reported this was his heart rate and his blood pressure as indicated in the triage note. He took 3 baby aspirin at that time and decided to go back to sleep. He woke up again with the same symptoms and decided to call EMS. They gave him an additional baby aspirin to complete a dose of 325 mg. He denied any chest pain, shortness of breath, lightheadedness. He has not had any new infectious complaints, but he did just complete antibiotics for an area of cellulitis of his right lower extremity. He denies any leg swelling at all and he has been taking his usual dose of Lasix every day. Patient has a [employer] pacemaker that was placed following a similar event and follows with Dr. Cristiano Haddad.    There are no associated symptoms. No other exacerbating or ameliorating factors. PCP: Lizet Rolle MD    No current facility-administered medications on file prior to encounter.      Current Outpatient Medications on File Prior to Encounter   Medication Sig Dispense Refill    potassium chloride (K-DUR, KLOR-CON M20) 20 mEq tablet Take 1 tablet by mouth once daily 90 Tablet 0    omeprazole (PRILOSEC) 20 mg capsule Take 1 capsule by mouth once daily 90 Capsule 0    furosemide (LASIX) 40 mg tablet Take 1 tablet by mouth once daily 90 Tablet 3    isosorbide mononitrate ER (IMDUR) 60 mg CR tablet Take 1 tablet by mouth once daily 90 Tablet 3    metoprolol tartrate (LOPRESSOR) 25 mg tablet Take 1/2 (one-half) tablet by mouth twice daily 90 Tablet 3    albuterol (Ventolin HFA) 90 mcg/actuation inhaler Take 2 Puffs by inhalation every four (4) hours as needed for Wheezing. 2 Each 5    levothyroxine (Euthyrox) 50 mcg tablet Take 1 Tablet by mouth Daily (before breakfast). 90 Tablet 3    acetaminophen (TYLENOL EXTRA STRENGTH PO) Take  by mouth as needed. coenzyme q10 10 mg cap Take 100 mg by mouth every evening. Past History     Past Medical History:  Past Medical History:   Diagnosis Date    Adverse effect of anesthesia     difficult with waking up     Allergic rhinitis     Arthritis     Asthma     Atrial flutter [address] 9/12/2014    CAD (coronary artery disease)     Dr. Calzada Shown    Calculus of kidney     Chest pain 2006    Normal stress echo    Chronic bronchitis     Chronic bronchitis ([address] 10/3/2012    Chronic pain     knees    Erectile dysfunction     GERD (gastroesophageal reflux disease)     hiatal hernia    Gout 1/24/2013    Hemoptysis 2008    Work up by Dr. Ciarra Camp;  Negative    Hiatal hernia     Hypercholesterolemia     Hypertension     Ill-defined condition     bronchitis    Melanoma ([facility]     back    Nausea & vomiting     Pacemaker     Dr. Lorena Velasquez    Prostate cancer [facility]) 6/4/2012    S/P ablation of atrial flutter 9/12/2014    Sleep apnea     states never had test for apnea    Stroke [facility]) 2009     tia no residual problems    Thyroid disease     Valvular heart disease        Past Surgical History:  Past Surgical History:   Procedure Laterality Date    HX AFIB ABLATION  2014    Dr. Payan Daily ruptured appendix    HX HEART CATHETERIZATION      HX HERNIA REPAIR  1986    HX HERNIA REPAIR  12/17/14    Recurrent left inguinal hernia; Dr. Merari Ruth      5 hernia repairs total    HX PACEMAKER  12/2014    HX PACEMAKER PLACEMENT  2014    Dr. Leland Suárez    Kidney stone extraction    [id number] [facility]      ablation    [place] COLSC FLX W/RMVL OF TUMOR POLYP LESION SNARE TQ  4/21/2011            Family History:  Family History   Problem Relation Age of Onset    Stroke Father     Heart Disease Mother        Social History:  Social History     Tobacco Use    Smoking status: Never    Smokeless tobacco: Never   Vaping Use    Vaping Use: Never used   Substance Use Topics    Alcohol use: Yes     Alcohol/week: 3.0 standard drinks     Types: 1 Shots of liquor, 2 Standard drinks or equivalent per week     Comment: rare    Drug use: No     Types: OTC, Prescription       Allergies: Allergies   Allergen Reactions    Azithromycin Anaphylaxis    Gadolinium-Containing Contrast Media Other (comments)     1) Moderate to Severe. 2) Post Gadolinium issues as noted:   - Diaphoretic, Near Syncope, Nausea and vomiting. Acetaminophen Other (comments)     Indigestion,\"burning sensation\"    Contrast Dye [Iodine] Other (comments)     Decrease in BP    Crestor [Rosuvastatin] Myalgia    Levaquin [Levofloxacin] Other (comments)     Joint pain    Medrol [Methylprednisolone] Vertigo    Norvasc [Amlodipine] Other (comments)     Numbness and tingling    Nsaids (Non-Steroidal Anti-Inflammatory Drug) Other (comments)     Cough up blood      Percocet [Oxycodone-Acetaminophen] Itching    Ranexa [Ranolazine] Other (comments)     Cannot remember    Simvastatin Other (comments)     Joint pain    Voltaren [Diclofenac Sodium] Other (comments)     unknown    Zetia [Ezetimibe] Myalgia         Review of Systems   Review of Systems   Constitutional:  Positive for diaphoresis. Negative for appetite change. HENT:  Negative for sore throat. Eyes:  Negative for visual disturbance. Respiratory:  Negative for cough and shortness of breath. Cardiovascular:  Negative for chest pain. Tachycardia, resolved   Gastrointestinal:  Negative for abdominal pain, diarrhea, nausea and vomiting. Genitourinary:  Negative for difficulty urinating. Musculoskeletal:  Negative for myalgias. Skin:  Negative for color change. Neurological:  Negative for dizziness and headaches. Psychiatric/Behavioral:  Negative for agitation. Physical Exam   Physical Exam  Constitutional:       Appearance: Normal appearance. HENT:      Head: Normocephalic and atraumatic. Mouth/Throat:      Mouth: Mucous membranes are moist.   Eyes:      Conjunctiva/sclera: Conjunctivae normal.      Pupils: Pupils are equal, round, and reactive to light. Cardiovascular:      Rate and Rhythm: Normal rate and regular rhythm. Comments: PPM in place  Pulmonary:      Effort: Pulmonary effort is normal.      Breath sounds: Normal breath sounds. Abdominal:      General: Abdomen is flat. There is no distension. Palpations: Abdomen is soft. Tenderness: There is no guarding or rebound. Musculoskeletal:         General: No swelling. Normal range of motion. Cervical back: Normal range of motion. Left lower leg: Edema present. Comments: Scarring/Healing area of infection of right calf, no tenderness, swelling, induration, or fluctuance   Skin:     General: Skin is warm and dry. Capillary Refill: Capillary refill takes less than 2 seconds. Neurological:      General: No focal deficit present. Mental Status: He is alert and oriented to person, place, and time.    Psychiatric:         Mood and Affect: Mood normal.     Diagnostic Study Results     Labs -     Recent Results (from the past 24 hour(s))   EKG, 12 LEAD, INITIAL    Collection Time: 10/25/22  8:04 AM   Result Value Ref Range    Ventricular Rate 70 BPM    Atrial Rate 70 BPM    P-R Interval 224 ms    QRS Duration 112 ms    Q-T Interval 380 ms    QTC Calculation (Bezet) 410 ms    Calculated P Axis 68 degrees    Calculated R Axis -48 degrees    Calculated T Axis 91 degrees    Diagnosis       Atrial-paced rhythm with prolonged AV conduction  Left anterior fascicular block  Left ventricular hypertrophy with repolarization abnormality  Cannot rule out Septal infarct , age undetermined  When compared with ECG of 16-MAR-2016 12:55,  Electronic atrial pacemaker has replaced Sinus rhythm  Right bundle branch block is no longer present  Minimal criteria for Septal infarct are now present     CBC WITH AUTOMATED DIFF    Collection Time: 10/25/22  8:07 AM   Result Value Ref Range    WBC 8.4 4.1 - 11.1 K/uL    RBC 4.67 4.10 - 5.70 M/uL    HGB 15.1 12.1 - 17.0 g/dL    HCT 46.1 36.6 - 50.3 %    MCV 98.7 80.0 - 99.0 FL    MCH 32.3 26.0 - 34.0 PG    MCHC 32.8 30.0 - 36.5 g/dL    RDW 13.1 11.5 - 14.5 %    PLATELET 245 259 - 601 K/uL    MPV 8.6 (L) 8.9 - 12.9 FL    NRBC 0.0 0  WBC    ABSOLUTE NRBC 0.00 0.00 - 0.01 K/uL    NEUTROPHILS 71 32 - 75 %    LYMPHOCYTES 19 12 - 49 %    MONOCYTES 9 5 - 13 %    EOSINOPHILS 0 0 - 7 %    BASOPHILS 0 0 - 1 %    IMMATURE GRANULOCYTES 1 (H) 0.0 - 0.5 %    ABS. NEUTROPHILS 5.9 1.8 - 8.0 K/UL    ABS. LYMPHOCYTES 1.6 0.8 - 3.5 K/UL    ABS. MONOCYTES 0.7 0.0 - 1.0 K/UL    ABS. EOSINOPHILS 0.0 0.0 - 0.4 K/UL    ABS. BASOPHILS 0.0 0.0 - 0.1 K/UL    ABS. IMM.  GRANS. 0.1 (H) 0.00 - 0.04 K/UL    DF AUTOMATED     METABOLIC PANEL, COMPREHENSIVE    Collection Time: 10/25/22  8:07 AM   Result Value Ref Range    Sodium 138 136 - 145 mmol/L    Potassium 4.3 3.5 - 5.1 mmol/L    Chloride 102 97 - 108 mmol/L    CO2 31 21 - 32 mmol/L    Anion gap 5 5 - 15 mmol/L    Glucose 123 (H) 65 - 100 mg/dL    BUN 26 (H) 6 - 20 MG/DL    Creatinine 0.99 0.70 - 1.30 MG/DL    BUN/Creatinine ratio 26 (H) 12 - 20      eGFR >60 >60 ml/min/1.73m2    Calcium 9.8 8.5 - 10.1 MG/DL    Bilirubin, total 0.5 0.2 - 1.0 MG/DL    ALT (SGPT) 33 12 - 78 U/L    AST (SGOT) 16 15 - 37 U/L    Alk. phosphatase 69 45 - 117 U/L    Protein, total 7.4 6.4 - 8.2 g/dL    Albumin 3.6 3.5 - 5.0 g/dL    Globulin 3.8 2.0 - 4.0 g/dL    A-G Ratio 0.9 (L) 1.1 - 2.2     TROPONIN-HIGH SENSITIVITY    Collection Time: 10/25/22  8:07 AM   Result Value Ref Range    Troponin-High Sensitivity 9 0 - 76 ng/L   MAGNESIUM    Collection Time: 10/25/22  8:07 AM   Result Value Ref Range    Magnesium 2.5 (H) 1.6 - 2.4 mg/dL   PHOSPHORUS    Collection Time: 10/25/22  8:07 AM   Result Value Ref Range    Phosphorus 3.8 2.6 - 4.7 MG/DL   TROPONIN-HIGH SENSITIVITY    Collection Time: 10/25/22 10:34 AM   Result Value Ref Range    Troponin-High Sensitivity 11 0 - 76 ng/L       Radiologic Studies -   No orders to display     CT Results  (Last 48 hours)      None          CXR Results  (Last 48 hours)      None              Medical Decision Making   I am the first provider for this patient. I reviewed the vital signs, available nursing notes, past medical history, past surgical history, family history and social history. Vital Signs-Reviewed the patient's vital signs. Patient Vitals for the past 12 hrs:   Temp Pulse Resp BP SpO2   10/25/22 1054 97.5 °F (36.4 °C) 70 16 (!) 158/89 98 %   10/25/22 0750 97.8 °F (36.6 °C) 70 15 (!) 160/90 92 %       Records Reviewed: Nursing records and medical records reviewed    Provider Notes (Medical Decision Making):   Patient with a history of sick sinus syndrome, nonsustained V. tach who presents after an episode of tachycardia with associated diaphoresis that was transient overnight. Happened again earlier this morning. Now he feels completely at his baseline. No infectious symptoms to suggest this is related to acute infection. No signs of volume overload on exam.  Electrolytes and other labs obtained in triage are unremarkable.   Troponin negative and EKG with no acute signs of ischemia, but nothing to compare to. Suspect he had an arrhytmia event that caused episode. - Interrogate [employer] pacemaker  - Obtain serial troponin  - Check further electrolytes with magnesium and phosphorous    ED Course:   Initial assessment performed. The patients presenting problems have been discussed, and they are in agreement with the care plan formulated and outlined with them. I have encouraged them to ask questions as they arise throughout their visit. ED Course as of 10/25/22 1214   Tue Oct 25, 2022   1757 EKG was sinus rhythm, heart rate 70, leftward deviated axis, first-degree AV block, left bundle branch morphology with QRS only 112, no ischemic ST changes. NO prior EKG available for comparison. [WB]   2853 Labs obtained in triage are unremarkable. Troponin negative at 9. No electrolyte abnormalities seen on CMP. We will add magnesium and phosphorus. We will make sure to interrogate patient's [employer] pacemaker. [WB]   8372 Patient's magnesium and phosphorus are not low. Interrogation report is been sent. Last events of NSVT was on September 7, but none in the past 24 hours. Patient has remained asyptomatic here. Will plan on discharge if repeat troponin not rising as patient is  [WB]   1120 On re-evaluation he stated that it WAS his BP that was elevated and not his heart rate. Again no chest pain or shortness of breath. No evidence of end-organ damage here. Repeat troponin negative. Patient has not developed any symptoms or has been here in the emergency department. Stable for discharge at this time. Discussed importance of follow-up with Dr. Dilip Thompson.  Patient voiced understanding. Discussed strict return precautions and he understands. [WB]      ED Course User Index  [WB] Khanh Ellison MD     Critical Care:  None    Disposition:  Home    DISCHARGE PLAN:  1. Discharge Medication List as of 10/25/2022 11:22 AM        2.    Follow-up Information       Follow up With Specialties Details Why Contact [facility] EMERGENCY DEPT Emergency Medicine  As needed, If symptoms worsen [address]ango MD Clinical Cardiac Electrophysiology Physician, Cardio Vascular Surgery, Cardiovascular Disease Physician Schedule an appointment as soon as possible for a visit   [address]  [place] [address]  [phone]      Hoang Billy MD Internal Medicine Physician Schedule an appointment as soon as possible for a visit   [address] [facility]  [phone]            3. Return to ED if worse     Diagnosis     Clinical Impression:   1. Tachycardia    2. Hypertension, unspecified type        Attestations:    Hany Schmitt MD    Please note that this dictation was completed with [employer], the computer voice recognition software. Quite often unanticipated grammatical, syntax, homophones, and other interpretive errors are inadvertently transcribed by the computer software. Please disregard these errors. Please excuse any errors that have escaped final proofreading. Thank you.

## 2022-10-26 LAB
ATRIAL RATE: 70 BPM
CALCULATED P AXIS, ECG09: 68 DEGREES
CALCULATED R AXIS, ECG10: -48 DEGREES
CALCULATED T AXIS, ECG11: 91 DEGREES
DIAGNOSIS, 93000: NORMAL
P-R INTERVAL, ECG05: 224 MS
Q-T INTERVAL, ECG07: 380 MS
QRS DURATION, ECG06: 112 MS
QTC CALCULATION (BEZET), ECG08: 410 MS
VENTRICULAR RATE, ECG03: 70 BPM

## 2022-10-31 ENCOUNTER — OFFICE VISIT (OUTPATIENT)
Dept: INTERNAL MEDICINE CLINIC | Age: 87
End: 2022-10-31
Payer: MEDICARE

## 2022-10-31 VITALS
HEIGHT: 73 IN | WEIGHT: 227.2 LBS | RESPIRATION RATE: 16 BRPM | BODY MASS INDEX: 30.11 KG/M2 | TEMPERATURE: 98.4 F | HEART RATE: 73 BPM | SYSTOLIC BLOOD PRESSURE: 112 MMHG | OXYGEN SATURATION: 94 % | DIASTOLIC BLOOD PRESSURE: 73 MMHG

## 2022-10-31 DIAGNOSIS — R73.9 BORDERLINE HYPERGLYCEMIA: ICD-10-CM

## 2022-10-31 DIAGNOSIS — I25.10 CORONARY ARTERY DISEASE INVOLVING NATIVE CORONARY ARTERY OF NATIVE HEART WITHOUT ANGINA PECTORIS: Primary | ICD-10-CM

## 2022-10-31 DIAGNOSIS — E03.9 ACQUIRED HYPOTHYROIDISM: ICD-10-CM

## 2022-10-31 DIAGNOSIS — M10.9 GOUT, UNSPECIFIED CAUSE, UNSPECIFIED CHRONICITY, UNSPECIFIED SITE: ICD-10-CM

## 2022-10-31 DIAGNOSIS — E78.00 HYPERCHOLESTEROLEMIA: ICD-10-CM

## 2022-10-31 DIAGNOSIS — C61 PROSTATE CANCER (HCC): ICD-10-CM

## 2022-10-31 PROCEDURE — G8427 DOCREV CUR MEDS BY ELIG CLIN: HCPCS | Performed by: INTERNAL MEDICINE

## 2022-10-31 PROCEDURE — G8417 CALC BMI ABV UP PARAM F/U: HCPCS | Performed by: INTERNAL MEDICINE

## 2022-10-31 PROCEDURE — G8510 SCR DEP NEG, NO PLAN REQD: HCPCS | Performed by: INTERNAL MEDICINE

## 2022-10-31 PROCEDURE — 1101F PT FALLS ASSESS-DOCD LE1/YR: CPT | Performed by: INTERNAL MEDICINE

## 2022-10-31 PROCEDURE — G8536 NO DOC ELDER MAL SCRN: HCPCS | Performed by: INTERNAL MEDICINE

## 2022-10-31 PROCEDURE — 99214 OFFICE O/P EST MOD 30 MIN: CPT | Performed by: INTERNAL MEDICINE

## 2022-10-31 NOTE — PROGRESS NOTES
SUBJECTIVE  Mr. Dahiana Vallejo is a 81 y.o. male patient of mine who presents today for routine follow up visit. Chief Complaint   Patient presents with    ED Follow-up     HTN follow up       Seen in ER on 10/25:   Adenike Veras , 81 y.o. male with a past medical history significant for sick sinus syndrome and aflutter s/p PPM as stated below presents to the ED with cc of tachycardia with associated diaphoresis that woke him up from sleep at 4:00 in the morning. He woke up and he reports that his heart rate was going at 195 or so. He reports that this has happened before but his heart rate had never been that high. He reported this was his heart rate and his blood pressure as indicated in the triage note. He took 3 baby aspirin at that time and decided to go back to sleep. He woke up again with the same symptoms and decided to call EMS. They gave him an additional baby aspirin to complete a dose of 325 mg. He denied any chest pain, shortness of breath, lightheadedness. He has not had any new infectious complaints, but he did just complete antibiotics for an area of cellulitis of his right lower extremity. He denies any leg swelling at all and he has been taking his usual dose of Lasix every day. Patient has a [employer] pacemaker that was placed following a similar event and follows with Dr. Dilip Thompson.    He was discharged with instruction to follow up with cardiologist.     He was bit by something a month ago, went to ER on [address] with \"black spots on leg\". Given antibiotics and this resolved. He has a new complaint of a red spot on his R mid back. Chronic bronchitis has been doing well. He has seen Dr. Lily Santiago. Uses albuterol prn. Doing better. He has undergone valve replacement in March 2016 with Dr. Niecy Maza. It is recalled from early 2016 that he saw Dr. Luisito Krishnan, for exertional dyspnea, and had cardiac cath. Knee pain: \"Doing pretty good right now. \"   Sees Dr. Bae, and has had corticosteroid injections. Surgery on hold due to heart issues. He has prostate cancer, but doesn't want to treat this. He has had biopsy proven adenocarcinoma of prostate, by Dr. Gabriele Villavicencio with [facility]. As we noted before: At this time \"I aint doin' nothin'. If the PSA gets high, then I'll take the shots. \"  From our records, it appears he was referred to [facility] for consideration of radiation therapy. He refused this. Past Medical History: Hyperlipidemia, chronic bronchitis, allergic rhinitis, urolithiasis, hiatal hernia with GERD, ED, Asthma, prostate cancer 2012 (Dr. Gabriele Villavicencio). Normal stress echo in 2006; Echo 2010 showed LVH, EF 55-60%. Hemoptysis with negative workup in 2008--saw Dr. Herbert Zhang. EGD in 2009 Dr. Raheem Sneed showing Hiatal hernia. R lid chronic ptosis noted in 2009 by Dr. Cristina Young, optometrist.  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 Had exertional chest pain in 2012, recurred in 2014--cardiac work up with Dr. Vu Jara. His ophthalmologist is Dr. Edilson Ecsobar. Past Surgical History: Hernia in 86. Kidney stone 85. Appy (ruptured) in 79. Resection of melanoma. Colon polypectomy 2011 Dr. Raheem Sneed. A flutter ablation 2014 Dr. Shaun Celestin 2014 Dr. Theron Shoemaker repair 2014 Dr. Ana Munoz. Aortic valve replacement 2016. Cataracts 2016. Allergies: IVP dye (BP drop).      Medications:   Current Outpatient Medications on File Prior to Visit   Medication Sig Dispense Refill    potassium chloride (K-DUR, KLOR-CON M20) 20 mEq tablet Take 1 tablet by mouth once daily 90 Tablet 0    omeprazole (PRILOSEC) 20 mg capsule Take 1 capsule by mouth once daily 90 Capsule 0    furosemide (LASIX) 40 mg tablet Take 1 tablet by mouth once daily 90 Tablet 3    isosorbide mononitrate ER (IMDUR) 60 mg CR tablet Take 1 tablet by mouth once daily 90 Tablet 3    metoprolol tartrate (LOPRESSOR) 25 mg tablet Take 1/2 (one-half) tablet by mouth twice daily 90 Tablet 3    albuterol (Ventolin HFA) 90 mcg/actuation inhaler Take 2 Puffs by inhalation every four (4) hours as needed for Wheezing. 2 Each 5    levothyroxine (Euthyrox) 50 mcg tablet Take 1 Tablet by mouth Daily (before breakfast). 90 Tablet 3    acetaminophen (TYLENOL EXTRA STRENGTH PO) Take  by mouth as needed. coenzyme q10 10 mg cap Take 100 mg by mouth every evening. No current facility-administered medications on file prior to visit. 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 No tobacco, or drugs. Family History: F CVAs. Brother valve disease. Review of systems: Unremarkable except as noted above. Objective:    Visit Vitals  /73 (BP 1 Location: Left upper arm, BP Patient Position: Sitting, BP Cuff Size: Adult)   Pulse 73   Temp 98.4 °F (36.9 °C) (Temporal)   Resp 16   Ht 6' 1\" (1.854 m)   Wt 227 lb 3.2 oz (103.1 kg)   SpO2 94%   BMI 29.98 kg/m²     Gen: Pleasant 81 y.o.  male in NAD. HEENT: PERRLA. EOMI. OP moist and pink. Neck: Supple. No LAD. HEART: RRR, No M/G/R.   LUNGS: CTAB No W/R. ABDOMEN: S, NT, ND, BS+. EXTREMITIES: Warm. No C/C/E. MUSCULOSKELETAL: Normal ROM, muscle strength 5/5 all groups. NEURO: Alert and oriented x 3. Cranial nerves grossly intact. No focal sensory or motor deficits noted. SKIN: Warm. Dry. The \"red spot\" turns out to be an embedded tick. Lab Results   Component Value Date/Time    Cholesterol, total 201 (H) 06/09/2022 09:54 AM    HDL Cholesterol 49 06/09/2022 09:54 AM    LDL, calculated 127.8 (H) 06/09/2022 09:54 AM    VLDL, calculated 24.2 06/09/2022 09:54 AM    Triglyceride 121 06/09/2022 09:54 AM    CHOL/HDL Ratio 4.1 06/09/2022 09:54 AM     Lab Results   Component Value Date/Time    Hemoglobin A1c 6.3 (H) 06/09/2022 09:54 AM       Assessment / Plan:   CAD: No CP. As per his cardiologist, Dr. Trang Urrutia.   MARCELO isaacs, SSS: now with pacemaker. As per cardiology. Aortic stenosis: s/p AVR. Doing well. Asthma: Stable. Uses albuterol prn; Less than daily currently. Prostate Cancer: So far, doing okay with surveillance. Patient reaffirms desire for conservative approach today. Surveillance for now. Pt willing to do hormonal treatments if PSA rises to higher levels. His urologist has suggested a PSA of 20 or above as a triggering value  Problem drinking: He has mostly stopped drinking since COVID. Hyperlipidemia:  Not at goal, but options limited. Did not tolerate simvastatin or zetia. Now on fish oil. Will recheck lipids and CMP. Continue pulse dosing of Crestor. GERD:  Controlled on prn prilosec; Pt. may continue this. ED: Not discussed. All rhinitis: Stable. Possible TIA/Diplopia/Amaurosis fugax: No recent episode. Work up as above. Gout: no recent flares. Borderline DM. Recheck labs. Obesity: Watch diet. More exercise as able (limited now by postsurgical recovery). Hx of Noncompliance. Tick: This was completely removed with forceps, and remained alive, moving until killed. The skin was cleaned with alcohol. The tick appears to be a dog tick. Follow up in 6 months.

## 2022-10-31 NOTE — PROGRESS NOTES
Rm    Chief Complaint   Patient presents with    ED Follow-up     HTN follow up        Visit Vitals  /73 (BP 1 Location: Left upper arm, BP Patient Position: Sitting, BP Cuff Size: Adult)   Pulse 73   Temp 98.4 °F (36.9 °C) (Temporal)   Resp 16   Ht 6' 1\" (1.854 m)   Wt 227 lb 3.2 oz (103.1 kg)   SpO2 94%   BMI 29.98 kg/m²        1. Have you been to the ER, urgent care clinic since your last visit? Hospitalized since your last visit? Yes 10/25/22 [facility] ED    2. Have you seen or consulted any other health care providers outside of the [address] [place] [address] since your last visit? Include any pap smears or colon screening. No     Health Maintenance Due   Topic Date Due    Shingrix Vaccine Age 49> (2 of 2) 12/01/2020    COVID-19 Vaccine (4 - Booster for Moderna series) 11/18/2021    Flu Vaccine (1) 08/01/2022        3 most recent PHQ Screens 10/31/2022   Little interest or pleasure in doing things Not at all   Feeling down, depressed, irritable, or hopeless Not at all   Total Score PHQ 2 0        Fall Risk Assessment, last 12 mths 10/31/2022   Able to walk? Yes   Fall in past 12 months? 0   Do you feel unsteady? 0   Are you worried about falling 0   Is the gait abnormal? -   Number of falls in past 12 months -   Fall with injury?  -       Learning Assessment 9/23/2014   PRIMARY LEARNER Patient   HIGHEST LEVEL OF EDUCATION - PRIMARY LEARNER  -   BARRIERS PRIMARY LEARNER -   CO-LEARNER CAREGIVER -   PRIMARY LANGUAGE ENGLISH   LEARNER PREFERENCE PRIMARY DEMONSTRATION   ANSWERED BY SELF   RELATIONSHIP SELF

## 2023-04-03 ENCOUNTER — OFFICE VISIT (OUTPATIENT)
Dept: URGENT CARE | Age: 88
End: 2023-04-03
Payer: MEDICARE

## 2023-04-03 DIAGNOSIS — L03.115 CELLULITIS OF RIGHT LOWER EXTREMITY: Primary | ICD-10-CM

## 2023-04-03 PROCEDURE — G8428 CUR MEDS NOT DOCUMENT: HCPCS | Performed by: NURSE PRACTITIONER

## 2023-04-03 PROCEDURE — 1101F PT FALLS ASSESS-DOCD LE1/YR: CPT | Performed by: NURSE PRACTITIONER

## 2023-04-03 PROCEDURE — G8536 NO DOC ELDER MAL SCRN: HCPCS | Performed by: NURSE PRACTITIONER

## 2023-04-03 PROCEDURE — 1123F ACP DISCUSS/DSCN MKR DOCD: CPT | Performed by: NURSE PRACTITIONER

## 2023-04-03 PROCEDURE — G8417 CALC BMI ABV UP PARAM F/U: HCPCS | Performed by: NURSE PRACTITIONER

## 2023-04-03 PROCEDURE — 99213 OFFICE O/P EST LOW 20 MIN: CPT | Performed by: NURSE PRACTITIONER

## 2023-04-03 PROCEDURE — G8432 DEP SCR NOT DOC, RNG: HCPCS | Performed by: NURSE PRACTITIONER

## 2023-04-03 RX ORDER — CEPHALEXIN 500 MG/1
500 CAPSULE ORAL 4 TIMES DAILY
Qty: 28 CAPSULE | Refills: 0 | Status: SHIPPED | OUTPATIENT
Start: 2023-04-03 | End: 2023-04-10

## 2023-04-03 RX ORDER — BEMPEDOIC ACID 180 MG/1
TABLET, FILM COATED ORAL
COMMUNITY

## 2023-04-03 NOTE — PROGRESS NOTES
Danilo Trivedi is a 81 y.o. male presenting to clinic today reporting an insect bite to the right calf area that he first noticed yesterday. States that there is now a red area around it. He did not see the creature that bit him. Denies drainage, fevers, or chills. No other complaints today. The history is provided by the patient. History limited by: nothing. Insect Bite  Pertinent negatives include no chest pain, no abdominal pain and no shortness of breath. Past Medical History:   Diagnosis Date    Adverse effect of anesthesia     difficult with waking up     Allergic rhinitis     Arthritis     Asthma     Atrial flutter ([address] 9/12/2014    CAD (coronary artery disease)     Dr. Mayra Curiel    Calculus of kidney     Chest pain 2006    Normal stress echo    Chronic bronchitis     Chronic bronchitis ([facility] 10/3/2012    Chronic pain     knees    Erectile dysfunction     GERD (gastroesophageal reflux disease)     hiatal hernia    Gout 1/24/2013    Hemoptysis 2008    Work up by Dr. Carmelo Espinosa;  Negative    Hiatal hernia     Hypercholesterolemia     Hypertension     Ill-defined condition     bronchitis    Melanoma ([facility] [address]     back    Nausea & vomiting     Pacemaker     Dr. Tierra Cadena    Prostate cancer [facility]) 6/4/2012    S/P ablation of atrial flutter 9/12/2014    Sleep apnea     states never had test for apnea    Stroke [facility]) 2009     tia no residual problems    Thyroid disease     Valvular heart disease         Past Surgical History:   Procedure Laterality Date    HX AFIB ABLATION  2014    Dr. Sho Pittman    ruptured appendix    HX HEART CATHETERIZATION      [address]    HX HERNIA REPAIR  12/17/14    Recurrent left inguinal hernia; Dr. Trista Linton      5 hernia repairs total    HX PACEMAKER  12/2014    HX PACEMAKER PLACEMENT  2014    Dr. Castillo Slidejenelle    Kidney stone extraction    [place] COLSC FLX W/RMVL OF TUMOR POLYP LESION SNARE TQ  4/21/2011         [place] UNLISTED PROCEDURE CARDIAC SURGERY      ablation         Family History   Problem Relation Age of Onset    Stroke Father     Heart Disease Mother         Social History     Socioeconomic History    Marital status:      Spouse name: Not on file    Number of children: Not on file    Years of education: Not on file    Highest education level: Not on file   Occupational History    Not on file   Tobacco Use    Smoking status: Never    Smokeless tobacco: Never   Vaping Use    Vaping Use: Never used   Substance and Sexual Activity    Alcohol use: Yes     Alcohol/week: 3.0 standard drinks     Types: 1 Shots of liquor, 2 Standard drinks or equivalent per week     Comment: rare    Drug use: No     Types: OTC, Prescription    Sexual activity: Not Currently     Partners: Female   Other Topics Concern    Not on file   Social History Narrative    Not on file     Social Determinants of Health     Financial Resource Strain: Not on file   Food Insecurity: Not on file   Transportation Needs: Not on file   Physical Activity: Not on file   Stress: Not on file   Social Connections: Not on file   Intimate Partner Violence: Not on file   Housing Stability: Not on file                ALLERGIES: Azithromycin, Gadolinium-containing contrast media, Acetaminophen, Contrast dye [iodine], Crestor [rosuvastatin], Levaquin [levofloxacin], Medrol [methylprednisolone], Norvasc [amlodipine], Nsaids (non-steroidal anti-inflammatory drug), Percocet [oxycodone-acetaminophen], Ranexa [ranolazine], Simvastatin, Voltaren [diclofenac sodium], and Zetia [ezetimibe]    Review of Systems   Constitutional:  Negative for chills and fever. HENT:  Negative for congestion, rhinorrhea and sinus pain. Respiratory:  Negative for cough, chest tightness and shortness of breath. Cardiovascular:  Negative for chest pain. Gastrointestinal:  Negative for abdominal pain, diarrhea, nausea and vomiting.    Skin:         Insect bite right calf     Vitals:    04/03/23 1119   BP: 104/67   Pulse: 78   Resp: 16   Temp: 98.7 °F (37.1 °C)   SpO2: 96%   Weight: 234 lb (106.1 kg)       Physical Exam  Vitals and nursing note reviewed. Constitutional:       General: He is not in acute distress. Appearance: Normal appearance. He is not ill-appearing, toxic-appearing or diaphoretic. HENT:      Head: Normocephalic and atraumatic. Eyes:      Extraocular Movements: Extraocular movements intact. Conjunctiva/sclera: Conjunctivae normal.   Cardiovascular:      Rate and Rhythm: Normal rate. Pulmonary:      Effort: Pulmonary effort is normal. No respiratory distress. Comments: Speaking in complete sentences without difficulty. Skin:     General: Skin is warm and dry. Comments: RIGHT POSTERIOR LOWER EXTREMITY: Approximately 4cm x4cm area of erythema, +warmth and mild tenderness, no fluctuance. Neurological:      Mental Status: He is alert. Psychiatric:         Mood and Affect: Mood normal.         Behavior: Behavior normal.       MDM  PLAN:  Patient presents to clinic today with cellulitis on the posterior right lower extremity and reports that he had an insect bite in this location over the weekend. Afebrile, nontoxic appearing. Rx keflex  Follow up with PCP or return to clinic if symptoms persist/ worsen. Go to ED with development of any acute symptoms. DIAGNOSIS:    ICD-10-CM ICD-9-CM    1. Cellulitis of right lower extremity  L03.115 682.6         Medications Ordered Today   Medications    cephALEXin (KEFLEX) 500 mg capsule     Sig: Take 1 Capsule by mouth four (4) times daily for 7 days.      Dispense:  28 Capsule     Refill:  0         Procedures home

## 2023-04-03 NOTE — PATIENT INSTRUCTIONS
Follow up with your primary care provider if symptoms persist.  Go to the Emergency Department with development of any acute symptoms. Continue to use hydrocortisone cream/ ointment on the area.

## 2023-04-19 ENCOUNTER — LAB ONLY (OUTPATIENT)
Dept: INTERNAL MEDICINE CLINIC | Age: 88
End: 2023-04-19

## 2023-04-19 DIAGNOSIS — R73.9 BORDERLINE HYPERGLYCEMIA: ICD-10-CM

## 2023-04-19 DIAGNOSIS — E03.9 ACQUIRED HYPOTHYROIDISM: ICD-10-CM

## 2023-04-19 DIAGNOSIS — E78.00 HYPERCHOLESTEROLEMIA: ICD-10-CM

## 2023-04-19 DIAGNOSIS — M10.9 GOUT, UNSPECIFIED CAUSE, UNSPECIFIED CHRONICITY, UNSPECIFIED SITE: ICD-10-CM

## 2023-04-19 DIAGNOSIS — C61 PROSTATE CANCER (HCC): ICD-10-CM

## 2023-04-20 LAB
CHOLEST SERPL-MCNC: 191 MG/DL
EST. AVERAGE GLUCOSE BLD GHB EST-MCNC: 134 MG/DL
HBA1C MFR BLD: 6.3 % (ref 4–5.6)
HDLC SERPL-MCNC: 40 MG/DL
HDLC SERPL: 4.8 (ref 0–5)
LDLC SERPL CALC-MCNC: 121.2 MG/DL (ref 0–100)
T4 FREE SERPL-MCNC: 1.1 NG/DL (ref 0.8–1.5)
TRIGL SERPL-MCNC: 149 MG/DL (ref ?–150)
TSH SERPL DL<=0.05 MIU/L-ACNC: 2.37 UIU/ML (ref 0.36–3.74)
URATE SERPL-MCNC: 6.7 MG/DL (ref 3.5–7.2)
VLDLC SERPL CALC-MCNC: 29.8 MG/DL

## 2023-04-21 LAB
% FREE PSA, 480797: 12.2 %
PSA SERPL-MCNC: 8.9 NG/ML (ref 0–4)
PSA, FREE, 480853: 1.09 NG/ML
REFLEX CRITERIA: ABNORMAL

## 2023-04-25 ENCOUNTER — OFFICE VISIT (OUTPATIENT)
Dept: INTERNAL MEDICINE CLINIC | Age: 88
End: 2023-04-25
Payer: MEDICARE

## 2023-04-25 VITALS
HEART RATE: 70 BPM | HEIGHT: 73 IN | WEIGHT: 234.4 LBS | SYSTOLIC BLOOD PRESSURE: 108 MMHG | BODY MASS INDEX: 31.07 KG/M2 | OXYGEN SATURATION: 95 % | DIASTOLIC BLOOD PRESSURE: 65 MMHG | TEMPERATURE: 97.6 F | RESPIRATION RATE: 16 BRPM

## 2023-04-25 DIAGNOSIS — C61 PROSTATE CANCER (HCC): ICD-10-CM

## 2023-04-25 DIAGNOSIS — E03.9 ACQUIRED HYPOTHYROIDISM: ICD-10-CM

## 2023-04-25 DIAGNOSIS — Z00.00 MEDICARE ANNUAL WELLNESS VISIT, SUBSEQUENT: ICD-10-CM

## 2023-04-25 DIAGNOSIS — M17.12 OSTEOARTHRITIS OF LEFT KNEE, UNSPECIFIED OSTEOARTHRITIS TYPE: Primary | ICD-10-CM

## 2023-04-25 DIAGNOSIS — M10.9 GOUT, UNSPECIFIED CAUSE, UNSPECIFIED CHRONICITY, UNSPECIFIED SITE: ICD-10-CM

## 2023-04-25 DIAGNOSIS — E78.00 HYPERCHOLESTEROLEMIA: ICD-10-CM

## 2023-04-25 DIAGNOSIS — I49.5 SSS (SICK SINUS SYNDROME) (HCC): ICD-10-CM

## 2023-04-25 DIAGNOSIS — R73.9 BORDERLINE HYPERGLYCEMIA: ICD-10-CM

## 2023-04-25 DIAGNOSIS — I25.118 CORONARY ARTERY DISEASE OF NATIVE ARTERY OF NATIVE HEART WITH STABLE ANGINA PECTORIS (HCC): ICD-10-CM

## 2023-04-25 DIAGNOSIS — N18.31 STAGE 3A CHRONIC KIDNEY DISEASE (HCC): ICD-10-CM

## 2023-04-25 DIAGNOSIS — J42 CHRONIC BRONCHITIS, UNSPECIFIED CHRONIC BRONCHITIS TYPE (HCC): ICD-10-CM

## 2023-04-25 DIAGNOSIS — I25.10 CORONARY ARTERY DISEASE INVOLVING NATIVE CORONARY ARTERY OF NATIVE HEART WITHOUT ANGINA PECTORIS: ICD-10-CM

## 2023-04-25 PROCEDURE — G0439 PPPS, SUBSEQ VISIT: HCPCS | Performed by: INTERNAL MEDICINE

## 2023-04-25 PROCEDURE — G8417 CALC BMI ABV UP PARAM F/U: HCPCS | Performed by: INTERNAL MEDICINE

## 2023-04-25 PROCEDURE — G8510 SCR DEP NEG, NO PLAN REQD: HCPCS | Performed by: INTERNAL MEDICINE

## 2023-04-25 PROCEDURE — G8427 DOCREV CUR MEDS BY ELIG CLIN: HCPCS | Performed by: INTERNAL MEDICINE

## 2023-04-25 PROCEDURE — G8536 NO DOC ELDER MAL SCRN: HCPCS | Performed by: INTERNAL MEDICINE

## 2023-04-25 PROCEDURE — 1101F PT FALLS ASSESS-DOCD LE1/YR: CPT | Performed by: INTERNAL MEDICINE

## 2023-04-25 RX ORDER — LEVOTHYROXINE SODIUM 50 UG/1
TABLET ORAL
Qty: 90 TABLET | Refills: 0 | Status: SHIPPED | OUTPATIENT
Start: 2023-04-25

## 2023-04-25 RX ORDER — TRIAMCINOLONE ACETONIDE 5 MG/G
CREAM TOPICAL 2 TIMES DAILY
Qty: 15 G | Refills: 0 | Status: SHIPPED | OUTPATIENT
Start: 2023-04-25

## 2023-04-25 NOTE — PATIENT INSTRUCTIONS
Medicare Wellness Visit, Male    The best way to live healthy is to have a lifestyle where you eat a well-balanced diet, exercise regularly, limit alcohol use, and quit all forms of tobacco/nicotine, if applicable. Regular preventive services are another way to keep healthy. Preventive services (vaccines, screening tests, monitoring & exams) can help personalize your care plan, which helps you manage your own care. Screening tests can find health problems at the earliest stages, when they are easiest to treat. [facility] follows the current, evidence-based guidelines published by the [facility] ([facility]) when recommending preventive services for our patients. Because we follow these guidelines, sometimes recommendations change over time as research supports it. (For example, a prostate screening blood test is no longer routinely recommended for men with no symptoms). Of course, you and your doctor may decide to screen more often for some diseases, based on your risk and co-morbidities (chronic disease you are already diagnosed with). Preventive services for you include:  - Medicare offers their members a free annual wellness visit, which is time for you and your primary care provider to discuss and plan for your preventive service needs.  Take advantage of this benefit every year!    -Over the age of 72 should receive the recommended pneumonia vaccines.   -All adults should have a flu vaccine yearly.  -All adults should receive a tetanus vaccine every 10 years.  -Over the age of 48 should receive the shingles vaccines.    -All adults should be screened once for Hepatitis C.  -All adults age 38-68 who are overweight should have a diabetes screening test once every three years.   -Other screening tests & preventive services for persons with diabetes include: an eye exam to screen for diabetic retinopathy, a kidney function test, a foot exam, and stricter control over your cholesterol.   -Cardiovascular screening for adults with routine risk involves an electrocardiogram (ECG) at intervals determined by the provider.     -Colorectal cancer screening should be done for adults age 43-69 with no increased risk factors for colorectal cancer. There are a number of acceptable methods of screening for this type of cancer. Each test has its own benefits and drawbacks. Discuss with your provider what is most appropriate for you during your annual wellness visit. The different tests include: colonoscopy (considered the best screening method), a fecal occult blood test, a fecal DNA test, and sigmoidoscopy.    -Lung cancer screening is recommended annually with a low dose CT scan for adults between age 54 and 68, who have smoked at least 30 pack years (equivalent of 1 pack per day for 30 days), and who is a current smoker or quit less than 15 years ago. -An Abdominal Aortic Aneurysm (AAA) Screening is recommended for men age 73-68 who has ever smoked in their lifetime.      Here is a list of your current Health Maintenance items (your personalized list of preventive services) with a due date:  Health Maintenance Due   Topic Date Due    Shingles Vaccine (2 of 2) 12/01/2020    COVID-19 Vaccine (4 - Booster for [employer] series) 11/18/2021

## 2023-04-25 NOTE — PROGRESS NOTES
SUBJECTIVE  Mr. iT Soto is a 81 y.o. male patient of mine who presents today for routine follow up visit. Chief Complaint   Patient presents with    Follow-up       He is to get L knee partial replacement by Dr. Aixa Pizano soon. He has OA which has caused progressive pain and debility. He denies acute illness, fevers, etc. He denies dyspnea, chest pain. No focal neuro deficits. He has a rash on legs, which he thinks is something biting him at night. His legs exhibit papules and excoriations. \"That damn bite went away and now it's coming back. \" He has a derm appt coming up next month. He had a more pronounced bite and was seen at [facility] on 4/3, given antibiotics for cellulitis. \"I had a black spot. \" (Interestingly, this seems to have happened in 2022 as well, and he was treated [facility]). Chronic bronchitis has been doing well. He has seen Dr. Eric Mcnair. Uses albuterol prn. Doing better. He has undergone valve replacement in March 2016 with Dr. Paige Borja. It is recalled from early 2016 that he saw Dr. Deangelo Wall, for exertional dyspnea, and had cardiac cath. Knee pain: \"Doing pretty good right now. \"   Sees Dr. Jorge Vasquez, and has had corticosteroid injections. Surgery on hold due to heart issues. He has prostate cancer, but doesn't want to treat this. He has had biopsy proven adenocarcinoma of prostate, by Dr. Brenda Woody with [facility]. As we noted before: At this time \"I aint doin' nothin'. If the PSA gets high, then I'll take the shots. \"  From our records, it appears he was referred to [facility] for consideration of radiation therapy. He refused this. Past Medical History: Hyperlipidemia, chronic bronchitis, allergic rhinitis, urolithiasis, hiatal hernia with GERD, ED, Asthma, prostate cancer 2012 (Dr. Brenda Woody). Normal stress echo in 2006; Echo 2010 showed LVH, EF 55-60%. Hemoptysis with negative workup in 2008--saw Dr. Nakita Ray.   EGD in 2009 Dr. Tiny Diaz showing Hiatal hernia. R lid chronic ptosis noted in 2009 by Dr. Phil Barajas, optometrist.  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 Had exertional chest pain in 2012, recurred in 2014--cardiac work up with Dr. Alisson Mcrae. His ophthalmologist is Dr. Ghulam Langston. Past Surgical History: Hernia in 86. Kidney stone 85. Appy (ruptured) in 79. Resection of melanoma. Colon polypectomy 2011 Dr. Neha Paulson. A flutter ablation 2014 Dr. Rama Jean 2014 Dr. Silver Andrews repair 2014 Dr. Ana Myrick. Aortic valve replacement 2016. Cataracts 2016. Allergies: IVP dye (BP drop). Medications:   Current Outpatient Medications on File Prior to Visit   Medication Sig Dispense Refill    bempedoic acid (Nexletol) 180 mg tab Nexletol 180 mg tablet   Take by oral route for 90 days. potassium chloride (K-DUR, KLOR-CON M20) 20 mEq tablet Take 1 tablet by mouth once daily 90 Tablet 3    omeprazole (PRILOSEC) 20 mg capsule Take 1 capsule by mouth once daily 90 Capsule 3    furosemide (LASIX) 40 mg tablet Take 1 tablet by mouth once daily 90 Tablet 3    metoprolol tartrate (LOPRESSOR) 25 mg tablet Take 1/2 (one-half) tablet by mouth twice daily 90 Tablet 3    albuterol (Ventolin HFA) 90 mcg/actuation inhaler Take 2 Puffs by inhalation every four (4) hours as needed for Wheezing. 2 Each 5    levothyroxine (Euthyrox) 50 mcg tablet Take 1 Tablet by mouth Daily (before breakfast). 90 Tablet 3    acetaminophen (TYLENOL EXTRA STRENGTH PO) Take  by mouth as needed. coenzyme q10 10 mg cap Take 100 mg by mouth every evening. No current facility-administered medications on file prior to visit. 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 No tobacco, or drugs. Family History: F CVAs. Brother valve disease.   Review of systems: Unremarkable except as noted above.    Objective:    Visit Vitals  /65 (BP 1 Location: Left upper arm, BP Patient Position: Sitting, BP Cuff Size: Adult)   Pulse 70   Temp 97.6 °F (36.4 °C) (Temporal)   Resp 16   Ht 6' 1\" (1.854 m)   Wt 234 lb 6.4 oz (106.3 kg)   SpO2 95%   BMI 30.93 kg/m²     Gen: Pleasant 81 y.o.  male in NAD. HEENT: PERRLA. EOMI. OP moist and pink. Neck: Supple. No LAD. HEART: RRR, No M/G/R.   LUNGS: CTAB No W/R. ABDOMEN: S, NT, ND, BS+. EXTREMITIES: Warm. No C/C/E. MUSCULOSKELETAL: Normal ROM, muscle strength 5/5 all groups. NEURO: Alert and oriented x 3. Cranial nerves grossly intact. No focal sensory or motor deficits noted. SKIN: Warm. Dry. The \"red spot\" turns out to be an embedded tick. Lab Results   Component Value Date/Time    Cholesterol, total 191 04/19/2023 08:17 AM    HDL Cholesterol 40 04/19/2023 08:17 AM    LDL, calculated 121.2 (H) 04/19/2023 08:17 AM    VLDL, calculated 29.8 04/19/2023 08:17 AM    Triglyceride 149 04/19/2023 08:17 AM    CHOL/HDL Ratio 4.8 04/19/2023 08:17 AM     Lab Results   Component Value Date/Time    Hemoglobin A1c 6.3 (H) 04/19/2023 08:17 AM       Assessment / Plan:   L knee OA: OK to proceed with surgery based on guidelines, though he is also pending additional requested clearance from his cardiologist. Visit planned. CAD: No CP. As per his cardiologist, Dr. Lilia Lord. A flutter, SSS: now with pacemaker. As per cardiology. Aortic stenosis: s/p AVR. Doing well. Asthma: Stable. Uses albuterol prn; Less than daily currently. Prostate Cancer: So far, doing okay with surveillance. Patient reaffirms desire for conservative approach today. Surveillance for now. Pt willing to do hormonal treatments if PSA rises to higher levels. His urologist has suggested a PSA of 20 or above as a triggering value  Problem drinking: He has mostly stopped drinking since COVID. Hyperlipidemia:  Not at goal, but options limited. Did not tolerate simvastatin or zetia. Now on fish oil. Will recheck lipids and CMP. Continue pulse dosing of Crestor. GERD:  Controlled on prn prilosec; Pt. may continue this. ED: Not discussed. All rhinitis: Stable. Possible TIA/Diplopia/Amaurosis fugax: No recent episode. Work up as above. Gout: no recent flares. Borderline DM. Recheck labs. Obesity: Watch diet. More exercise as able (limited now by postsurgical recovery). Hx of Noncompliance. Follow up in 6 months.

## 2023-04-25 NOTE — PROGRESS NOTES
1. \"Have you been to the ER, urgent care clinic since your last visit? Hospitalized since your last visit? Yes, Spider bite    2. \"Have you seen or consulted any other health care providers outside of the [address] [place] [address] since your last visit? \" No     3. For patients aged 39-70: Has the patient had a colonoscopy / FIT/ Cologuard?  NA - based on age

## 2023-04-25 NOTE — PROGRESS NOTES
This is the Subsequent Medicare Annual Wellness Exam, performed 12 months or more after the Initial AWV or the last Subsequent AWV    I have reviewed the patient's medical history in detail and updated the computerized patient record. Assessment/Plan   Education and counseling provided:  Are appropriate based on today's review and evaluation    1. Osteoarthritis of left knee, unspecified osteoarthritis type  2. Gout, unspecified cause, unspecified chronicity, unspecified site  -     URIC ACID; Future  3. Prostate cancer ([facility]  -     PSA W/ REFLX FREE PSA; Future  4. Borderline hyperglycemia  -     METABOLIC PANEL, COMPREHENSIVE; Future  -     HEMOGLOBIN A1C WITH EAG; Future  5. Hypercholesterolemia  -     LIPID PANEL; Future  -     METABOLIC PANEL, COMPREHENSIVE; Future  6. Acquired hypothyroidism  -     CBC WITH AUTOMATED DIFF; Future  -     TSH 3RD GENERATION; Future  -     T4, FREE; Future  7. Coronary artery disease involving native coronary artery of native heart without angina pectoris  -     LIPID PANEL; Future  -     METABOLIC PANEL, COMPREHENSIVE; Future  -     CBC WITH AUTOMATED DIFF; Future  8. Chronic bronchitis, unspecified chronic bronchitis type ([facility]  9. SSS (sick sinus syndrome) ([facility]  10. Coronary artery disease of native artery of native heart with stable angina pectoris ([facility]  11. Stage 3a chronic kidney disease (HCC)       Depression Risk Factor Screening     3 most recent PHQ Screens 4/25/2023   Little interest or pleasure in doing things Not at all   Feeling down, depressed, irritable, or hopeless Not at all   Total Score PHQ 2 0       Alcohol & Drug Abuse Risk Screen    Do you average more than 1 drink per night or more than 7 drinks a week: No    In the past three months have you have had more than 4 drinks containing alcohol on one occasion: No          Functional Ability and Level of Safety    Hearing: Hearing is good. Activities of Daily Living:   The home contains: no safety equipment. Patient does total self care      Ambulation: with difficulty, uses a cane     Fall Risk:  Fall Risk Assessment, last 12 mths 4/25/2023   Able to walk? Yes   Fall in past 12 months? 0   Do you feel unsteady? 0   Are you worried about falling 0   Is the gait abnormal? -   Number of falls in past 12 months -   Fall with injury?  -      Abuse Screen:  Patient is not abused       Cognitive Screening    Has your family/caregiver stated any concerns about your memory: no     Cognitive Screening: Normal    Health Maintenance Due     Health Maintenance Due   Topic Date Due    Shingles Vaccine (2 of 2) 12/01/2020    COVID-19 Vaccine (4 - Booster for Moderna series) 11/18/2021    Medicare Yearly Exam  02/03/2023       Patient Care Team   Patient Care Team:  Nevaeh Ramierz MD as PCP - Reilly Chan MD as PCP - [facility] [place] [facility]  Luz Benjamin MD (Orthopedic Surgery)  Elis Hackett MD (Cardiovascular Disease Physician)  Sina Marie MD as Physician (Cardiovascular Disease Physician)  Rex Glass MD as Surgeon (General Surgery)  Blanca Orozco NP as Nurse Practitioner (Nurse Practitioner)  Dr. Lola Ruth (Dental General Practice)  BRYAN Agudelo as Nurse Practitioner (Nurse Practitioner)    History     Patient Active Problem List   Diagnosis Code    Hypercholesterolemia E78.00    Stroke [facility]) I63.9    Asthma J45.909    Calculus of kidney N20.0    Erectile dysfunction N52.9    GERD (gastroesophageal reflux disease) K21.9    Prostate cancer ([facility] C61    Chronic bronchitis ([address] J42    Allergic rhinitis J30.9    Gout M10.9    H/O TIA (transient ischemic attack) and stroke Z86.73    Chest pain R07.9    Sinus tachycardia R00.0    Atrial flutter ([address] I48.92    S/P ablation of atrial flutter Z98.890, Z86.79    Aortic stenosis I35.0    SOB (shortness of breath) R06.02    SSS (sick sinus syndrome) ([facility] I49.5    MARYLOU (obstructive sleep apnea) G47.33    Pacemaker Z95.0    ASHD (arteriosclerotic heart disease) I25.10    S/P AVR (aortic valve replacement) Z95.2    Irregular heartbeat I49.9    Coronary artery disease with stable angina pectoris (HCC) I25.118    NSVT (nonsustained ventricular tachycardia) ([facility]) I47.29    Statin intolerance Z78.9    Stage 3a chronic kidney disease ([facility] N18.31     Past Medical History:   Diagnosis Date    Adverse effect of anesthesia     difficult with waking up     Allergic rhinitis     Arthritis     Asthma     Atrial flutter ([facility] 9/12/2014    CAD (coronary artery disease)     Dr. Vitaly Murguia    Calculus of kidney     Chest pain 2006    Normal stress echo    Chronic bronchitis     Chronic bronchitis ([facility] 10/3/2012    Chronic pain     knees    Erectile dysfunction     GERD (gastroesophageal reflux disease)     hiatal hernia    Gout 1/24/2013    Hemoptysis 2008    Work up by Dr. Romayne Oram;  Negative    Hiatal hernia     Hypercholesterolemia     Hypertension     Ill-defined condition     bronchitis    Melanoma ([facility]     back    Nausea & vomiting     Pacemaker     Dr. Fabrizio Shultz    Prostate cancer [facility]) 6/4/2012    S/P ablation of atrial flutter 9/12/2014    Sleep apnea     states never had test for apnea    Stroke [facility]) 2009     tia no residual problems    Thyroid disease     Valvular heart disease       Past Surgical History:   Procedure Laterality Date    HX AFIB ABLATION  2014    Dr. Mccullough Senior    ruptured appendix    HX HEART CATHETERIZATION      [address]    HX HERNIA REPAIR  12/17/14    Recurrent left inguinal hernia; Dr. Queta Martinez      5 hernia repairs total    HX PACEMAKER  12/2014    HX PACEMAKER PLACEMENT  2014    Dr. Beverly Robles    Kidney stone extraction    [place] COLSC FLX W/RMVL OF TUMOR POLYP LESION SNARE TQ  4/21/2011         [place] UNLISTED PROCEDURE CARDIAC SURGERY      ablation     Current Outpatient Medications   Medication Sig Dispense Refill triamcinolone (ARISTOCORT) 0.5 % topical cream Apply  to affected area two (2) times a day. use thin layer 15 g 0    bempedoic acid (Nexletol) 180 mg tab Nexletol 180 mg tablet   Take by oral route for 90 days. potassium chloride (K-DUR, KLOR-CON M20) 20 mEq tablet Take 1 tablet by mouth once daily 90 Tablet 3    omeprazole (PRILOSEC) 20 mg capsule Take 1 capsule by mouth once daily 90 Capsule 3    furosemide (LASIX) 40 mg tablet Take 1 tablet by mouth once daily 90 Tablet 3    metoprolol tartrate (LOPRESSOR) 25 mg tablet Take 1/2 (one-half) tablet by mouth twice daily 90 Tablet 3    albuterol (Ventolin HFA) 90 mcg/actuation inhaler Take 2 Puffs by inhalation every four (4) hours as needed for Wheezing. 2 Each 5    levothyroxine (Euthyrox) 50 mcg tablet Take 1 Tablet by mouth Daily (before breakfast). 90 Tablet 3    acetaminophen (TYLENOL EXTRA STRENGTH PO) Take  by mouth as needed. coenzyme q10 10 mg cap Take 100 mg by mouth every evening. Allergies   Allergen Reactions    Azithromycin Anaphylaxis    Gadolinium-Containing Contrast Media Other (comments)     1) Moderate to Severe. 2) Post Gadolinium issues as noted:   - Diaphoretic, Near Syncope, Nausea and vomiting.     Acetaminophen Other (comments)     Indigestion,\"burning sensation\"    Contrast Dye [Iodine] Other (comments)     Decrease in BP    Crestor [Rosuvastatin] Myalgia    Levaquin [Levofloxacin] Other (comments)     Joint pain    Medrol [Methylprednisolone] Vertigo    Norvasc [Amlodipine] Other (comments)     Numbness and tingling    Nsaids (Non-Steroidal Anti-Inflammatory Drug) Other (comments)     Cough up blood      Percocet [Oxycodone-Acetaminophen] Itching    Ranexa [Ranolazine] Other (comments)     Cannot remember    Simvastatin Other (comments)     Joint pain    Voltaren [Diclofenac Sodium] Other (comments)     unknown    Zetia [Ezetimibe] Myalgia       Family History   Problem Relation Age of Onset    Stroke Father     Heart Disease Mother      Social History     Tobacco Use    Smoking status: Never    Smokeless tobacco: Never   Substance Use Topics    Alcohol use:  Yes     Alcohol/week: 3.0 standard drinks     Types: 1 Shots of liquor, 2 Standard drinks or equivalent per week     Comment: jennie Rabago MD

## 2023-05-09 DIAGNOSIS — R73.9 BORDERLINE HYPERGLYCEMIA: Primary | ICD-10-CM

## 2023-05-09 DIAGNOSIS — M10.9 GOUT, UNSPECIFIED CAUSE, UNSPECIFIED CHRONICITY, UNSPECIFIED SITE: Primary | ICD-10-CM

## 2023-05-09 DIAGNOSIS — C61 PROSTATE CANCER (HCC): Primary | ICD-10-CM

## 2023-05-09 DIAGNOSIS — E78.00 HYPERCHOLESTEROLEMIA: ICD-10-CM

## 2023-05-09 DIAGNOSIS — I25.10 CORONARY ARTERY DISEASE INVOLVING NATIVE CORONARY ARTERY OF NATIVE HEART WITHOUT ANGINA PECTORIS: ICD-10-CM

## 2023-05-09 DIAGNOSIS — E78.00 HYPERCHOLESTEROLEMIA: Primary | ICD-10-CM

## 2023-05-23 ENCOUNTER — HOSPITAL ENCOUNTER (OUTPATIENT)
Facility: HOSPITAL | Age: 88
Discharge: HOME | End: 2023-05-26
Payer: MEDICARE

## 2023-05-23 VITALS
HEIGHT: 71 IN | BODY MASS INDEX: 32.72 KG/M2 | HEART RATE: 70 BPM | TEMPERATURE: 97.9 F | DIASTOLIC BLOOD PRESSURE: 49 MMHG | RESPIRATION RATE: 16 BRPM | SYSTOLIC BLOOD PRESSURE: 87 MMHG | WEIGHT: 233.69 LBS

## 2023-05-23 LAB
ABO + RH BLD: NORMAL
ALBUMIN SERPL-MCNC: 3.4 G/DL (ref 3.5–5)
ALBUMIN/GLOB SERPL: 0.8 (ref 1.1–2.2)
ALP SERPL-CCNC: 78 U/L (ref 45–117)
ALT SERPL-CCNC: 21 U/L (ref 12–78)
ANION GAP SERPL CALC-SCNC: 2 MMOL/L (ref 5–15)
APPEARANCE UR: CLEAR
AST SERPL-CCNC: 14 U/L (ref 15–37)
BACTERIA URNS QL MICRO: NEGATIVE /HPF
BILIRUB SERPL-MCNC: 0.5 MG/DL (ref 0.2–1)
BILIRUB UR QL: NEGATIVE
BLOOD GROUP ANTIBODIES SERPL: NORMAL
BUN SERPL-MCNC: 18 MG/DL (ref 6–20)
BUN/CREAT SERPL: 18 (ref 12–20)
CALCIUM SERPL-MCNC: 9.4 MG/DL (ref 8.5–10.1)
CHLORIDE SERPL-SCNC: 107 MMOL/L (ref 97–108)
CO2 SERPL-SCNC: 29 MMOL/L (ref 21–32)
COLOR UR: NORMAL
CREAT SERPL-MCNC: 1 MG/DL (ref 0.7–1.3)
EPITH CASTS URNS QL MICRO: NORMAL /LPF
ERYTHROCYTE [DISTWIDTH] IN BLOOD BY AUTOMATED COUNT: 12.8 % (ref 11.5–14.5)
GLOBULIN SER CALC-MCNC: 4.3 G/DL (ref 2–4)
GLUCOSE SERPL-MCNC: 117 MG/DL (ref 65–100)
GLUCOSE UR STRIP.AUTO-MCNC: NEGATIVE MG/DL
HCT VFR BLD AUTO: 41.9 % (ref 36.6–50.3)
HGB BLD-MCNC: 13.2 G/DL (ref 12.1–17)
HGB UR QL STRIP: NEGATIVE
INR PPP: 1 (ref 0.9–1.1)
KETONES UR QL STRIP.AUTO: NEGATIVE MG/DL
LEUKOCYTE ESTERASE UR QL STRIP.AUTO: NEGATIVE
MCH RBC QN AUTO: 31.4 PG (ref 26–34)
MCHC RBC AUTO-ENTMCNC: 31.5 G/DL (ref 30–36.5)
MCV RBC AUTO: 99.5 FL (ref 80–99)
NITRITE UR QL STRIP.AUTO: NEGATIVE
NRBC # BLD: 0 K/UL (ref 0–0.01)
NRBC BLD-RTO: 0 PER 100 WBC
PH UR STRIP: 6.5 (ref 5–8)
PLATELET # BLD AUTO: 200 K/UL (ref 150–400)
PMV BLD AUTO: 8.8 FL (ref 8.9–12.9)
POTASSIUM SERPL-SCNC: 3.9 MMOL/L (ref 3.5–5.1)
PROT SERPL-MCNC: 7.7 G/DL (ref 6.4–8.2)
PROT UR STRIP-MCNC: NEGATIVE MG/DL
PROTHROMBIN TIME: 10.6 SEC (ref 9–11.1)
RBC # BLD AUTO: 4.21 M/UL (ref 4.1–5.7)
RBC #/AREA URNS HPF: NORMAL /HPF (ref 0–5)
SODIUM SERPL-SCNC: 138 MMOL/L (ref 136–145)
SP GR UR REFRACTOMETRY: 1.01
SPECIMEN EXP DATE BLD: NORMAL
URINE CULTURE IF INDICATED: NORMAL
UROBILINOGEN UR QL STRIP.AUTO: 0.2 EU/DL (ref 0.2–1)
WBC # BLD AUTO: 7.2 K/UL (ref 4.1–11.1)
WBC URNS QL MICRO: NORMAL /HPF (ref 0–4)

## 2023-05-23 PROCEDURE — 85027 COMPLETE CBC AUTOMATED: CPT

## 2023-05-23 PROCEDURE — 86850 RBC ANTIBODY SCREEN: CPT

## 2023-05-23 PROCEDURE — 86900 BLOOD TYPING SEROLOGIC ABO: CPT

## 2023-05-23 PROCEDURE — 36415 COLL VENOUS BLD VENIPUNCTURE: CPT

## 2023-05-23 PROCEDURE — 85610 PROTHROMBIN TIME: CPT

## 2023-05-23 PROCEDURE — 81001 URINALYSIS AUTO W/SCOPE: CPT

## 2023-05-23 PROCEDURE — 80053 COMPREHEN METABOLIC PANEL: CPT

## 2023-05-23 PROCEDURE — 86901 BLOOD TYPING SEROLOGIC RH(D): CPT

## 2023-05-23 RX ORDER — PREGABALIN 75 MG/1
75 CAPSULE ORAL ONCE
OUTPATIENT
Start: 2023-06-02

## 2023-05-23 RX ORDER — SODIUM CHLORIDE, SODIUM LACTATE, POTASSIUM CHLORIDE, CALCIUM CHLORIDE 600; 310; 30; 20 MG/100ML; MG/100ML; MG/100ML; MG/100ML
INJECTION, SOLUTION INTRAVENOUS CONTINUOUS
OUTPATIENT
Start: 2023-06-02

## 2023-05-23 RX ORDER — CELECOXIB 200 MG/1
400 CAPSULE ORAL ONCE
OUTPATIENT
Start: 2023-06-02

## 2023-05-23 RX ORDER — ACETAMINOPHEN 500 MG
1000 TABLET ORAL ONCE
OUTPATIENT
Start: 2023-06-02

## 2023-05-23 RX ORDER — ACETAMINOPHEN 500 MG
500 TABLET ORAL EVERY 6 HOURS PRN
COMMUNITY

## 2023-05-23 ASSESSMENT — PAIN SCALES - GENERAL: PAINLEVEL_OUTOF10: 0

## 2023-05-23 NOTE — PROGRESS NOTES
The [address] \"Your Path to a More Active Life\" orthopedic total knee or total hip educational video and the [facility] patient handbook provided & reviewed during the patients pre-admission testing (PAT) appointment. An opportunity for questions was provided, patient verbalized understanding.

## 2023-05-23 NOTE — PROGRESS NOTES
Orthopedic and Spine Patients: Instructions on When You Can   Eat or Drink Before Surgery      You have been provided a pre-surgery drink received at your pre-admission testing appointment. Night before surgery: You should drink 1/2 bottle of the  pre-surgery drink at bedtime. No food after midnight! Day of Surgery:  Complete 2nd half of the bottle of the pre-surgery drink 1 hour prior to arrival at hospital.  For questions call Pre-Admission Testing at [phone]. They are available from 8:00am-5:00pm, Monday through Friday.

## 2023-05-23 NOTE — PROGRESS NOTES
[facility]  Joint/Spine Preoperative Instructions        Surgery Date 06/02/23          Time of Arrival to be called @[phone]    1. On the day of your surgery, please report to the Surgical Services Registration Desk and sign in at your designated time. The Surgery Center is located to the right of the Emergency Room. 2. You must have someone with you to drive you home. You should not drive a car for 24 hours following surgery. Please make arrangements for a friend or family member to stay with you for the first 24 hours after your surgery. 3. No food after midnight. Medications morning of surgery should be taken with a sip of water. Please follow pre-surgery drink instructions that were given at your Pre Admission Testing appointment. 4. We recommend you do not drink any alcoholic beverages for 24 hours before and after your surgery. 5. Contact your surgeons office for instructions on the following medications: non-steroidal anti-inflammatory drugs (i.e. Advil, Aleve), vitamins, and supplements. (Some surgeons will want you to stop these medications prior to surgery and others may allow you to take them)  **If you are currently taking Plavix, Coumadin, Aspirin and/or other blood-thinning agents, contact your surgeon for instructions. ** Your surgeon will partner with the physician prescribing these medications to determine if it is safe to stop or if you need to continue taking. Please do not stop taking these medications without instructions from your surgeon    6. Wear comfortable clothes. Wear glasses instead of contacts. Do not bring any money or jewelry. Please bring picture ID, insurance card, and any prearranged co-payment or hospital payment. Do not wear make-up, particularly mascara the morning of your surgery. Do not wear nail polish, particularly if you are having foot /hand surgery.   Wear your hair loose or down, no ponytails, buns, ryan

## 2023-05-23 NOTE — PROGRESS NOTES

## 2023-05-24 DIAGNOSIS — I25.118 CORONARY ARTERY DISEASE OF NATIVE ARTERY OF NATIVE HEART WITH STABLE ANGINA PECTORIS (HCC): ICD-10-CM

## 2023-05-24 DIAGNOSIS — E03.9 HYPOTHYROIDISM, UNSPECIFIED TYPE: Primary | ICD-10-CM

## 2023-05-24 PROBLEM — Z01.818 ENCOUNTER FOR PREADMISSION TESTING: Status: ACTIVE | Noted: 2023-05-24

## 2023-05-24 LAB
BACTERIA SPEC CULT: NORMAL
BACTERIA SPEC CULT: NORMAL
SERVICE CMNT-IMP: NORMAL

## 2023-05-24 NOTE — PERIOP NOTE
PAT Nurse Practitioner   Pre-Operative Chart Review/Assessment:-ORTHOPEDIC/NEUROSURGICAL SPINE                Patient Name:  Steve Oleary                                                           Age:   81 y.o.    :  1935     Today's Date:  2023     Date of PAT:   23      Date of Surgery:    23     Procedure(s):  Left  Unicondylar Knee Arthroplasty     Surgeon:   Nathan Lopez     Primary Care Provider:    Dr. Weston Commander:      1)  Cardiac Clearance:  Dr. Marjan Franco 23       2)  Sleep apnea screening:  Has dx of SUNIL-noncompliant w/ CPAP       3)   Program for Diabetes Health Consult:  Not indicated-A1C 6.3      4) Treatment for MRSA/MSSA:  Negative       5) Additional Concerns:  UNI:  CAD, aortic stenosis s/p AVR, a flutter s/p ablation, SSS s/p PPM, TIA, asthma, chronic bronchitis, SUNIL (noncompliant), prostate CA, PONV, hiatal hernia w/ GERD, slow to wake after anesthesia. Hx of noncompliance. Multiple drug allergies. Vital Signs:       BP (!) 87/49 Comment: 98/49 in left arm-pt. denies dizziness or any other symptoms. pt. did take pain medicine-tramadol this am. Madelon Apley, NP notified of low BP  Pulse 70   Temp 97.9 °F (36.6 °C) (Oral)   Resp 16   Ht 1.803 m (5' 11\")   Wt 106 kg (233 lb 11 oz)   BMI 32.59 kg/m²                      ____________________________________________  PAST MEDICAL HISTORY  Past Medical History:   Diagnosis Date    Adverse effect of anesthesia     difficult with waking up     Allergic rhinitis     Arthritis     Asthma     Atrial flutter ([address] 2014    CAD (coronary artery disease)     Dr. Marjan Franco    Calculus of kidney     Chest pain 2006    Normal stress echo    Chronic bronchitis ([address] 10/3/2012    Chronic pain     knees    Drooping eyelid, right     Erectile dysfunction     GERD (gastroesophageal reflux disease)     hiatal hernia    Gout 2013    Hemoptysis     Work up by Dr. Kimberly Viveros;  Negative    Hiatal hernia

## 2023-06-01 ENCOUNTER — ANESTHESIA EVENT (OUTPATIENT)
Facility: HOSPITAL | Age: 88
End: 2023-06-01
Payer: MEDICARE

## 2023-06-01 ASSESSMENT — ENCOUNTER SYMPTOMS: SHORTNESS OF BREATH: 1

## 2023-06-01 NOTE — H&P
Darrell Marquis MD - Adult Reconstruction and Total Joint Replacement     Orthopaedic History and Physical        NAME: Isaac Barajas Sr       :  1935       MRN:  [id number]        Subjective:   Patient ID: Arturo Geroges. is a 81 y.o. male. Chief Complaint: Pain of the Left Knee and Pain of the Right Knee  The patient presents today to follow up on bilateral knee pain and arthritis. He is scheduled for a partial knee next month. He has been complaining of rashes around his shin, and worries whether he is still a candidate for surgery. He applies topical steroids over the rashes. He takes 50 mg of Tramadol as-needed.      Patient Active Problem List    Diagnosis Date Noted    Encounter for preadmission testing 2023    Stage 3a chronic kidney disease ([address] 2022    Statin intolerance 2020    NSVT (nonsustained ventricular tachycardia) ([address] 2020    Coronary artery disease with stable angina pectoris ([address] 10/15/2019    Irregular heartbeat 2016    S/P AVR (aortic valve replacement) 2016    ASHD (arteriosclerotic heart disease) 2016    Pacemaker 2015    SUNIL (obstructive sleep apnea) 2014    SSS (sick sinus syndrome) ([address] 2014    SOB (shortness of breath) 2014    Aortic stenosis 2014    Asthma     GERD (gastroesophageal reflux disease)     Hypercholesterolemia     Atrial flutter ([address] 2014    S/P ablation of atrial flutter 2014    H/O TIA (transient ischemic attack) and stroke 2014    Chest pain 2014    Sinus tachycardia 2014    Gout 2013    Chronic bronchitis ([address] 10/03/2012    Allergic rhinitis 10/03/2012    Prostate cancer ([address] 2012    Calculus of kidney     Erectile dysfunction     Stroke [facility])      Past Medical History:   Diagnosis Date    Adverse effect of anesthesia     difficult with waking up     Allergic rhinitis     Arthritis     Asthma     Atrial flutter ([address] 2014    CAD (coronary

## 2023-06-02 ENCOUNTER — HOSPITAL ENCOUNTER (OUTPATIENT)
Facility: HOSPITAL | Age: 88
Setting detail: OBSERVATION
Discharge: HOME | End: 2023-06-03
Attending: ORTHOPAEDIC SURGERY | Admitting: ORTHOPAEDIC SURGERY
Payer: MEDICARE

## 2023-06-02 ENCOUNTER — ANESTHESIA (OUTPATIENT)
Facility: HOSPITAL | Age: 88
End: 2023-06-02
Payer: MEDICARE

## 2023-06-02 DIAGNOSIS — Z96.652 S/P TOTAL KNEE ARTHROPLASTY, LEFT: Primary | ICD-10-CM

## 2023-06-02 PROCEDURE — 6370000000 HC RX 637 (ALT 250 FOR IP)

## 2023-06-02 PROCEDURE — 6360000002 HC RX W HCPCS: Performed by: STUDENT IN AN ORGANIZED HEALTH CARE EDUCATION/TRAINING PROGRAM

## 2023-06-02 PROCEDURE — 2580000003 HC RX 258: Performed by: PHYSICIAN ASSISTANT

## 2023-06-02 PROCEDURE — 2580000003 HC RX 258: Performed by: ANESTHESIOLOGY

## 2023-06-02 PROCEDURE — 3700000001 HC ADD 15 MINUTES (ANESTHESIA): Performed by: ORTHOPAEDIC SURGERY

## 2023-06-02 PROCEDURE — 6360000002 HC RX W HCPCS: Performed by: PHYSICIAN ASSISTANT

## 2023-06-02 PROCEDURE — 6360000002 HC RX W HCPCS: Performed by: REGISTERED NURSE

## 2023-06-02 PROCEDURE — 7100000000 HC PACU RECOVERY - FIRST 15 MIN: Performed by: ORTHOPAEDIC SURGERY

## 2023-06-02 PROCEDURE — G0378 HOSPITAL OBSERVATION PER HR: HCPCS

## 2023-06-02 PROCEDURE — 3700000000 HC ANESTHESIA ATTENDED CARE: Performed by: ORTHOPAEDIC SURGERY

## 2023-06-02 PROCEDURE — 6360000002 HC RX W HCPCS

## 2023-06-02 PROCEDURE — 6360000002 HC RX W HCPCS: Performed by: ORTHOPAEDIC SURGERY

## 2023-06-02 PROCEDURE — 97161 PT EVAL LOW COMPLEX 20 MIN: CPT

## 2023-06-02 PROCEDURE — 64447 NJX AA&/STRD FEMORAL NRV IMG: CPT | Performed by: STUDENT IN AN ORGANIZED HEALTH CARE EDUCATION/TRAINING PROGRAM

## 2023-06-02 PROCEDURE — 2709999900 HC NON-CHARGEABLE SUPPLY: Performed by: ORTHOPAEDIC SURGERY

## 2023-06-02 PROCEDURE — C1776 JOINT DEVICE (IMPLANTABLE): HCPCS | Performed by: ORTHOPAEDIC SURGERY

## 2023-06-02 PROCEDURE — 6370000000 HC RX 637 (ALT 250 FOR IP): Performed by: ORTHOPAEDIC SURGERY

## 2023-06-02 PROCEDURE — 2580000003 HC RX 258: Performed by: ORTHOPAEDIC SURGERY

## 2023-06-02 PROCEDURE — 7100000001 HC PACU RECOVERY - ADDTL 15 MIN: Performed by: ORTHOPAEDIC SURGERY

## 2023-06-02 PROCEDURE — 2500000003 HC RX 250 WO HCPCS: Performed by: REGISTERED NURSE

## 2023-06-02 PROCEDURE — C1713 ANCHOR/SCREW BN/BN,TIS/BN: HCPCS | Performed by: ORTHOPAEDIC SURGERY

## 2023-06-02 PROCEDURE — 97116 GAIT TRAINING THERAPY: CPT

## 2023-06-02 PROCEDURE — 3600000015 HC SURGERY LEVEL 5 ADDTL 15MIN: Performed by: ORTHOPAEDIC SURGERY

## 2023-06-02 PROCEDURE — 3600000005 HC SURGERY LEVEL 5 BASE: Performed by: ORTHOPAEDIC SURGERY

## 2023-06-02 PROCEDURE — 6370000000 HC RX 637 (ALT 250 FOR IP): Performed by: PHYSICIAN ASSISTANT

## 2023-06-02 DEVICE — IMPLANTABLE DEVICE
Type: IMPLANTABLE DEVICE | Site: KNEE | Status: FUNCTIONAL
Brand: PERSONA® PARTIAL KNEE SYSTEM

## 2023-06-02 DEVICE — KNEE K1 TOT HEMI STD CEM IMPL CAPPED K1 ZIM: Type: IMPLANTABLE DEVICE | Status: FUNCTIONAL

## 2023-06-02 DEVICE — CEMENT BNE 20ML 40GM FULL DOSE PMMA W/O ANTIBIO M VISC: Type: IMPLANTABLE DEVICE | Site: KNEE | Status: FUNCTIONAL

## 2023-06-02 RX ORDER — FENTANYL CITRATE 50 UG/ML
25 INJECTION, SOLUTION INTRAMUSCULAR; INTRAVENOUS EVERY 5 MIN PRN
Status: DISCONTINUED | OUTPATIENT
Start: 2023-06-02 | End: 2023-06-02 | Stop reason: HOSPADM

## 2023-06-02 RX ORDER — BISACODYL 10 MG
10 SUPPOSITORY, RECTAL RECTAL DAILY PRN
Status: DISCONTINUED | OUTPATIENT
Start: 2023-06-04 | End: 2023-06-03 | Stop reason: HOSPADM

## 2023-06-02 RX ORDER — SENNA AND DOCUSATE SODIUM 50; 8.6 MG/1; MG/1
1 TABLET, FILM COATED ORAL 2 TIMES DAILY
Status: DISCONTINUED | OUTPATIENT
Start: 2023-06-02 | End: 2023-06-03 | Stop reason: HOSPADM

## 2023-06-02 RX ORDER — CELECOXIB 200 MG/1
400 CAPSULE ORAL ONCE
Status: DISCONTINUED | OUTPATIENT
Start: 2023-06-02 | End: 2023-06-02 | Stop reason: HOSPADM

## 2023-06-02 RX ORDER — SODIUM CHLORIDE 0.9 % (FLUSH) 0.9 %
5-40 SYRINGE (ML) INJECTION PRN
Status: DISCONTINUED | OUTPATIENT
Start: 2023-06-02 | End: 2023-06-03 | Stop reason: HOSPADM

## 2023-06-02 RX ORDER — SODIUM CHLORIDE 9 MG/ML
INJECTION, SOLUTION INTRAVENOUS PRN
Status: DISCONTINUED | OUTPATIENT
Start: 2023-06-02 | End: 2023-06-02 | Stop reason: HOSPADM

## 2023-06-02 RX ORDER — SODIUM CHLORIDE, SODIUM LACTATE, POTASSIUM CHLORIDE, CALCIUM CHLORIDE 600; 310; 30; 20 MG/100ML; MG/100ML; MG/100ML; MG/100ML
INJECTION, SOLUTION INTRAVENOUS CONTINUOUS
Status: DISCONTINUED | OUTPATIENT
Start: 2023-06-02 | End: 2023-06-02 | Stop reason: HOSPADM

## 2023-06-02 RX ORDER — SODIUM CHLORIDE 0.9 % (FLUSH) 0.9 %
5-40 SYRINGE (ML) INJECTION PRN
Status: DISCONTINUED | OUTPATIENT
Start: 2023-06-02 | End: 2023-06-02 | Stop reason: HOSPADM

## 2023-06-02 RX ORDER — 0.9 % SODIUM CHLORIDE 0.9 %
500 INTRAVENOUS SOLUTION INTRAVENOUS ONCE AS NEEDED
Status: DISCONTINUED | OUTPATIENT
Start: 2023-06-02 | End: 2023-06-03 | Stop reason: HOSPADM

## 2023-06-02 RX ORDER — ONDANSETRON 4 MG/1
4 TABLET, ORALLY DISINTEGRATING ORAL EVERY 8 HOURS PRN
Status: DISCONTINUED | OUTPATIENT
Start: 2023-06-02 | End: 2023-06-03 | Stop reason: HOSPADM

## 2023-06-02 RX ORDER — POLYETHYLENE GLYCOL 3350 17 G/17G
17 POWDER, FOR SOLUTION ORAL DAILY
Status: DISCONTINUED | OUTPATIENT
Start: 2023-06-02 | End: 2023-06-03 | Stop reason: HOSPADM

## 2023-06-02 RX ORDER — HYDROMORPHONE HYDROCHLORIDE 1 MG/ML
0.5 INJECTION, SOLUTION INTRAMUSCULAR; INTRAVENOUS; SUBCUTANEOUS EVERY 5 MIN PRN
Status: DISCONTINUED | OUTPATIENT
Start: 2023-06-02 | End: 2023-06-02 | Stop reason: HOSPADM

## 2023-06-02 RX ORDER — PANTOPRAZOLE SODIUM 40 MG/1
40 TABLET, DELAYED RELEASE ORAL
Status: DISCONTINUED | OUTPATIENT
Start: 2023-06-03 | End: 2023-06-03 | Stop reason: HOSPADM

## 2023-06-02 RX ORDER — BUPIVACAINE HYDROCHLORIDE AND EPINEPHRINE 5; 5 MG/ML; UG/ML
INJECTION, SOLUTION EPIDURAL; INTRACAUDAL; PERINEURAL PRN
Status: DISCONTINUED | OUTPATIENT
Start: 2023-06-02 | End: 2023-06-02 | Stop reason: SDUPTHER

## 2023-06-02 RX ORDER — POTASSIUM CHLORIDE 20 MEQ/1
10 TABLET, EXTENDED RELEASE ORAL 2 TIMES DAILY
Status: DISCONTINUED | OUTPATIENT
Start: 2023-06-02 | End: 2023-06-03 | Stop reason: HOSPADM

## 2023-06-02 RX ORDER — PHENYLEPHRINE HYDROCHLORIDE 10 MG/ML
INJECTION INTRAVENOUS PRN
Status: DISCONTINUED | OUTPATIENT
Start: 2023-06-02 | End: 2023-06-02 | Stop reason: SDUPTHER

## 2023-06-02 RX ORDER — ACETAMINOPHEN 500 MG
1000 TABLET ORAL ONCE
Status: COMPLETED | OUTPATIENT
Start: 2023-06-02 | End: 2023-06-02

## 2023-06-02 RX ORDER — ONDANSETRON 2 MG/ML
4 INJECTION INTRAMUSCULAR; INTRAVENOUS
Status: COMPLETED | OUTPATIENT
Start: 2023-06-02 | End: 2023-06-02

## 2023-06-02 RX ORDER — ASPIRIN 81 MG/1
81 TABLET ORAL 2 TIMES DAILY
Status: DISCONTINUED | OUTPATIENT
Start: 2023-06-02 | End: 2023-06-03 | Stop reason: HOSPADM

## 2023-06-02 RX ORDER — DIPHENHYDRAMINE HCL 25 MG
25 CAPSULE ORAL EVERY 6 HOURS PRN
Status: DISCONTINUED | OUTPATIENT
Start: 2023-06-02 | End: 2023-06-03 | Stop reason: HOSPADM

## 2023-06-02 RX ORDER — SODIUM CHLORIDE 9 MG/ML
INJECTION, SOLUTION INTRAVENOUS CONTINUOUS
Status: DISCONTINUED | OUTPATIENT
Start: 2023-06-02 | End: 2023-06-03 | Stop reason: HOSPADM

## 2023-06-02 RX ORDER — TRAMADOL HYDROCHLORIDE 50 MG/1
50 TABLET ORAL EVERY 6 HOURS PRN
Status: DISCONTINUED | OUTPATIENT
Start: 2023-06-02 | End: 2023-06-02

## 2023-06-02 RX ORDER — ACETAMINOPHEN 325 MG/1
650 TABLET ORAL EVERY 6 HOURS
Status: DISCONTINUED | OUTPATIENT
Start: 2023-06-02 | End: 2023-06-03 | Stop reason: HOSPADM

## 2023-06-02 RX ORDER — HYDROCODONE BITARTRATE AND ACETAMINOPHEN 5; 325 MG/1; MG/1
1 TABLET ORAL EVERY 4 HOURS PRN
Status: DISCONTINUED | OUTPATIENT
Start: 2023-06-02 | End: 2023-06-03 | Stop reason: HOSPADM

## 2023-06-02 RX ORDER — PROPOFOL 10 MG/ML
INJECTION, EMULSION INTRAVENOUS PRN
Status: DISCONTINUED | OUTPATIENT
Start: 2023-06-02 | End: 2023-06-02 | Stop reason: SDUPTHER

## 2023-06-02 RX ORDER — DIPHENHYDRAMINE HYDROCHLORIDE 50 MG/ML
25 INJECTION INTRAMUSCULAR; INTRAVENOUS EVERY 6 HOURS PRN
Status: DISCONTINUED | OUTPATIENT
Start: 2023-06-02 | End: 2023-06-03 | Stop reason: HOSPADM

## 2023-06-02 RX ORDER — ONDANSETRON 2 MG/ML
INJECTION INTRAMUSCULAR; INTRAVENOUS PRN
Status: DISCONTINUED | OUTPATIENT
Start: 2023-06-02 | End: 2023-06-02 | Stop reason: SDUPTHER

## 2023-06-02 RX ORDER — SODIUM CHLORIDE 0.9 % (FLUSH) 0.9 %
5-40 SYRINGE (ML) INJECTION EVERY 12 HOURS SCHEDULED
Status: DISCONTINUED | OUTPATIENT
Start: 2023-06-02 | End: 2023-06-03 | Stop reason: HOSPADM

## 2023-06-02 RX ORDER — SODIUM CHLORIDE 0.9 % (FLUSH) 0.9 %
5-40 SYRINGE (ML) INJECTION EVERY 12 HOURS SCHEDULED
Status: DISCONTINUED | OUTPATIENT
Start: 2023-06-02 | End: 2023-06-02 | Stop reason: HOSPADM

## 2023-06-02 RX ORDER — ROPIVACAINE HYDROCHLORIDE 5 MG/ML
INJECTION, SOLUTION EPIDURAL; INFILTRATION; PERINEURAL PRN
Status: DISCONTINUED | OUTPATIENT
Start: 2023-06-02 | End: 2023-06-02 | Stop reason: ALTCHOICE

## 2023-06-02 RX ORDER — TRAMADOL HYDROCHLORIDE 50 MG/1
100 TABLET ORAL EVERY 6 HOURS PRN
Status: DISCONTINUED | OUTPATIENT
Start: 2023-06-02 | End: 2023-06-02

## 2023-06-02 RX ORDER — PROCHLORPERAZINE EDISYLATE 5 MG/ML
5 INJECTION INTRAMUSCULAR; INTRAVENOUS
Status: DISCONTINUED | OUTPATIENT
Start: 2023-06-02 | End: 2023-06-02 | Stop reason: HOSPADM

## 2023-06-02 RX ORDER — FUROSEMIDE 20 MG/1
20 TABLET ORAL DAILY
Status: DISCONTINUED | OUTPATIENT
Start: 2023-06-03 | End: 2023-06-03 | Stop reason: HOSPADM

## 2023-06-02 RX ORDER — TRAMADOL HYDROCHLORIDE 50 MG/1
TABLET ORAL
Status: COMPLETED
Start: 2023-06-02 | End: 2023-06-02

## 2023-06-02 RX ORDER — KETOROLAC TROMETHAMINE 30 MG/ML
15 INJECTION, SOLUTION INTRAMUSCULAR; INTRAVENOUS ONCE
Status: COMPLETED | OUTPATIENT
Start: 2023-06-02 | End: 2023-06-02

## 2023-06-02 RX ORDER — DEXTROSE MONOHYDRATE 100 MG/ML
INJECTION, SOLUTION INTRAVENOUS CONTINUOUS PRN
Status: DISCONTINUED | OUTPATIENT
Start: 2023-06-02 | End: 2023-06-02 | Stop reason: HOSPADM

## 2023-06-02 RX ORDER — MORPHINE SULFATE 2 MG/ML
2 INJECTION, SOLUTION INTRAMUSCULAR; INTRAVENOUS
Status: DISPENSED | OUTPATIENT
Start: 2023-06-02 | End: 2023-06-03

## 2023-06-02 RX ORDER — LIDOCAINE HYDROCHLORIDE 10 MG/ML
1 INJECTION, SOLUTION EPIDURAL; INFILTRATION; INTRACAUDAL; PERINEURAL
Status: DISCONTINUED | OUTPATIENT
Start: 2023-06-02 | End: 2023-06-02 | Stop reason: HOSPADM

## 2023-06-02 RX ORDER — LEVOTHYROXINE SODIUM 0.05 MG/1
50 TABLET ORAL
Status: DISCONTINUED | OUTPATIENT
Start: 2023-06-03 | End: 2023-06-03 | Stop reason: HOSPADM

## 2023-06-02 RX ORDER — LIDOCAINE HYDROCHLORIDE 20 MG/ML
INJECTION, SOLUTION EPIDURAL; INFILTRATION; INTRACAUDAL; PERINEURAL PRN
Status: DISCONTINUED | OUTPATIENT
Start: 2023-06-02 | End: 2023-06-02 | Stop reason: SDUPTHER

## 2023-06-02 RX ORDER — ONDANSETRON 2 MG/ML
4 INJECTION INTRAMUSCULAR; INTRAVENOUS EVERY 6 HOURS PRN
Status: DISCONTINUED | OUTPATIENT
Start: 2023-06-02 | End: 2023-06-03 | Stop reason: HOSPADM

## 2023-06-02 RX ORDER — TRANEXAMIC ACID 100 MG/ML
INJECTION, SOLUTION INTRAVENOUS PRN
Status: DISCONTINUED | OUTPATIENT
Start: 2023-06-02 | End: 2023-06-02 | Stop reason: SDUPTHER

## 2023-06-02 RX ADMIN — BUPIVACAINE HYDROCHLORIDE AND EPINEPHRINE 50 MG: 5; 5 INJECTION, SOLUTION EPIDURAL; INTRACAUDAL; PERINEURAL at 07:15

## 2023-06-02 RX ADMIN — HYDROCODONE BITARTRATE AND ACETAMINOPHEN 1 TABLET: 5; 325 TABLET ORAL at 22:22

## 2023-06-02 RX ADMIN — MORPHINE SULFATE 2 MG: 2 INJECTION, SOLUTION INTRAMUSCULAR; INTRAVENOUS at 12:36

## 2023-06-02 RX ADMIN — KETOROLAC TROMETHAMINE 15 MG: 30 INJECTION, SOLUTION INTRAMUSCULAR; INTRAVENOUS at 14:03

## 2023-06-02 RX ADMIN — ONDANSETRON 4 MG: 2 INJECTION INTRAMUSCULAR; INTRAVENOUS at 09:15

## 2023-06-02 RX ADMIN — SODIUM CHLORIDE, PRESERVATIVE FREE 10 ML: 5 INJECTION INTRAVENOUS at 11:22

## 2023-06-02 RX ADMIN — METOPROLOL TARTRATE 25 MG: 25 TABLET, FILM COATED ORAL at 21:19

## 2023-06-02 RX ADMIN — TRAMADOL HYDROCHLORIDE 50 MG: 50 TABLET ORAL at 11:21

## 2023-06-02 RX ADMIN — SODIUM CHLORIDE, POTASSIUM CHLORIDE, SODIUM LACTATE AND CALCIUM CHLORIDE: 600; 310; 30; 20 INJECTION, SOLUTION INTRAVENOUS at 08:30

## 2023-06-02 RX ADMIN — CEFAZOLIN 2000 MG: 1 INJECTION, POWDER, FOR SOLUTION INTRAMUSCULAR; INTRAVENOUS at 16:06

## 2023-06-02 RX ADMIN — HYDROCODONE BITARTRATE AND ACETAMINOPHEN 1 TABLET: 5; 325 TABLET ORAL at 18:00

## 2023-06-02 RX ADMIN — HYDROCODONE BITARTRATE AND ACETAMINOPHEN 1 TABLET: 5; 325 TABLET ORAL at 14:13

## 2023-06-02 RX ADMIN — Medication 3 AMPULE: at 06:00

## 2023-06-02 RX ADMIN — SODIUM CHLORIDE, POTASSIUM CHLORIDE, SODIUM LACTATE AND CALCIUM CHLORIDE: 600; 310; 30; 20 INJECTION, SOLUTION INTRAVENOUS at 06:29

## 2023-06-02 RX ADMIN — TRAMADOL HYDROCHLORIDE 50 MG: 50 TABLET ORAL at 09:45

## 2023-06-02 RX ADMIN — SENNOSIDES AND DOCUSATE SODIUM 1 TABLET: 50; 8.6 TABLET ORAL at 21:19

## 2023-06-02 RX ADMIN — ACETAMINOPHEN 650 MG: 325 TABLET ORAL at 11:22

## 2023-06-02 RX ADMIN — WATER 2000 MG: 1 INJECTION INTRAMUSCULAR; INTRAVENOUS; SUBCUTANEOUS at 07:52

## 2023-06-02 RX ADMIN — MEPIVACAINE HYDROCHLORIDE 2.8 ML: 15 INJECTION, SOLUTION EPIDURAL; INFILTRATION at 07:13

## 2023-06-02 RX ADMIN — ACETAMINOPHEN 1000 MG: 500 TABLET ORAL at 05:59

## 2023-06-02 RX ADMIN — PROPOFOL 75 MCG/KG/MIN: 10 INJECTION, EMULSION INTRAVENOUS at 07:43

## 2023-06-02 RX ADMIN — SODIUM CHLORIDE: 9 INJECTION, SOLUTION INTRAVENOUS at 11:24

## 2023-06-02 RX ADMIN — ONDANSETRON HYDROCHLORIDE 4 MG: 2 INJECTION, SOLUTION INTRAMUSCULAR; INTRAVENOUS at 08:00

## 2023-06-02 RX ADMIN — SENNOSIDES AND DOCUSATE SODIUM 1 TABLET: 50; 8.6 TABLET ORAL at 11:22

## 2023-06-02 RX ADMIN — ACETAMINOPHEN 650 MG: 325 TABLET ORAL at 18:00

## 2023-06-02 RX ADMIN — PROPOFOL 20 MG: 10 INJECTION, EMULSION INTRAVENOUS at 07:12

## 2023-06-02 RX ADMIN — LIDOCAINE HYDROCHLORIDE 40 MG: 20 INJECTION, SOLUTION EPIDURAL; INFILTRATION; INTRACAUDAL; PERINEURAL at 07:44

## 2023-06-02 RX ADMIN — ASPIRIN 81 MG: 81 TABLET, COATED ORAL at 21:19

## 2023-06-02 RX ADMIN — PHENYLEPHRINE HYDROCHLORIDE 40 MCG: 10 INJECTION INTRAVENOUS at 08:44

## 2023-06-02 RX ADMIN — TRANEXAMIC ACID 1000 MG: 100 INJECTION, SOLUTION INTRAVENOUS at 07:50

## 2023-06-02 ASSESSMENT — PAIN SCALES - GENERAL
PAINLEVEL_OUTOF10: 9
PAINLEVEL_OUTOF10: 7
PAINLEVEL_OUTOF10: 10
PAINLEVEL_OUTOF10: 10
PAINLEVEL_OUTOF10: 8
PAINLEVEL_OUTOF10: 0
PAINLEVEL_OUTOF10: 5
PAINLEVEL_OUTOF10: 7

## 2023-06-02 ASSESSMENT — PAIN DESCRIPTION - ORIENTATION
ORIENTATION: LEFT

## 2023-06-02 ASSESSMENT — PAIN DESCRIPTION - DESCRIPTORS
DESCRIPTORS: BURNING
DESCRIPTORS: ACHING

## 2023-06-02 ASSESSMENT — PAIN DESCRIPTION - LOCATION
LOCATION: KNEE

## 2023-06-02 NOTE — PROGRESS NOTES
TRANSFER - IN REPORT:    Verbal report received from Theron Sandy on Dionicio Dewey Sr  being received from PACU for routine post-op      Report consisted of patient's Situation, Background, Assessment and   Recommendations(SBAR). Information from the following report(s) Nurse Handoff Report, Index, and Intake/Output was reviewed with the receiving nurse. Opportunity for questions and clarification was provided.

## 2023-06-02 NOTE — DISCHARGE INSTRUCTIONS
Discharge Instructions:  Elizabeth Saavedra     Surgery: Left Unicondylar Knee Arthroplasty         Surgery Date:  6/2/2023    To relieve pain:  Use ice/gel packs.    -Put the ice pack directly over the wound, or anywhere you are hurting or swollen.   -To control pain and swelling, keep ice on regularly, especially after physical activity.  -The packs should stay cold for 3-4 hours. When it is not cold anymore, rotate with the packs in the freezer. Elevate your leg. This will also keep swelling down. Rest for at least 20 minutes between activity or exercises. To keep track of your pain medications, write down what you take and when you take it. The last dose of pain medication you got in the hospital was:     Medication    Dose    Date & Time      Choose your medications based on the pain scale below: To keep your pain under control, take Tylenol every 6 hours for 14 days - even if you feel like you dont need it. For mild to moderate pain (4-6 on pain scale), take one pain pill every 4 hours or as instructed. For severe pain (7-10 on pain scale), take two pain pills every 4 hours or as instructed. To prevent nausea, take your pain medications with food. Pain Scale              As your pain lessens:    Slowly start taking less pain medication. You may do this by waiting longer between doses or by taking smaller doses. Stop using the pain medications as soon as you no longer need it, usually in 2-3 weeks. Aspirin  To prevent blood clots, you will need to take Aspirin 81 mg twice a day for 30 days. To prevent stomach upset or bleeding:  Do not take non-steroidal anti-inflammatory medications (Ibuprofen, Advil, Motrin, Naproxen, etc.)   Take Pepcid 20 mg twice a day, or a similar home medication, while you are taking a blood thinner. Keep your waterproof dressing in place.  It will be removed by your surgeon during

## 2023-06-02 NOTE — H&P
Date of Surgery Update:  Matthew Person Sr was seen and examined on the day of surgery prior to the procedure by the surgical team.    There were no significant clinical changes since the completion of the History and Physical.    Exam today prior to surgery showed no acute cardiac findings, no respiratory difficulty, and no abdominal complaints or pain.        Signed By: Thelma Harris PA-C    June 2, 2023 9:11 AM

## 2023-06-02 NOTE — ANESTHESIA PROCEDURE NOTES
Spinal Block    Patient location during procedure: holding area  End time: 6/2/2023 7:25 AM  Reason for block: primary anesthetic and at surgeon's request  Staffing  Performed: anesthesiologist   Anesthesiologist: Dequan Shepherd MD  Spinal Block  Patient position: sitting  Prep: ChloraPrep and site prepped and draped  Patient monitoring: cardiac monitor, continuous pulse ox, continuous capnometry, frequent blood pressure checks and oxygen  Approach: midline  Location: L3/L4  Provider prep: sterile gloves and mask  Local infiltration: lidocaine  Needle  Needle type: pencil-tip   Needle gauge: 25 G  Needle length: 3.5 in  Assessment  Swirl obtained: Yes  CSF: clear  Attempts: 1  Hemodynamics: stable  Preanesthetic Checklist  Completed: patient identified, IV checked, site marked, risks and benefits discussed, surgical/procedural consents, equipment checked, pre-op evaluation, timeout performed, anesthesia consent given, oxygen available, monitors applied/VS acknowledged, fire risk safety assessment completed and verbalized and blood product R/B/A discussed and consented

## 2023-06-02 NOTE — PLAN OF CARE
Problem: Physical Therapy - Adult  Goal: By Discharge: Performs mobility at highest level of function for planned discharge setting. See evaluation for individualized goals. Description: FUNCTIONAL STATUS PRIOR TO ADMISSION: Patient was independent and active without use of DME. He does drive. HOME SUPPORT PRIOR TO ADMISSION: The patient lived with spouse and helped her some. He is independent, though son says they both help each other. Physical Therapy Goals  Initiated 6/2/2023  1. Patient will move from supine to sit and sit to supine in bed with modified independence within 4 day(s). 2.  Patient will perform sit to stand with supervision/set-up within 4 day(s). 3.  Patient will transfer from bed to chair and chair to bed with contact guard assist using the least restrictive device within 4 day(s). 4.  Patient will ambulate with contact guard assist for 150 feet with the least restrictive device within 4 day(s). 5.  Patient will ascend/descend 2 stairs with 2 handrail(s) with minimal assistance within 4 day(s). 6. Patient will perform all home exercise program per protocol with minimal assistance within 4 days. 7. Patient will demonstrate AROM 0-90 degrees in operative joint within 4 days. Outcome: Not Progressing  PHYSICAL THERAPY EVALUATION    Patient: Tika Ya [de-identified]81 y.o. male)  Date: 6/2/2023  Primary Diagnosis: Osteoarthritis of left knee, unspecified osteoarthritis type [M17.12]  S/P total knee arthroplasty, left [Z96.652]  Procedure(s) (LRB):  LEFT KNEE UNICONDYLAR KNEE ARTHROPLASTY (Left) Day of Surgery   Precautions:                fall      ASSESSMENT :   DEFICITS/IMPAIRMENTS:   The patient is limited by decreased functional mobility, ROM, strength, activity tolerance, endurance, increased pain levels     Based on the impairments listed above patient was fearful about getting up at all stating that his knee was killing him.   He had had all of the pain medicine that was

## 2023-06-02 NOTE — ANESTHESIA POSTPROCEDURE EVALUATION
Department of Anesthesiology  Postprocedure Note    Patient: Rolando Natarajan  MRN: [id number]  YOB: 1935  Date of evaluation: 6/2/2023      Procedure Summary     Date: 06/02/23 Room / Location: [facility] MAIN OR 8 / [facility] MAIN OR    Anesthesia Start: 0737 Anesthesia Stop: 5374    Procedure: LEFT KNEE UNICONDYLAR KNEE ARTHROPLASTY (Left: Knee) Diagnosis:       Osteoarthritis of left knee, unspecified osteoarthritis type      (Osteoarthritis of left knee, unspecified osteoarthritis type [M17.12])    Providers: Koby Love MD Responsible Provider: Nithin Siddiqi MD    Anesthesia Type: Regional, MAC ASA Status: 3          Anesthesia Type: Regional, MAC    Ester Phase I: Ester Score: 10    Ester Phase II:        Anesthesia Post Evaluation    Patient location during evaluation: PACU  Patient participation: complete - patient participated  Level of consciousness: awake and alert  Airway patency: patent  Nausea & Vomiting: no nausea  Complications: no  Cardiovascular status: hemodynamically stable  Respiratory status: acceptable  Hydration status: euvolemic

## 2023-06-02 NOTE — PROGRESS NOTES
End of Shift Note    Bedside shift change report given to NE Larson (oncoming nurse) by Ang Womack RN (offgoing nurse). Report included the following information SBAR, Kardex, Intake/Output, MAR, and Accordion    Shift worked:  7-7     Shift summary and any significant changes:          Concerns for physician to address:       Zone phone for oncoming shift:   [phone]       Activity:     Number times ambulated in hallways past shift: 1  Number of times OOB to chair past shift: 0    Cardiac:   Cardiac Monitoring: No           Access:  Current line(s): PIV     Genitourinary:   Urinary status: voiding    Respiratory:      Chronic home O2 use?: NO  Incentive spirometer at bedside: YES       GI:     Current diet:  ADULT DIET; Regular  Passing flatus: YES  Tolerating current diet: YES       Pain Management:   Patient states pain is manageable on current regimen: YES    Skin:     Interventions: increase time out of bed and PT/OT consult    Patient Safety:  Fall Score:    Interventions: bed/chair alarm, assistive device (walker, cane.  etc), gripper socks, and pt to call before getting OOB       Length of Stay:  Expected LOS: 2  Actual LOS: 0      Ang Womack RN                         CK

## 2023-06-02 NOTE — PROGRESS NOTES
Ortho / Neurosurgery NP Note    POD# 0  s/p LEFT KNEE UNICONDYLAR KNEE ARTHROPLASTY   Pt seen with family present. Pt in bed, awake and alert , recent arrival from PACU  Patient reports expected post op pain, given Tramadol 50 mg in PACU at 0945, patient reports it has not controlled the pain. Patient reports he was taking Tramadol 50 mg PRN PTA for pain control. Patient states unable to take NSAIDS r/t cardiac, renal  hx and hx of GI bleed  Tolerating regular diet, denies nausea  Patient reports he expected to go home today but he concerned about pain prior to physical therapy      VSS Afebrile. Room air     Visit Vitals  BP (!) 154/83   Pulse 70   Temp 97.3 °F (36.3 °C)   Resp 16   Ht 1.829 m (6')   Wt 106 kg (233 lb 11 oz)   SpO2 99%   BMI 31.69 kg/m²       Voiding status: Due to void          Labs    Lab Results   Component Value Date/Time    HGB 13.2 05/23/2023 10:19 AM      Lab Results   Component Value Date/Time    INR 1.0 05/23/2023 10:19 AM      Lab Results   Component Value Date/Time     05/23/2023 10:19 AM    K 3.9 05/23/2023 10:19 AM     05/23/2023 10:19 AM    CO2 29 05/23/2023 10:19 AM    BUN 18 05/23/2023 10:19 AM     Recent Glucose Results:   Glucose   Date Value Ref Range Status   05/23/2023 117 (H) 65 - 100 mg/dL Final   10/25/2022 123 (H) 65 - 100 mg/dL Final   06/09/2022 118 (H) 65 - 100 mg/dL Final           Body mass index is 31.69 kg/m². : A BMI > 30 is classified as obesity and > 40 is classified as morbid obesity. Dressing c.d. I-Ace wrap in place  Cryotherapy in place over incision  Calves soft and supple; No pain with passive stretch  Sensation and motor intact. +PF/DF/EHL intact BLE 5/5  SCDs for mechanical DVT proph while in bed     PLAN:  1) PT BID - WBAT  2) Aspirin 81 mg PO BID for DVT Prophylaxis   3) GI Prophylaxis - Pepcid   4) Pain control - scheduled tylenol  , and prn  tramadol  .  RN to given  dose of Tramadol 50 mg now, will try Tramadol 100 mg at next dose

## 2023-06-02 NOTE — PROGRESS NOTES
TRANSFER - IN REPORT:    Verbal report received from [place] on Dionicio R Peaks Sr  being received from PACU for routine post-op      Report consisted of patient's Situation, Background, Assessment and   Recommendations(SBAR). Information from the following report(s) Nurse Handoff Report, Index, Intake/Output, and MAR was reviewed with the receiving nurse. Opportunity for questions and clarification was provided.

## 2023-06-02 NOTE — ANESTHESIA PRE PROCEDURE
Department of Anesthesiology  Preprocedure Note       Name:  Aubrey Lauren   Age:  81 y.o.  :  1935                                          MRN:  [id number]         Date:  2023      Surgeon: Govind Vaca):  Anika Gonzalez MD    Procedure: Procedure(s):  LEFT KNEE UNICONDYLAR KNEE ARTHROPLASTY (ANES GENERAL WITH REGIONAL BLOCK)    Medications prior to admission:   Prior to Admission medications    Medication Sig Start Date End Date Taking? Authorizing Provider   Multiple Vitamins-Minerals (PRESERVISION AREDS 2 PO) Take 1 tablet by mouth in the morning and at bedtime    Historical Provider, MD   acetaminophen (TYLENOL) 500 MG tablet Take 1 tablet by mouth every 6 hours as needed for Pain    Historical Provider, MD   albuterol sulfate HFA (PROVENTIL;VENTOLIN;PROAIR) 108 (90 Base) MCG/ACT inhaler Inhale 2 puffs into the lungs every 4 hours as needed for Wheezing 22   Ar Automatic Reconciliation   Coenzyme Q10 10 MG CAPS Take 20 mg by mouth every evening    Ar Automatic Reconciliation   furosemide (LASIX) 40 MG tablet Take 1 tablet by mouth daily 22   Ar Automatic Reconciliation   levothyroxine (SYNTHROID) 50 MCG tablet Take 1 tablet by mouth every morning (before breakfast) 22   Ar Automatic Reconciliation   metoprolol tartrate (LOPRESSOR) 25 MG tablet Take 1 tablet by mouth 2 times daily 22   Ar Automatic Reconciliation   omeprazole (PRILOSEC) 20 MG delayed release capsule 1 capsule Daily 22   Ar Automatic Reconciliation   potassium chloride (KLOR-CON M) 20 MEQ extended release tablet 0.5 tablets 2 times daily 23   Ar Automatic Reconciliation       Current medications:    No current facility-administered medications for this encounter.      Current Outpatient Medications   Medication Sig Dispense Refill    Multiple Vitamins-Minerals (PRESERVISION AREDS 2 PO) Take 1 tablet by mouth in the morning and at bedtime      acetaminophen (TYLENOL) 500 MG tablet Take 1 tablet by mouth
POCCL, POCBUN, POCHEMO, POCHCT in the last 72 hours. Coags:   Lab Results   Component Value Date/Time    PROTIME 10.6 05/23/2023 10:19 AM    INR 1.0 05/23/2023 10:19 AM       HCG (If Applicable): No results found for: PREGTESTUR, PREGSERUM, HCG, HCGQUANT     ABGs: No results found for: PHART, PO2ART, XYM6PAX, UHT1MOP, BEART, Y3FRNGGO     Type & Screen (If Applicable):  No results found for: LABABO, LABRH    Drug/Infectious Status (If Applicable):  No results found for: HIV, HEPCAB    COVID-19 Screening (If Applicable):   Lab Results   Component Value Date/Time    COVID19 Not Detected 07/06/2020 08:15 AM           Anesthesia Evaluation  Patient summary reviewed and Nursing notes reviewed history of anesthetic complications:   Airway: Mallampati: III  TM distance: >3 FB   Neck ROM: full  Mouth opening: > = 3 FB   Dental: normal exam         Pulmonary:normal exam    (+) shortness of breath:  sleep apnea: on noncompliant,  asthma:                            Cardiovascular:    (+) hypertension:, valvular problems/murmurs:, pacemaker:, CAD:, dysrhythmias: atrial fibrillation,                ROS comment: Bioprosthetic aortic valve     Neuro/Psych:   (+) CVA:, neuromuscular disease:,             GI/Hepatic/Renal:   (+) hiatal hernia, GERD:,           Endo/Other:                     Abdominal:             Vascular: Other Findings: Abdominal exam deferred          Anesthesia Plan      MAC, regional and spinal     ASA 3       Induction: intravenous. MIPS: Postoperative opioids intended and Prophylactic antiemetics administered. Anesthetic plan and risks discussed with patient. Plan discussed with CRNA.     Attending anesthesiologist reviewed and agrees with Preprocedure content      Post-op pain plan if not by surgeon: single peripheral nerve block            Angela White MD   6/1/2023

## 2023-06-02 NOTE — OP NOTE
Timi Phan MD - Adult Reconstruction and Total Joint Replacement      Ernesto Oseguera  - MRN [id number] - : 1935 (81 y.o.)    Date: 2023    PREOPERATIVE DIAGNOSIS:  Left knee degenerative joint disease    POSTOPERATIVE DIAGNOSIS:  Same    PROCEDURE:  Left unicompartmental knee replacement    Implants Used:   [employer]  Femur: 4  Tibia: F  Poly: 9mm  Implant Name Type Inv. Item Serial No.  Lot No. LRB No. Used Action   PSN PK CMT TIB LM SZ F - SNA  PSN PK CMT TIB LM SZ F NA [employer]- [id number] Left 1 Implanted   PSN PK VE PLY LM SZ F 9MM - SNA  PSN PK VE PLY LM SZ F 9MM NA [employer]- [id number] Left 1 Implanted   PSN PK CMT FEM LM SZ 4 - SNA  PSN PK CMT FEM LM SZ 4 NA [employer]WD [id number] Left 1 Implanted   CEMENT BNE 20ML 40GM FULL DOSE PMMA W/O ANTIBIO M VISC - SNA  CEMENT BNE 20ML 40GM FULL DOSE PMMA W/O ANTIBIO M VISC NA [employer] [facility]- [id number] Left 1 Implanted       Anesthesia: GETA + local    Pre-operative antibiotic: Ancef    Surgeon: Timi Phan     Assist: LORE Aldana (Performing all or most of the following assistant-at-surgery services including but not limited to: proper patient positioning, sterile/prep and draping, placement of instruments/trackers, operative exposure, minor portions of bone / soft tissue excision, final irrigation and debridement, deep and superficial closure, application of final dressings)    EBL: minimal    Drains: none     Specimens: none     Complications: none     Condition: stable to PACU     INDICATIONS: Longstanding knee pain unresponsive to conservative measures. The risks, benefits, and alternatives to the procedure were explained to the patient and they wished to proceed. They understood no guarantees could be given about the outcome of the procedure.     DESCRIPTION OF PROCEDURE:  The patient was brought into the operating room and placed supine on a standard OR

## 2023-06-02 NOTE — CARE COORDINATION
CM met with pt at bedside, introduced role, & verified demographic information (address/insurance/emergency contact) is up-to-date in the chart. D/c plan discussed.     Transportation for d/c: one of his sons  Support post d/c: patient didn't think he was going to need any assistance but now feels he does, reported wife unable to provide support, pt encouraged to reach out to family and friends to arrange support   Does pt own DME needed for d/c: yes  Accepting [facility] agency: referral sent to [facility], [facility], and [facility]  FOC offered: yes        Kolton Garcia, [address]

## 2023-06-02 NOTE — ANESTHESIA PROCEDURE NOTES
Peripheral Block    Patient location during procedure: holding area  Reason for block: post-op pain management and at surgeon's request  Start time: 6/2/2023 7:15 AM  End time: 6/2/2023 7:25 AM  Staffing  Performed: anesthesiologist   Anesthesiologist: Mega Bynum MD  Preanesthetic Checklist  Completed: patient identified, IV checked, site marked, risks and benefits discussed, surgical/procedural consents, equipment checked, pre-op evaluation, timeout performed, anesthesia consent given, oxygen available, monitors applied/VS acknowledged, fire risk safety assessment completed and verbalized and blood product R/B/A discussed and consented  Peripheral Block   Patient position: supine  Prep: ChloraPrep  Provider prep: mask and sterile gloves  Patient monitoring: cardiac monitor, continuous pulse ox, continuous capnometry, frequent blood pressure checks, IV access, oxygen and responsive to questions  Block type: Femoral  Adductor canal  Laterality: left  Injection technique: single-shot  Guidance: nerve stimulator and ultrasound guided    Needle   Needle type: insulated echogenic nerve stimulator needle   Needle gauge: 20 G  Needle localization: nerve stimulator and ultrasound guidance  Needle length: 10 cm  Assessment   Injection assessment: no paresthesia on injection, negative aspiration for heme, local visualized surrounding nerve on ultrasound and no intravascular symptoms  Slow fractionated injection: yes  Hemodynamics: stable  Outcomes: uncomplicated and patient tolerated procedure well    Additional Notes  With nerve block to vastus medialis utilizing nerve stimulator and ultrasound guidance

## 2023-06-03 ENCOUNTER — HOSPITAL ENCOUNTER (EMERGENCY)
Facility: HOSPITAL | Age: 88
Discharge: HOME | End: 2023-06-03
Attending: EMERGENCY MEDICINE

## 2023-06-03 VITALS
WEIGHT: 221 LBS | TEMPERATURE: 98.1 F | HEIGHT: 72 IN | HEART RATE: 91 BPM | OXYGEN SATURATION: 93 % | DIASTOLIC BLOOD PRESSURE: 70 MMHG | SYSTOLIC BLOOD PRESSURE: 137 MMHG | BODY MASS INDEX: 29.93 KG/M2 | RESPIRATION RATE: 18 BRPM

## 2023-06-03 VITALS
RESPIRATION RATE: 18 BRPM | DIASTOLIC BLOOD PRESSURE: 49 MMHG | TEMPERATURE: 98.8 F | SYSTOLIC BLOOD PRESSURE: 107 MMHG | WEIGHT: 233.69 LBS | HEIGHT: 72 IN | BODY MASS INDEX: 31.65 KG/M2 | HEART RATE: 70 BPM | OXYGEN SATURATION: 96 %

## 2023-06-03 DIAGNOSIS — R58 BLEEDING: Primary | ICD-10-CM

## 2023-06-03 DIAGNOSIS — Z48.89 ENCOUNTER FOR POSTOPERATIVE WOUND CHECK: ICD-10-CM

## 2023-06-03 LAB
ANION GAP SERPL CALC-SCNC: 4 MMOL/L (ref 5–15)
BUN SERPL-MCNC: 14 MG/DL (ref 6–20)
BUN/CREAT SERPL: 15 (ref 12–20)
CALCIUM SERPL-MCNC: 8.3 MG/DL (ref 8.5–10.1)
CHLORIDE SERPL-SCNC: 103 MMOL/L (ref 97–108)
CO2 SERPL-SCNC: 28 MMOL/L (ref 21–32)
CREAT SERPL-MCNC: 0.96 MG/DL (ref 0.7–1.3)
GLUCOSE SERPL-MCNC: 146 MG/DL (ref 65–100)
HCT VFR BLD AUTO: 36.1 % (ref 36.6–50.3)
HGB BLD-MCNC: 11.3 G/DL (ref 12.1–17)
POTASSIUM SERPL-SCNC: 4.1 MMOL/L (ref 3.5–5.1)
SODIUM SERPL-SCNC: 135 MMOL/L (ref 136–145)

## 2023-06-03 PROCEDURE — 85014 HEMATOCRIT: CPT

## 2023-06-03 PROCEDURE — G0378 HOSPITAL OBSERVATION PER HR: HCPCS

## 2023-06-03 PROCEDURE — 6370000000 HC RX 637 (ALT 250 FOR IP): Performed by: PHYSICIAN ASSISTANT

## 2023-06-03 PROCEDURE — 97530 THERAPEUTIC ACTIVITIES: CPT

## 2023-06-03 PROCEDURE — 80048 BASIC METABOLIC PNL TOTAL CA: CPT

## 2023-06-03 PROCEDURE — 2580000003 HC RX 258: Performed by: PHYSICIAN ASSISTANT

## 2023-06-03 PROCEDURE — 85018 HEMOGLOBIN: CPT

## 2023-06-03 PROCEDURE — 6370000000 HC RX 637 (ALT 250 FOR IP)

## 2023-06-03 PROCEDURE — 6360000002 HC RX W HCPCS: Performed by: PHYSICIAN ASSISTANT

## 2023-06-03 PROCEDURE — 97116 GAIT TRAINING THERAPY: CPT

## 2023-06-03 PROCEDURE — 36415 COLL VENOUS BLD VENIPUNCTURE: CPT

## 2023-06-03 RX ORDER — FAMOTIDINE 20 MG/1
20 TABLET, FILM COATED ORAL DAILY
Qty: 30 TABLET | Refills: 0 | Status: SHIPPED | OUTPATIENT
Start: 2023-06-03

## 2023-06-03 RX ORDER — POLYETHYLENE GLYCOL 3350 17 G/17G
17 POWDER, FOR SOLUTION ORAL DAILY
Qty: 7 EACH | Refills: 0 | Status: SHIPPED | OUTPATIENT
Start: 2023-06-03 | End: 2023-06-10

## 2023-06-03 RX ORDER — HYDROCODONE BITARTRATE AND ACETAMINOPHEN 5; 325 MG/1; MG/1
1-2 TABLET ORAL EVERY 6 HOURS PRN
Qty: 35 TABLET | Refills: 0 | Status: SHIPPED | OUTPATIENT
Start: 2023-06-03 | End: 2023-06-17

## 2023-06-03 RX ADMIN — HYDROCODONE BITARTRATE AND ACETAMINOPHEN 1 TABLET: 5; 325 TABLET ORAL at 12:24

## 2023-06-03 RX ADMIN — METOPROLOL TARTRATE 25 MG: 25 TABLET, FILM COATED ORAL at 08:18

## 2023-06-03 RX ADMIN — PANTOPRAZOLE SODIUM 40 MG: 40 TABLET, DELAYED RELEASE ORAL at 06:17

## 2023-06-03 RX ADMIN — SENNOSIDES AND DOCUSATE SODIUM 1 TABLET: 50; 8.6 TABLET ORAL at 08:18

## 2023-06-03 RX ADMIN — ACETAMINOPHEN 650 MG: 325 TABLET ORAL at 12:24

## 2023-06-03 RX ADMIN — ASPIRIN 81 MG: 81 TABLET, COATED ORAL at 08:18

## 2023-06-03 RX ADMIN — CEFAZOLIN 2000 MG: 1 INJECTION, POWDER, FOR SOLUTION INTRAMUSCULAR; INTRAVENOUS at 00:54

## 2023-06-03 RX ADMIN — HYDROCODONE BITARTRATE AND ACETAMINOPHEN 1 TABLET: 5; 325 TABLET ORAL at 06:17

## 2023-06-03 RX ADMIN — SODIUM CHLORIDE, PRESERVATIVE FREE 10 ML: 5 INJECTION INTRAVENOUS at 08:21

## 2023-06-03 RX ADMIN — POTASSIUM CHLORIDE 10 MEQ: 1500 TABLET, EXTENDED RELEASE ORAL at 08:18

## 2023-06-03 RX ADMIN — LEVOTHYROXINE SODIUM 50 MCG: 0.05 TABLET ORAL at 06:17

## 2023-06-03 ASSESSMENT — PAIN DESCRIPTION - ORIENTATION
ORIENTATION: LEFT

## 2023-06-03 ASSESSMENT — PAIN SCALES - GENERAL
PAINLEVEL_OUTOF10: 2
PAINLEVEL_OUTOF10: 2
PAINLEVEL_OUTOF10: 6
PAINLEVEL_OUTOF10: 4

## 2023-06-03 ASSESSMENT — PAIN DESCRIPTION - LOCATION
LOCATION: KNEE

## 2023-06-03 ASSESSMENT — PAIN DESCRIPTION - DESCRIPTORS
DESCRIPTORS: ACHING
DESCRIPTORS: SORE

## 2023-06-03 ASSESSMENT — LIFESTYLE VARIABLES
HOW MANY STANDARD DRINKS CONTAINING ALCOHOL DO YOU HAVE ON A TYPICAL DAY: 1 OR 2
HOW OFTEN DO YOU HAVE A DRINK CONTAINING ALCOHOL: MONTHLY OR LESS

## 2023-06-03 ASSESSMENT — PAIN - FUNCTIONAL ASSESSMENT: PAIN_FUNCTIONAL_ASSESSMENT: 0-10

## 2023-06-03 NOTE — H&P
ORTHO PROGRESS NOTE  Post Op day: 1 Day Post-Op    Oma 3, 2023 11:39 AM     Kay Schultz Sr    Attending Physician: Treatment Team: Attending Provider: Cristela Ratliff MD; Surgeon: Cristela Ratliff MD; : Kishan Snachez; Physical Therapist Assistant: Joyce Bond PTA     Vital Signs:    Patient Vitals for the past 8 hrs:   BP Temp Pulse Resp SpO2   06/03/23 1108 (!) 107/49 98.8 °F (37.1 °C) 70 18 96 %   06/03/23 0818 109/61 -- 75 -- --   06/03/23 0736 (!) 99/52 98.1 °F (36.7 °C) 71 18 90 %   06/03/23 0617 -- -- -- 16 --   06/03/23 0415 115/62 99.5 °F (37.5 °C) 82 18 96 %          LAB:    Recent Labs     06/03/23  0530   HCT 36.1*   HGB 11.3*       Objective: General: alert, cooperative, no distress. Cardiac: Normal S1&S2, no murmur. Resp: BBS clear, no wheezing. Gastrointestinal:  Soft, non-tender. Neuro/Vascular: CNS Intact. Sensation stable. Brisk cap refill, 2+ pulses UE/LE  Musculoskeletal:  FROM UE/LE. Nancy's sign negative in bilateral lower extremities. Calves soft, supple, non-tender upon palpation or with passive stretch. Skin: Incision - clean, dry and intact. No significant erythema or swelling. Dressing: clean, dry, and intact    Joshi -   Drain -        PT/OT:   Gait:                      Assessment:    s/p Procedure(s):  LEFT KNEE UNICONDYLAR KNEE ARTHROPLASTY    Principal Problem:    S/P total knee arthroplasty, left  Resolved Problems:    * No resolved hospital problems. *       Plan:   Expressed concerns about discharging home - has two sons available to help him and agreed to dc.  Cleared PT     Signed By: LORE Ash    Physician Assistant, Aleida Guzman

## 2023-06-03 NOTE — PLAN OF CARE
Problem: Physical Therapy - Adult  Goal: By Discharge: Performs mobility at highest level of function for planned discharge setting. See evaluation for individualized goals. Description: FUNCTIONAL STATUS PRIOR TO ADMISSION: Patient was independent and active without use of DME. He does drive. HOME SUPPORT PRIOR TO ADMISSION: The patient lived with spouse and helped her some. He is independent, though son says they both help each other. Physical Therapy Goals  Initiated 6/2/2023  1. Patient will move from supine to sit and sit to supine in bed with modified independence within 4 day(s). 2.  Patient will perform sit to stand with supervision/set-up within 4 day(s). 3.  Patient will transfer from bed to chair and chair to bed with contact guard assist using the least restrictive device within 4 day(s). 4.  Patient will ambulate with contact guard assist for 150 feet with the least restrictive device within 4 day(s). 5.  Patient will ascend/descend 2 stairs with 2 handrail(s) with minimal assistance within 4 day(s). 6. Patient will perform all home exercise program per protocol with minimal assistance within 4 days. 7. Patient will demonstrate AROM 0-90 degrees in operative joint within 4 days. Outcome: Progressing   PHYSICAL THERAPY TREATMENT    Patient: [place] [de-identified]81 y.o. male)  Date: 6/3/2023  Diagnosis: Osteoarthritis of left knee, unspecified osteoarthritis type [M17.12]  S/P total knee arthroplasty, left [Z96.652] S/P total knee arthroplasty, left  Procedure(s) (LRB):  LEFT KNEE UNICONDYLAR KNEE ARTHROPLASTY (Left) 1 Day Post-Op  Precautions:                    ASSESSMENT:  Patient continues to benefit from skilled PT services and is progressing towards goals. Pt was received in semi-supine, very eager to participate with therapy services, slightly impulsive throughout needing close supervision however with no loss of balance.  Pt was able to ambulate to the orthopedic gym and back

## 2023-06-03 NOTE — DISCHARGE SUMMARY
Cheryl Agudelo MD - Adult Reconstruction and Total Joint Replacement    Eli Lyle  - MRN [id number] - : 1935 (81 y.o.)    Discharge Summary    Admit date: 2023    Discharge date and time: No discharge date for patient encounter. Admitting Physician: Cheryl Agudelo MD     Date of Surgery: [unfilled]     Preoperative Diagnosis:  Osteoarthritis of left knee, unspecified osteoarthritis type [M17.12]    Postoperative Diagnosis: * No post-op diagnosis entered *    Procedure(s): Procedure(s):  LEFT KNEE UNICONDYLAR KNEE ARTHROPLASTY    Surgeon: Surgeon(s) and Role:     * Cheryl Agudelo MD - Primary      Anesthesia:  General    HPI:  Pt is a 81 y.o. male who has a history of Osteoarthritis of left knee, unspecified osteoarthritis type [M17.12]  S/P total knee arthroplasty, left [Z96.652]  with pain and limitations of activities of daily living who presents at this time for total joint replacement following the failure of conservative management. PMH:   Past Medical History:   Diagnosis Date    Adverse effect of anesthesia     difficult with waking up     Allergic rhinitis     Arthritis     Asthma     Atrial flutter ([address] 2014    CAD (coronary artery disease)     Dr. Bailey Georges    Calculus of kidney     Chest pain 2006    Normal stress echo    Chronic bronchitis ([address] 10/3/2012    Chronic pain     knees    Drooping eyelid, right     Erectile dysfunction     GERD (gastroesophageal reflux disease)     hiatal hernia    Gout 2013    Hemoptysis     Work up by Dr. Haley Mao;  Negative    Hiatal hernia     Hypercholesterolemia     Hypertension     Melanoma ([address]     back    Nausea & vomiting     Pacemaker     Dr. Saira Patel    Pre-diabetes     Prostate cancer [facility]) 2012    S/P ablation of atrial flutter 2014    Sleep apnea     no cpap    Stroke [facility])      tia no residual problems    Thyroid disease     Valvular heart disease     aortic stenosis s/p AVR       Medications upon admission :

## 2023-06-03 NOTE — PROGRESS NOTES
[facility] has accepted. Per nursing dc planned today. Medicare IM 6-2.     1240 pm Patient has no walker ( rolling ) at home. Needs DME. Choice provided. Willard Ren, RN   1229 pm  Care manager         135 pm Walker delivered to bedside. Form signed. Pt verbalizes understanding that walker may not be covered in full by his insurance.

## 2023-06-03 NOTE — PROGRESS NOTES
End of Shift Note    Bedside shift change report given to Garrison Jackson RN (oncoming nurse) by Holger Weems LPN (offgoing nurse). Report included the following information SBAR, Kardex, Procedure Summary, and MAR    Shift worked:  7p-7a     Shift summary and any significant changes:    POD 1. AxOx4. Up with 1 walker. PRN pain medication given this shift, see MAR. Rested quietly all shift. Concerns for physician to address: none     [id number]       Activity:     Number times ambulated in hallways past shift: 0  Number of times OOB to chair past shift: 0    Cardiac:   Cardiac Monitoring: No           Access:  Current line(s): PIV     Genitourinary:   Urinary status: voiding    Respiratory:      Chronic home O2 use?: NO  Incentive spirometer at bedside: YES       GI:     Current diet:  ADULT DIET; Regular  Passing flatus: YES  Tolerating current diet: YES       Pain Management:   Patient states pain is manageable on current regimen: YES    Skin:     Interventions: float heels and increase time out of bed    Patient Safety:  Fall Score:    Interventions: bed/chair alarm, assistive device (walker, cane.  etc), gripper socks, pt to call before getting OOB, and stay with me (per policy)       Length of Stay:  Expected LOS: 2  Actual LOS: 0

## 2023-06-03 NOTE — PLAN OF CARE
Problem: Physical Therapy - Adult  Goal: By Discharge: Performs mobility at highest level of function for planned discharge setting. See evaluation for individualized goals. Description: FUNCTIONAL STATUS PRIOR TO ADMISSION: Patient was independent and active without use of DME. He does drive. HOME SUPPORT PRIOR TO ADMISSION: The patient lived with spouse and helped her some. He is independent, though son says they both help each other. Physical Therapy Goals  Initiated 6/2/2023  1. Patient will move from supine to sit and sit to supine in bed with modified independence within 4 day(s). 2.  Patient will perform sit to stand with supervision/set-up within 4 day(s). 3.  Patient will transfer from bed to chair and chair to bed with contact guard assist using the least restrictive device within 4 day(s). 4.  Patient will ambulate with contact guard assist for 150 feet with the least restrictive device within 4 day(s). 5.  Patient will ascend/descend 2 stairs with 2 handrail(s) with minimal assistance within 4 day(s). 6. Patient will perform all home exercise program per protocol with minimal assistance within 4 days. 7. Patient will demonstrate AROM 0-90 degrees in operative joint within 4 days.      6/2/2023 1655 by Emeterio Enrique, PT  Outcome: Not Progressing

## 2023-06-03 NOTE — PROGRESS NOTES
Crista NOLANS reviewed with Pt. All medications reviewed individually with patient. Opportunities for questions and concerns provided.

## 2023-06-05 ASSESSMENT — ENCOUNTER SYMPTOMS: SHORTNESS OF BREATH: 1

## 2023-06-23 ENCOUNTER — TELEMEDICINE (OUTPATIENT)
Age: 88
End: 2023-06-23
Payer: MEDICARE

## 2023-06-23 DIAGNOSIS — F51.01 PRIMARY INSOMNIA: Primary | ICD-10-CM

## 2023-06-23 PROCEDURE — 99441 PR PHYS/QHP TELEPHONE EVALUATION 5-10 MIN: CPT | Performed by: INTERNAL MEDICINE

## 2023-06-23 RX ORDER — TRAZODONE HYDROCHLORIDE 50 MG/1
50 TABLET ORAL NIGHTLY
Qty: 90 TABLET | Refills: 1 | Status: SHIPPED | OUTPATIENT
Start: 2023-06-23

## 2023-06-23 SDOH — ECONOMIC STABILITY: FOOD INSECURITY: WITHIN THE PAST 12 MONTHS, THE FOOD YOU BOUGHT JUST DIDN'T LAST AND YOU DIDN'T HAVE MONEY TO GET MORE.: NEVER TRUE

## 2023-06-23 SDOH — ECONOMIC STABILITY: FOOD INSECURITY: WITHIN THE PAST 12 MONTHS, YOU WORRIED THAT YOUR FOOD WOULD RUN OUT BEFORE YOU GOT MONEY TO BUY MORE.: NEVER TRUE

## 2023-06-23 SDOH — ECONOMIC STABILITY: INCOME INSECURITY: HOW HARD IS IT FOR YOU TO PAY FOR THE VERY BASICS LIKE FOOD, HOUSING, MEDICAL CARE, AND HEATING?: NOT HARD AT ALL

## 2023-06-23 SDOH — ECONOMIC STABILITY: HOUSING INSECURITY
IN THE LAST 12 MONTHS, WAS THERE A TIME WHEN YOU DID NOT HAVE A STEADY PLACE TO SLEEP OR SLEPT IN A SHELTER (INCLUDING NOW)?: NO

## 2023-06-23 ASSESSMENT — ANXIETY QUESTIONNAIRES
IF YOU CHECKED OFF ANY PROBLEMS ON THIS QUESTIONNAIRE, HOW DIFFICULT HAVE THESE PROBLEMS MADE IT FOR YOU TO DO YOUR WORK, TAKE CARE OF THINGS AT HOME, OR GET ALONG WITH OTHER PEOPLE: NOT DIFFICULT AT ALL
2. NOT BEING ABLE TO STOP OR CONTROL WORRYING: 0
3. WORRYING TOO MUCH ABOUT DIFFERENT THINGS: 0
1. FEELING NERVOUS, ANXIOUS, OR ON EDGE: 0
5. BEING SO RESTLESS THAT IT IS HARD TO SIT STILL: 0
GAD7 TOTAL SCORE: 0
7. FEELING AFRAID AS IF SOMETHING AWFUL MIGHT HAPPEN: 0
6. BECOMING EASILY ANNOYED OR IRRITABLE: 0
4. TROUBLE RELAXING: 0

## 2023-06-23 ASSESSMENT — PATIENT HEALTH QUESTIONNAIRE - PHQ9
1. LITTLE INTEREST OR PLEASURE IN DOING THINGS: 0
SUM OF ALL RESPONSES TO PHQ QUESTIONS 1-9: 0
SUM OF ALL RESPONSES TO PHQ9 QUESTIONS 1 & 2: 0
SUM OF ALL RESPONSES TO PHQ QUESTIONS 1-9: 0
2. FEELING DOWN, DEPRESSED OR HOPELESS: 0
SUM OF ALL RESPONSES TO PHQ QUESTIONS 1-9: 0
SUM OF ALL RESPONSES TO PHQ QUESTIONS 1-9: 0

## 2023-06-23 NOTE — PROGRESS NOTES
1. \"Have you been to the ER, urgent care clinic since your last visit? Hospitalized since your last visit? 6/2/2023 Left knee partial replacement. 2. \"Have you seen or consulted any other health care providers outside of the [address] [place] [address] since your last visit? \" No     3. For patients aged 39-70: Has the patient had a colonoscopy / FIT/ Cologuard?  NA - based on age

## 2023-06-23 NOTE — PROGRESS NOTES
Reji Maravilla Sr (:  1935) is a Established patient, presenting virtually for evaluation of the following:    Assessment & Plan     Insomnia: Rx for trazodone. Below is the assessment and plan developed based on review of pertinent history, physical exam, labs, studies, and medications. No follow-ups on file. Subjective   HPI  Review of Systems     He has trouble getting to sleep at night. \"Last night I didn't sleep but an hour. \"     Melatonin doesn't help. Uses Tylenol PM.   He last night tried taking a sleeping pill, tylenol PM, and hydrocodone. Objective   Patient-Reported Vitals  No data recorded         On this date 2023 I have spent 5-10 minutes reviewing previous notes, test results and face to face (virtual) with the patient discussing the diagnosis and importance of compliance with the treatment plan as well as documenting on the day of the visit. Dionicio HEBERT Maco , was evaluated through a synchronous (real-time) audio-video encounter. The patient (or guardian if applicable) is aware that this is a billable service, which includes applicable co-pays. This Virtual Visit was conducted with patient's (and/or legal guardian's) consent. Patient identification was verified, and a caregiver was present when appropriate.    The patient was located at Home: [address]  Provider was located at [facility] ([address] [place] [address] Dept): [address] [place] [address],  [address]         --Venancio Santana MD

## 2023-07-24 RX ORDER — LEVOTHYROXINE SODIUM 0.05 MG/1
TABLET ORAL
Qty: 90 TABLET | Refills: 3 | Status: SHIPPED | OUTPATIENT
Start: 2023-07-24

## 2023-09-01 RX ORDER — ALBUTEROL SULFATE 90 UG/1
AEROSOL, METERED RESPIRATORY (INHALATION)
Qty: 36 G | Refills: 0 | Status: SHIPPED | OUTPATIENT
Start: 2023-09-01

## 2023-09-05 ENCOUNTER — OFFICE VISIT (OUTPATIENT)
Age: 88
End: 2023-09-05
Payer: MEDICARE

## 2023-09-05 ENCOUNTER — HOSPITAL ENCOUNTER (OUTPATIENT)
Facility: HOSPITAL | Age: 88
Discharge: HOME | End: 2023-09-08
Payer: MEDICARE

## 2023-09-05 VITALS
HEIGHT: 72 IN | WEIGHT: 225 LBS | TEMPERATURE: 98.1 F | SYSTOLIC BLOOD PRESSURE: 94 MMHG | BODY MASS INDEX: 30.48 KG/M2 | HEART RATE: 70 BPM | RESPIRATION RATE: 17 BRPM | OXYGEN SATURATION: 91 % | DIASTOLIC BLOOD PRESSURE: 58 MMHG

## 2023-09-05 DIAGNOSIS — C61 MALIGNANT NEOPLASM OF PROSTATE (HCC): ICD-10-CM

## 2023-09-05 DIAGNOSIS — R73.9 HYPERGLYCEMIA, UNSPECIFIED: ICD-10-CM

## 2023-09-05 DIAGNOSIS — Z01.818 PREOP EXAM FOR INTERNAL MEDICINE: ICD-10-CM

## 2023-09-05 DIAGNOSIS — M25.59 PAIN IN OTHER SPECIFIED JOINT: ICD-10-CM

## 2023-09-05 DIAGNOSIS — I25.118 CORONARY ARTERY DISEASE OF NATIVE ARTERY OF NATIVE HEART WITH STABLE ANGINA PECTORIS (HCC): ICD-10-CM

## 2023-09-05 DIAGNOSIS — R05.3 CHRONIC COUGH: ICD-10-CM

## 2023-09-05 DIAGNOSIS — M19.09 PRIMARY OSTEOARTHRITIS, OTHER SPECIFIED SITE: ICD-10-CM

## 2023-09-05 DIAGNOSIS — E03.9 HYPOTHYROIDISM, UNSPECIFIED TYPE: Primary | ICD-10-CM

## 2023-09-05 DIAGNOSIS — M10.9 GOUT, UNSPECIFIED CAUSE, UNSPECIFIED CHRONICITY, UNSPECIFIED SITE: ICD-10-CM

## 2023-09-05 LAB
APTT PPP: 25.4 SEC (ref 22.1–31)
INR PPP: 1 (ref 0.9–1.1)
PROTHROMBIN TIME: 10.7 SEC (ref 9–11.1)
THERAPEUTIC RANGE: NORMAL SECS (ref 58–77)

## 2023-09-05 PROCEDURE — 71046 X-RAY EXAM CHEST 2 VIEWS: CPT

## 2023-09-05 PROCEDURE — 99214 OFFICE O/P EST MOD 30 MIN: CPT | Performed by: INTERNAL MEDICINE

## 2023-09-05 PROCEDURE — 93010 ELECTROCARDIOGRAM REPORT: CPT | Performed by: INTERNAL MEDICINE

## 2023-09-05 PROCEDURE — 1123F ACP DISCUSS/DSCN MKR DOCD: CPT | Performed by: INTERNAL MEDICINE

## 2023-09-05 PROCEDURE — 93005 ELECTROCARDIOGRAM TRACING: CPT | Performed by: INTERNAL MEDICINE

## 2023-09-05 SDOH — ECONOMIC STABILITY: FOOD INSECURITY: WITHIN THE PAST 12 MONTHS, THE FOOD YOU BOUGHT JUST DIDN'T LAST AND YOU DIDN'T HAVE MONEY TO GET MORE.: NEVER TRUE

## 2023-09-05 SDOH — ECONOMIC STABILITY: FOOD INSECURITY: WITHIN THE PAST 12 MONTHS, YOU WORRIED THAT YOUR FOOD WOULD RUN OUT BEFORE YOU GOT MONEY TO BUY MORE.: NEVER TRUE

## 2023-09-05 SDOH — ECONOMIC STABILITY: INCOME INSECURITY: HOW HARD IS IT FOR YOU TO PAY FOR THE VERY BASICS LIKE FOOD, HOUSING, MEDICAL CARE, AND HEATING?: NOT VERY HARD

## 2023-09-05 ASSESSMENT — PATIENT HEALTH QUESTIONNAIRE - PHQ9
1. LITTLE INTEREST OR PLEASURE IN DOING THINGS: 0
SUM OF ALL RESPONSES TO PHQ QUESTIONS 1-9: 0
SUM OF ALL RESPONSES TO PHQ QUESTIONS 1-9: 0
2. FEELING DOWN, DEPRESSED OR HOPELESS: 0
SUM OF ALL RESPONSES TO PHQ9 QUESTIONS 1 & 2: 0
SUM OF ALL RESPONSES TO PHQ QUESTIONS 1-9: 0
SUM OF ALL RESPONSES TO PHQ QUESTIONS 1-9: 0

## 2023-09-05 ASSESSMENT — ANXIETY QUESTIONNAIRES
7. FEELING AFRAID AS IF SOMETHING AWFUL MIGHT HAPPEN: 0
3. WORRYING TOO MUCH ABOUT DIFFERENT THINGS: 0
5. BEING SO RESTLESS THAT IT IS HARD TO SIT STILL: 0
2. NOT BEING ABLE TO STOP OR CONTROL WORRYING: 0
GAD7 TOTAL SCORE: 0
IF YOU CHECKED OFF ANY PROBLEMS ON THIS QUESTIONNAIRE, HOW DIFFICULT HAVE THESE PROBLEMS MADE IT FOR YOU TO DO YOUR WORK, TAKE CARE OF THINGS AT HOME, OR GET ALONG WITH OTHER PEOPLE: NOT DIFFICULT AT ALL
6. BECOMING EASILY ANNOYED OR IRRITABLE: 0
1. FEELING NERVOUS, ANXIOUS, OR ON EDGE: 0
4. TROUBLE RELAXING: 0

## 2023-09-05 NOTE — PROGRESS NOTES
1. \"Have you been to the ER, urgent care clinic since your last visit? Hospitalized since your last visit? \" No    2. \"Have you seen or consulted any other health care providers outside of the [address] [place] [address] since your last visit? \" No     3. For patients aged 43-73: Has the patient had a colonoscopy / FIT/ Cologuard? NA - based on age      If the patient is female:    4. For patients aged 43-66: Has the patient had a mammogram within the past 2 years? NA - based on age or sex      11. For patients aged 21-65: Has the patient had a pap smear?  NA - based on age or sex

## 2023-09-05 NOTE — PROGRESS NOTES
PROGRESS NOTE  Name: Racheal Hill Sr   : 1935       ASSESSMENT/ PLAN:     L knee OA: OK to proceed with surgery based on guidelines. Dry cough x 2 months: Check CXR. CAD: No CP. As per his cardiologist, Dr. Cavazos Never. A flutter, SSS: now with pacemaker. As per cardiology. Aortic stenosis: s/p AVR. Doing well. Asthma: Stable. Uses albuterol prn; Less than daily currently. Prostate Cancer: So far, doing okay with surveillance. Patient reaffirms desire for conservative approach today. Surveillance for now. Pt willing to do hormonal treatments if PSA rises to higher levels. His urologist has suggested a PSA of 20 or above as a triggering value  Problem drinking: He has mostly stopped drinking since COVID. Hyperlipidemia:  Not at goal, but options limited. Did not tolerate simvastatin or zetia. Now on fish oil. Will recheck lipids and CMP. Continue pulse dosing of Crestor. GERD:  Controlled on prn prilosec; Pt. may continue this. ED: Not discussed. All rhinitis: Stable. Possible TIA/Diplopia/Amaurosis fugax: No recent episode. Work up as above. Gout: no recent flares. Borderline DM. Recheck labs. Obesity: Watch diet. More exercise as able (limited now by postsurgical recovery). Hx of Noncompliance. Follow up in 6 months. I have reviewed the patient's medications and risks/side effects/benefits were discussed. Diagnosis(-es) explained to patient and questions answered. Literature provided where appropriate. SUBJECTIVE:   Mr. Racheal Hill Sr is a 81 y.o. male who presents today for follow up. Chief Complaint   Patient presents with    Pre-op Exam     Pre-op exam (right knee)     He has chronic worsening knee pain. He is to get L knee partial replacement by Dr. Jono Franco soon. He has OA which has caused progressive pain and debility. He denies acute illness, fevers, etc. He denies dyspnea, chest pain. No focal neuro deficits.      He has a chronic

## 2023-09-06 LAB
ALBUMIN SERPL-MCNC: 3.8 G/DL (ref 3.5–5)
ALBUMIN/GLOB SERPL: 1 (ref 1.1–2.2)
ALP SERPL-CCNC: 86 U/L (ref 45–117)
ALT SERPL-CCNC: 25 U/L (ref 12–78)
ANION GAP SERPL CALC-SCNC: 3 MMOL/L (ref 5–15)
APPEARANCE UR: CLEAR
AST SERPL-CCNC: 19 U/L (ref 15–37)
BACTERIA URNS QL MICRO: ABNORMAL /HPF
BASOPHILS # BLD: 0.1 K/UL (ref 0–0.1)
BASOPHILS NFR BLD: 1 % (ref 0–1)
BILIRUB SERPL-MCNC: 0.5 MG/DL (ref 0.2–1)
BILIRUB UR QL: NEGATIVE
BUN SERPL-MCNC: 24 MG/DL (ref 6–20)
BUN/CREAT SERPL: 22 (ref 12–20)
CALCIUM SERPL-MCNC: 9.2 MG/DL (ref 8.5–10.1)
CHLORIDE SERPL-SCNC: 106 MMOL/L (ref 97–108)
CHOLEST SERPL-MCNC: 188 MG/DL
CO2 SERPL-SCNC: 30 MMOL/L (ref 21–32)
COLOR UR: ABNORMAL
CREAT SERPL-MCNC: 1.1 MG/DL (ref 0.7–1.3)
DIFFERENTIAL METHOD BLD: ABNORMAL
EOSINOPHIL # BLD: 0.5 K/UL (ref 0–0.4)
EOSINOPHIL NFR BLD: 6 % (ref 0–7)
EPITH CASTS URNS QL MICRO: ABNORMAL /LPF
ERYTHROCYTE [DISTWIDTH] IN BLOOD BY AUTOMATED COUNT: 13.4 % (ref 11.5–14.5)
EST. AVERAGE GLUCOSE BLD GHB EST-MCNC: 128 MG/DL
GLOBULIN SER CALC-MCNC: 4 G/DL (ref 2–4)
GLUCOSE SERPL-MCNC: 105 MG/DL (ref 65–100)
GLUCOSE UR STRIP.AUTO-MCNC: NEGATIVE MG/DL
HBA1C MFR BLD: 6.1 % (ref 4–5.6)
HCT VFR BLD AUTO: 40.4 % (ref 36.6–50.3)
HDLC SERPL-MCNC: 41 MG/DL
HDLC SERPL: 4.6 (ref 0–5)
HGB BLD-MCNC: 12.7 G/DL (ref 12.1–17)
HGB UR QL STRIP: NEGATIVE
IMM GRANULOCYTES # BLD AUTO: 0 K/UL (ref 0–0.04)
IMM GRANULOCYTES NFR BLD AUTO: 0 % (ref 0–0.5)
KETONES UR QL STRIP.AUTO: NEGATIVE MG/DL
LDLC SERPL CALC-MCNC: 117 MG/DL (ref 0–100)
LEUKOCYTE ESTERASE UR QL STRIP.AUTO: ABNORMAL
LYMPHOCYTES # BLD: 2.2 K/UL (ref 0.8–3.5)
LYMPHOCYTES NFR BLD: 31 % (ref 12–49)
MCH RBC QN AUTO: 31 PG (ref 26–34)
MCHC RBC AUTO-ENTMCNC: 31.4 G/DL (ref 30–36.5)
MCV RBC AUTO: 98.5 FL (ref 80–99)
MONOCYTES # BLD: 0.7 K/UL (ref 0–1)
MONOCYTES NFR BLD: 10 % (ref 5–13)
NEUTS SEG # BLD: 3.8 K/UL (ref 1.8–8)
NEUTS SEG NFR BLD: 52 % (ref 32–75)
NITRITE UR QL STRIP.AUTO: NEGATIVE
NRBC # BLD: 0 K/UL (ref 0–0.01)
NRBC BLD-RTO: 0 PER 100 WBC
PH UR STRIP: 5.5 (ref 5–8)
PLATELET # BLD AUTO: 204 K/UL (ref 150–400)
PMV BLD AUTO: 9.6 FL (ref 8.9–12.9)
POTASSIUM SERPL-SCNC: 4.9 MMOL/L (ref 3.5–5.1)
PROT SERPL-MCNC: 7.8 G/DL (ref 6.4–8.2)
PROT UR STRIP-MCNC: NEGATIVE MG/DL
RBC # BLD AUTO: 4.1 M/UL (ref 4.1–5.7)
RBC #/AREA URNS HPF: ABNORMAL /HPF (ref 0–5)
SODIUM SERPL-SCNC: 139 MMOL/L (ref 136–145)
SP GR UR REFRACTOMETRY: 1.01 (ref 1–1.03)
T4 FREE SERPL-MCNC: 1 NG/DL (ref 0.8–1.5)
TRIGL SERPL-MCNC: 150 MG/DL
TSH SERPL DL<=0.05 MIU/L-ACNC: 2.49 UIU/ML (ref 0.36–3.74)
URATE SERPL-MCNC: 5.9 MG/DL (ref 3.5–7.2)
UROBILINOGEN UR QL STRIP.AUTO: 1 EU/DL (ref 0.2–1)
VLDLC SERPL CALC-MCNC: 30 MG/DL
WBC # BLD AUTO: 7.2 K/UL (ref 4.1–11.1)
WBC URNS QL MICRO: ABNORMAL /HPF (ref 0–4)

## 2023-09-07 LAB
PSA FREE MFR SERPL: 15 %
PSA FREE SERPL-MCNC: 1.35 NG/ML
PSA SERPL-MCNC: 9 NG/ML (ref 0–4)

## 2023-09-08 ENCOUNTER — HOSPITAL ENCOUNTER (OUTPATIENT)
Facility: HOSPITAL | Age: 88
End: 2023-09-08
Payer: MEDICARE

## 2023-09-08 VITALS
TEMPERATURE: 98.3 F | DIASTOLIC BLOOD PRESSURE: 50 MMHG | SYSTOLIC BLOOD PRESSURE: 95 MMHG | BODY MASS INDEX: 30.64 KG/M2 | OXYGEN SATURATION: 97 % | WEIGHT: 226.19 LBS | HEART RATE: 70 BPM | RESPIRATION RATE: 16 BRPM | HEIGHT: 72 IN

## 2023-09-08 LAB
ABO + RH BLD: NORMAL
APPEARANCE UR: CLEAR
BACTERIA URNS QL MICRO: NEGATIVE /HPF
BILIRUB UR QL: NEGATIVE
BLOOD GROUP ANTIBODIES SERPL: NORMAL
COLOR UR: ABNORMAL
EPITH CASTS URNS QL MICRO: ABNORMAL /LPF
GLUCOSE UR STRIP.AUTO-MCNC: NEGATIVE MG/DL
HGB UR QL STRIP: NEGATIVE
KETONES UR QL STRIP.AUTO: NEGATIVE MG/DL
LEUKOCYTE ESTERASE UR QL STRIP.AUTO: ABNORMAL
NITRITE UR QL STRIP.AUTO: NEGATIVE
PH UR STRIP: 6.5 (ref 5–8)
PROT UR STRIP-MCNC: NEGATIVE MG/DL
RBC #/AREA URNS HPF: ABNORMAL /HPF (ref 0–5)
SP GR UR REFRACTOMETRY: 1.01
SPECIMEN EXP DATE BLD: NORMAL
URINE CULTURE IF INDICATED: ABNORMAL
UROBILINOGEN UR QL STRIP.AUTO: 0.2 EU/DL (ref 0.2–1)
WBC URNS QL MICRO: ABNORMAL /HPF (ref 0–4)

## 2023-09-08 PROCEDURE — 86901 BLOOD TYPING SEROLOGIC RH(D): CPT

## 2023-09-08 PROCEDURE — 86900 BLOOD TYPING SEROLOGIC ABO: CPT

## 2023-09-08 PROCEDURE — 86850 RBC ANTIBODY SCREEN: CPT

## 2023-09-08 PROCEDURE — 36415 COLL VENOUS BLD VENIPUNCTURE: CPT

## 2023-09-08 PROCEDURE — 81001 URINALYSIS AUTO W/SCOPE: CPT

## 2023-09-08 PROCEDURE — 87086 URINE CULTURE/COLONY COUNT: CPT

## 2023-09-08 PROCEDURE — 97530 THERAPEUTIC ACTIVITIES: CPT

## 2023-09-08 PROCEDURE — 97161 PT EVAL LOW COMPLEX 20 MIN: CPT

## 2023-09-08 RX ORDER — SODIUM CHLORIDE, SODIUM LACTATE, POTASSIUM CHLORIDE, CALCIUM CHLORIDE 600; 310; 30; 20 MG/100ML; MG/100ML; MG/100ML; MG/100ML
INJECTION, SOLUTION INTRAVENOUS CONTINUOUS
OUTPATIENT
Start: 2023-09-18

## 2023-09-08 RX ORDER — ACETAMINOPHEN 500 MG
1000 TABLET ORAL ONCE
OUTPATIENT
Start: 2023-09-18

## 2023-09-08 ASSESSMENT — PAIN SCALES - GENERAL: PAINLEVEL_OUTOF10: 0

## 2023-09-08 NOTE — PROGRESS NOTES
Called and left voice mail for Kira, Dr. Danial Irving' nurse, to ask her if patient can have a regular teeth cleaning on 9/13. Also left message about patient having small non raised red rash to right calf and left groin/scrotal area skin growth. Let her know that patient is seeing his dermatologist on 9/12 to address these 2 issues. Asked for call back from [place]. 1610. Spoke with Holger Lee, nurse at Dr. Danial Irving' office. She states to tell patient not to have dental work prior to surgery. Also states that Dr. Danial Irving will need to know what the dermatologist says about patient's skin issues. Will touch base with dermatologist after patient's visit on 9/12/23. Spoke with patient on the phone and let him know to cancel his dentist appointment and not to have dental work prior to surgery.

## 2023-09-08 NOTE — PERIOP NOTE
Patient called to ask about upcoming Dermatologist and Dentist appt prior to surgery 9/18/2023. Patient informed he needs to keep Dermatology appt, but needs to cancel Dentist appt. Patient verbalized understanding.

## 2023-09-08 NOTE — PROGRESS NOTES

## 2023-09-08 NOTE — PROGRESS NOTES
Orthopedic and Spine Patients: Instructions on When You Can   Eat or Drink Before Surgery      You have been provided a pre-surgery drink received at your pre-admission testing appointment. Night before surgery: You should drink 1/2 bottle of the  pre-surgery drink at bedtime. No food after midnight! Day of Surgery:  Complete 2nd half of the bottle of the pre-surgery drink 1 hour prior to arrival at hospital.  For questions call Pre-Admission Testing at [phone]. They are available from 8:00am-5:00pm, Monday through Friday.

## 2023-09-09 LAB
BACTERIA SPEC CULT: NORMAL
SERVICE CMNT-IMP: NORMAL

## 2023-09-10 LAB
BACTERIA SPEC CULT: NORMAL
BACTERIA SPEC CULT: NORMAL
SERVICE CMNT-IMP: NORMAL

## 2023-09-11 ENCOUNTER — TELEPHONE (OUTPATIENT)
Age: 88
End: 2023-09-11

## 2023-09-11 NOTE — TELEPHONE ENCOUNTER
I spoke to the patient about following up with . He reported, he was cleared by his office on Friday, Sept. 8th when he had more preadmission testing. Patient verbalized understanding of information discussed w/ no further questions at this time.         Flo Davis    Phone Fax E-mail Address   [phone] Not available [address] [place] [address]

## 2023-09-14 NOTE — PROGRESS NOTES
Left message with Dr. Negro Zarco office for recent office visit note related to evaluation of rash. 09/15/23 Follow up call to dermatology office for recent office note. Left message with Dr. Magalie Reina office concerning above attempts .

## 2023-09-18 ENCOUNTER — HOSPITAL ENCOUNTER (OUTPATIENT)
Facility: HOSPITAL | Age: 88
Setting detail: OBSERVATION
Discharge: HOME | End: 2023-09-19
Attending: ORTHOPAEDIC SURGERY | Admitting: ORTHOPAEDIC SURGERY
Payer: MEDICARE

## 2023-09-18 ENCOUNTER — ANESTHESIA (OUTPATIENT)
Facility: HOSPITAL | Age: 88
End: 2023-09-18
Payer: MEDICARE

## 2023-09-18 ENCOUNTER — ANESTHESIA EVENT (OUTPATIENT)
Facility: HOSPITAL | Age: 88
End: 2023-09-18
Payer: MEDICARE

## 2023-09-18 DIAGNOSIS — Z96.651 S/P RIGHT UNICOMPARTMENTAL KNEE REPLACEMENT: Primary | ICD-10-CM

## 2023-09-18 PROCEDURE — 97161 PT EVAL LOW COMPLEX 20 MIN: CPT

## 2023-09-18 PROCEDURE — C1713 ANCHOR/SCREW BN/BN,TIS/BN: HCPCS | Performed by: ORTHOPAEDIC SURGERY

## 2023-09-18 PROCEDURE — 6360000002 HC RX W HCPCS: Performed by: ANESTHESIOLOGY

## 2023-09-18 PROCEDURE — 6370000000 HC RX 637 (ALT 250 FOR IP): Performed by: ORTHOPAEDIC SURGERY

## 2023-09-18 PROCEDURE — 64447 NJX AA&/STRD FEMORAL NRV IMG: CPT | Performed by: ANESTHESIOLOGY

## 2023-09-18 PROCEDURE — 6360000002 HC RX W HCPCS: Performed by: ORTHOPAEDIC SURGERY

## 2023-09-18 PROCEDURE — 6370000000 HC RX 637 (ALT 250 FOR IP): Performed by: NURSE PRACTITIONER

## 2023-09-18 PROCEDURE — 3700000000 HC ANESTHESIA ATTENDED CARE: Performed by: ORTHOPAEDIC SURGERY

## 2023-09-18 PROCEDURE — 3600000005 HC SURGERY LEVEL 5 BASE: Performed by: ORTHOPAEDIC SURGERY

## 2023-09-18 PROCEDURE — 2580000003 HC RX 258: Performed by: ANESTHESIOLOGY

## 2023-09-18 PROCEDURE — APPNB60 APP NON BILLABLE TIME 46-60 MINS: Performed by: NURSE PRACTITIONER

## 2023-09-18 PROCEDURE — 2709999900 HC NON-CHARGEABLE SUPPLY: Performed by: ORTHOPAEDIC SURGERY

## 2023-09-18 PROCEDURE — 7100000001 HC PACU RECOVERY - ADDTL 15 MIN: Performed by: ORTHOPAEDIC SURGERY

## 2023-09-18 PROCEDURE — 2580000003 HC RX 258: Performed by: ORTHOPAEDIC SURGERY

## 2023-09-18 PROCEDURE — 6360000002 HC RX W HCPCS: Performed by: PHYSICIAN ASSISTANT

## 2023-09-18 PROCEDURE — 2580000003 HC RX 258: Performed by: PHYSICIAN ASSISTANT

## 2023-09-18 PROCEDURE — G0378 HOSPITAL OBSERVATION PER HR: HCPCS

## 2023-09-18 PROCEDURE — C1776 JOINT DEVICE (IMPLANTABLE): HCPCS | Performed by: ORTHOPAEDIC SURGERY

## 2023-09-18 PROCEDURE — 2500000003 HC RX 250 WO HCPCS: Performed by: ANESTHESIOLOGY

## 2023-09-18 PROCEDURE — 6360000002 HC RX W HCPCS: Performed by: NURSE ANESTHETIST, CERTIFIED REGISTERED

## 2023-09-18 PROCEDURE — 3700000001 HC ADD 15 MINUTES (ANESTHESIA): Performed by: ORTHOPAEDIC SURGERY

## 2023-09-18 PROCEDURE — 3600000015 HC SURGERY LEVEL 5 ADDTL 15MIN: Performed by: ORTHOPAEDIC SURGERY

## 2023-09-18 PROCEDURE — 97530 THERAPEUTIC ACTIVITIES: CPT

## 2023-09-18 PROCEDURE — 7100000000 HC PACU RECOVERY - FIRST 15 MIN: Performed by: ORTHOPAEDIC SURGERY

## 2023-09-18 PROCEDURE — 2500000003 HC RX 250 WO HCPCS: Performed by: NURSE ANESTHETIST, CERTIFIED REGISTERED

## 2023-09-18 PROCEDURE — 97116 GAIT TRAINING THERAPY: CPT

## 2023-09-18 PROCEDURE — 6370000000 HC RX 637 (ALT 250 FOR IP): Performed by: PHYSICIAN ASSISTANT

## 2023-09-18 PROCEDURE — 2720000010 HC SURG SUPPLY STERILE: Performed by: ORTHOPAEDIC SURGERY

## 2023-09-18 DEVICE — IMPLANTABLE DEVICE
Type: IMPLANTABLE DEVICE | Site: KNEE | Status: FUNCTIONAL
Brand: PERSONA® PARTIAL KNEE SYSTEM

## 2023-09-18 DEVICE — SIMPLEX® HV IS A FAST-SETTING ACRYLIC RESIN FOR USE IN BONE SURGERY. MIXING THE TWO SEPARATE STERILE COMPONENTS PRODUCES A DUCTILE BONE CEMENT WHICH, AFTER HARDENING, FIXES THE IMPLANT AND TRANSFERS STRESSES PRODUCED DURING MOVEMENT EVENLY TO THE BONE. SIMPLEX® HV CEMENT POWDER ALSO CONTAINS INSOLUBLE ZIRCONIUM DIOXIDE AS AN X-RAY CONTRAST MEDIUM. SIMPLEX® HV DOES NOT EMIT A SIGNAL AND DOES NOT POSE A SAFETY RISK IN A MAGNETIC RESONANCE ENVIRONMENT.
Type: IMPLANTABLE DEVICE | Site: KNEE | Status: FUNCTIONAL
Brand: SIMPLEX HV

## 2023-09-18 DEVICE — SCREW BNE L48MM CONSTRN CNDYL KNEE HEX HD STEM FOR LEG NXGN: Type: IMPLANTABLE DEVICE | Site: KNEE | Status: FUNCTIONAL

## 2023-09-18 DEVICE — SCREW BNE L33MM CONSTRN CNDYL KNEE HEX HD STEM FOR LEG NXGN: Type: IMPLANTABLE DEVICE | Site: KNEE | Status: FUNCTIONAL

## 2023-09-18 DEVICE — IMPLANTABLE DEVICE: Type: IMPLANTABLE DEVICE | Site: KNEE | Status: FUNCTIONAL

## 2023-09-18 RX ORDER — ONDANSETRON 4 MG/1
4 TABLET, ORALLY DISINTEGRATING ORAL EVERY 8 HOURS PRN
Status: DISCONTINUED | OUTPATIENT
Start: 2023-09-18 | End: 2023-09-19 | Stop reason: HOSPADM

## 2023-09-18 RX ORDER — POLYETHYLENE GLYCOL 3350 17 G/17G
17 POWDER, FOR SOLUTION ORAL DAILY
Qty: 7 PACKET | Refills: 0 | Status: SHIPPED | OUTPATIENT
Start: 2023-09-19 | End: 2023-09-26

## 2023-09-18 RX ORDER — FAMOTIDINE 20 MG/1
20 TABLET, FILM COATED ORAL 2 TIMES DAILY
Status: DISCONTINUED | OUTPATIENT
Start: 2023-09-18 | End: 2023-09-19 | Stop reason: HOSPADM

## 2023-09-18 RX ORDER — SODIUM CHLORIDE 0.9 % (FLUSH) 0.9 %
5-40 SYRINGE (ML) INJECTION EVERY 12 HOURS SCHEDULED
Status: DISCONTINUED | OUTPATIENT
Start: 2023-09-18 | End: 2023-09-18 | Stop reason: HOSPADM

## 2023-09-18 RX ORDER — SODIUM CHLORIDE 0.9 % (FLUSH) 0.9 %
5-40 SYRINGE (ML) INJECTION PRN
Status: DISCONTINUED | OUTPATIENT
Start: 2023-09-18 | End: 2023-09-18

## 2023-09-18 RX ORDER — SODIUM CHLORIDE 0.9 % (FLUSH) 0.9 %
5-40 SYRINGE (ML) INJECTION PRN
Status: DISCONTINUED | OUTPATIENT
Start: 2023-09-18 | End: 2023-09-19 | Stop reason: HOSPADM

## 2023-09-18 RX ORDER — HYDROCODONE BITARTRATE AND ACETAMINOPHEN 5; 325 MG/1; MG/1
1 TABLET ORAL EVERY 6 HOURS PRN
Status: DISCONTINUED | OUTPATIENT
Start: 2023-09-18 | End: 2023-09-18

## 2023-09-18 RX ORDER — SODIUM CHLORIDE 0.9 % (FLUSH) 0.9 %
5-40 SYRINGE (ML) INJECTION EVERY 12 HOURS SCHEDULED
Status: DISCONTINUED | OUTPATIENT
Start: 2023-09-18 | End: 2023-09-18

## 2023-09-18 RX ORDER — SODIUM CHLORIDE 9 MG/ML
INJECTION, SOLUTION INTRAVENOUS PRN
Status: DISCONTINUED | OUTPATIENT
Start: 2023-09-18 | End: 2023-09-18

## 2023-09-18 RX ORDER — CEPHALEXIN 500 MG/1
500 CAPSULE ORAL EVERY 6 HOURS
Qty: 28 CAPSULE | Refills: 0 | Status: SHIPPED | OUTPATIENT
Start: 2023-09-19 | End: 2023-09-26

## 2023-09-18 RX ORDER — POLYETHYLENE GLYCOL 3350 17 G/17G
17 POWDER, FOR SOLUTION ORAL DAILY
Status: DISCONTINUED | OUTPATIENT
Start: 2023-09-18 | End: 2023-09-19 | Stop reason: HOSPADM

## 2023-09-18 RX ORDER — LIDOCAINE HYDROCHLORIDE 10 MG/ML
1 INJECTION, SOLUTION EPIDURAL; INFILTRATION; INTRACAUDAL; PERINEURAL
Status: DISCONTINUED | OUTPATIENT
Start: 2023-09-18 | End: 2023-09-18 | Stop reason: HOSPADM

## 2023-09-18 RX ORDER — LIDOCAINE HYDROCHLORIDE 20 MG/ML
INJECTION, SOLUTION EPIDURAL; INFILTRATION; INTRACAUDAL; PERINEURAL PRN
Status: DISCONTINUED | OUTPATIENT
Start: 2023-09-18 | End: 2023-09-18 | Stop reason: SDUPTHER

## 2023-09-18 RX ORDER — ASPIRIN 81 MG/1
81 TABLET ORAL 2 TIMES DAILY
Status: DISCONTINUED | OUTPATIENT
Start: 2023-09-18 | End: 2023-09-19 | Stop reason: HOSPADM

## 2023-09-18 RX ORDER — HYDROCODONE BITARTRATE AND ACETAMINOPHEN 5; 325 MG/1; MG/1
1 TABLET ORAL EVERY 6 HOURS PRN
Qty: 20 TABLET | Refills: 0 | Status: SHIPPED | OUTPATIENT
Start: 2023-09-18 | End: 2023-09-25

## 2023-09-18 RX ORDER — ACETAMINOPHEN 500 MG
1000 TABLET ORAL ONCE
Status: COMPLETED | OUTPATIENT
Start: 2023-09-18 | End: 2023-09-18

## 2023-09-18 RX ORDER — SODIUM CHLORIDE 0.9 % (FLUSH) 0.9 %
5-40 SYRINGE (ML) INJECTION EVERY 12 HOURS SCHEDULED
Status: DISCONTINUED | OUTPATIENT
Start: 2023-09-18 | End: 2023-09-19 | Stop reason: HOSPADM

## 2023-09-18 RX ORDER — HYDROCODONE BITARTRATE AND ACETAMINOPHEN 5; 325 MG/1; MG/1
1 TABLET ORAL EVERY 4 HOURS PRN
Status: DISCONTINUED | OUTPATIENT
Start: 2023-09-18 | End: 2023-09-19 | Stop reason: HOSPADM

## 2023-09-18 RX ORDER — SENNA AND DOCUSATE SODIUM 50; 8.6 MG/1; MG/1
1 TABLET, FILM COATED ORAL 2 TIMES DAILY
Qty: 14 TABLET | Refills: 0 | Status: SHIPPED | OUTPATIENT
Start: 2023-09-18 | End: 2023-09-25

## 2023-09-18 RX ORDER — CEPHALEXIN 250 MG/1
500 CAPSULE ORAL EVERY 6 HOURS
Status: DISCONTINUED | OUTPATIENT
Start: 2023-09-19 | End: 2023-09-19 | Stop reason: HOSPADM

## 2023-09-18 RX ORDER — ONDANSETRON 2 MG/ML
4 INJECTION INTRAMUSCULAR; INTRAVENOUS EVERY 6 HOURS PRN
Status: DISCONTINUED | OUTPATIENT
Start: 2023-09-18 | End: 2023-09-19 | Stop reason: HOSPADM

## 2023-09-18 RX ORDER — BUPIVACAINE HYDROCHLORIDE 5 MG/ML
INJECTION, SOLUTION EPIDURAL; INTRACAUDAL PRN
Status: DISCONTINUED | OUTPATIENT
Start: 2023-09-18 | End: 2023-09-18 | Stop reason: ALTCHOICE

## 2023-09-18 RX ORDER — SENNA AND DOCUSATE SODIUM 50; 8.6 MG/1; MG/1
1 TABLET, FILM COATED ORAL 2 TIMES DAILY
Status: DISCONTINUED | OUTPATIENT
Start: 2023-09-18 | End: 2023-09-19 | Stop reason: HOSPADM

## 2023-09-18 RX ORDER — IBUPROFEN 400 MG/1
400 TABLET ORAL EVERY 12 HOURS
Status: DISCONTINUED | OUTPATIENT
Start: 2023-09-18 | End: 2023-09-19 | Stop reason: HOSPADM

## 2023-09-18 RX ORDER — SODIUM CHLORIDE, SODIUM LACTATE, POTASSIUM CHLORIDE, CALCIUM CHLORIDE 600; 310; 30; 20 MG/100ML; MG/100ML; MG/100ML; MG/100ML
INJECTION, SOLUTION INTRAVENOUS CONTINUOUS
Status: DISCONTINUED | OUTPATIENT
Start: 2023-09-18 | End: 2023-09-18 | Stop reason: HOSPADM

## 2023-09-18 RX ORDER — MEPERIDINE HYDROCHLORIDE 25 MG/ML
12.5 INJECTION INTRAMUSCULAR; INTRAVENOUS; SUBCUTANEOUS EVERY 5 MIN PRN
Status: DISCONTINUED | OUTPATIENT
Start: 2023-09-18 | End: 2023-09-18

## 2023-09-18 RX ORDER — PHENYLEPHRINE HCL IN 0.9% NACL 0.4MG/10ML
SYRINGE (ML) INTRAVENOUS PRN
Status: DISCONTINUED | OUTPATIENT
Start: 2023-09-18 | End: 2023-09-18 | Stop reason: SDUPTHER

## 2023-09-18 RX ORDER — SODIUM CHLORIDE 9 MG/ML
INJECTION, SOLUTION INTRAVENOUS PRN
Status: DISCONTINUED | OUTPATIENT
Start: 2023-09-18 | End: 2023-09-18 | Stop reason: HOSPADM

## 2023-09-18 RX ORDER — SODIUM CHLORIDE 9 MG/ML
INJECTION, SOLUTION INTRAVENOUS CONTINUOUS
Status: DISCONTINUED | OUTPATIENT
Start: 2023-09-18 | End: 2023-09-19 | Stop reason: HOSPADM

## 2023-09-18 RX ORDER — DIPHENHYDRAMINE HYDROCHLORIDE 50 MG/ML
25 INJECTION INTRAMUSCULAR; INTRAVENOUS EVERY 6 HOURS PRN
Status: DISCONTINUED | OUTPATIENT
Start: 2023-09-18 | End: 2023-09-19 | Stop reason: HOSPADM

## 2023-09-18 RX ORDER — DIPHENHYDRAMINE HCL 25 MG
25 CAPSULE ORAL EVERY 6 HOURS PRN
Status: DISCONTINUED | OUTPATIENT
Start: 2023-09-18 | End: 2023-09-19 | Stop reason: HOSPADM

## 2023-09-18 RX ORDER — ONDANSETRON 2 MG/ML
INJECTION INTRAMUSCULAR; INTRAVENOUS PRN
Status: DISCONTINUED | OUTPATIENT
Start: 2023-09-18 | End: 2023-09-18 | Stop reason: SDUPTHER

## 2023-09-18 RX ORDER — PROPOFOL 10 MG/ML
INJECTION, EMULSION INTRAVENOUS CONTINUOUS PRN
Status: DISCONTINUED | OUTPATIENT
Start: 2023-09-18 | End: 2023-09-18 | Stop reason: SDUPTHER

## 2023-09-18 RX ORDER — BISACODYL 10 MG
10 SUPPOSITORY, RECTAL RECTAL DAILY PRN
Status: DISCONTINUED | OUTPATIENT
Start: 2023-09-18 | End: 2023-09-19 | Stop reason: HOSPADM

## 2023-09-18 RX ORDER — TRANEXAMIC ACID 100 MG/ML
INJECTION, SOLUTION INTRAVENOUS PRN
Status: DISCONTINUED | OUTPATIENT
Start: 2023-09-18 | End: 2023-09-18 | Stop reason: SDUPTHER

## 2023-09-18 RX ORDER — ACETAMINOPHEN 325 MG/1
650 TABLET ORAL EVERY 6 HOURS
Status: DISCONTINUED | OUTPATIENT
Start: 2023-09-18 | End: 2023-09-19 | Stop reason: HOSPADM

## 2023-09-18 RX ORDER — HYDROMORPHONE HYDROCHLORIDE 1 MG/ML
0.5 INJECTION, SOLUTION INTRAMUSCULAR; INTRAVENOUS; SUBCUTANEOUS EVERY 5 MIN PRN
Status: DISCONTINUED | OUTPATIENT
Start: 2023-09-18 | End: 2023-09-18

## 2023-09-18 RX ORDER — 0.9 % SODIUM CHLORIDE 0.9 %
500 INTRAVENOUS SOLUTION INTRAVENOUS ONCE AS NEEDED
Status: DISCONTINUED | OUTPATIENT
Start: 2023-09-18 | End: 2023-09-19 | Stop reason: HOSPADM

## 2023-09-18 RX ORDER — ROPIVACAINE HYDROCHLORIDE 5 MG/ML
INJECTION, SOLUTION EPIDURAL; INFILTRATION; PERINEURAL
Status: COMPLETED | OUTPATIENT
Start: 2023-09-18 | End: 2023-09-18

## 2023-09-18 RX ORDER — FENTANYL CITRATE 50 UG/ML
25 INJECTION, SOLUTION INTRAMUSCULAR; INTRAVENOUS EVERY 5 MIN PRN
Status: DISCONTINUED | OUTPATIENT
Start: 2023-09-18 | End: 2023-09-18

## 2023-09-18 RX ORDER — ONDANSETRON 2 MG/ML
4 INJECTION INTRAMUSCULAR; INTRAVENOUS
Status: DISCONTINUED | OUTPATIENT
Start: 2023-09-18 | End: 2023-09-18

## 2023-09-18 RX ORDER — MORPHINE SULFATE 4 MG/ML
1 INJECTION, SOLUTION INTRAMUSCULAR; INTRAVENOUS
Status: DISPENSED | OUTPATIENT
Start: 2023-09-18 | End: 2023-09-19

## 2023-09-18 RX ORDER — MIDAZOLAM HYDROCHLORIDE 1 MG/ML
INJECTION INTRAMUSCULAR; INTRAVENOUS PRN
Status: DISCONTINUED | OUTPATIENT
Start: 2023-09-18 | End: 2023-09-18 | Stop reason: SDUPTHER

## 2023-09-18 RX ORDER — DEXAMETHASONE SODIUM PHOSPHATE 4 MG/ML
INJECTION, SOLUTION INTRA-ARTICULAR; INTRALESIONAL; INTRAMUSCULAR; INTRAVENOUS; SOFT TISSUE PRN
Status: DISCONTINUED | OUTPATIENT
Start: 2023-09-18 | End: 2023-09-18 | Stop reason: SDUPTHER

## 2023-09-18 RX ORDER — SODIUM CHLORIDE 0.9 % (FLUSH) 0.9 %
5-40 SYRINGE (ML) INJECTION PRN
Status: DISCONTINUED | OUTPATIENT
Start: 2023-09-18 | End: 2023-09-18 | Stop reason: HOSPADM

## 2023-09-18 RX ORDER — DROPERIDOL 2.5 MG/ML
0.62 INJECTION, SOLUTION INTRAMUSCULAR; INTRAVENOUS
Status: DISCONTINUED | OUTPATIENT
Start: 2023-09-18 | End: 2023-09-18

## 2023-09-18 RX ORDER — ASPIRIN 81 MG/1
81 TABLET ORAL 2 TIMES DAILY
Qty: 30 TABLET | Refills: 3 | Status: SHIPPED
Start: 2023-09-18

## 2023-09-18 RX ORDER — LEVOTHYROXINE SODIUM 0.05 MG/1
50 TABLET ORAL
Status: DISCONTINUED | OUTPATIENT
Start: 2023-09-19 | End: 2023-09-19 | Stop reason: HOSPADM

## 2023-09-18 RX ADMIN — MIDAZOLAM HYDROCHLORIDE 2 MG: 1 INJECTION, SOLUTION INTRAMUSCULAR; INTRAVENOUS at 07:12

## 2023-09-18 RX ADMIN — HYDROCODONE BITARTRATE AND ACETAMINOPHEN 1 TABLET: 5; 325 TABLET ORAL at 16:15

## 2023-09-18 RX ADMIN — TRANEXAMIC ACID 1000 MG: 100 INJECTION, SOLUTION INTRAVENOUS at 07:42

## 2023-09-18 RX ADMIN — SENNOSIDES AND DOCUSATE SODIUM 1 TABLET: 50; 8.6 TABLET ORAL at 21:54

## 2023-09-18 RX ADMIN — FENTANYL CITRATE 25 MCG: 50 INJECTION, SOLUTION INTRAMUSCULAR; INTRAVENOUS at 09:30

## 2023-09-18 RX ADMIN — ACETAMINOPHEN 1000 MG: 500 TABLET ORAL at 06:40

## 2023-09-18 RX ADMIN — ACETAMINOPHEN 650 MG: 325 TABLET ORAL at 21:54

## 2023-09-18 RX ADMIN — FAMOTIDINE 20 MG: 20 TABLET, FILM COATED ORAL at 21:54

## 2023-09-18 RX ADMIN — Medication 80 MCG: at 08:21

## 2023-09-18 RX ADMIN — Medication 40 MCG: at 08:14

## 2023-09-18 RX ADMIN — MEPIVACAINE HYDROCHLORIDE 54 MG: 20 INJECTION, SOLUTION EPIDURAL; INFILTRATION at 07:18

## 2023-09-18 RX ADMIN — ROPIVACAINE HYDROCHLORIDE 20 ML: 5 INJECTION, SOLUTION EPIDURAL; INFILTRATION; PERINEURAL at 07:21

## 2023-09-18 RX ADMIN — WATER 2000 MG: 1 INJECTION INTRAMUSCULAR; INTRAVENOUS; SUBCUTANEOUS at 07:40

## 2023-09-18 RX ADMIN — ASPIRIN 81 MG: 81 TABLET, COATED ORAL at 21:54

## 2023-09-18 RX ADMIN — SODIUM CHLORIDE, POTASSIUM CHLORIDE, SODIUM LACTATE AND CALCIUM CHLORIDE: 600; 310; 30; 20 INJECTION, SOLUTION INTRAVENOUS at 07:28

## 2023-09-18 RX ADMIN — CEFAZOLIN 2000 MG: 1 INJECTION, POWDER, FOR SOLUTION INTRAMUSCULAR; INTRAVENOUS at 16:16

## 2023-09-18 RX ADMIN — Medication 40 MCG: at 08:08

## 2023-09-18 RX ADMIN — FENTANYL CITRATE 25 MCG: 50 INJECTION, SOLUTION INTRAMUSCULAR; INTRAVENOUS at 09:25

## 2023-09-18 RX ADMIN — Medication 40 MCG: at 07:59

## 2023-09-18 RX ADMIN — PROPOFOL 50 MCG/KG/MIN: 10 INJECTION, EMULSION INTRAVENOUS at 07:32

## 2023-09-18 RX ADMIN — FENTANYL CITRATE 25 MCG: 50 INJECTION, SOLUTION INTRAMUSCULAR; INTRAVENOUS at 09:35

## 2023-09-18 RX ADMIN — SODIUM CHLORIDE, PRESERVATIVE FREE 5 ML: 5 INJECTION INTRAVENOUS at 21:54

## 2023-09-18 RX ADMIN — ONDANSETRON HYDROCHLORIDE 4 MG: 2 INJECTION, SOLUTION INTRAMUSCULAR; INTRAVENOUS at 08:15

## 2023-09-18 RX ADMIN — LIDOCAINE HYDROCHLORIDE 60 MG: 20 INJECTION, SOLUTION EPIDURAL; INFILTRATION; INTRACAUDAL; PERINEURAL at 07:33

## 2023-09-18 RX ADMIN — FENTANYL CITRATE 25 MCG: 50 INJECTION, SOLUTION INTRAMUSCULAR; INTRAVENOUS at 09:20

## 2023-09-18 RX ADMIN — Medication 40 MCG: at 07:55

## 2023-09-18 RX ADMIN — IBUPROFEN 400 MG: 400 TABLET, FILM COATED ORAL at 21:54

## 2023-09-18 RX ADMIN — DEXAMETHASONE SODIUM PHOSPHATE 10 MG: 4 INJECTION, SOLUTION INTRA-ARTICULAR; INTRALESIONAL; INTRAMUSCULAR; INTRAVENOUS; SOFT TISSUE at 07:44

## 2023-09-18 RX ADMIN — HYDROCODONE BITARTRATE AND ACETAMINOPHEN 1 TABLET: 5; 325 TABLET ORAL at 21:58

## 2023-09-18 RX ADMIN — HYDROMORPHONE HYDROCHLORIDE 0.5 MG: 1 INJECTION, SOLUTION INTRAMUSCULAR; INTRAVENOUS; SUBCUTANEOUS at 09:30

## 2023-09-18 RX ADMIN — HYDROCODONE BITARTRATE AND ACETAMINOPHEN 1 TABLET: 5; 325 TABLET ORAL at 11:16

## 2023-09-18 RX ADMIN — Medication 80 MCG: at 07:47

## 2023-09-18 RX ADMIN — METOPROLOL TARTRATE 25 MG: 25 TABLET, FILM COATED ORAL at 21:54

## 2023-09-18 ASSESSMENT — PAIN DESCRIPTION - LOCATION
LOCATION: KNEE

## 2023-09-18 ASSESSMENT — PAIN DESCRIPTION - DESCRIPTORS
DESCRIPTORS: SORE
DESCRIPTORS: SORE
DESCRIPTORS: ACHING
DESCRIPTORS: SORE
DESCRIPTORS: ACHING;DISCOMFORT
DESCRIPTORS: ACHING
DESCRIPTORS: SORE

## 2023-09-18 ASSESSMENT — PAIN SCALES - GENERAL
PAINLEVEL_OUTOF10: 3
PAINLEVEL_OUTOF10: 4
PAINLEVEL_OUTOF10: 2
PAINLEVEL_OUTOF10: 4
PAINLEVEL_OUTOF10: 4
PAINLEVEL_OUTOF10: 2
PAINLEVEL_OUTOF10: 3
PAINLEVEL_OUTOF10: 7
PAINLEVEL_OUTOF10: 4
PAINLEVEL_OUTOF10: 2
PAINLEVEL_OUTOF10: 8

## 2023-09-18 ASSESSMENT — PAIN DESCRIPTION - ONSET: ONSET: GRADUAL

## 2023-09-18 ASSESSMENT — PAIN DESCRIPTION - ORIENTATION
ORIENTATION: RIGHT

## 2023-09-18 ASSESSMENT — PAIN DESCRIPTION - FREQUENCY: FREQUENCY: INTERMITTENT

## 2023-09-18 ASSESSMENT — PAIN DESCRIPTION - PAIN TYPE: TYPE: SURGICAL PAIN

## 2023-09-18 ASSESSMENT — PAIN - FUNCTIONAL ASSESSMENT
PAIN_FUNCTIONAL_ASSESSMENT: PREVENTS OR INTERFERES SOME ACTIVE ACTIVITIES AND ADLS
PAIN_FUNCTIONAL_ASSESSMENT: 0-10

## 2023-09-18 ASSESSMENT — ENCOUNTER SYMPTOMS: SHORTNESS OF BREATH: 0

## 2023-09-18 NOTE — PROCEDURES
End of Shift Note    Bedside shift change report given to dunia OLIVIA (oncoming nurse) by Pushpa Escobar RN (offgoing nurse). Report included the following information SBAR, Kardex, MAR, Accordion, Recent Results, and Med Rec Status    Shift worked:  day     Shift summary and any significant changes:     Voiding. Concerns for physician to address:  none     Zone phone for oncoming shift:          Activity:     Number times ambulated in hallways past shift: 0  Number of times OOB to chair past shift: 0    Cardiac:   Cardiac Monitoring: No           Access:  Current line(s): PIV     Genitourinary:   Urinary status: voiding    Respiratory:      Chronic home O2 use?: NO  Incentive spirometer at bedside: YES       GI:     Current diet:  ADULT DIET; Regular  Passing flatus: YES  Tolerating current diet: YES       Pain Management:   Patient states pain is manageable on current regimen: YES    Skin:     Interventions: float heels and increase time out of bed    Patient Safety:  Fall Score:    Interventions: bed/chair alarm, assistive device (walker, cane.  etc), gripper socks, and pt to call before getting OOB       Length of Stay:  Expected LOS: 1  Actual LOS: 0      Pushpa Escobar RN

## 2023-09-18 NOTE — ANESTHESIA PROCEDURE NOTES
Spinal Block    Patient location during procedure: pre-op  End time: 9/18/2023 7:18 AM  Reason for block: primary anesthetic  Staffing  Performed: anesthesiologist   Performed by: Landon Maddox MD  Authorized by: Landon Maddox MD    Spinal Block  Patient position: sitting  Prep: DuraPrep  Patient monitoring: cardiac monitor, continuous pulse ox, frequent blood pressure checks and oxygen  Approach: midline  Location: L2/L3  Provider prep: mask and sterile gloves  Local infiltration: lidocaine  Needle  Needle type: pencil-tip   Needle gauge: 22 G  Needle length: 5 in  Needle insertion depth: 5 cm  Assessment  Sensory level: T4  Swirl obtained: Yes  CSF: clear  Attempts: 1  Hemodynamics: stable  Preanesthetic Checklist  Completed: patient identified, IV checked, site marked, risks and benefits discussed, surgical/procedural consents, equipment checked, pre-op evaluation, timeout performed, anesthesia consent given, oxygen available, monitors applied/VS acknowledged, fire risk safety assessment completed and verbalized and blood product R/B/A discussed and consented

## 2023-09-18 NOTE — CARE COORDINATION
CM met with pt at bedside, introduced role, & verified demographic information (address/insurance/emergency contact) is up-to-date in the chart. D/c plan discussed.     Transportation for d/c: [place] Lj JAYXVBHAVANI [phone]   Support post d/c: Family  Does pt own DME needed for d/c: Pt owns 2 walkers  Accepting [facility] agency: [facility] [address] offered: Yes, 09/18/2023        Mini Monahan, MSW  x [phone]

## 2023-09-18 NOTE — ANESTHESIA POSTPROCEDURE EVALUATION
Department of Anesthesiology  Postprocedure Note    Patient: Savanna Day  MRN: [id number]  YOB: 1935  Date of evaluation: 9/18/2023      Procedure Summary     Date: 09/18/23 Room / Location: [facility] MAIN OR M4 / [facility] MAIN OR    Anesthesia Start: 0730 Anesthesia Stop: 2691    Procedure: RIGHT UNICONDYLAR KNEE ARTHROPLASTY (Right: Knee) Diagnosis:       Primary osteoarthritis of right knee      (Primary osteoarthritis of right knee [M17.11])    Providers: Silas Betancur MD Responsible Provider: Landon Maddox MD    Anesthesia Type: Regional, Spinal, TIVA ASA Status: 3          Anesthesia Type: Regional, Spinal, TIVA    Ester Phase I: Ester Score: 10    Ester Phase II:        Anesthesia Post Evaluation    Patient location during evaluation: bedside  Patient participation: complete - patient participated  Level of consciousness: awake and alert  Airway patency: patent  Nausea & Vomiting: no nausea and no vomiting  Complications: no  Cardiovascular status: hemodynamically stable  Respiratory status: acceptable  Hydration status: stable  Multimodal analgesia pain management approach  Pain management: adequate

## 2023-09-18 NOTE — PERIOP NOTE
06:38= brought to Pre OP via wheelchair; assisted to standing scale, then used cane to ambulate to room 20; A&Ox4, consents verified (3), changed into gown using CHG, warming blanket applied. 06:50= Dr. Arabella Lind in to discuss Anesthesia. 06:59= 2 puffs using albuterol inhaler per CRNA. Up to BR to void then back to bed. 07:03= LR bolus started via new IV in left wrist per protocol. .    07:12= timeout performed with Dr. Arabella Lind; 2LO2NC placed on pt and frequent VS started every 2 minutes prior to sedation for spinal.    07:21= second timeout performed with Dr. Arabella Lind for right knee nerve block; 2LO2NC remains on pt and frequent VS cycling.

## 2023-09-18 NOTE — H&P
Date of Surgery Update:  Homa Walters Sr was seen and examined on the day of surgery prior to the procedure by the surgical team.    There were no significant clinical changes since the completion of the History and Physical.    Exam today prior to surgery showed no acute cardiac findings, no respiratory difficulty, and no abdominal complaints or pain.        Signed By: Saul Chavez PA-C    September 18, 2023 8:33 AM
([address]) 9/12/2014    CAD (coronary artery disease)     Dr. Jesu Onofre    Calculus of kidney     Chest pain 2006    Normal stress echo    Chronic bronchitis ([address]) 10/3/2012    Chronic cough 09/2023    pcp had chest xray done-normal results    Chronic pain     knees    Drooping eyelid, right     Enlarged prostate     Erectile dysfunction     GERD (gastroesophageal reflux disease)     hiatal hernia    Gout 1/24/2013    Hemoptysis 2008    Work up by Dr. Marlyn Barone; Negative    Hiatal hernia     Hypercholesterolemia     Hypertension     Melanoma ([address])     back    Nausea & vomiting     Pacemaker     Dr. Jose Aquino    Pre-diabetes     Prostate cancer [facility]) 6/4/2012    S/P ablation of atrial flutter 9/12/2014    Sleep apnea     no cpap    Stroke [facility]) 2009     tia no residual problems    Thyroid disease     Valvular heart disease     aortic stenosis s/p AVR      Past Surgical History:   Procedure Laterality Date    ABLATION OF DYSRHYTHMIC FOCUS  2014    Dr. Austen West    ruptured appendix    CARDIAC CATHETERIZATION      COLSC FLX W/RMVL OF TUMOR POLYP LESION SNARE TQ  4/21/2011         HERNIA REPAIR      5 hernia repairs total    HERNIA REPAIR  12/17/14    Recurrent left inguinal hernia; Dr. Delon Quinn Left 6/2/2023    LEFT KNEE UNICONDYLAR KNEE ARTHROPLASTY performed by Ethan Harley MD at [facility]  2014    Dr. Safia Sharpe    Kidney stone extraction      Prior to Admission medications    Medication Sig Start Date End Date Taking?  Authorizing Provider   albuterol sulfate HFA (PROVENTIL;VENTOLIN;PROAIR) 108 (90 Base) MCG/ACT inhaler INHALE 2 PUFFS BY MOUTH EVERY 4 HOURS AS NEEDED FOR WHEEZING 9/1/23   Sunny Gallardo MD   levothyroxine (SYNTHROID) 50 MCG tablet TAKE 1 TABLET BY MOUTH ONCE DAILY BEFORE BREAKFAST  Patient taking differently: Take 1 tablet by mouth Daily 7/24/23   Sunny Gallardo MD

## 2023-09-18 NOTE — OP NOTE
Yue Hines MD - Adult Reconstruction and Total Joint Replacement      Megan Ugarte Sr - MRN [id number] - : 1935 (81 y.o.)    Date: 2023    PREOPERATIVE DIAGNOSIS:  Right knee degenerative joint disease    POSTOPERATIVE DIAGNOSIS:  Same    PROCEDURE:  Right unicompartmental knee replacement    Implants Used:   Maria Elena Person[employer] UK[employer]  Femur: 4  Tibia: F  Poly: 10mm  Implant Name Type Inv.  Item Serial No.  Lot No. LRB No. Used Action   SCREW BNE L48MM CONSTRN CNDYL KNEE HEX HD STEM FOR LEG NXGN - [id number]  SCREW BNE L48MM CONSTRN CNDYL KNEE HEX HD STEM FOR LEG NXGN  [employer]- [id number] Right 1 Implanted   SCREW BNE L33MM CONSTRN CNDYL KNEE HEX HD STEM FOR LEG NXGN - [id number]  SCREW BNE L33MM CONSTRN CNDYL KNEE HEX HD STEM FOR LEG NXGN  [employer]- [id number] Right 1 Implanted   SCREW BNE L33MM CONSTRN CNDYL KNEE HEX HD STEM FOR LEG NXGN - [id number]  SCREW BNE L33MM CONSTRN CNDYL KNEE HEX HD STEM FOR LEG NXGN  [employer]- [id number] Right 1 Implanted   PSN PK CMT TIB RM SZ F - [id number]  PSN PK CMT TIB RM SZ F  [employer]- [id number] Right 1 Implanted   PSN PK CMT FEM RM SZ 4 - [id number]  PSN PK CMT FEM RM SZ 4  [employer]- [id number] Right 1 Implanted   CEMENT BNE RADIOPAQUE FAST SET ACRYL RESIN HI VISC SIMPLEXHV - [id number]  CEMENT BNE RADIOPAQUE FAST SET ACRYL RESIN HI VISC SIMPLEXHV  [employer] [facility]-WD [id number] Right 1 Implanted   BEARING TIB SZ F [id number] R MED PART KNEE Elvis DIAZ - [id number]  BEARING TIB SZ F [id number] R MED PART KNEE [facility] [employer]- [id number] Right 1 Implanted       Anesthesia: spinal + local    Pre-operative antibiotic: Ancef    Surgeon: Yue Hines     Assist: LORE Becerril (Performing all or most of the following assistant-at-surgery services including but not limited to: proper patient positioning, sterile/prep and draping,

## 2023-09-18 NOTE — ANESTHESIA PROCEDURE NOTES
Peripheral Block    Patient location during procedure: pre-op  Reason for block: post-op pain management and at surgeon's request  Start time: 9/18/2023 7:19 AM  End time: 9/18/2023 7:21 AM  Staffing  Performed: anesthesiologist   Anesthesiologist: Susannah Fabry, MD  Performed by: Susannah Fabry, MD  Authorized by: Susannah Fabry, MD    Preanesthetic Checklist  Completed: patient identified, IV checked, site marked, risks and benefits discussed, surgical/procedural consents, equipment checked, pre-op evaluation, timeout performed, anesthesia consent given, oxygen available, monitors applied/VS acknowledged, fire risk safety assessment completed and verbalized and blood product R/B/A discussed and consented  Peripheral Block   Patient position: supine  Prep: ChloraPrep  Provider prep: sterile gloves, mask and sterile gown  Patient monitoring: cardiac monitor, continuous pulse ox, continuous capnometry, frequent blood pressure checks, IV access, oxygen and responsive to questions  Block type: Femoral  Adductor canal  Laterality: right  Injection technique: single-shot  Guidance: ultrasound guided    Needle   Needle type: insulated echogenic nerve stimulator needle   Needle gauge: 22 G  Needle localization: nerve stimulator and ultrasound guidance  Needle insertion depth: 4 cm  Needle length: 5 cm  Assessment   Injection assessment: negative aspiration for heme, no paresthesia on injection, local visualized surrounding nerve on ultrasound and no intravascular symptoms  Slow fractionated injection: yes  Hemodynamics: stable  Real-time US image taken/store: yes  Outcomes: uncomplicated    Medications Administered  ropivacaine (NAROPIN) injection 0.5% - Perineural   20 mL - 9/18/2023 7:21:00 AM

## 2023-09-19 VITALS
HEIGHT: 72 IN | OXYGEN SATURATION: 93 % | BODY MASS INDEX: 29.89 KG/M2 | DIASTOLIC BLOOD PRESSURE: 61 MMHG | HEART RATE: 70 BPM | TEMPERATURE: 98.7 F | SYSTOLIC BLOOD PRESSURE: 124 MMHG | WEIGHT: 220.68 LBS | RESPIRATION RATE: 16 BRPM

## 2023-09-19 LAB
ANION GAP SERPL CALC-SCNC: 5 MMOL/L (ref 5–15)
BUN SERPL-MCNC: 20 MG/DL (ref 6–20)
BUN/CREAT SERPL: 18 (ref 12–20)
CALCIUM SERPL-MCNC: 8.7 MG/DL (ref 8.5–10.1)
CHLORIDE SERPL-SCNC: 104 MMOL/L (ref 97–108)
CO2 SERPL-SCNC: 26 MMOL/L (ref 21–32)
CREAT SERPL-MCNC: 1.13 MG/DL (ref 0.7–1.3)
GLUCOSE SERPL-MCNC: 129 MG/DL (ref 65–100)
HCT VFR BLD AUTO: 36.6 % (ref 36.6–50.3)
HGB BLD-MCNC: 12.1 G/DL (ref 12.1–17)
POTASSIUM SERPL-SCNC: 4.1 MMOL/L (ref 3.5–5.1)
SODIUM SERPL-SCNC: 135 MMOL/L (ref 136–145)

## 2023-09-19 PROCEDURE — 6360000002 HC RX W HCPCS: Performed by: PHYSICIAN ASSISTANT

## 2023-09-19 PROCEDURE — 97530 THERAPEUTIC ACTIVITIES: CPT

## 2023-09-19 PROCEDURE — G0378 HOSPITAL OBSERVATION PER HR: HCPCS

## 2023-09-19 PROCEDURE — 97116 GAIT TRAINING THERAPY: CPT

## 2023-09-19 PROCEDURE — APPNB60 APP NON BILLABLE TIME 46-60 MINS: Performed by: NURSE PRACTITIONER

## 2023-09-19 PROCEDURE — 6370000000 HC RX 637 (ALT 250 FOR IP): Performed by: NURSE PRACTITIONER

## 2023-09-19 PROCEDURE — 36415 COLL VENOUS BLD VENIPUNCTURE: CPT

## 2023-09-19 PROCEDURE — 80048 BASIC METABOLIC PNL TOTAL CA: CPT

## 2023-09-19 PROCEDURE — 85014 HEMATOCRIT: CPT

## 2023-09-19 PROCEDURE — 2580000003 HC RX 258: Performed by: PHYSICIAN ASSISTANT

## 2023-09-19 PROCEDURE — 85018 HEMOGLOBIN: CPT

## 2023-09-19 PROCEDURE — 6370000000 HC RX 637 (ALT 250 FOR IP): Performed by: PHYSICIAN ASSISTANT

## 2023-09-19 RX ADMIN — HYDROCODONE BITARTRATE AND ACETAMINOPHEN 1 TABLET: 5; 325 TABLET ORAL at 04:21

## 2023-09-19 RX ADMIN — METOPROLOL TARTRATE 25 MG: 25 TABLET, FILM COATED ORAL at 09:36

## 2023-09-19 RX ADMIN — POLYETHYLENE GLYCOL 3350 17 G: 17 POWDER, FOR SOLUTION ORAL at 09:38

## 2023-09-19 RX ADMIN — CEFAZOLIN 2000 MG: 1 INJECTION, POWDER, FOR SOLUTION INTRAMUSCULAR; INTRAVENOUS at 00:25

## 2023-09-19 RX ADMIN — FAMOTIDINE 20 MG: 20 TABLET, FILM COATED ORAL at 09:38

## 2023-09-19 RX ADMIN — CEPHALEXIN 500 MG: 250 CAPSULE ORAL at 06:28

## 2023-09-19 RX ADMIN — DIPHENHYDRAMINE HYDROCHLORIDE 25 MG: 25 CAPSULE ORAL at 04:20

## 2023-09-19 RX ADMIN — HYDROCODONE BITARTRATE AND ACETAMINOPHEN 1 TABLET: 5; 325 TABLET ORAL at 09:37

## 2023-09-19 RX ADMIN — LEVOTHYROXINE SODIUM 50 MCG: 0.05 TABLET ORAL at 06:28

## 2023-09-19 RX ADMIN — SENNOSIDES AND DOCUSATE SODIUM 1 TABLET: 50; 8.6 TABLET ORAL at 09:36

## 2023-09-19 RX ADMIN — ASPIRIN 81 MG: 81 TABLET, COATED ORAL at 09:38

## 2023-09-19 ASSESSMENT — PAIN DESCRIPTION - LOCATION
LOCATION: KNEE
LOCATION: KNEE

## 2023-09-19 ASSESSMENT — PAIN DESCRIPTION - DESCRIPTORS
DESCRIPTORS: ACHING;DISCOMFORT
DESCRIPTORS: ACHING

## 2023-09-19 ASSESSMENT — PAIN SCALES - GENERAL
PAINLEVEL_OUTOF10: 6
PAINLEVEL_OUTOF10: 2
PAINLEVEL_OUTOF10: 7
PAINLEVEL_OUTOF10: 2

## 2023-09-19 ASSESSMENT — PAIN DESCRIPTION - ORIENTATION
ORIENTATION: RIGHT
ORIENTATION: RIGHT

## 2023-09-19 NOTE — PLAN OF CARE
Problem: Physical Therapy - Adult  Goal: By Discharge: Performs mobility at highest level of function for planned discharge setting. See evaluation for individualized goals. Description: FUNCTIONAL STATUS PRIOR TO ADMISSION: Patient was modified independent using a single point cane for functional mobility. Lives with elderly wife who cannot provide assist. Son lives locally. Pt s/p L uni knee replacement June 2023. HOME SUPPORT PRIOR TO ADMISSION: The patient lived with wife but did not require assistance. Physical Therapy Goals  Initiated 9/18/2023  1. Patient will move from supine to sit and sit to supine in bed with independence within 7 day(s). 2.  Patient will perform sit to stand with modified independence within 7 day(s). 3.  Patient will transfer from bed to chair and chair to bed with modified independence using the least restrictive device within 7 day(s). 4.  Patient will ambulate with modified independence for 200 feet with the least restrictive device within 7 day(s). 5.  Patient will ascend/descend 2 stairs with single handrail(s) with supervision/set-up within 7 day(s). 9/18/2023 1529 by Kinjal Coronado, PT  Outcome: Progressing  9/18/2023 1333 by Kinjal Coronado, PT  Outcome: Progressing   PHYSICAL THERAPY TREATMENT    Patient: Cecilia Young (81 y.o. male)  Date: 9/18/2023  Diagnosis: Primary osteoarthritis of right knee [M17.11]  S/P total knee arthroplasty, right [Z96.651] S/P total knee arthroplasty, right  Procedure(s) (LRB):  RIGHT UNICONDYLAR KNEE ARTHROPLASTY (Right) Day of Surgery  Precautions:                      ASSESSMENT:  Patient continues to benefit from skilled PT services and is progressing towards goals. Pt reporting 9/10 R knee pain, but agreeable to therapy. RN informed of pt pain level. Pt completed bed mob with supervision. Transferred sit to stand to RW with CGA/ min A. Pt tolerated household distance amb with RW and CGA, limited by R knee pain.  Pt returned to
Problem: Physical Therapy - Adult  Goal: By Discharge: Performs mobility at highest level of function for planned discharge setting. See evaluation for individualized goals. Description: FUNCTIONAL STATUS PRIOR TO ADMISSION: Patient was modified independent using a single point cane for functional mobility. Lives with elderly wife who cannot provide assist. Son lives locally. Pt s/p L uni knee replacement June 2023. HOME SUPPORT PRIOR TO ADMISSION: The patient lived with wife but did not require assistance. Physical Therapy Goals  Initiated 9/18/2023  1. Patient will move from supine to sit and sit to supine in bed with independence within 7 day(s). 2.  Patient will perform sit to stand with modified independence within 7 day(s). 3.  Patient will transfer from bed to chair and chair to bed with modified independence using the least restrictive device within 7 day(s). 4.  Patient will ambulate with modified independence for 200 feet with the least restrictive device within 7 day(s). 5.  Patient will ascend/descend 2 stairs with single handrail(s) with supervision/set-up within 7 day(s). Outcome: Progressing   PHYSICAL THERAPY TREATMENT    Patient: Radha Fried (81 y.o. male)  Date: 9/19/2023  Diagnosis: Primary osteoarthritis of right knee [M17.11]  S/P total knee arthroplasty, right [Z96.651] S/P total knee arthroplasty, right  Procedure(s) (LRB):  RIGHT UNICONDYLAR KNEE ARTHROPLASTY (Right) 1 Day Post-Op  Precautions: RLE WBAT    ASSESSMENT:  Patient continues to benefit from skilled PT services and is progressing towards goals. Pt has good understanding of rehab process given his hx of left uni back in June and moves overall with SBA-CGA. Pt is slightly impulsive and with initial complaints of soreness however improves with mobility. Pt was able to ambulate to the orthopedic gym and back 175ft x2 with use of RW.  With some antalgic gait, decreased stance on his operative LE, however fair gait
Problem: Safety - Adult  Goal: Free from fall injury  Outcome: Progressing     Problem: Pain  Goal: Verbalizes/displays adequate comfort level or baseline comfort level  Outcome: Progressing
Problem: Safety - Adult  Goal: Free from fall injury  Outcome: Progressing  Flowsheets (Taken 9/19/2023 0010 by Ambrose Santos RN)  Free From Fall Injury: Instruct family/caregiver on patient safety     Problem: Physical Therapy - Adult  Goal: By Discharge: Performs mobility at highest level of function for planned discharge setting. See evaluation for individualized goals. Description: FUNCTIONAL STATUS PRIOR TO ADMISSION: Patient was modified independent using a single point cane for functional mobility. Lives with elderly wife who cannot provide assist. Son lives locally. Pt s/p L uni knee replacement June 2023. HOME SUPPORT PRIOR TO ADMISSION: The patient lived with wife but did not require assistance. Physical Therapy Goals  Initiated 9/18/2023  1. Patient will move from supine to sit and sit to supine in bed with independence within 7 day(s). 2.  Patient will perform sit to stand with modified independence within 7 day(s). 3.  Patient will transfer from bed to chair and chair to bed with modified independence using the least restrictive device within 7 day(s). 4.  Patient will ambulate with modified independence for 200 feet with the least restrictive device within 7 day(s). 5.  Patient will ascend/descend 2 stairs with single handrail(s) with supervision/set-up within 7 day(s).    9/19/2023 0956 by Linus Brown PTA  Outcome: Progressing
functional    Range Of Motion:  AROM: Generally decreased, functional       Functional Mobility:  Bed Mobility:     Bed Mobility Training  Bed Mobility Training: Yes  Supine to Sit: Supervision  Scooting: Supervision  Transfers:     Transfer Training  Transfer Training: Yes  Sit to Stand: Contact-guard assistance;Minimum assistance  Stand to Sit: Contact-guard assistance  Balance:               Balance  Sitting: Intact  Standing: Impaired  Standing - Static: Good;Constant support  Standing - Dynamic: Fair;Good;Constant support  Ambulation/Gait Training:                       Gait  Overall Level of Assistance: Contact-guard assistance  Interventions: Verbal cues  Base of Support: Widened  Speed/Eloisa: Pace decreased (< 100 feet/min)  Step Length: Left shortened;Right shortened  Stance: Right decreased  Gait Abnormalities: Antalgic;Decreased step clearance  Distance (ft): 100 Feet (+ 50')  Assistive Device: Gait belt;Walker, rolling                                                                                                                                                                                                                                               Intervention/Education specific to: \"knee replacement\"    Encouraged use of cryo therapy to aide in pain and edema control.               Therapeutic Exercises:     EXERCISE   Sets   Reps   Active Active Assist   Passive Self ROM   Comments   Ankle Pumps   [x]                                        []                                        []                                        []                                           Quad Sets   [x]                                        []                                        []                                        []                                           Hamstring Sets   []                                        []                                        []                                        []

## 2023-09-19 NOTE — PROGRESS NOTES
1116 - Patient discharged. CMA to send the MOON via certified mail to patient. Attempted to deliver and verbally explain the MOON with patient. Patient was with clinical staff.  Margret Redd, Care Management Assistant

## 2023-09-19 NOTE — DISCHARGE SUMMARY
Ortho Discharge Summary    Patient ID:  Nico Card  [id number]  male  81 y.o.  1935    Admit date: 9/18/2023    Discharge date: 9/19/2023    Admitting Physician: Casey Moon MD     Consulting Physician(s):   Treatment Team: Surgeon: Casey Moon MD; : FOZIA Bhardwaj; Registered Nurse: Mike Lind RN; Physical Therapist Assistant: Sylvester Hung PTA; LPN: Carlos Moss LPN    Date of Surgery:   9/18/2023     Preoperative Diagnosis:  Primary osteoarthritis of right knee [M17.11]    Postoperative Diagnosis:   * No post-op diagnosis entered *    Procedure(s):   RIGHT UNICONDYLAR KNEE ARTHROPLASTY     Anesthesia Type:   Choice     Surgeon: Casey Moon MD                            HPI:  Pt is a 81 y.o. male who has a history of Primary osteoarthritis of right knee [M17.11]  with pain and limitations of activities of daily living who presents at this time for a RIGHT UNICONDYLAR KNEE ARTHROPLASTY following the failure of conservative management. PMH:   Past Medical History:   Diagnosis Date    Adverse effect of anesthesia     difficult with waking up     Allergic rhinitis     Arthritis     Asthma     Atrial flutter ([address]) 9/12/2014    CAD (coronary artery disease)     Dr. Ben An    Calculus of kidney     Chest pain 2006    Normal stress echo    Chronic bronchitis ([address]) 10/3/2012    Chronic cough 09/2023    pcp had chest xray done-normal results    Chronic pain     knees    Drooping eyelid, right     Enlarged prostate     Erectile dysfunction     GERD (gastroesophageal reflux disease)     hiatal hernia    Gout 1/24/2013    Hemoptysis 2008    Work up by Dr. Crystal Saldaña;  Negative    Hiatal hernia     Hypercholesterolemia     Hypertension     Melanoma ([address])     back    Nausea & vomiting     Pacemaker     Dr. Hernandez Raw    Pre-diabetes     Prostate cancer [facility]) 6/4/2012    S/P ablation of atrial flutter 9/12/2014    Sleep apnea     no cpap    Stroke [facility]) 2009     tia no residual problems

## 2023-09-19 NOTE — PROGRESS NOTES
DISCHARGE NOTE FROM [facility]    Patient determined to be stable for discharge by attending provider. I have reviewed the discharge instructions with the patient and caregiver. They verbalized understanding and all questions were answered to their satisfaction. No complaints or further questions were expressed. Medications sent to pharmacy. Appropriate educational materials and medication side effect teaching were provided. PIV were removed prior to discharge. Patient did not discharge with any line, garrett, or drain. Personal items and valuables accounted for at discharge by patient and/or family: Yes    Post-op patient: Yes-Patient given post-op discharge kit and instructed on use.     [place], [place]

## 2023-11-21 ENCOUNTER — TELEPHONE (OUTPATIENT)
Age: 88
End: 2023-11-21

## 2023-11-29 RX ORDER — OMEPRAZOLE 20 MG/1
20 CAPSULE, DELAYED RELEASE ORAL DAILY
Qty: 90 CAPSULE | Refills: 3 | Status: SHIPPED | OUTPATIENT
Start: 2023-11-29

## 2023-12-26 RX ORDER — POTASSIUM CHLORIDE 20 MEQ/1
20 TABLET, EXTENDED RELEASE ORAL DAILY
Qty: 90 TABLET | Refills: 0 | Status: SHIPPED | OUTPATIENT
Start: 2023-12-26

## 2024-02-01 ENCOUNTER — TELEPHONE (OUTPATIENT)
Age: 89
End: 2024-02-01

## 2024-02-01 NOTE — TELEPHONE ENCOUNTER
Pt calls needing an appt for his thumb that jumps out of joint.     This is the right thumb.      Pt needs an appt to get this looked at.

## 2024-02-12 ENCOUNTER — OFFICE VISIT (OUTPATIENT)
Age: 89
End: 2024-02-12
Payer: MEDICARE

## 2024-02-12 VITALS
TEMPERATURE: 97.2 F | WEIGHT: 228.4 LBS | DIASTOLIC BLOOD PRESSURE: 64 MMHG | BODY MASS INDEX: 30.94 KG/M2 | HEIGHT: 72 IN | HEART RATE: 75 BPM | SYSTOLIC BLOOD PRESSURE: 105 MMHG | RESPIRATION RATE: 14 BRPM | OXYGEN SATURATION: 96 %

## 2024-02-12 DIAGNOSIS — M79.644 PAIN OF RIGHT THUMB: Primary | ICD-10-CM

## 2024-02-12 PROCEDURE — 99213 OFFICE O/P EST LOW 20 MIN: CPT | Performed by: INTERNAL MEDICINE

## 2024-02-12 NOTE — PROGRESS NOTES
Chief Complaint   Patient presents with    Thumb pain     Patient states that thumb \"jumps out of the joint\".          \"Have you been to the ER, urgent care clinic since your last visit?  Hospitalized since your last visit?\"    NO    “Have you seen or consulted any other health care providers outside of [facility] since your last visit?”    NO

## 2024-02-12 NOTE — PROGRESS NOTES
PROGRESS NOTE  Name: Dionicio Dewey Sr   : 1935       ASSESSMENT/ PLAN:     Dionicio was seen today for thumb pain.    Diagnoses and all orders for this visit:    Pain of right thumb  -     KEREN - Jesus Cao MD, Orthopaedic Surgery (elbow, hand, wrist), [place]    Follow up otherwise as planned.     I have reviewed the patient's medications and risks/side effects/benefits were discussed. Diagnosis(-es) explained to patient and questions answered. Literature provided where appropriate.                       SUBJECTIVE  Mr. Dionicio Dewey Sr presents today acutely for     Chief Complaint   Patient presents with    Thumb pain     Patient states that thumb \"jumps out of the joint\".        He has had the pain at R thumb base, and a feeling of coming out of alignment. This has been present for a couple of weeks.  He doesn't recall any particular inciting injury or trauma. He made himself a splint using popsickle stick. \"It's getting better.\"     OBJECTIVE  /64   Pulse 75   Temp 97.2 °F (36.2 °C) (Temporal)   Resp 14   Ht 1.829 m (6')   Wt 103.6 kg (228 lb 6.4 oz)   SpO2 96%   BMI 30.98 kg/m²   Gen: Pleasant 88 y.o.  male in NAD.     EXTREMITIES: Warm. R thumb clicks with motion, flexion. Not really tender today. Possibly slight swelling around base of thumb.

## 2024-02-23 RX ORDER — ALBUTEROL SULFATE 90 UG/1
AEROSOL, METERED RESPIRATORY (INHALATION)
Qty: 36 G | Refills: 0 | Status: SHIPPED | OUTPATIENT
Start: 2024-02-23

## 2024-03-25 ENCOUNTER — OFFICE VISIT (OUTPATIENT)
Age: 89
End: 2024-03-25
Payer: MEDICARE

## 2024-03-25 VITALS
SYSTOLIC BLOOD PRESSURE: 105 MMHG | HEART RATE: 71 BPM | RESPIRATION RATE: 14 BRPM | TEMPERATURE: 98.2 F | OXYGEN SATURATION: 98 % | BODY MASS INDEX: 29.93 KG/M2 | WEIGHT: 221 LBS | DIASTOLIC BLOOD PRESSURE: 62 MMHG | HEIGHT: 72 IN

## 2024-03-25 DIAGNOSIS — E03.9 HYPOTHYROIDISM, UNSPECIFIED TYPE: ICD-10-CM

## 2024-03-25 DIAGNOSIS — R73.9 HYPERGLYCEMIA, UNSPECIFIED: ICD-10-CM

## 2024-03-25 DIAGNOSIS — Z00.00 MEDICARE ANNUAL WELLNESS VISIT, SUBSEQUENT: Primary | ICD-10-CM

## 2024-03-25 DIAGNOSIS — M10.9 GOUT, UNSPECIFIED CAUSE, UNSPECIFIED CHRONICITY, UNSPECIFIED SITE: ICD-10-CM

## 2024-03-25 DIAGNOSIS — I25.118 CORONARY ARTERY DISEASE OF NATIVE ARTERY OF NATIVE HEART WITH STABLE ANGINA PECTORIS (HCC): ICD-10-CM

## 2024-03-25 PROCEDURE — 99214 OFFICE O/P EST MOD 30 MIN: CPT | Performed by: INTERNAL MEDICINE

## 2024-03-25 PROCEDURE — 1123F ACP DISCUSS/DSCN MKR DOCD: CPT | Performed by: INTERNAL MEDICINE

## 2024-03-25 PROCEDURE — G0439 PPPS, SUBSEQ VISIT: HCPCS | Performed by: INTERNAL MEDICINE

## 2024-03-25 ASSESSMENT — PATIENT HEALTH QUESTIONNAIRE - PHQ9
2. FEELING DOWN, DEPRESSED OR HOPELESS: NOT AT ALL
SUM OF ALL RESPONSES TO PHQ9 QUESTIONS 1 & 2: 0
1. LITTLE INTEREST OR PLEASURE IN DOING THINGS: NOT AT ALL
SUM OF ALL RESPONSES TO PHQ QUESTIONS 1-9: 0

## 2024-03-25 ASSESSMENT — LIFESTYLE VARIABLES
HOW OFTEN DO YOU HAVE A DRINK CONTAINING ALCOHOL: MONTHLY OR LESS
HOW MANY STANDARD DRINKS CONTAINING ALCOHOL DO YOU HAVE ON A TYPICAL DAY: 1 OR 2

## 2024-03-25 NOTE — PROGRESS NOTES
Medicare Annual Wellness Visit    Dionicio EMILEE Maco Campbell is here for Medicare AWV    Assessment & Plan   Medicare annual wellness visit, subsequent  Recommendations for Preventive Services Due: see orders and patient instructions/AVS.  Recommended screening schedule for the next 5-10 years is provided to the patient in written form: see Patient Instructions/AVS.     No follow-ups on file.     Subjective       Patient's complete Health Risk Assessment and screening values have been reviewed and are found in Flowsheets. The following problems were reviewed today and where indicated follow up appointments were made and/or referrals ordered.    Positive Risk Factor Screenings with Interventions:                    Safety:  Do you have any tripping hazards - loose or unsecured carpets or rugs?: (!) Yes  Interventions:  See AVS for additional education material                   Objective   Vitals:    03/25/24 0937   BP: 105/62   Site: Left Upper Arm   Position: Sitting   Cuff Size: Medium Adult   Pulse: 71   Resp: 14   Temp: 98.2 °F (36.8 °C)   TempSrc: Temporal   SpO2: 98%   Weight: 100.2 kg (221 lb)   Height: 1.829 m (6')      Body mass index is 29.97 kg/m².               Allergies   Allergen Reactions    Azithromycin Anaphylaxis    Gadolinium Derivatives Other (See Comments)     1) Moderate to Severe.  2) Post Gadolinium issues as noted:   - Diaphoretic, Near Syncope, Nausea and vomiting.    Amlodipine Other (See Comments)     Numbness and tingling    Diclofenac Sodium Other (See Comments)     unknown    Ezetimibe Myalgia    Iodine Other (See Comments)     Decrease in BP    Levofloxacin Other (See Comments)     Joint pain    Methylprednisolone Dizziness or Vertigo    Nsaids Other (See Comments)     Cough up blood    Oxycodone-Acetaminophen Itching    Ranolazine Other (See Comments)     Cannot remember    Rosuvastatin Myalgia    Simvastatin Other (See Comments)     Joint pain     Prior to Visit Medications    Medication Sig

## 2024-03-25 NOTE — PROGRESS NOTES
1. \"Have you been to the ER, urgent care clinic since your last visit?  Hospitalized since your last visit?\" No    2. \"Have you seen or consulted any other health care providers outside of the [facility] since your last visit?\" No     3. For patients aged 45-75: Has the patient had a colonoscopy / FIT/ Cologuard? NA - based on age

## 2024-03-25 NOTE — PROGRESS NOTES
PROGRESS NOTE  Name: Dionicio Dewey Sr   : 1935       ASSESSMENT/ PLAN:     CAD: No CP.  As per his cardiologist, Dr. Arango.  GRECIA mo, SSS: now with pacemaker. As per cardiology.   Aortic stenosis: s/p AVR.  Doing well.    Asthma: Stable.  Uses albuterol prn; Less than daily currently.   Prostate Cancer: So far, doing okay with surveillance. Patient reaffirms desire for conservative approach today. Surveillance for now.  Pt willing to do hormonal treatments if PSA rises to higher levels. His urologist has suggested a PSA of 20 or above as a triggering value  Problem drinking: He has mostly stopped drinking since COVID.     Hyperlipidemia:  Not at goal, but options limited.  Did not tolerate simvastatin or zetia.  Now on fish oil.  Will recheck lipids and CMP.  Continue pulse dosing of Crestor.  GERD:  Controlled on prn prilosec; Pt. may continue this.  ED: Not discussed.  All rhinitis: Stable.   Possible TIA/Diplopia/Amaurosis fugax: No recent episode.  Work up as above.  Gout: no recent flares.  Borderline DM. Recheck labs.   Obesity: Watch diet.  More exercise as able (limited now by postsurgical recovery).   Knee pain: Despite recent surgery. Follow up with Dr. Frias.   Hx of Noncompliance.    Follow up in 6 months.      I have reviewed the patient's medications and risks/side effects/benefits were discussed. Diagnosis(-es) explained to patient and questions answered. Literature provided where appropriate.                    SUBJECTIVE:   Mr. Dionicio Dewey Sr is a 89 y.o. male who presents today for follow up.      Chief Complaint   Patient presents with    Medicare AWV     His wife  in early March. He feels he is coping okay. He has family nearby, and friends at [facility].     He is to get a new battery in pacemaker in a couple of months.     He still has R knee pain, but \"it's starting to get better.    He has had recent L knee replacement in 2023, R knee in 2023. Joseph Tee.

## 2024-05-13 ENCOUNTER — HOSPITAL ENCOUNTER (OUTPATIENT)
Facility: HOSPITAL | Age: 89
Discharge: HOME | End: 2024-05-16
Payer: MEDICARE

## 2024-05-13 ENCOUNTER — TRANSCRIBE ORDERS (OUTPATIENT)
Facility: HOSPITAL | Age: 89
End: 2024-05-13

## 2024-05-13 DIAGNOSIS — I44.1 MOBITZ TYPE II ATRIOVENTRICULAR BLOCK: ICD-10-CM

## 2024-05-13 DIAGNOSIS — I44.1 MOBITZ TYPE II ATRIOVENTRICULAR BLOCK: Primary | ICD-10-CM

## 2024-05-13 PROCEDURE — 71046 X-RAY EXAM CHEST 2 VIEWS: CPT

## 2024-05-30 ENCOUNTER — HOSPITAL ENCOUNTER (OUTPATIENT)
Facility: HOSPITAL | Age: 89
Discharge: HOME | End: 2024-05-30
Attending: INTERNAL MEDICINE | Admitting: INTERNAL MEDICINE
Payer: MEDICARE

## 2024-05-30 VITALS
BODY MASS INDEX: 29.55 KG/M2 | HEIGHT: 73 IN | HEART RATE: 70 BPM | RESPIRATION RATE: 16 BRPM | WEIGHT: 223 LBS | TEMPERATURE: 97.8 F | DIASTOLIC BLOOD PRESSURE: 58 MMHG | SYSTOLIC BLOOD PRESSURE: 122 MMHG | OXYGEN SATURATION: 100 %

## 2024-05-30 DIAGNOSIS — Z45.010 PACEMAKER AT END OF BATTERY LIFE: ICD-10-CM

## 2024-05-30 LAB — ECHO BSA: 2.28 M2

## 2024-05-30 PROCEDURE — 33228 REMV&REPLC PM GEN DUAL LEAD: CPT | Performed by: INTERNAL MEDICINE

## 2024-05-30 PROCEDURE — 2720000010 HC SURG SUPPLY STERILE: Performed by: INTERNAL MEDICINE

## 2024-05-30 PROCEDURE — 2580000003 HC RX 258: Performed by: INTERNAL MEDICINE

## 2024-05-30 PROCEDURE — 99152 MOD SED SAME PHYS/QHP 5/>YRS: CPT | Performed by: INTERNAL MEDICINE

## 2024-05-30 PROCEDURE — 2709999900 HC NON-CHARGEABLE SUPPLY: Performed by: INTERNAL MEDICINE

## 2024-05-30 PROCEDURE — C1785 PMKR, DUAL, RATE-RESP: HCPCS | Performed by: INTERNAL MEDICINE

## 2024-05-30 PROCEDURE — 6360000002 HC RX W HCPCS: Performed by: INTERNAL MEDICINE

## 2024-05-30 DEVICE — PACEMAKER
Type: IMPLANTABLE DEVICE | Status: FUNCTIONAL
Brand: ACCOLADE™ MRI EL DR

## 2024-05-30 RX ORDER — FENTANYL CITRATE 50 UG/ML
INJECTION, SOLUTION INTRAMUSCULAR; INTRAVENOUS PRN
Status: DISCONTINUED | OUTPATIENT
Start: 2024-05-30 | End: 2024-05-30 | Stop reason: HOSPADM

## 2024-05-30 RX ORDER — ACETAMINOPHEN 325 MG/1
650 TABLET ORAL EVERY 4 HOURS PRN
Status: DISCONTINUED | OUTPATIENT
Start: 2024-05-30 | End: 2024-05-30 | Stop reason: HOSPADM

## 2024-05-30 RX ORDER — SODIUM CHLORIDE 0.9 % (FLUSH) 0.9 %
5-40 SYRINGE (ML) INJECTION EVERY 12 HOURS SCHEDULED
Status: DISCONTINUED | OUTPATIENT
Start: 2024-05-30 | End: 2024-05-30 | Stop reason: HOSPADM

## 2024-05-30 RX ORDER — MIDAZOLAM HYDROCHLORIDE 1 MG/ML
INJECTION INTRAMUSCULAR; INTRAVENOUS PRN
Status: DISCONTINUED | OUTPATIENT
Start: 2024-05-30 | End: 2024-05-30 | Stop reason: HOSPADM

## 2024-05-30 RX ORDER — SODIUM CHLORIDE 0.9 % (FLUSH) 0.9 %
5-40 SYRINGE (ML) INJECTION PRN
Status: DISCONTINUED | OUTPATIENT
Start: 2024-05-30 | End: 2024-05-30 | Stop reason: HOSPADM

## 2024-05-30 RX ORDER — SODIUM CHLORIDE 9 MG/ML
INJECTION, SOLUTION INTRAVENOUS PRN
Status: DISCONTINUED | OUTPATIENT
Start: 2024-05-30 | End: 2024-05-30 | Stop reason: HOSPADM

## 2024-05-30 NOTE — DISCHARGE INSTRUCTIONS
[facility]  [address] [place] [address]  [phone]  [email or web address]     NEW PACEMAKER IMPLANT DISCHARGE INSTRUCTIONS    Patient ID:  Dionicio Dewey Sr  [id number]  89 y.o.  1935    Admit Date: 5/30/2024    Discharge Date: 5/30/2024     Admitting Physician: [unfilled]     Discharge Physician: [unfilled]    Admission Diagnoses:   Pacemaker at end of battery life [Z45.010]    Discharge Diagnoses:   [unfilled]    Discharge Condition: Good    Cardiology Procedures this Admission:  Pacemaker insertion.     Disposition: home    Reference discharge instructions provided by nursing for diet and activity.    Follow-up with device clinic in three weeks. Call [phone] to make an appointment.    Signed:  Nemesio Reynolds MD  5/30/2024  8:50 AM      DISCHARGE INSTRUCTIONS FOR PATIENTS WITH PACEMAKERS    1. Remember to call for an appointment for 3 weeks [phone] to check healing and implant programming.  2. Medic Alert Bracelets are available from your pharmacist to wear at all times if you choose to wear one.  3. Carry your ID card for pacemaker with you at all times.  This card will be given to you in the hospital or mailed to you.  4. The pacemaker will bulge slightly under your skin.  The bulge will decrease in size over the next few weeks.  Please notify the doctor's office if you notice any of the following around your site:   A.  A bruise that does not go away.   B.  Soreness or yellow, green, or brown drainage from the site.   C.  Any swelling from the site.   D.  If you have a fever of 100 degrees or higher that lasts for a few days.    INCISION CARE       1.  Leave the dressing over your site until your follow up visit.  2.  You may not shower until after follow up visit.   3.  For comfort, wear loose fitting clothing.  4.  Ice pack to affected shoulder for first 24 hours, wear your sling for 2 days.  5.  Report any signs of infection, fever, pain,

## 2024-05-30 NOTE — PROGRESS NOTES
Cardiac Cath Lab Recovery Arrival Note:      Dionicio Dewey Sr arrived to Cardiac Cath Lab, Recovery Area. Staff introduced to patient. Patient identifiers verified with NAME and DATE OF BIRTH. Procedure verified with patient. Consent forms reviewed and signed by patient or authorized representative and verified. Allergies verified.     Patient and family oriented to department. Patient and family informed of procedure and plan of care.     Questions answered with review. Patient prepped for procedure, per orders from physician, prior to arrival.    Patient on cardiac monitor, non-invasive blood pressure, SPO2 monitor. On RA. Patient is A&Ox 4. Patient reports no pain.     Patient in stretcher, in low position, with side rails up, call bell within reach, patient instructed to call if assistance as needed.    Patient prep in: [facility] Recovery Area, [address].     Family in: [place], Daughter in Law.   Prep by: Angie

## 2024-06-17 RX ORDER — ALBUTEROL SULFATE 90 UG/1
AEROSOL, METERED RESPIRATORY (INHALATION)
Qty: 36 G | Refills: 0 | Status: SHIPPED | OUTPATIENT
Start: 2024-06-17

## 2024-07-22 RX ORDER — LEVOTHYROXINE SODIUM 0.05 MG/1
TABLET ORAL
Qty: 90 TABLET | Refills: 3 | Status: SHIPPED | OUTPATIENT
Start: 2024-07-22

## 2024-08-19 RX ORDER — POTASSIUM CHLORIDE 20 MEQ/1
20 TABLET, EXTENDED RELEASE ORAL DAILY
Qty: 90 TABLET | Refills: 3 | Status: SHIPPED | OUTPATIENT
Start: 2024-08-19

## 2024-08-22 ENCOUNTER — LAB (OUTPATIENT)
Age: 89
End: 2024-08-22

## 2024-08-22 DIAGNOSIS — E03.9 HYPOTHYROIDISM, UNSPECIFIED TYPE: ICD-10-CM

## 2024-08-22 DIAGNOSIS — I25.118 CORONARY ARTERY DISEASE OF NATIVE ARTERY OF NATIVE HEART WITH STABLE ANGINA PECTORIS (HCC): ICD-10-CM

## 2024-08-22 DIAGNOSIS — M10.9 GOUT, UNSPECIFIED CAUSE, UNSPECIFIED CHRONICITY, UNSPECIFIED SITE: ICD-10-CM

## 2024-08-22 DIAGNOSIS — R73.9 HYPERGLYCEMIA, UNSPECIFIED: ICD-10-CM

## 2024-08-22 LAB
ALBUMIN SERPL-MCNC: 3.5 G/DL (ref 3.5–5)
ALBUMIN/GLOB SERPL: 0.9 (ref 1.1–2.2)
ALP SERPL-CCNC: 79 U/L (ref 45–117)
ALT SERPL-CCNC: 22 U/L (ref 12–78)
ANION GAP SERPL CALC-SCNC: 4 MMOL/L (ref 5–15)
AST SERPL-CCNC: 20 U/L (ref 15–37)
BASOPHILS # BLD: 0 K/UL (ref 0–0.1)
BASOPHILS NFR BLD: 1 % (ref 0–1)
BILIRUB SERPL-MCNC: 0.5 MG/DL (ref 0.2–1)
BUN SERPL-MCNC: 20 MG/DL (ref 6–20)
BUN/CREAT SERPL: 20 (ref 12–20)
CALCIUM SERPL-MCNC: 8.8 MG/DL (ref 8.5–10.1)
CHLORIDE SERPL-SCNC: 106 MMOL/L (ref 97–108)
CHOLEST SERPL-MCNC: 210 MG/DL
CO2 SERPL-SCNC: 29 MMOL/L (ref 21–32)
CREAT SERPL-MCNC: 0.99 MG/DL (ref 0.7–1.3)
DIFFERENTIAL METHOD BLD: ABNORMAL
EOSINOPHIL # BLD: 0.5 K/UL (ref 0–0.4)
EOSINOPHIL NFR BLD: 10 % (ref 0–7)
ERYTHROCYTE [DISTWIDTH] IN BLOOD BY AUTOMATED COUNT: 12.8 % (ref 11.5–14.5)
EST. AVERAGE GLUCOSE BLD GHB EST-MCNC: 126 MG/DL
GLOBULIN SER CALC-MCNC: 4.1 G/DL (ref 2–4)
GLUCOSE SERPL-MCNC: 117 MG/DL (ref 65–100)
HBA1C MFR BLD: 6 % (ref 4–5.6)
HCT VFR BLD AUTO: 37.5 % (ref 36.6–50.3)
HDLC SERPL-MCNC: 40 MG/DL
HDLC SERPL: 5.3 (ref 0–5)
HGB BLD-MCNC: 12.1 G/DL (ref 12.1–17)
IMM GRANULOCYTES # BLD AUTO: 0 K/UL (ref 0–0.04)
IMM GRANULOCYTES NFR BLD AUTO: 0 % (ref 0–0.5)
LDLC SERPL CALC-MCNC: 145.8 MG/DL (ref 0–100)
LYMPHOCYTES # BLD: 1.9 K/UL (ref 0.8–3.5)
LYMPHOCYTES NFR BLD: 37 % (ref 12–49)
MCH RBC QN AUTO: 32 PG (ref 26–34)
MCHC RBC AUTO-ENTMCNC: 32.3 G/DL (ref 30–36.5)
MCV RBC AUTO: 99.2 FL (ref 80–99)
MONOCYTES # BLD: 0.5 K/UL (ref 0–1)
MONOCYTES NFR BLD: 10 % (ref 5–13)
NEUTS SEG # BLD: 2.2 K/UL (ref 1.8–8)
NEUTS SEG NFR BLD: 42 % (ref 32–75)
NRBC # BLD: 0 K/UL (ref 0–0.01)
NRBC BLD-RTO: 0 PER 100 WBC
PLATELET # BLD AUTO: 171 K/UL (ref 150–400)
PMV BLD AUTO: 9.1 FL (ref 8.9–12.9)
POTASSIUM SERPL-SCNC: 4.2 MMOL/L (ref 3.5–5.1)
PROT SERPL-MCNC: 7.6 G/DL (ref 6.4–8.2)
RBC # BLD AUTO: 3.78 M/UL (ref 4.1–5.7)
SODIUM SERPL-SCNC: 139 MMOL/L (ref 136–145)
T4 FREE SERPL-MCNC: 1 NG/DL (ref 0.8–1.5)
TRIGL SERPL-MCNC: 121 MG/DL
TSH SERPL DL<=0.05 MIU/L-ACNC: 2.33 UIU/ML (ref 0.36–3.74)
URATE SERPL-MCNC: 6.7 MG/DL (ref 3.5–7.2)
VLDLC SERPL CALC-MCNC: 24.2 MG/DL
WBC # BLD AUTO: 5.1 K/UL (ref 4.1–11.1)

## 2024-09-03 ENCOUNTER — OFFICE VISIT (OUTPATIENT)
Age: 89
End: 2024-09-03
Payer: MEDICARE

## 2024-09-03 VITALS
HEIGHT: 73 IN | SYSTOLIC BLOOD PRESSURE: 107 MMHG | DIASTOLIC BLOOD PRESSURE: 61 MMHG | OXYGEN SATURATION: 97 % | HEART RATE: 87 BPM | BODY MASS INDEX: 30.48 KG/M2 | WEIGHT: 230 LBS | TEMPERATURE: 97.3 F | RESPIRATION RATE: 16 BRPM

## 2024-09-03 DIAGNOSIS — R73.9 HYPERGLYCEMIA, UNSPECIFIED: ICD-10-CM

## 2024-09-03 DIAGNOSIS — I25.118 CORONARY ARTERY DISEASE OF NATIVE ARTERY OF NATIVE HEART WITH STABLE ANGINA PECTORIS (HCC): ICD-10-CM

## 2024-09-03 DIAGNOSIS — C61 MALIGNANT NEOPLASM OF PROSTATE (HCC): ICD-10-CM

## 2024-09-03 DIAGNOSIS — I49.5 SICK SINUS SYNDROME (HCC): ICD-10-CM

## 2024-09-03 DIAGNOSIS — M10.9 GOUT, UNSPECIFIED CAUSE, UNSPECIFIED CHRONICITY, UNSPECIFIED SITE: ICD-10-CM

## 2024-09-03 DIAGNOSIS — E03.9 HYPOTHYROIDISM, UNSPECIFIED TYPE: Primary | ICD-10-CM

## 2024-09-03 DIAGNOSIS — Z86.73 HISTORY OF STROKE: ICD-10-CM

## 2024-09-03 DIAGNOSIS — N18.31 CHRONIC KIDNEY DISEASE, STAGE 3A (HCC): ICD-10-CM

## 2024-09-03 DIAGNOSIS — J42 CHRONIC BRONCHITIS, UNSPECIFIED CHRONIC BRONCHITIS TYPE (HCC): ICD-10-CM

## 2024-09-03 PROCEDURE — 1123F ACP DISCUSS/DSCN MKR DOCD: CPT | Performed by: INTERNAL MEDICINE

## 2024-09-03 PROCEDURE — 99214 OFFICE O/P EST MOD 30 MIN: CPT | Performed by: INTERNAL MEDICINE

## 2024-09-03 NOTE — PROGRESS NOTES
PROGRESS NOTE  Name: Dionicio Dewey Sr   : 1935       ASSESSMENT/ PLAN:     CAD: No CP.  As per his cardiologist, Dr. Arango.  GRECIA mo, SSS: now with pacemaker. As per cardiology.   Aortic stenosis: s/p AVR.  Doing well.    Asthma: Stable.  Uses albuterol prn; Less than daily currently.   Prostate Cancer: So far, doing okay with surveillance. Patient reaffirms desire for conservative approach today. Surveillance for now.  Pt willing to do hormonal treatments if PSA rises to higher levels. His urologist has suggested a PSA of 20 or above as a triggering value  Problem drinking: He has mostly stopped drinking since COVID.     Hyperlipidemia:  Not at goal, but options limited.  Did not tolerate simvastatin or zetia.  Now on fish oil.  Will recheck lipids and CMP.  Continue pulse dosing of Crestor.  GERD:  Controlled on prn prilosec; Pt. may continue this.  ED: Not discussed.  All rhinitis: Stable.   Possible TIA/Diplopia/Amaurosis fugax: No recent episode.  Work up as above.  Gout: no recent flares.  Borderline DM. Recheck labs.   Obesity: Watch diet.  More exercise as able (limited now by postsurgical recovery).   Knee pain: Despite recent surgery. Follow up with Dr. Frias.   Hx of Noncompliance.    Follow up in 6 months.      I have reviewed the patient's medications and risks/side effects/benefits were discussed. Diagnosis(-es) explained to patient and questions answered. Literature provided where appropriate.                    SUBJECTIVE:   Mr. Dionicio Dewey Sr is a 89 y.o. male who presents today for follow up.      Chief Complaint   Patient presents with    Follow-up     His wife  in early 2024. He feels he is coping okay. He has family nearby, and friends at [facility].     He is to get a new battery in pacemaker in a couple of months.     He still has R knee pain, but \"it's starting to get better.    He has had recent L knee replacement in 2023, R knee in 2023. Joseph

## 2024-09-03 NOTE — PROGRESS NOTES
\"Have you been to the ER, urgent care clinic since your last visit?  Hospitalized since your last visit?\"    ER    “Have you seen or consulted any other health care providers outside of [facility] since your last visit?”    NO            Click Here for Release of Records Request

## 2024-10-17 ENCOUNTER — HOSPITAL ENCOUNTER (OUTPATIENT)
Facility: HOSPITAL | Age: 89
Setting detail: OBSERVATION
Discharge: LEFT BEFORE DISCHARGE | End: 2024-10-19
Attending: EMERGENCY MEDICINE | Admitting: INTERNAL MEDICINE
Payer: MEDICARE

## 2024-10-17 ENCOUNTER — APPOINTMENT (OUTPATIENT)
Facility: HOSPITAL | Age: 89
End: 2024-10-17
Payer: MEDICARE

## 2024-10-17 DIAGNOSIS — R42 DIZZINESS: Primary | ICD-10-CM

## 2024-10-17 DIAGNOSIS — R07.9 ACUTE CHEST PAIN: ICD-10-CM

## 2024-10-17 LAB
ALBUMIN SERPL-MCNC: 3.5 G/DL (ref 3.5–5)
ALBUMIN/GLOB SERPL: 0.8 (ref 1.1–2.2)
ALP SERPL-CCNC: 68 U/L (ref 45–117)
ALT SERPL-CCNC: 23 U/L (ref 12–78)
ANION GAP SERPL CALC-SCNC: 3 MMOL/L (ref 2–12)
APPEARANCE UR: CLEAR
AST SERPL-CCNC: 19 U/L (ref 15–37)
BACTERIA URNS QL MICRO: NEGATIVE /HPF
BASOPHILS # BLD: 0 K/UL (ref 0–0.1)
BASOPHILS NFR BLD: 1 % (ref 0–1)
BILIRUB SERPL-MCNC: 0.4 MG/DL (ref 0.2–1)
BILIRUB UR QL: NEGATIVE
BUN SERPL-MCNC: 20 MG/DL (ref 6–20)
BUN/CREAT SERPL: 20 (ref 12–20)
CALCIUM SERPL-MCNC: 9.3 MG/DL (ref 8.5–10.1)
CHLORIDE SERPL-SCNC: 107 MMOL/L (ref 97–108)
CO2 SERPL-SCNC: 28 MMOL/L (ref 21–32)
COLOR UR: NORMAL
CREAT SERPL-MCNC: 1 MG/DL (ref 0.7–1.3)
DIFFERENTIAL METHOD BLD: ABNORMAL
EOSINOPHIL # BLD: 0.4 K/UL (ref 0–0.4)
EOSINOPHIL NFR BLD: 7 % (ref 0–7)
EPITH CASTS URNS QL MICRO: NORMAL /LPF
ERYTHROCYTE [DISTWIDTH] IN BLOOD BY AUTOMATED COUNT: 12.4 % (ref 11.5–14.5)
GLOBULIN SER CALC-MCNC: 4.4 G/DL (ref 2–4)
GLUCOSE BLD STRIP.AUTO-MCNC: 114 MG/DL (ref 65–117)
GLUCOSE SERPL-MCNC: 115 MG/DL (ref 65–100)
GLUCOSE UR STRIP.AUTO-MCNC: NEGATIVE MG/DL
HCT VFR BLD AUTO: 38.3 % (ref 36.6–50.3)
HGB BLD-MCNC: 12.4 G/DL (ref 12.1–17)
HGB UR QL STRIP: NEGATIVE
HYALINE CASTS URNS QL MICRO: NORMAL /LPF (ref 0–2)
IMM GRANULOCYTES # BLD AUTO: 0 K/UL (ref 0–0.04)
IMM GRANULOCYTES NFR BLD AUTO: 0 % (ref 0–0.5)
INR PPP: 1.1 (ref 0.9–1.1)
KETONES UR QL STRIP.AUTO: NEGATIVE MG/DL
LEUKOCYTE ESTERASE UR QL STRIP.AUTO: NEGATIVE
LYMPHOCYTES # BLD: 1.5 K/UL (ref 0.8–3.5)
LYMPHOCYTES NFR BLD: 28 % (ref 12–49)
MCH RBC QN AUTO: 32 PG (ref 26–34)
MCHC RBC AUTO-ENTMCNC: 32.4 G/DL (ref 30–36.5)
MCV RBC AUTO: 98.7 FL (ref 80–99)
MONOCYTES # BLD: 0.6 K/UL (ref 0–1)
MONOCYTES NFR BLD: 12 % (ref 5–13)
NEUTS SEG # BLD: 2.8 K/UL (ref 1.8–8)
NEUTS SEG NFR BLD: 52 % (ref 32–75)
NITRITE UR QL STRIP.AUTO: NEGATIVE
NRBC # BLD: 0 K/UL (ref 0–0.01)
NRBC BLD-RTO: 0 PER 100 WBC
PH UR STRIP: 6 (ref 5–8)
PLATELET # BLD AUTO: 180 K/UL (ref 150–400)
PMV BLD AUTO: 8.4 FL (ref 8.9–12.9)
POTASSIUM SERPL-SCNC: 4.3 MMOL/L (ref 3.5–5.1)
PROT SERPL-MCNC: 7.9 G/DL (ref 6.4–8.2)
PROT UR STRIP-MCNC: NEGATIVE MG/DL
PROTHROMBIN TIME: 11 SEC (ref 9–11.1)
RBC # BLD AUTO: 3.88 M/UL (ref 4.1–5.7)
RBC #/AREA URNS HPF: NORMAL /HPF (ref 0–5)
SERVICE CMNT-IMP: NORMAL
SODIUM SERPL-SCNC: 138 MMOL/L (ref 136–145)
SP GR UR REFRACTOMETRY: 1.01
TROPONIN I SERPL HS-MCNC: 7 NG/L (ref 0–76)
TROPONIN I SERPL HS-MCNC: 8 NG/L (ref 0–76)
URINE CULTURE IF INDICATED: NORMAL
UROBILINOGEN UR QL STRIP.AUTO: 0.2 EU/DL (ref 0.2–1)
WBC # BLD AUTO: 5.3 K/UL (ref 4.1–11.1)
WBC URNS QL MICRO: NORMAL /HPF (ref 0–4)

## 2024-10-17 PROCEDURE — 80053 COMPREHEN METABOLIC PANEL: CPT

## 2024-10-17 PROCEDURE — 6370000000 HC RX 637 (ALT 250 FOR IP): Performed by: INTERNAL MEDICINE

## 2024-10-17 PROCEDURE — G0378 HOSPITAL OBSERVATION PER HR: HCPCS

## 2024-10-17 PROCEDURE — 2580000003 HC RX 258: Performed by: INTERNAL MEDICINE

## 2024-10-17 PROCEDURE — 85610 PROTHROMBIN TIME: CPT

## 2024-10-17 PROCEDURE — 82962 GLUCOSE BLOOD TEST: CPT

## 2024-10-17 PROCEDURE — 93005 ELECTROCARDIOGRAM TRACING: CPT | Performed by: EMERGENCY MEDICINE

## 2024-10-17 PROCEDURE — 36415 COLL VENOUS BLD VENIPUNCTURE: CPT

## 2024-10-17 PROCEDURE — 70450 CT HEAD/BRAIN W/O DYE: CPT

## 2024-10-17 PROCEDURE — 85025 COMPLETE CBC W/AUTO DIFF WBC: CPT

## 2024-10-17 PROCEDURE — 84484 ASSAY OF TROPONIN QUANT: CPT

## 2024-10-17 PROCEDURE — 81001 URINALYSIS AUTO W/SCOPE: CPT

## 2024-10-17 PROCEDURE — 99285 EMERGENCY DEPT VISIT HI MDM: CPT

## 2024-10-17 RX ORDER — ENOXAPARIN SODIUM 100 MG/ML
30 INJECTION SUBCUTANEOUS 2 TIMES DAILY
Status: DISCONTINUED | OUTPATIENT
Start: 2024-10-18 | End: 2024-10-19 | Stop reason: HOSPADM

## 2024-10-17 RX ORDER — ALBUTEROL SULFATE 90 UG/1
2 INHALANT RESPIRATORY (INHALATION) EVERY 4 HOURS PRN
Status: DISCONTINUED | OUTPATIENT
Start: 2024-10-17 | End: 2024-10-17 | Stop reason: CLARIF

## 2024-10-17 RX ORDER — METOPROLOL TARTRATE 25 MG/1
25 TABLET, FILM COATED ORAL 2 TIMES DAILY
Status: DISCONTINUED | OUTPATIENT
Start: 2024-10-17 | End: 2024-10-19 | Stop reason: HOSPADM

## 2024-10-17 RX ORDER — ACETAMINOPHEN 650 MG/1
650 SUPPOSITORY RECTAL EVERY 6 HOURS PRN
Status: DISCONTINUED | OUTPATIENT
Start: 2024-10-17 | End: 2024-10-19 | Stop reason: HOSPADM

## 2024-10-17 RX ORDER — SODIUM CHLORIDE 9 MG/ML
INJECTION, SOLUTION INTRAVENOUS PRN
Status: DISCONTINUED | OUTPATIENT
Start: 2024-10-17 | End: 2024-10-19 | Stop reason: HOSPADM

## 2024-10-17 RX ORDER — SODIUM CHLORIDE 0.9 % (FLUSH) 0.9 %
5-40 SYRINGE (ML) INJECTION PRN
Status: DISCONTINUED | OUTPATIENT
Start: 2024-10-17 | End: 2024-10-19 | Stop reason: HOSPADM

## 2024-10-17 RX ORDER — ALBUTEROL SULFATE 5 MG/ML
2.5 SOLUTION RESPIRATORY (INHALATION) EVERY 4 HOURS PRN
Status: DISCONTINUED | OUTPATIENT
Start: 2024-10-17 | End: 2024-10-19 | Stop reason: HOSPADM

## 2024-10-17 RX ORDER — MAGNESIUM SULFATE IN WATER 40 MG/ML
2000 INJECTION, SOLUTION INTRAVENOUS PRN
Status: DISCONTINUED | OUTPATIENT
Start: 2024-10-17 | End: 2024-10-19 | Stop reason: HOSPADM

## 2024-10-17 RX ORDER — ONDANSETRON 4 MG/1
4 TABLET, ORALLY DISINTEGRATING ORAL EVERY 8 HOURS PRN
Status: DISCONTINUED | OUTPATIENT
Start: 2024-10-17 | End: 2024-10-19 | Stop reason: HOSPADM

## 2024-10-17 RX ORDER — POTASSIUM CHLORIDE 1500 MG/1
40 TABLET, EXTENDED RELEASE ORAL PRN
Status: DISCONTINUED | OUTPATIENT
Start: 2024-10-17 | End: 2024-10-19 | Stop reason: HOSPADM

## 2024-10-17 RX ORDER — ACETAMINOPHEN 325 MG/1
650 TABLET ORAL EVERY 4 HOURS PRN
Status: DISCONTINUED | OUTPATIENT
Start: 2024-10-17 | End: 2024-10-17

## 2024-10-17 RX ORDER — SODIUM CHLORIDE 0.9 % (FLUSH) 0.9 %
5-40 SYRINGE (ML) INJECTION EVERY 12 HOURS SCHEDULED
Status: DISCONTINUED | OUTPATIENT
Start: 2024-10-17 | End: 2024-10-19 | Stop reason: HOSPADM

## 2024-10-17 RX ORDER — POLYETHYLENE GLYCOL 3350 17 G/17G
17 POWDER, FOR SOLUTION ORAL DAILY PRN
Status: DISCONTINUED | OUTPATIENT
Start: 2024-10-17 | End: 2024-10-19 | Stop reason: HOSPADM

## 2024-10-17 RX ORDER — ASPIRIN 81 MG/1
81 TABLET ORAL
Status: DISCONTINUED | OUTPATIENT
Start: 2024-10-17 | End: 2024-10-19 | Stop reason: HOSPADM

## 2024-10-17 RX ORDER — ONDANSETRON 2 MG/ML
4 INJECTION INTRAMUSCULAR; INTRAVENOUS EVERY 6 HOURS PRN
Status: DISCONTINUED | OUTPATIENT
Start: 2024-10-17 | End: 2024-10-19 | Stop reason: HOSPADM

## 2024-10-17 RX ORDER — POTASSIUM CHLORIDE 7.45 MG/ML
10 INJECTION INTRAVENOUS PRN
Status: DISCONTINUED | OUTPATIENT
Start: 2024-10-17 | End: 2024-10-19 | Stop reason: HOSPADM

## 2024-10-17 RX ORDER — POTASSIUM CHLORIDE 1500 MG/1
20 TABLET, EXTENDED RELEASE ORAL DAILY
Status: DISCONTINUED | OUTPATIENT
Start: 2024-10-18 | End: 2024-10-19 | Stop reason: HOSPADM

## 2024-10-17 RX ORDER — POTASSIUM CHLORIDE 1.5 G/1.58G
40 POWDER, FOR SOLUTION ORAL PRN
Status: DISCONTINUED | OUTPATIENT
Start: 2024-10-17 | End: 2024-10-19 | Stop reason: HOSPADM

## 2024-10-17 RX ORDER — LEVOTHYROXINE SODIUM 50 UG/1
50 TABLET ORAL
Status: DISCONTINUED | OUTPATIENT
Start: 2024-10-18 | End: 2024-10-19 | Stop reason: HOSPADM

## 2024-10-17 RX ORDER — FUROSEMIDE 20 MG/1
20 TABLET ORAL DAILY
Status: DISCONTINUED | OUTPATIENT
Start: 2024-10-18 | End: 2024-10-19 | Stop reason: HOSPADM

## 2024-10-17 RX ORDER — PANTOPRAZOLE SODIUM 40 MG/1
40 TABLET, DELAYED RELEASE ORAL
Status: DISCONTINUED | OUTPATIENT
Start: 2024-10-18 | End: 2024-10-19 | Stop reason: HOSPADM

## 2024-10-17 RX ORDER — ACETAMINOPHEN 325 MG/1
650 TABLET ORAL EVERY 6 HOURS PRN
Status: DISCONTINUED | OUTPATIENT
Start: 2024-10-17 | End: 2024-10-19 | Stop reason: HOSPADM

## 2024-10-17 RX ADMIN — SODIUM CHLORIDE, PRESERVATIVE FREE 10 ML: 5 INJECTION INTRAVENOUS at 22:08

## 2024-10-17 RX ADMIN — ASPIRIN 81 MG: 81 TABLET, COATED ORAL at 22:07

## 2024-10-17 RX ADMIN — METOPROLOL TARTRATE 25 MG: 25 TABLET, FILM COATED ORAL at 22:08

## 2024-10-17 ASSESSMENT — PAIN - FUNCTIONAL ASSESSMENT: PAIN_FUNCTIONAL_ASSESSMENT: NONE - DENIES PAIN

## 2024-10-17 ASSESSMENT — PAIN SCALES - GENERAL: PAINLEVEL_OUTOF10: 0

## 2024-10-17 ASSESSMENT — HEART SCORE: ECG: NON-SPECIFC REPOLARIZATION DISTURBANCE/LBTB/PM

## 2024-10-17 NOTE — ED PROVIDER NOTES
has no focal abnormalities on exam.  He still complains of dizziness despite his normal workup.  I have discussed with the hospitalist to admit him for further workup including possible MRI brain. The decision to perform testing and results were discussed with the patient and son . I discussed each of these tests and considerations with the patient and son . Patient and son agree with the plan of admission.    ADDITIONAL CONSIDERATIONS:  Considered making patient a level 2 code stroke but he has no gait ataxia and his symptoms are waxing and waning.  His NIH score is 0.  Procedures/Critical Care     EKG 12 Lead    Date/Time: 10/17/2024 10:12 AM    Performed by: Roland Vega MD  Authorized by: Roland Vega MD    ECG interpreted by ED Physician in the absence of a cardiologist: yes    Interpretation:     Interpretation: abnormal    Rate:     ECG rate:  70    ECG rate assessment: normal    Rhythm:     Rhythm: paced    Pacing:     Type of pacing:  AV  Ectopy:     Ectopy: none    ST segments:     ST segments:  Non-specific  T waves:     T waves: non-specific    Critical Care    Performed by: Roland Vega MD  Authorized by: Roland Vega MD    Critical care provider statement:     Critical care time (minutes):  50    Critical care time was exclusive of:  Separately billable procedures and treating other patients    Critical care was necessary to treat or prevent imminent or life-threatening deterioration of the following conditions:  CNS failure or compromise and cardiac failure    Critical care was time spent personally by me on the following activities:  Blood draw for specimens, development of treatment plan with patient or surrogate, evaluation of patient's response to treatment, examination of patient, ordering and review of laboratory studies, ordering and review of radiographic studies, re-evaluation of patient's condition, ordering and performing treatments and interventions and

## 2024-10-17 NOTE — ED TRIAGE NOTES
Pt presents ambulatory to ED via triage for c/o dizziness and feeling \"out of it\". Pt reports he thinks he had a stroke Monday due to the dizziness. Pt notes that last night he had similar occurrence then this morning around 0630 he had dizziness that he \"just didn't feel right\". Pt has right sided facial droop which pt son reports is his baseline. Pt also notes that he started having chest pain about 1.5 hr ago but denies any chest pain att.    Provider called to triage for stroke eval.  in triage.

## 2024-10-18 ENCOUNTER — APPOINTMENT (OUTPATIENT)
Facility: HOSPITAL | Age: 89
End: 2024-10-18
Payer: MEDICARE

## 2024-10-18 PROCEDURE — 2580000003 HC RX 258: Performed by: INTERNAL MEDICINE

## 2024-10-18 PROCEDURE — 96372 THER/PROPH/DIAG INJ SC/IM: CPT

## 2024-10-18 PROCEDURE — 6360000004 HC RX CONTRAST MEDICATION

## 2024-10-18 PROCEDURE — G0378 HOSPITAL OBSERVATION PER HR: HCPCS

## 2024-10-18 PROCEDURE — 6360000002 HC RX W HCPCS: Performed by: INTERNAL MEDICINE

## 2024-10-18 PROCEDURE — 6370000000 HC RX 637 (ALT 250 FOR IP): Performed by: INTERNAL MEDICINE

## 2024-10-18 PROCEDURE — 96374 THER/PROPH/DIAG INJ IV PUSH: CPT

## 2024-10-18 PROCEDURE — 6370000000 HC RX 637 (ALT 250 FOR IP): Performed by: STUDENT IN AN ORGANIZED HEALTH CARE EDUCATION/TRAINING PROGRAM

## 2024-10-18 PROCEDURE — 75574 CT ANGIO HRT W/3D IMAGE: CPT

## 2024-10-18 RX ORDER — IOPAMIDOL 755 MG/ML
100 INJECTION, SOLUTION INTRAVASCULAR
Status: COMPLETED | OUTPATIENT
Start: 2024-10-18 | End: 2024-10-18

## 2024-10-18 RX ORDER — IOPAMIDOL 755 MG/ML
INJECTION, SOLUTION INTRAVASCULAR
Status: COMPLETED
Start: 2024-10-18 | End: 2024-10-18

## 2024-10-18 RX ORDER — LORAZEPAM 2 MG/ML
1 INJECTION INTRAMUSCULAR ONCE
Status: COMPLETED | OUTPATIENT
Start: 2024-10-18 | End: 2024-10-18

## 2024-10-18 RX ORDER — NITROGLYCERIN 0.4 MG/1
0.4 TABLET SUBLINGUAL ONCE AS NEEDED
Status: COMPLETED | OUTPATIENT
Start: 2024-10-18 | End: 2024-10-18

## 2024-10-18 RX ADMIN — METOPROLOL TARTRATE 25 MG: 25 TABLET, FILM COATED ORAL at 21:29

## 2024-10-18 RX ADMIN — PANTOPRAZOLE SODIUM 40 MG: 40 TABLET, DELAYED RELEASE ORAL at 06:45

## 2024-10-18 RX ADMIN — ENOXAPARIN SODIUM 30 MG: 100 INJECTION SUBCUTANEOUS at 21:28

## 2024-10-18 RX ADMIN — POTASSIUM CHLORIDE 20 MEQ: 1500 TABLET, EXTENDED RELEASE ORAL at 09:56

## 2024-10-18 RX ADMIN — LEVOTHYROXINE SODIUM 50 MCG: 0.1 TABLET ORAL at 06:45

## 2024-10-18 RX ADMIN — ENOXAPARIN SODIUM 30 MG: 100 INJECTION SUBCUTANEOUS at 09:56

## 2024-10-18 RX ADMIN — FUROSEMIDE 20 MG: 20 TABLET ORAL at 09:54

## 2024-10-18 RX ADMIN — METOPROLOL TARTRATE 25 MG: 25 TABLET, FILM COATED ORAL at 09:54

## 2024-10-18 RX ADMIN — NITROGLYCERIN 0.8 MG: 0.4 TABLET SUBLINGUAL at 14:13

## 2024-10-18 RX ADMIN — SODIUM CHLORIDE, PRESERVATIVE FREE 10 ML: 5 INJECTION INTRAVENOUS at 21:28

## 2024-10-18 RX ADMIN — IOPAMIDOL 100 ML: 755 INJECTION, SOLUTION INTRAVASCULAR at 14:29

## 2024-10-18 RX ADMIN — SODIUM CHLORIDE, PRESERVATIVE FREE 10 ML: 5 INJECTION INTRAVENOUS at 09:57

## 2024-10-18 RX ADMIN — ASPIRIN 81 MG: 81 TABLET, COATED ORAL at 12:08

## 2024-10-18 RX ADMIN — LORAZEPAM 1 MG: 2 INJECTION INTRAMUSCULAR; INTRAVENOUS at 13:44

## 2024-10-18 RX ADMIN — IOPAMIDOL 100 ML: 755 INJECTION, SOLUTION INTRAVENOUS at 14:29

## 2024-10-18 NOTE — PROGRESS NOTES
1106 - PCP Hospital follow-up transitional care appointment has been scheduled with Dr. Shemar Castrejon on 10/28/24 1130. This is the first available appt due to limited provider availability. PCP office does not offer alternate provider option for hospital follow up. [facility] placed [facility] information AVS for patient resource. Pending patient discharge. Zoey Harvey Care Management Assistant    Attempted to schedule PCP hospital follow up. Sent PCP office a message, awaiting return call from PCP office with appt information. [facility] placed [facility] information AVS for patient resource.  Pending patient discharge. Lonnie Hernandez Management Assistant

## 2024-10-18 NOTE — PLAN OF CARE
Problem: Chronic Conditions and Co-morbidities  Goal: Patient's chronic conditions and co-morbidity symptoms are monitored and maintained or improved  10/18/2024 1009 by Piper Jewell RN  Outcome: Progressing  10/18/2024 0301 by Sav Mckeon RN  Outcome: Progressing  Flowsheets (Taken 10/17/2024 1949)  Care Plan - Patient's Chronic Conditions and Co-Morbidity Symptoms are Monitored and Maintained or Improved: Monitor and assess patient's chronic conditions and comorbid symptoms for stability, deterioration, or improvement     Problem: Discharge Planning  Goal: Discharge to home or other facility with appropriate resources  10/18/2024 1009 by Piper Jewell RN  Outcome: Progressing  10/18/2024 0301 by Sav Mckeon RN  Outcome: Progressing  Flowsheets (Taken 10/17/2024 1949)  Discharge to home or other facility with appropriate resources: Identify barriers to discharge with patient and caregiver     Problem: Safety - Adult  Goal: Free from fall injury  10/18/2024 1009 by Piper Jewell RN  Outcome: Progressing  10/18/2024 0301 by Sav Mckeon RN  Outcome: Progressing     Problem: ABCDS Injury Assessment  Goal: Absence of physical injury  10/18/2024 1009 by Piper Jewell RN  Outcome: Progressing  10/18/2024 0301 by Sav Mckeon RN  Outcome: Progressing     Problem: Cardiovascular - Adult  Goal: Maintains optimal cardiac output and hemodynamic stability  10/18/2024 1009 by Piper Jewell RN  Outcome: Progressing  10/18/2024 0301 by Sav Mckeon RN  Outcome: Progressing  Goal: Absence of cardiac dysrhythmias or at baseline  10/18/2024 1009 by Piper Jewell RN  Outcome: Progressing  10/18/2024 0301 by Sav Mckeon RN  Outcome: Progressing

## 2024-10-18 NOTE — PROGRESS NOTES
MRI ON HOLD - TELEMETRY ORDERS AND SCREENING SHEET    Please complete screening and sign electronically or fax to [phone].    Telemetry order must be changed to allow the patient to leave the unit without telemetry.    Telemetry box cannot come to MRI with the patient.    Please call MRI at [phone] when this has been done.    If this patient needs monitored while off the unit, please call MRI at [phone] to coordinate a time for an RN to accompany the patient to MRI.

## 2024-10-18 NOTE — PLAN OF CARE
Problem: Chronic Conditions and Co-morbidities  Goal: Patient's chronic conditions and co-morbidity symptoms are monitored and maintained or improved  Outcome: Progressing  Flowsheets (Taken 10/17/2024 1949)  Care Plan - Patient's Chronic Conditions and Co-Morbidity Symptoms are Monitored and Maintained or Improved: Monitor and assess patient's chronic conditions and comorbid symptoms for stability, deterioration, or improvement     Problem: Discharge Planning  Goal: Discharge to home or other facility with appropriate resources  Outcome: Progressing  Flowsheets (Taken 10/17/2024 1949)  Discharge to home or other facility with appropriate resources: Identify barriers to discharge with patient and caregiver     Problem: Safety - Adult  Goal: Free from fall injury  Outcome: Progressing     Problem: ABCDS Injury Assessment  Goal: Absence of physical injury  Outcome: Progressing

## 2024-10-18 NOTE — PROGRESS NOTES
End of Shift Note    Bedside shift change report given to Christie (oncoming nurse) by Piper Jewell RN (offgoing nurse).  Report included the following information SBAR, Intake/Output, MAR, and Recent Results    Shift worked:  7A-7P     Shift summary and any significant changes:     Pt AOX4, able to make needs known. Pt had CTA done, MRI brain not done yet. X1 assist to the bathroom. Denies pain and discomfort. Safety precaution maintained.      Concerns for physician to address:       Zone phone for oncoming shift:          Activity:     Number times ambulated in hallways past shift: 3  Number of times OOB to chair past shift: 0    Cardiac:   Cardiac Monitoring: Yes           Access:  Current line(s): PIV     Genitourinary:   Urinary status: voiding    Respiratory:      Chronic home O2 use?: NO  Incentive spirometer at bedside: NO       GI:     Current diet:  ADULT DIET; Regular; Low Fat/Low Chol/High Fiber/2 gm Na  Passing flatus: YES  Tolerating current diet: YES       Pain Management:   Patient states pain is manageable on current regimen: YES    Skin:     Interventions: increase time out of bed and limit briefs    Patient Safety:  Fall Score:    Interventions: assistive device (walker, cane. etc), gripper socks, and pt to call before getting OOB       Length of Stay:  Expected LOS: 2  Actual LOS: 0      Piper Jewell RN

## 2024-10-18 NOTE — CONSULTS
[facility]  [address] [place] [address]  [phone]  [email or web address]       CARDIOLOGY CONSULTATION       Date of  Admission: 10/17/2024 10:21 AM     Admission type:Emergency   Primary Care Physician:Shemar Castrejon MD     Attending Provider: Eli Pride MD  Cardiology Provider: SERENA  CC/REASON FOR CONSULT: Chest pain     Subjective:     Dionicio Dewey Sr is a 89 y.o. male admitted for Dizziness [R42]  Acute chest pain [R07.9].    The patient was seen at the bedside.  Sleeping comfortably.  Status post cardiac CT.    Patient Active Problem List    Diagnosis Date Noted    Pacemaker at end of battery life 05/30/2024    Dizziness 10/17/2024    History of stroke 09/03/2024    S/P total knee arthroplasty, right 09/18/2023    S/P total knee arthroplasty, left 06/02/2023    Encounter for preadmission testing 05/24/2023    Stage 3a chronic kidney disease ([facility]) 06/09/2022    Statin intolerance 06/25/2020    NSVT (nonsustained ventricular tachycardia) ([facility]) 06/25/2020    Coronary artery disease with stable angina pectoris ([facility]) 10/15/2019    Irregular heartbeat 05/03/2016    S/P AVR (aortic valve replacement) 03/16/2016    ASHD (arteriosclerotic heart disease) 01/19/2016    Pacemaker 05/19/2015    SUNIL (obstructive sleep apnea) 12/23/2014    SSS (sick sinus syndrome) ([facility]) 12/11/2014    SOB (shortness of breath) 11/04/2014    Aortic stenosis 11/04/2014    Asthma     GERD (gastroesophageal reflux disease)     Hypercholesterolemia     Atrial flutter ([facility]) 09/12/2014    S/P ablation of atrial flutter 09/12/2014    H/O TIA (transient ischemic attack) and stroke 09/11/2014    Chest pain 09/11/2014    Sinus tachycardia 09/11/2014    Gout 01/24/2013    Chronic bronchitis ([facility]) 10/03/2012    Allergic rhinitis 10/03/2012    Prostate cancer ([facility]) 06/04/2012    Calculus of kidney     Erectile dysfunction     Stroke ([facility])       Shemar Castrejon MD  Past Medical History:  cath negative in 2020      Thank you for allowing us to participate in the care of this patient.  We will follow.    Deepthi Bran MD  CC:Shemar Castrejon MD

## 2024-10-18 NOTE — PROGRESS NOTES
Attempted to deliver and verbally explain the MOON/VOON with patient. Patient was with clinical staff. Kateryna Ventura, Care Management Assistant

## 2024-10-18 NOTE — PROGRESS NOTES
Hospitalist Progress Note    NAME:   Dionicio Dewey Sr   : 1935   MRN: [id number]     Date/Time: 10/18/2024 1:48 PM  Patient PCP: Shemar Castrejon MD    Estimated discharge date: 10/19  Barriers: MRI, cardiology consult, CT cardiac      Assessment / Plan:    Intermittent dizziness  History of a flutter  Chest pain  Sick sinus syndrome status post pacemaker  Aortic stenosis status post AVR  Orthostatics negative  Cardiology consult pending  Interrogation of pacemaker  Ct cardiac pending  MRI brain to rule out posterior circulation stroke      History of coronary artery disease  - Continue aspirin  - Continue metoprolol     History of hypothyroidism  - Continue Synthroid     History of GERD  - Continue omeprazole     Asthma  - Continue home inhaler    History of prostate cancer    Medical Decision Making:   I personally reviewed labs: CBC BMP  I personally reviewed imaging: Chest x-ray  I personally reviewed EKG: Paced rhythm  Toxic drug monitoring:   Discussed case with: Patient RN        Code Status: Full code  DVT Prophylaxis: Lovenox  GI Prophylaxis:    Subjective:     Chief Complaint / Reason for Physician Visit  \" Patient seen follow-up for dizziness/chest pain  Currently denies any chest pain no further dizziness episode since admission.  Patient lying comfortably.\".  Discussed with RN events overnight.       Objective:     VITALS:   Last 24hrs VS reviewed since prior progress note. Most recent are:  Patient Vitals for the past 24 hrs:   BP Temp Temp src Pulse Resp SpO2   10/18/24 1150 129/67 97.3 °F (36.3 °C) -- 70 -- 93 %   10/18/24 0954 129/60 -- -- 73 -- --   10/18/24 0832 129/60 97.7 °F (36.5 °C) -- 68 -- 96 %   10/18/24 0804 124/63 97.7 °F (36.5 °C) Oral 73 18 97 %   10/18/24 0353 123/64 97.5 °F (36.4 °C) Oral 70 -- 92 %   10/17/24 1949 137/61 97.3 °F (36.3 °C) Oral 70 18 94 %   10/17/24 1829 (!) 149/74 97.5 °F (36.4 °C) -- 70 18 98 %   10/17/24 1445 (!) 120/59 -- -- 70 12 98 %       No intake

## 2024-10-19 VITALS
DIASTOLIC BLOOD PRESSURE: 64 MMHG | OXYGEN SATURATION: 97 % | HEART RATE: 70 BPM | BODY MASS INDEX: 30.55 KG/M2 | WEIGHT: 225.53 LBS | TEMPERATURE: 98.1 F | SYSTOLIC BLOOD PRESSURE: 104 MMHG | RESPIRATION RATE: 18 BRPM | HEIGHT: 72 IN

## 2024-10-19 LAB
EKG ATRIAL RATE: 70 BPM
EKG DIAGNOSIS: NORMAL
EKG P AXIS: 64 DEGREES
EKG P-R INTERVAL: 262 MS
EKG Q-T INTERVAL: 432 MS
EKG QRS DURATION: 156 MS
EKG QTC CALCULATION (BAZETT): 466 MS
EKG R AXIS: -52 DEGREES
EKG T AXIS: 47 DEGREES
EKG VENTRICULAR RATE: 70 BPM

## 2024-10-19 PROCEDURE — G0378 HOSPITAL OBSERVATION PER HR: HCPCS

## 2024-10-19 PROCEDURE — 6370000000 HC RX 637 (ALT 250 FOR IP): Performed by: INTERNAL MEDICINE

## 2024-10-19 PROCEDURE — 2580000003 HC RX 258: Performed by: INTERNAL MEDICINE

## 2024-10-19 PROCEDURE — 6360000002 HC RX W HCPCS: Performed by: INTERNAL MEDICINE

## 2024-10-19 PROCEDURE — 96372 THER/PROPH/DIAG INJ SC/IM: CPT

## 2024-10-19 RX ADMIN — LEVOTHYROXINE SODIUM 50 MCG: 0.1 TABLET ORAL at 06:41

## 2024-10-19 RX ADMIN — METOPROLOL TARTRATE 25 MG: 25 TABLET, FILM COATED ORAL at 09:14

## 2024-10-19 RX ADMIN — SODIUM CHLORIDE, PRESERVATIVE FREE 10 ML: 5 INJECTION INTRAVENOUS at 09:15

## 2024-10-19 RX ADMIN — POTASSIUM CHLORIDE 20 MEQ: 1500 TABLET, EXTENDED RELEASE ORAL at 09:14

## 2024-10-19 RX ADMIN — PANTOPRAZOLE SODIUM 40 MG: 40 TABLET, DELAYED RELEASE ORAL at 06:40

## 2024-10-19 RX ADMIN — ENOXAPARIN SODIUM 30 MG: 100 INJECTION SUBCUTANEOUS at 09:14

## 2024-10-19 RX ADMIN — FUROSEMIDE 20 MG: 20 TABLET ORAL at 09:13

## 2024-10-19 RX ADMIN — ASPIRIN 81 MG: 81 TABLET, COATED ORAL at 12:52

## 2024-10-19 NOTE — PLAN OF CARE
Problem: Chronic Conditions and Co-morbidities  Goal: Patient's chronic conditions and co-morbidity symptoms are monitored and maintained or improved  Outcome: Progressing     Problem: Discharge Planning  Goal: Discharge to home or other facility with appropriate resources  Outcome: Progressing     Problem: Safety - Adult  Goal: Free from fall injury  Outcome: Progressing     Problem: ABCDS Injury Assessment  Goal: Absence of physical injury  Outcome: Progressing     Problem: Cardiovascular - Adult  Goal: Maintains optimal cardiac output and hemodynamic stability  Outcome: Progressing  Goal: Absence of cardiac dysrhythmias or at baseline  Outcome: Progressing

## 2024-10-19 NOTE — PROGRESS NOTES
End of Shift Note    Bedside shift change report given to NE Vivas (oncoming nurse) by Christie Sarabia RN (offgoing nurse).  Report included the following information SBAR REPORTS LIST: SBAR    Shift worked:  [id number]     Shift summary and any significant changes:     No changes, pt slept well but is eager to get his MRI and go home.      Concerns for physician to address:  Need Ativan order renewed for MRI, transport told nurse he was going to MRI and he was given it, but when he returned it appears only the CT was done  Pt want to know when is his MRI, so he can be discharged, he states he really needs a shower and a shave.     Zone phone for oncoming shift:   [phone]       Activity:  None- rested in bed    Cardiac:   Cardiac Monitoring: YES / NO: Yes        Access:  Current line(s): IV ACCESS: - Peripheral IV - site  L a/c, R forearm, insertion date:      Genitourinary:   Urinary status: Urinary status: Patient is voiding without difficulty.    Respiratory:   oxygen delivery: room air  Chronic home O2 use?: YES / NO: No  Incentive spirometer at bedside: YES / NO: No      GI:  Current diet:  DIET: regular and Low fat/chol, high fiber  Passing flatus: YES / NO: Yes  Tolerating current diet: YES / NO: Yes      Pain Management:   Patient states pain is manageable on current regimen: YES / NO: N/A    Skin:    Interventions: GARRY SCALE: 20    Patient Safety:  Fall Score: FALL RISK ASSESSMENT: At risk due to:  dizziness  Interventions: Fall Interventions Provided: Implemented/recommended use of non-skid footwear, Implemented/recommended use of fall risk identification flag to all team members, Implemented/recommended assistive devices and encouraged their use, and Implemented/recommended environmental changes (remove hazards, lower bed, improve lighting, etc.)      Length of Stay:  Expected LOS: 2  Actual LOS: 0      Christie Sarabia RN

## 2024-10-19 NOTE — PROGRESS NOTES
Preliminary review of coronary CT angiogram films shows at least moderate stenosis (50%) in the proximal portion of the left anterior descending and high diagonal branch at a bifurcation point with calcification. Informed Dr. Tineo with results.

## 2024-10-19 NOTE — DISCHARGE SUMMARY
Discharge Summary    Name: Dionicio Dewey Sr  [id number]  YOB: 1935 (Age: 89 y.o.)   Date of Admission: 10/17/2024  Date of Discharge: 10/19/2024  Attending Physician: No att. providers found    Discharge Diagnosis:   Elevated dizziness  Sick sinus syndrome status post pacemaker  History of a flutter  History of CAD  History of hypothyroidism  GERD  Asthma  Consultations:  IP CONSULT TO HOSPITALIST  IP CONSULT TO CARDIOLOGY      Brief Admission History/Reason for Admission Per James Morales MD:   blaire Dewey Sr is a 89 y.o.  male with PMHx significant for sick sinus syndrome, status post pacemaker, atrial flutter, coronary artery disease, asthma gout, BPH, hypercholesterolemia, hypertension, TIA, and aortic stenosis  who presents to the emergency department  b/o dizziness that started Monday, lasted for approximately 4 to 5 hours, then eventually improved.  Returned this morning around 630 when he woke up.  Patient describes his symptoms as just \"feeling off\".  He tells me he thinks he may have had a stroke on Monday.  He also reports substernal chest pain that started at approximately at 845.  Currently stated the chest pain is relieved, tissue is not superficial with no radiation  We were asked to admit for work up and evaluation of the above problems.             Brief Hospital Course by Main Problems:   Intermittent dizziness  History of a flutter  Chest pain  Sick sinus syndrome status post pacemaker  Aortic stenosis status post AVR  Orthostatics negative  Cardiac CT ordered which showed moderate stenosis 50% in the proximal portion of left anterior descending and a high diagonal branch at the bifurcation point with calcification  Discussed with cardiology was planning cardiac cath on Monday  MRI brain was also ordered to rule out any posterior circulation stroke  Since patient has a pacemaker MRI got delayed  Today morning patient got very upset regarding delay in

## 2024-10-19 NOTE — PROGRESS NOTES
Patient left against medical advice.    He became frustrated when he found that his scheduled MRI of yesterday was not going to be performed today due to his pacemaker.  The process for this procedure was explained to him and that it was a multi-step process.  It was then cancelled as unnecessary as the cardiologist was able to obtain necessary information via another mode.      The patient was then under the impression that he was being discharged before the cardiologist determined that he needed a procedure on Monday.    He was informed of the benefits of the procedure as well as the risks of not receivng it.    He signed a form acknowledging this and left against medical advice.  He will make contact with his doctor on Monday to reschedule.     Transportation was provided by his son.    Sangeetha Dodge RN

## 2024-10-28 ENCOUNTER — TELEPHONE (OUTPATIENT)
Age: 89
End: 2024-10-28

## 2024-10-28 NOTE — TELEPHONE ENCOUNTER
Patient states he is cancelling his Hosp. F/U appt today, 10/28/24 at 11:30 with Dr. Castrejon due to having a Heart Catheterization tomorrow, 10/29. Please call if any questions or to re-sched sooner than available. Thank you

## 2024-11-11 ENCOUNTER — HOSPITAL ENCOUNTER (OUTPATIENT)
Facility: HOSPITAL | Age: 89
Discharge: HOME | End: 2024-11-11
Attending: INTERNAL MEDICINE | Admitting: INTERNAL MEDICINE
Payer: MEDICARE

## 2024-11-11 VITALS
HEIGHT: 73 IN | RESPIRATION RATE: 17 BRPM | TEMPERATURE: 98.1 F | HEART RATE: 72 BPM | DIASTOLIC BLOOD PRESSURE: 62 MMHG | OXYGEN SATURATION: 95 % | WEIGHT: 214 LBS | SYSTOLIC BLOOD PRESSURE: 118 MMHG | BODY MASS INDEX: 28.36 KG/M2

## 2024-11-11 DIAGNOSIS — Z95.5 STENTED CORONARY ARTERY: Primary | ICD-10-CM

## 2024-11-11 DIAGNOSIS — R07.9 CHEST PAIN: ICD-10-CM

## 2024-11-11 LAB
ACT BLD: 287 SECS (ref 79–138)
ECHO BSA: 2.24 M2

## 2024-11-11 PROCEDURE — C1874 STENT, COATED/COV W/DEL SYS: HCPCS | Performed by: INTERNAL MEDICINE

## 2024-11-11 PROCEDURE — C1894 INTRO/SHEATH, NON-LASER: HCPCS | Performed by: INTERNAL MEDICINE

## 2024-11-11 PROCEDURE — 99152 MOD SED SAME PHYS/QHP 5/>YRS: CPT | Performed by: INTERNAL MEDICINE

## 2024-11-11 PROCEDURE — 93005 ELECTROCARDIOGRAM TRACING: CPT | Performed by: INTERNAL MEDICINE

## 2024-11-11 PROCEDURE — 2709999900 HC NON-CHARGEABLE SUPPLY: Performed by: INTERNAL MEDICINE

## 2024-11-11 PROCEDURE — 2580000003 HC RX 258: Performed by: INTERNAL MEDICINE

## 2024-11-11 PROCEDURE — 2500000003 HC RX 250 WO HCPCS: Performed by: INTERNAL MEDICINE

## 2024-11-11 PROCEDURE — C1769 GUIDE WIRE: HCPCS | Performed by: INTERNAL MEDICINE

## 2024-11-11 PROCEDURE — 6360000002 HC RX W HCPCS: Performed by: INTERNAL MEDICINE

## 2024-11-11 PROCEDURE — 6360000004 HC RX CONTRAST MEDICATION: Performed by: INTERNAL MEDICINE

## 2024-11-11 PROCEDURE — C9600 PERC DRUG-EL COR STENT SING: HCPCS | Performed by: INTERNAL MEDICINE

## 2024-11-11 PROCEDURE — 93571 IV DOP VEL&/PRESS C FLO 1ST: CPT | Performed by: INTERNAL MEDICINE

## 2024-11-11 PROCEDURE — C1887 CATHETER, GUIDING: HCPCS | Performed by: INTERNAL MEDICINE

## 2024-11-11 PROCEDURE — 6370000000 HC RX 637 (ALT 250 FOR IP): Performed by: INTERNAL MEDICINE

## 2024-11-11 PROCEDURE — 76937 US GUIDE VASCULAR ACCESS: CPT | Performed by: INTERNAL MEDICINE

## 2024-11-11 PROCEDURE — 99153 MOD SED SAME PHYS/QHP EA: CPT | Performed by: INTERNAL MEDICINE

## 2024-11-11 PROCEDURE — 93458 L HRT ARTERY/VENTRICLE ANGIO: CPT | Performed by: INTERNAL MEDICINE

## 2024-11-11 PROCEDURE — 85347 COAGULATION TIME ACTIVATED: CPT

## 2024-11-11 DEVICE — STENT ONYXNG35022UX ONYX 3.50X22RX
Type: IMPLANTABLE DEVICE | Status: FUNCTIONAL
Brand: ONYX FRONTIER™

## 2024-11-11 RX ORDER — VERAPAMIL HYDROCHLORIDE 2.5 MG/ML
INJECTION, SOLUTION INTRAVENOUS PRN
Status: DISCONTINUED | OUTPATIENT
Start: 2024-11-11 | End: 2024-11-11 | Stop reason: HOSPADM

## 2024-11-11 RX ORDER — IOPAMIDOL 755 MG/ML
INJECTION, SOLUTION INTRAVASCULAR PRN
Status: DISCONTINUED | OUTPATIENT
Start: 2024-11-11 | End: 2024-11-11 | Stop reason: HOSPADM

## 2024-11-11 RX ORDER — DIPHENHYDRAMINE HYDROCHLORIDE 50 MG/ML
INJECTION INTRAMUSCULAR; INTRAVENOUS PRN
Status: DISCONTINUED | OUTPATIENT
Start: 2024-11-11 | End: 2024-11-11 | Stop reason: HOSPADM

## 2024-11-11 RX ORDER — HEPARIN SODIUM 1000 [USP'U]/ML
INJECTION, SOLUTION INTRAVENOUS; SUBCUTANEOUS PRN
Status: DISCONTINUED | OUTPATIENT
Start: 2024-11-11 | End: 2024-11-11 | Stop reason: HOSPADM

## 2024-11-11 RX ORDER — SODIUM CHLORIDE 9 MG/ML
INJECTION, SOLUTION INTRAVENOUS PRN
Status: DISCONTINUED | OUTPATIENT
Start: 2024-11-11 | End: 2024-11-11 | Stop reason: HOSPADM

## 2024-11-11 RX ORDER — SODIUM CHLORIDE 0.9 % (FLUSH) 0.9 %
5-40 SYRINGE (ML) INJECTION EVERY 12 HOURS SCHEDULED
Status: DISCONTINUED | OUTPATIENT
Start: 2024-11-11 | End: 2024-11-11 | Stop reason: HOSPADM

## 2024-11-11 RX ORDER — LIDOCAINE HYDROCHLORIDE 10 MG/ML
INJECTION, SOLUTION INFILTRATION; PERINEURAL PRN
Status: DISCONTINUED | OUTPATIENT
Start: 2024-11-11 | End: 2024-11-11 | Stop reason: HOSPADM

## 2024-11-11 RX ORDER — FENTANYL CITRATE 50 UG/ML
INJECTION, SOLUTION INTRAMUSCULAR; INTRAVENOUS PRN
Status: DISCONTINUED | OUTPATIENT
Start: 2024-11-11 | End: 2024-11-11 | Stop reason: HOSPADM

## 2024-11-11 RX ORDER — CLOPIDOGREL 300 MG/1
TABLET, FILM COATED ORAL PRN
Status: DISCONTINUED | OUTPATIENT
Start: 2024-11-11 | End: 2024-11-11 | Stop reason: HOSPADM

## 2024-11-11 RX ORDER — SODIUM CHLORIDE 0.9 % (FLUSH) 0.9 %
5-40 SYRINGE (ML) INJECTION PRN
Status: DISCONTINUED | OUTPATIENT
Start: 2024-11-11 | End: 2024-11-11 | Stop reason: HOSPADM

## 2024-11-11 RX ORDER — CLOPIDOGREL BISULFATE 75 MG/1
75 TABLET ORAL DAILY
Qty: 90 TABLET | Refills: 4 | Status: SHIPPED | OUTPATIENT
Start: 2024-11-11

## 2024-11-11 RX ORDER — 0.9 % SODIUM CHLORIDE 0.9 %
INTRAVENOUS SOLUTION INTRAVENOUS CONTINUOUS PRN
Status: COMPLETED | OUTPATIENT
Start: 2024-11-11 | End: 2024-11-11

## 2024-11-11 RX ORDER — HEPARIN SODIUM 10000 [USP'U]/ML
INJECTION, SOLUTION INTRAVENOUS; SUBCUTANEOUS PRN
Status: DISCONTINUED | OUTPATIENT
Start: 2024-11-11 | End: 2024-11-11 | Stop reason: HOSPADM

## 2024-11-11 RX ORDER — ASPIRIN 81 MG/1
81 TABLET ORAL DAILY
Qty: 90 TABLET | Refills: 5 | Status: SHIPPED | OUTPATIENT
Start: 2024-11-11

## 2024-11-11 ASSESSMENT — PAIN - FUNCTIONAL ASSESSMENT: PAIN_FUNCTIONAL_ASSESSMENT: NONE - DENIES PAIN

## 2024-11-11 NOTE — PLAN OF CARE
Problem: Safety - Adult  Goal: Free from fall injury  Outcome: Adequate for Discharge     Problem: Chronic Conditions and Co-morbidities  Goal: Patient's chronic conditions and co-morbidity symptoms are monitored and maintained or improved  Outcome: Adequate for Discharge     Problem: Discharge Planning  Goal: Discharge to home or other facility with appropriate resources  Outcome: Adequate for Discharge     Problem: Skin/Tissue Integrity  Goal: Absence of new skin breakdown  Description: 1.  Monitor for areas of redness and/or skin breakdown  2.  Assess vascular access sites hourly  3.  Every 4-6 hours minimum:  Change oxygen saturation probe site  4.  Every 4-6 hours:  If on nasal continuous positive airway pressure, respiratory therapy assess nares and determine need for appliance change or resting period.  Outcome: Adequate for Discharge

## 2024-11-11 NOTE — CARDIO/PULMONARY
Chart reviewed: Patient is 89 y.o. male admitted with Chest pain [R07.9]    Education: CAD education folder given to Dionicio Dewey Sr.      Educated using teach back method. Reviewed CAD diagnosis definition and purpose of intervention. Discussed risk factors for CAD to include the following: family history, elevated BMI, hyperlipidemia, hypertension, diabetes, stress, and smoking. Smoking Cessation Program link added to AVS. Discussed Heart Healthy/Low Sodium (2000 mg) diet. Reviewed the importance of medication compliance and potential side effects. Discussed follow up appointments with cardiologist, signs and symptoms of angina, and what to report to physician after discharge.  Emphasized the value of cardiac rehab. Discussed Cardiac Rehab Program format, benefits, and encouraged enrollment to assist with risk modification and management.     Patient would like to review materials, is okay with f/u call after d/c.    Dionicio Dewey Sr verbalized understanding with questions answered.     Jenifer Brady RN

## 2024-11-11 NOTE — PROGRESS NOTES
Patient arrived from cath lab. TR band @ 15 site- clean, dry, and intact. Patient denies pain/discomfort. Patient sitting up, eating a box lunch. Vitals and site check Q15 minutes for first hour. Patient educate on bedrest and bed alarm. Patient has call bell in reach.      1330: TR band down. Quick clot and Tegaderm applied. Site is clean dry and intact. Patient denies pain/discomfort.   1407: Patient ambulating vega. Tolerated well denies chest pain, sob, and dizziness.   1432: I have reviewed Discharge Instructions with the patient and family. The patient verbalized understanding. Discharge medications reviewed with patient along with appropriate educational materials. Opportunity for questions and clarification was provided. All lines removed without difficulty. Cath sites are clean, dry, and intact. Patient's belongings gathered and with patient. Patient is ready for discharge.

## 2024-11-11 NOTE — PROGRESS NOTES
Cardiac Cath Lab Recovery Arrival Note:      Dionicio Dewey Sr arrived to Cardiac Cath Lab, Recovery Area. Staff introduced to patient. Patient identifiers verified with NAME and DATE OF BIRTH. Procedure verified with patient. Consent forms reviewed and signed by patient or authorized representative and verified. Allergies verified.     Patient and family oriented to department. Patient and family informed of procedure and plan of care.     Questions answered with review. Patient prepped for procedure, per orders from physician, prior to arrival.    Patient on cardiac monitor, non-invasive blood pressure, SPO2 monitor. On RA. Patient is A&Ox 4. Patient reports no pain.     Patient in stretcher, in low position, with side rails up, call bell within reach, patient instructed to call if assistance as needed.    Patient prep in: [facility] Recovery [facility], [place] [facility].     Family in: Shemar, son.   Prep by: Una

## 2024-11-12 LAB
EKG ATRIAL RATE: 70 BPM
EKG DIAGNOSIS: NORMAL
EKG P AXIS: 59 DEGREES
EKG P-R INTERVAL: 290 MS
EKG Q-T INTERVAL: 460 MS
EKG QRS DURATION: 140 MS
EKG QTC CALCULATION (BAZETT): 496 MS
EKG R AXIS: -67 DEGREES
EKG T AXIS: 48 DEGREES
EKG VENTRICULAR RATE: 70 BPM

## 2024-11-15 RX ORDER — ALBUTEROL SULFATE 90 UG/1
INHALANT RESPIRATORY (INHALATION)
Qty: 36 G | Refills: 0 | Status: SHIPPED | OUTPATIENT
Start: 2024-11-15

## 2025-02-03 ENCOUNTER — OFFICE VISIT (OUTPATIENT)
Age: 89
End: 2025-02-03
Payer: MEDICARE

## 2025-02-03 VITALS
WEIGHT: 228 LBS | BODY MASS INDEX: 30.88 KG/M2 | DIASTOLIC BLOOD PRESSURE: 61 MMHG | OXYGEN SATURATION: 97 % | HEIGHT: 72 IN | RESPIRATION RATE: 18 BRPM | SYSTOLIC BLOOD PRESSURE: 104 MMHG | TEMPERATURE: 97.7 F | HEART RATE: 74 BPM

## 2025-02-03 DIAGNOSIS — E87.6 HYPOKALEMIA: ICD-10-CM

## 2025-02-03 DIAGNOSIS — Z00.00 MEDICARE ANNUAL WELLNESS VISIT, SUBSEQUENT: Primary | ICD-10-CM

## 2025-02-03 DIAGNOSIS — R26.89 IMBALANCE: ICD-10-CM

## 2025-02-03 LAB
ANION GAP SERPL CALC-SCNC: 4 MMOL/L (ref 2–12)
BUN SERPL-MCNC: 18 MG/DL (ref 6–20)
BUN/CREAT SERPL: 18 (ref 12–20)
CALCIUM SERPL-MCNC: 9.6 MG/DL (ref 8.5–10.1)
CHLORIDE SERPL-SCNC: 104 MMOL/L (ref 97–108)
CO2 SERPL-SCNC: 29 MMOL/L (ref 21–32)
CREAT SERPL-MCNC: 0.99 MG/DL (ref 0.7–1.3)
GLUCOSE SERPL-MCNC: 118 MG/DL (ref 65–100)
POTASSIUM SERPL-SCNC: 4.6 MMOL/L (ref 3.5–5.1)
SODIUM SERPL-SCNC: 137 MMOL/L (ref 136–145)

## 2025-02-03 PROCEDURE — 99213 OFFICE O/P EST LOW 20 MIN: CPT | Performed by: INTERNAL MEDICINE

## 2025-02-03 SDOH — ECONOMIC STABILITY: FOOD INSECURITY: WITHIN THE PAST 12 MONTHS, THE FOOD YOU BOUGHT JUST DIDN'T LAST AND YOU DIDN'T HAVE MONEY TO GET MORE.: NEVER TRUE

## 2025-02-03 SDOH — ECONOMIC STABILITY: FOOD INSECURITY: WITHIN THE PAST 12 MONTHS, YOU WORRIED THAT YOUR FOOD WOULD RUN OUT BEFORE YOU GOT MONEY TO BUY MORE.: NEVER TRUE

## 2025-02-03 ASSESSMENT — PATIENT HEALTH QUESTIONNAIRE - PHQ9
SUM OF ALL RESPONSES TO PHQ QUESTIONS 1-9: 0
SUM OF ALL RESPONSES TO PHQ9 QUESTIONS 1 & 2: 0
SUM OF ALL RESPONSES TO PHQ QUESTIONS 1-9: 0
2. FEELING DOWN, DEPRESSED OR HOPELESS: NOT AT ALL
1. LITTLE INTEREST OR PLEASURE IN DOING THINGS: NOT AT ALL

## 2025-02-03 NOTE — PROGRESS NOTES
\"Have you been to the ER, urgent care clinic since your last visit?  Hospitalized since your last visit?\"    YES - When: approximately 3 months ago.  Where and Why: [facility] for chest pain.    “Have you seen or consulted any other health care providers outside our system since your last visit?”    NO

## 2025-02-03 NOTE — PROGRESS NOTES
PROGRESS NOTE  Name: Dionicio Dewey Sr   : 1935       ASSESSMENT/ PLAN:     Dionicio was seen today for dizziness and medicare awv.    Diagnoses and all orders for this visit:    Imbalance  -     BS - Physical Therapy at [facility]    Hypokalemia  -     Basic Metabolic Panel; Future    Return in about 4 months (around 6/3/2025) for Hypertension; may reschedule the visit in March.     I have reviewed the patient's medications and risks/side effects/benefits were discussed. Diagnosis(-es) explained to patient and questions answered. Literature provided where appropriate.                       SUBJECTIVE  Mr. Dionicio Dewey Sr presents today acutely for     Chief Complaint   Patient presents with    Dizziness    Medicare AWV     He had three occasions in the past week, where he almost fell while bending over. \"I would have toppled over if I wasn't so quick and agile.\" He otherwise has no new neurologic symptoms. He denies loss of consciousness or vertigo.     He ambulates with a cane.     He is interested in getting his potassium checked as well.     OBJECTIVE  /61   Pulse 74   Temp 97.7 °F (36.5 °C) (Temporal)   Resp 18   Ht 1.829 m (6')   Wt 103.4 kg (228 lb)   SpO2 97%   BMI 30.92 kg/m²   Gen: Pleasant 89 y.o.  male in NAD.   HEENT: PERRLA. EOMI. OP moist and pink.  Neck: Supple.  No LAD.  HEART: RRR, No M/G/R.   LUNGS: CTAB No W/R.   ABDOMEN: S, NT, ND, BS+.   EXTREMITIES: Warm. No C/C/E. MUSCULOSKELETAL: Normal ROM, muscle strength 5/5 all groups. NEURO: Alert and oriented x 3.  Cranial nerves grossly intact.  No focal sensory or motor deficits noted. SKIN: Warm. Dry. No rashes or other lesions noted.

## 2025-02-03 NOTE — PROGRESS NOTES
Medicare Annual Wellness Visit    Dionicio HEBERT Maco Campbell is here for Dizziness and Medicare AWV    Assessment & Plan   Medicare annual wellness visit, subsequent  Imbalance  -     [facility] - Physical Therapy at [facility]  Hypokalemia  -     Basic Metabolic Panel; Future       Return if symptoms worsen or fail to improve.     Subjective       Patient's complete Health Risk Assessment and screening values have been reviewed and are found in Flowsheets. The following problems were reviewed today and where indicated follow up appointments were made and/or referrals ordered.    Positive Risk Factor Screenings with Interventions:    Fall Risk:  Do you feel unsteady or are you worried about falling? : (!) yes  2 or more falls in past year?: no  Fall with injury in past year?: no     Interventions:    Reviewed medications, home hazards, visual acuity, and co-morbidities that can increase risk for falls  See AVS for additional education material               Abnormal BMI (obese):  Body mass index is 30.92 kg/m². (!) Abnormal  Interventions:  See AVS for additional education material           Safety:  Do you always fasten your seatbelt when you are in a car?: (!) No  Interventions:  See AVS for additional education material           Cognitive screening was normal.         Objective   Vitals:    02/03/25 0846   BP: 104/61   Pulse: 74   Resp: 18   Temp: 97.7 °F (36.5 °C)   TempSrc: Temporal   SpO2: 97%   Weight: 103.4 kg (228 lb)   Height: 1.829 m (6')      Body mass index is 30.92 kg/m².                    Allergies   Allergen Reactions    Azithromycin Anaphylaxis    Gadolinium Derivatives Other (See Comments)     1) Moderate to Severe.  2) Post Gadolinium issues as noted:   - Diaphoretic, Near Syncope, Nausea and vomiting.    Amlodipine Other (See Comments)     Numbness and tingling    Diclofenac Sodium Other (See Comments)     unknown    Ezetimibe Myalgia    Iodine Other (See Comments)     Decrease in BP    Levofloxacin

## 2025-04-01 RX ORDER — ALBUTEROL SULFATE 90 UG/1
2 INHALANT RESPIRATORY (INHALATION) EVERY 4 HOURS PRN
Qty: 36 G | Refills: 5 | Status: SHIPPED | OUTPATIENT
Start: 2025-04-01

## 2025-06-10 ENCOUNTER — OFFICE VISIT (OUTPATIENT)
Age: 89
End: 2025-06-10
Payer: MEDICARE

## 2025-06-10 VITALS
HEART RATE: 77 BPM | OXYGEN SATURATION: 99 % | DIASTOLIC BLOOD PRESSURE: 49 MMHG | SYSTOLIC BLOOD PRESSURE: 86 MMHG | HEIGHT: 72 IN | TEMPERATURE: 98.2 F | BODY MASS INDEX: 30.88 KG/M2 | WEIGHT: 228 LBS | RESPIRATION RATE: 20 BRPM

## 2025-06-10 DIAGNOSIS — I25.10 ASHD (ARTERIOSCLEROTIC HEART DISEASE): ICD-10-CM

## 2025-06-10 DIAGNOSIS — E03.9 HYPOTHYROIDISM, UNSPECIFIED TYPE: ICD-10-CM

## 2025-06-10 DIAGNOSIS — J42 CHRONIC BRONCHITIS, UNSPECIFIED CHRONIC BRONCHITIS TYPE (HCC): ICD-10-CM

## 2025-06-10 DIAGNOSIS — I25.118 CORONARY ARTERY DISEASE OF NATIVE ARTERY OF NATIVE HEART WITH STABLE ANGINA PECTORIS: Primary | ICD-10-CM

## 2025-06-10 DIAGNOSIS — M10.9 GOUT, UNSPECIFIED CAUSE, UNSPECIFIED CHRONICITY, UNSPECIFIED SITE: ICD-10-CM

## 2025-06-10 DIAGNOSIS — R73.9 HYPERGLYCEMIA, UNSPECIFIED: ICD-10-CM

## 2025-06-10 DIAGNOSIS — C61 MALIGNANT NEOPLASM OF PROSTATE (HCC): ICD-10-CM

## 2025-06-10 DIAGNOSIS — I49.5 SICK SINUS SYNDROME (HCC): ICD-10-CM

## 2025-06-10 DIAGNOSIS — N18.31 CHRONIC KIDNEY DISEASE, STAGE 3A (HCC): ICD-10-CM

## 2025-06-10 PROCEDURE — 1123F ACP DISCUSS/DSCN MKR DOCD: CPT | Performed by: INTERNAL MEDICINE

## 2025-06-10 PROCEDURE — 99214 OFFICE O/P EST MOD 30 MIN: CPT | Performed by: INTERNAL MEDICINE

## 2025-06-10 PROCEDURE — 1159F MED LIST DOCD IN RCRD: CPT | Performed by: INTERNAL MEDICINE

## 2025-06-10 ASSESSMENT — PATIENT HEALTH QUESTIONNAIRE - PHQ9
SUM OF ALL RESPONSES TO PHQ QUESTIONS 1-9: 0
2. FEELING DOWN, DEPRESSED OR HOPELESS: NOT AT ALL
SUM OF ALL RESPONSES TO PHQ QUESTIONS 1-9: 0
1. LITTLE INTEREST OR PLEASURE IN DOING THINGS: NOT AT ALL

## 2025-06-10 NOTE — PROGRESS NOTES
PROGRESS NOTE  Name: Dionicio Dewey Sr   : 1935       ASSESSMENT/ PLAN:     CAD: No CP.  As per his cardiologist, Dr. Arango.  BP is a bit on the low side; sounds like he is also having some orthostasis at times. No syncope. Advised to take it slow and easy when arising. Follow up with cardiology regarding BP.   A flutter, SSS, Mobitz type II block: s/p pacemaker. As per cardiology.   Aortic stenosis: s/p AVR 2016.  Doing well.    Asthma: Stable.  Uses albuterol prn; Less than daily currently.   Prostate Cancer: So far, doing okay with surveillance. Patient reaffirms desire for conservative approach today. Surveillance for now.  Pt willing to do hormonal treatments if PSA rises to higher levels. His urologist has suggested a PSA of 20 or above as a triggering value  Problem drinking: He has mostly stopped drinking since COVID.     Hyperlipidemia:  Not at goal, but options limited.  Did not tolerate simvastatin or zetia.  Now on fish oil.  Will recheck lipids and CMP.  Continue pulse dosing of Crestor.  GERD:  Controlled on prn prilosec; Pt. may continue this.  ED: Not discussed.  All rhinitis: Stable.   Possible TIA/Diplopia/Amaurosis fugax: No recent episode.  Work up as above.  Gout: no recent flares.  Borderline DM. Recheck labs.   Obesity: Watch diet.  More exercise as able (limited now by postsurgical recovery).   Knee pain: Follow up as desired with Orthopedics.   Hx of Noncompliance.    Follow up in 6 months.      I have reviewed the patient's medications and risks/side effects/benefits were discussed. Diagnosis(-es) explained to patient and questions answered. Literature provided where appropriate.                    SUBJECTIVE:   Mr. Dionicio Dewey Sr is a 89 y.o. male who presents today for follow up.      Chief Complaint   Patient presents with    Hypertension     F/u. No concerns.        \"I've got a bad case of DOA--damned old age.\"    Lately he has been coughing up a little bit of white sputum,

## 2025-07-15 RX ORDER — LEVOTHYROXINE SODIUM 50 UG/1
50 TABLET ORAL
Qty: 90 TABLET | Refills: 3 | Status: SHIPPED | OUTPATIENT
Start: 2025-07-15

## 2025-09-05 RX ORDER — POTASSIUM CHLORIDE 1500 MG/1
20 TABLET, EXTENDED RELEASE ORAL DAILY
Qty: 90 TABLET | Refills: 3 | OUTPATIENT
Start: 2025-09-05

## 2025-09-05 RX ORDER — POTASSIUM CHLORIDE 1500 MG/1
20 TABLET, EXTENDED RELEASE ORAL DAILY
Qty: 90 TABLET | Refills: 3 | Status: SHIPPED | OUTPATIENT
Start: 2025-09-05

## (undated) DEVICE — ANGIOGRAPHY KIT CUST [K0910930B] [MERIT MEDICAL SYSTEMS INC]

## (undated) DEVICE — SOLUTION IRRIG 3000ML 0.9% SOD CHL USP UROMATIC PLAS CONT

## (undated) DEVICE — SUTURE VCRL SZ 0 L36IN ABSRB UD L36MM CT-1 1/2 CIR J946H

## (undated) DEVICE — GLOVE SURG SZ 8 L12IN FNGR THK79MIL GRN LTX FREE

## (undated) DEVICE — ELECTRODE PT RET AD L9FT HI MOIST COND ADH HYDRGEL CORDED

## (undated) DEVICE — HI-TORQUE VERSACORE FLOPPY GUIDE WIRE SYSTEM 145 CM: Brand: HI-TORQUE VERSACORE

## (undated) DEVICE — SYRINGE 20ML LL S/C 50

## (undated) DEVICE — 3M™ TEGADERM™ TRANSPARENT FILM DRESSING FRAME STYLE, 1626W, 4 IN X 4-3/4 IN (10 CM X 12 CM), 50/CT 4CT/CASE: Brand: 3M™ TEGADERM™

## (undated) DEVICE — CATHETER GUID 6FR L100CM DIA0.071IN NYL SHFT EBU3.5

## (undated) DEVICE — CLEANSER WND CLN DEB SYS IRRISEPT

## (undated) DEVICE — TRANSFER SET 3": Brand: MEDLINE INDUSTRIES, INC.

## (undated) DEVICE — ADHESIVE SKIN CLOSURE WND 8.661X1.5 IN 22 CM LIQUIBAND SECUR

## (undated) DEVICE — TUBESET BNE PREP CO2 CARBOJET

## (undated) DEVICE — HEADLESS TROCHAR PIN 75MM: Brand: ZUK

## (undated) DEVICE — HANDPIECE SET WITH COAXIAL HIGH FLOW TIP AND SUCTION TUBE: Brand: INTERPULSE

## (undated) DEVICE — 3M™ IOBAN™ 2 ANTIMICROBIAL INCISE DRAPE 6650EZ: Brand: IOBAN™ 2

## (undated) DEVICE — SUTURE VCRL SZ 1 L36IN ABSRB UD L36MM CT-1 1/2 CIR J947H

## (undated) DEVICE — TR BAND RADIAL ARTERY COMPRESSION DEVICE: Brand: TR BAND

## (undated) DEVICE — ZIMMER® STERILE DISPOSABLE TOURNIQUET CUFF WITH PROTECTIVE SLEEVE AND PLC, DUAL PORT, SINGLE BLADDER, 34 IN. (86 CM)

## (undated) DEVICE — HEART CATH-MRMC: Brand: MEDLINE INDUSTRIES, INC.

## (undated) DEVICE — BANDAGE COMPR M W6INXL10YD WHT BGE VELC E MTRX HK AND LOOP

## (undated) DEVICE — Device: Brand: JELCO

## (undated) DEVICE — SUTURE ABSRB L30CM 2-0 VLT SPRL PDS + STRATAFIX SXPP1B410

## (undated) DEVICE — CEMENT MIXING SYSTEM WITH FEMORAL BREAKWAY NOZZLE: Brand: REVOLUTION

## (undated) DEVICE — BLADE SAW W11XL77.5MM THK1.23MM CUT THK1.17MM S STL RECIP

## (undated) DEVICE — TOTAL JOINT-MRMC: Brand: MEDLINE INDUSTRIES, INC.

## (undated) DEVICE — SYRINGE ANGIO 10 CC BRL STD PRNT POLYCARB LT BLU MEDALLION

## (undated) DEVICE — GLIDESHEATH SLENDER STAINLESS STEEL KIT: Brand: GLIDESHEATH SLENDER

## (undated) DEVICE — Device

## (undated) DEVICE — SOLUTION IRRIG 1000ML STRL H2O USP PLAS POUR BTL

## (undated) DEVICE — STRYKER PERFORMANCE SERIES SAGITTAL BLADE: Brand: STRYKER PERFORMANCE SERIES

## (undated) DEVICE — TUBING PRSS MON L6IN PVC M FEM CONN

## (undated) DEVICE — SUTURE STRATAFIX SZ 3-0 30CM NONABSORB UD 26MM FS 3/8 SXMP2B412

## (undated) DEVICE — PREP KIT PEEL PTCH POVIDONE IOD

## (undated) DEVICE — SPLINT WR VELC FOAM NEUT POS DISP FOR RAD ART ACC SFT STRP

## (undated) DEVICE — SC 3W HP RA OFF NB - PG: Brand: NAMIC

## (undated) DEVICE — KIT ACCS INTRO 4FR L10CM NDL 21GA L7CM GWIRE L40CM

## (undated) DEVICE — CATHETER ETER ANGIO L110CM OD5FR ID046IN L75CM 038IN 145DEG CARD

## (undated) DEVICE — SUTURE V-LOC 180 SZ 2-0 L12IN ABSRB VLT GS-21 L37MM 1/2 CIR VLOCM0315

## (undated) DEVICE — GLOVE ORTHO 8   MSG9480

## (undated) DEVICE — PRESSURE GUIDEWIRE: Brand: COMET™ II

## (undated) DEVICE — SUTURE STRATAFIX SPRL SZ 1 L14IN ABSRB VLT L48CM CTX 1/2 SXPD2B405

## (undated) DEVICE — DRESSING WND ISLAND STD 4X10 IN MULT LAYR STRL SILVERLON

## (undated) DEVICE — SPLINT WR POS F/ARTERIAL ACC -- BX/10

## (undated) DEVICE — ROSEN CURVED WIRE GUIDE: Brand: ROSEN

## (undated) DEVICE — GLIDESHEATH SLENDER ACCESS KIT: Brand: GLIDESHEATH SLENDER

## (undated) DEVICE — SYR POWER 150ML 8IN FILL TUBE --

## (undated) DEVICE — PROVE COVER: Brand: UNBRANDED

## (undated) DEVICE — APPLICATOR MEDICATED 26 CC SOLUTION HI LT ORNG CHLORAPREP

## (undated) DEVICE — DRAPE,ORTHOMAX,EXTREMITY: Brand: MEDLINE

## (undated) DEVICE — CATHETER DIAG 5FR L100CM LUMN ID0.047IN JR4 CRV 0 SIDE H

## (undated) DEVICE — GUIDEWIRE VASC L260CM 0.035IN J TIP L3MM PTFE FIX COR NAMIC

## (undated) DEVICE — RADIFOCUS OPTITORQUE ANGIOGRAPHIC CATHETER: Brand: OPTITORQUE

## (undated) DEVICE — VALVE BLEEDBK CTRL SGL UNIT COPILOT

## (undated) DEVICE — MEDI-TRACE CADENCE ADULT, DEFIBRILLATION ELECTRODE -RTS  (10 PR/PK) - PHYSIO-CONTROL: Brand: MEDI-TRACE CADENCE

## (undated) DEVICE — PLASMABLADE PS200-040 4.0: Brand: PLASMABLADE™

## (undated) DEVICE — SUTURE MONOCRYL STRATAFIX SPRL + SZ 4-0 L12IN ABSRB UD PS-2 SXMP1B117

## (undated) DEVICE — CATHETER DIAG 5FR L100CM LUMN ID0.047IN JL3.5 CRV 0 SIDE H

## (undated) DEVICE — TRAY,IRRIGATION,PISTON SYRINGE,60ML,STRL: Brand: MEDLINE

## (undated) DEVICE — PACK PROCEDURE SURG HRT CATH

## (undated) DEVICE — PACEMAKER PACK: Brand: MEDLINE INDUSTRIES, INC.